# Patient Record
Sex: FEMALE | Race: ASIAN | NOT HISPANIC OR LATINO | Employment: UNEMPLOYED | URBAN - METROPOLITAN AREA
[De-identification: names, ages, dates, MRNs, and addresses within clinical notes are randomized per-mention and may not be internally consistent; named-entity substitution may affect disease eponyms.]

---

## 2017-01-05 ENCOUNTER — OFFICE VISIT - HEALTHEAST (OUTPATIENT)
Dept: FAMILY MEDICINE | Facility: CLINIC | Age: 67
End: 2017-01-05

## 2017-01-05 DIAGNOSIS — R93.89 ABNORMAL CHEST X-RAY: ICD-10-CM

## 2017-01-05 DIAGNOSIS — F32.A DEPRESSION (EMOTION): ICD-10-CM
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
## 2017-01-05 DIAGNOSIS — S22.089D CLOSED FRACTURE OF ELEVENTH THORACIC VERTEBRA WITH ROUTINE HEALING, UNSPECIFIED FRACTURE MORPHOLOGY, SUBSEQUENT ENCOUNTER: ICD-10-CM

## 2017-01-05 DIAGNOSIS — E78.5 HYPERLIPIDEMIA, UNSPECIFIED HYPERLIPIDEMIA TYPE: ICD-10-CM

## 2017-01-05 ASSESSMENT — MIFFLIN-ST. JEOR: SCORE: 967.58

## 2017-02-02 ENCOUNTER — OFFICE VISIT - HEALTHEAST (OUTPATIENT)
Dept: FAMILY MEDICINE | Facility: CLINIC | Age: 67
End: 2017-02-02

## 2017-02-02 DIAGNOSIS — E78.5 HYPERLIPIDEMIA, UNSPECIFIED HYPERLIPIDEMIA TYPE: ICD-10-CM

## 2017-02-02 DIAGNOSIS — S22.089D CLOSED FRACTURE OF ELEVENTH THORACIC VERTEBRA WITH ROUTINE HEALING, UNSPECIFIED FRACTURE MORPHOLOGY, SUBSEQUENT ENCOUNTER: ICD-10-CM

## 2017-02-02 DIAGNOSIS — F32.A DEPRESSION (EMOTION): ICD-10-CM

## 2017-02-02 DIAGNOSIS — K92.1 BLOOD IN STOOL: ICD-10-CM

## 2017-02-02 DIAGNOSIS — K64.9 HEMORRHOIDS, UNSPECIFIED HEMORRHOID TYPE: ICD-10-CM

## 2017-02-02 ASSESSMENT — MIFFLIN-ST. JEOR: SCORE: 939.51

## 2017-02-08 ENCOUNTER — AMBULATORY - HEALTHEAST (OUTPATIENT)
Dept: NURSING | Facility: CLINIC | Age: 67
End: 2017-02-08

## 2017-02-08 DIAGNOSIS — Z12.11 SCREENING FOR COLON CANCER: ICD-10-CM

## 2017-02-13 ENCOUNTER — RECORDS - HEALTHEAST (OUTPATIENT)
Dept: ADMINISTRATIVE | Facility: OTHER | Age: 67
End: 2017-02-13

## 2017-02-14 ENCOUNTER — RECORDS - HEALTHEAST (OUTPATIENT)
Dept: ADMINISTRATIVE | Facility: OTHER | Age: 67
End: 2017-02-14

## 2017-05-10 ENCOUNTER — OFFICE VISIT - HEALTHEAST (OUTPATIENT)
Dept: FAMILY MEDICINE | Facility: CLINIC | Age: 67
End: 2017-05-10

## 2017-05-10 DIAGNOSIS — Z23 IMMUNIZATION DUE: ICD-10-CM

## 2017-05-10 DIAGNOSIS — E78.5 HYPERLIPIDEMIA, UNSPECIFIED HYPERLIPIDEMIA TYPE: ICD-10-CM

## 2017-05-10 DIAGNOSIS — F32.A DEPRESSION (EMOTION): ICD-10-CM

## 2017-05-10 LAB
CHOLEST SERPL-MCNC: 230 MG/DL
FASTING STATUS PATIENT QL REPORTED: NO
HDLC SERPL-MCNC: 42 MG/DL
LDLC SERPL CALC-MCNC: 152 MG/DL
TRIGL SERPL-MCNC: 181 MG/DL

## 2017-05-10 ASSESSMENT — MIFFLIN-ST. JEOR: SCORE: 981.18

## 2017-05-31 ENCOUNTER — OFFICE VISIT - HEALTHEAST (OUTPATIENT)
Dept: FAMILY MEDICINE | Facility: CLINIC | Age: 67
End: 2017-05-31

## 2017-05-31 ENCOUNTER — RECORDS - HEALTHEAST (OUTPATIENT)
Dept: GENERAL RADIOLOGY | Facility: CLINIC | Age: 67
End: 2017-05-31

## 2017-05-31 DIAGNOSIS — R07.81 PLEURODYNIA: ICD-10-CM

## 2017-05-31 DIAGNOSIS — R07.81 RIB PAIN ON RIGHT SIDE: ICD-10-CM

## 2017-05-31 ASSESSMENT — MIFFLIN-ST. JEOR: SCORE: 974.72

## 2017-06-02 ENCOUNTER — OFFICE VISIT - HEALTHEAST (OUTPATIENT)
Dept: FAMILY MEDICINE | Facility: CLINIC | Age: 67
End: 2017-06-02

## 2017-06-02 DIAGNOSIS — R07.81 RIB PAIN ON RIGHT SIDE: ICD-10-CM

## 2017-06-02 ASSESSMENT — MIFFLIN-ST. JEOR: SCORE: 970.41

## 2017-07-26 ENCOUNTER — OFFICE VISIT - HEALTHEAST (OUTPATIENT)
Dept: FAMILY MEDICINE | Facility: CLINIC | Age: 67
End: 2017-07-26

## 2017-07-26 DIAGNOSIS — Z28.39 IMMUNIZATION DEFICIENCY: ICD-10-CM

## 2017-07-26 ASSESSMENT — MIFFLIN-ST. JEOR: SCORE: 978.35

## 2017-08-10 ENCOUNTER — OFFICE VISIT - HEALTHEAST (OUTPATIENT)
Dept: FAMILY MEDICINE | Facility: CLINIC | Age: 67
End: 2017-08-10

## 2017-08-10 DIAGNOSIS — R07.81 RIB PAIN ON RIGHT SIDE: ICD-10-CM

## 2017-08-10 DIAGNOSIS — F32.A DEPRESSION: ICD-10-CM

## 2017-08-10 DIAGNOSIS — L85.3 DRY SKIN DERMATITIS: ICD-10-CM

## 2017-08-10 DIAGNOSIS — E78.5 HYPERLIPIDEMIA, UNSPECIFIED HYPERLIPIDEMIA TYPE: ICD-10-CM

## 2017-08-10 LAB
CHOLEST SERPL-MCNC: 221 MG/DL
FASTING STATUS PATIENT QL REPORTED: NO
HDLC SERPL-MCNC: 35 MG/DL
LDLC SERPL CALC-MCNC: 117 MG/DL
TRIGL SERPL-MCNC: 345 MG/DL

## 2017-08-10 ASSESSMENT — MIFFLIN-ST. JEOR: SCORE: 980.9

## 2017-11-10 ENCOUNTER — OFFICE VISIT - HEALTHEAST (OUTPATIENT)
Dept: FAMILY MEDICINE | Facility: CLINIC | Age: 67
End: 2017-11-10

## 2017-11-10 DIAGNOSIS — L85.3 DRY SKIN DERMATITIS: ICD-10-CM

## 2017-11-10 DIAGNOSIS — E78.5 HYPERLIPIDEMIA, UNSPECIFIED HYPERLIPIDEMIA TYPE: ICD-10-CM

## 2017-11-10 DIAGNOSIS — M25.512 LEFT SHOULDER PAIN: ICD-10-CM

## 2017-11-10 DIAGNOSIS — Z23 NEED FOR INFLUENZA VACCINATION: ICD-10-CM

## 2017-11-10 LAB
ALT SERPL W P-5'-P-CCNC: 23 U/L (ref 0–45)
AST SERPL W P-5'-P-CCNC: 23 U/L (ref 0–40)
CHOLEST SERPL-MCNC: 229 MG/DL
FASTING STATUS PATIENT QL REPORTED: YES
HDLC SERPL-MCNC: 41 MG/DL
LDLC SERPL CALC-MCNC: 148 MG/DL
TRIGL SERPL-MCNC: 201 MG/DL

## 2017-11-10 ASSESSMENT — MIFFLIN-ST. JEOR: SCORE: 988.82

## 2017-12-22 ENCOUNTER — OFFICE VISIT - HEALTHEAST (OUTPATIENT)
Dept: FAMILY MEDICINE | Facility: CLINIC | Age: 67
End: 2017-12-22

## 2017-12-22 ENCOUNTER — AMBULATORY - HEALTHEAST (OUTPATIENT)
Dept: FAMILY MEDICINE | Facility: CLINIC | Age: 67
End: 2017-12-22

## 2017-12-22 ENCOUNTER — COMMUNICATION - HEALTHEAST (OUTPATIENT)
Dept: FAMILY MEDICINE | Facility: CLINIC | Age: 67
End: 2017-12-22

## 2017-12-22 DIAGNOSIS — M25.512 LEFT SHOULDER PAIN: ICD-10-CM

## 2017-12-22 DIAGNOSIS — E78.5 HYPERLIPIDEMIA, UNSPECIFIED HYPERLIPIDEMIA TYPE: ICD-10-CM

## 2017-12-22 ASSESSMENT — MIFFLIN-ST. JEOR: SCORE: 997.05

## 2018-02-02 ENCOUNTER — OFFICE VISIT - HEALTHEAST (OUTPATIENT)
Dept: FAMILY MEDICINE | Facility: CLINIC | Age: 68
End: 2018-02-02

## 2018-02-02 DIAGNOSIS — M25.512 LEFT SHOULDER PAIN: ICD-10-CM

## 2018-02-02 DIAGNOSIS — E78.5 HYPERLIPIDEMIA, UNSPECIFIED HYPERLIPIDEMIA TYPE: ICD-10-CM

## 2018-02-02 ASSESSMENT — MIFFLIN-ST. JEOR: SCORE: 1001.58

## 2018-02-06 ENCOUNTER — COMMUNICATION - HEALTHEAST (OUTPATIENT)
Dept: FAMILY MEDICINE | Facility: CLINIC | Age: 68
End: 2018-02-06

## 2018-02-06 DIAGNOSIS — E78.5 HYPERLIPIDEMIA, UNSPECIFIED HYPERLIPIDEMIA TYPE: ICD-10-CM

## 2018-02-28 ENCOUNTER — RECORDS - HEALTHEAST (OUTPATIENT)
Dept: MAMMOGRAPHY | Facility: CLINIC | Age: 68
End: 2018-02-28

## 2018-02-28 ENCOUNTER — OFFICE VISIT - HEALTHEAST (OUTPATIENT)
Dept: FAMILY MEDICINE | Facility: CLINIC | Age: 68
End: 2018-02-28

## 2018-02-28 DIAGNOSIS — R10.13 EPIGASTRIC PAIN: ICD-10-CM

## 2018-02-28 DIAGNOSIS — R03.0 ELEVATED BP WITHOUT DIAGNOSIS OF HYPERTENSION: ICD-10-CM

## 2018-02-28 DIAGNOSIS — Z12.31 ENCOUNTER FOR SCREENING MAMMOGRAM FOR MALIGNANT NEOPLASM OF BREAST: ICD-10-CM

## 2018-02-28 DIAGNOSIS — N64.4 BREAST PAIN IN FEMALE: ICD-10-CM

## 2018-02-28 ASSESSMENT — MIFFLIN-ST. JEOR: SCORE: 1015.19

## 2018-03-14 ENCOUNTER — OFFICE VISIT - HEALTHEAST (OUTPATIENT)
Dept: FAMILY MEDICINE | Facility: CLINIC | Age: 68
End: 2018-03-14

## 2018-03-14 DIAGNOSIS — N64.4 PAIN OF BOTH BREASTS: ICD-10-CM

## 2018-03-14 DIAGNOSIS — E78.5 HYPERLIPIDEMIA, UNSPECIFIED HYPERLIPIDEMIA TYPE: ICD-10-CM

## 2018-03-14 DIAGNOSIS — L85.3 DRY SKIN DERMATITIS: ICD-10-CM

## 2018-03-14 DIAGNOSIS — I10 ESSENTIAL HYPERTENSION: ICD-10-CM

## 2018-03-14 LAB
ALBUMIN SERPL-MCNC: 3.5 G/DL (ref 3.5–5)
ALP SERPL-CCNC: 70 U/L (ref 45–120)
ALT SERPL W P-5'-P-CCNC: 26 U/L (ref 0–45)
ANION GAP SERPL CALCULATED.3IONS-SCNC: 9 MMOL/L (ref 5–18)
AST SERPL W P-5'-P-CCNC: 23 U/L (ref 0–40)
BILIRUB SERPL-MCNC: 0.5 MG/DL (ref 0–1)
BUN SERPL-MCNC: 12 MG/DL (ref 8–22)
CALCIUM SERPL-MCNC: 9.3 MG/DL (ref 8.5–10.5)
CHLORIDE BLD-SCNC: 105 MMOL/L (ref 98–107)
CHOLEST SERPL-MCNC: 225 MG/DL
CO2 SERPL-SCNC: 28 MMOL/L (ref 22–31)
CREAT SERPL-MCNC: 0.83 MG/DL (ref 0.6–1.1)
FASTING STATUS PATIENT QL REPORTED: NO
GFR SERPL CREATININE-BSD FRML MDRD: >60 ML/MIN/1.73M2
GLUCOSE BLD-MCNC: 87 MG/DL (ref 70–125)
HDLC SERPL-MCNC: 41 MG/DL
LDLC SERPL CALC-MCNC: 154 MG/DL
POTASSIUM BLD-SCNC: 4.4 MMOL/L (ref 3.5–5)
PROT SERPL-MCNC: 6.8 G/DL (ref 6–8)
SODIUM SERPL-SCNC: 142 MMOL/L (ref 136–145)
TRIGL SERPL-MCNC: 151 MG/DL

## 2018-03-14 ASSESSMENT — MIFFLIN-ST. JEOR: SCORE: 1012.64

## 2018-05-16 ENCOUNTER — OFFICE VISIT - HEALTHEAST (OUTPATIENT)
Dept: FAMILY MEDICINE | Facility: CLINIC | Age: 68
End: 2018-05-16

## 2018-05-16 DIAGNOSIS — Z00.00 ROUTINE GENERAL MEDICAL EXAMINATION AT A HEALTH CARE FACILITY: ICD-10-CM

## 2018-05-16 DIAGNOSIS — Z23 IMMUNIZATION DUE: ICD-10-CM

## 2018-05-16 DIAGNOSIS — I10 ESSENTIAL HYPERTENSION: ICD-10-CM

## 2018-05-16 DIAGNOSIS — E78.5 HYPERLIPIDEMIA, UNSPECIFIED HYPERLIPIDEMIA TYPE: ICD-10-CM

## 2018-05-16 DIAGNOSIS — F32.A DEPRESSION: ICD-10-CM

## 2018-05-16 ASSESSMENT — MIFFLIN-ST. JEOR: SCORE: 1007.55

## 2018-06-13 ENCOUNTER — OFFICE VISIT - HEALTHEAST (OUTPATIENT)
Dept: FAMILY MEDICINE | Facility: CLINIC | Age: 68
End: 2018-06-13

## 2018-06-13 DIAGNOSIS — I10 ESSENTIAL HYPERTENSION: ICD-10-CM

## 2018-06-13 DIAGNOSIS — E78.5 HYPERLIPIDEMIA, UNSPECIFIED HYPERLIPIDEMIA TYPE: ICD-10-CM

## 2018-06-13 DIAGNOSIS — F32.1 MODERATE SINGLE CURRENT EPISODE OF MAJOR DEPRESSIVE DISORDER (H): ICD-10-CM

## 2018-06-13 DIAGNOSIS — R91.8 PULMONARY NODULES: ICD-10-CM

## 2018-06-13 DIAGNOSIS — R05.9 COUGH: ICD-10-CM

## 2018-06-13 ASSESSMENT — MIFFLIN-ST. JEOR: SCORE: 1003.58

## 2018-07-12 ENCOUNTER — COMMUNICATION - HEALTHEAST (OUTPATIENT)
Dept: FAMILY MEDICINE | Facility: CLINIC | Age: 68
End: 2018-07-12

## 2018-07-13 ENCOUNTER — RECORDS - HEALTHEAST (OUTPATIENT)
Dept: ADMINISTRATIVE | Facility: OTHER | Age: 68
End: 2018-07-13

## 2018-08-15 ENCOUNTER — OFFICE VISIT - HEALTHEAST (OUTPATIENT)
Dept: FAMILY MEDICINE | Facility: CLINIC | Age: 68
End: 2018-08-15

## 2018-08-15 DIAGNOSIS — I10 ESSENTIAL HYPERTENSION: ICD-10-CM

## 2018-08-15 DIAGNOSIS — E78.5 HYPERLIPIDEMIA, UNSPECIFIED HYPERLIPIDEMIA TYPE: ICD-10-CM

## 2018-08-15 DIAGNOSIS — F32.1 MODERATE SINGLE CURRENT EPISODE OF MAJOR DEPRESSIVE DISORDER (H): ICD-10-CM

## 2018-08-15 DIAGNOSIS — R20.8 BURNING SENSATION OF FEET: ICD-10-CM

## 2018-08-15 DIAGNOSIS — R05.9 COUGH: ICD-10-CM

## 2018-08-15 LAB
CHOLEST SERPL-MCNC: 203 MG/DL
FASTING STATUS PATIENT QL REPORTED: NO
HDLC SERPL-MCNC: 36 MG/DL
LDLC SERPL CALC-MCNC: 127 MG/DL
TRIGL SERPL-MCNC: 199 MG/DL

## 2018-08-15 ASSESSMENT — MIFFLIN-ST. JEOR: SCORE: 1011.52

## 2018-09-26 ENCOUNTER — OFFICE VISIT - HEALTHEAST (OUTPATIENT)
Dept: FAMILY MEDICINE | Facility: CLINIC | Age: 68
End: 2018-09-26

## 2018-09-26 DIAGNOSIS — I10 ESSENTIAL HYPERTENSION: ICD-10-CM

## 2018-09-26 DIAGNOSIS — E78.5 HYPERLIPIDEMIA, UNSPECIFIED HYPERLIPIDEMIA TYPE: ICD-10-CM

## 2018-09-26 DIAGNOSIS — D22.9 ATYPICAL NEVI: ICD-10-CM

## 2018-09-26 DIAGNOSIS — R05.9 COUGH: ICD-10-CM

## 2018-09-26 DIAGNOSIS — Z23 NEED FOR INFLUENZA VACCINATION: ICD-10-CM

## 2018-09-26 DIAGNOSIS — R20.8 BURNING SENSATION IN LOWER EXTREMITY: ICD-10-CM

## 2018-09-26 ASSESSMENT — MIFFLIN-ST. JEOR: SCORE: 1015.77

## 2018-10-29 ENCOUNTER — RECORDS - HEALTHEAST (OUTPATIENT)
Dept: ADMINISTRATIVE | Facility: OTHER | Age: 68
End: 2018-10-29

## 2018-11-05 ENCOUNTER — COMMUNICATION - HEALTHEAST (OUTPATIENT)
Dept: FAMILY MEDICINE | Facility: CLINIC | Age: 68
End: 2018-11-05

## 2018-11-05 DIAGNOSIS — F32.A DEPRESSION: ICD-10-CM

## 2018-11-07 ENCOUNTER — COMMUNICATION - HEALTHEAST (OUTPATIENT)
Dept: FAMILY MEDICINE | Facility: CLINIC | Age: 68
End: 2018-11-07

## 2018-11-07 ENCOUNTER — OFFICE VISIT - HEALTHEAST (OUTPATIENT)
Dept: FAMILY MEDICINE | Facility: CLINIC | Age: 68
End: 2018-11-07

## 2018-11-07 DIAGNOSIS — F32.1 MODERATE SINGLE CURRENT EPISODE OF MAJOR DEPRESSIVE DISORDER (H): ICD-10-CM

## 2018-11-07 DIAGNOSIS — L85.3 DRY SKIN: ICD-10-CM

## 2018-11-07 DIAGNOSIS — I10 ESSENTIAL HYPERTENSION: ICD-10-CM

## 2018-11-07 DIAGNOSIS — C44.300 SKIN CANCER OF FACE: ICD-10-CM

## 2018-11-07 DIAGNOSIS — E78.5 HYPERLIPIDEMIA, UNSPECIFIED HYPERLIPIDEMIA TYPE: ICD-10-CM

## 2018-11-07 LAB
CHOLEST SERPL-MCNC: 242 MG/DL
FASTING STATUS PATIENT QL REPORTED: YES
HDLC SERPL-MCNC: 37 MG/DL
LDLC SERPL CALC-MCNC: 168 MG/DL
TRIGL SERPL-MCNC: 185 MG/DL

## 2018-11-07 ASSESSMENT — MIFFLIN-ST. JEOR: SCORE: 1011.94

## 2018-11-15 ENCOUNTER — RECORDS - HEALTHEAST (OUTPATIENT)
Dept: ADMINISTRATIVE | Facility: OTHER | Age: 68
End: 2018-11-15

## 2019-01-03 ENCOUNTER — OFFICE VISIT - HEALTHEAST (OUTPATIENT)
Dept: FAMILY MEDICINE | Facility: CLINIC | Age: 69
End: 2019-01-03

## 2019-01-03 DIAGNOSIS — I10 ESSENTIAL HYPERTENSION: ICD-10-CM

## 2019-01-03 DIAGNOSIS — C44.300 SKIN CANCER OF FACE: ICD-10-CM

## 2019-01-03 DIAGNOSIS — F32.1 MODERATE SINGLE CURRENT EPISODE OF MAJOR DEPRESSIVE DISORDER (H): ICD-10-CM

## 2019-01-03 DIAGNOSIS — E78.5 HYPERLIPIDEMIA, UNSPECIFIED HYPERLIPIDEMIA TYPE: ICD-10-CM

## 2019-01-03 DIAGNOSIS — L30.9 DERMATITIS: ICD-10-CM

## 2019-01-03 ASSESSMENT — MIFFLIN-ST. JEOR: SCORE: 1026.29

## 2019-02-05 ENCOUNTER — COMMUNICATION - HEALTHEAST (OUTPATIENT)
Dept: FAMILY MEDICINE | Facility: CLINIC | Age: 69
End: 2019-02-05

## 2019-02-05 DIAGNOSIS — E78.5 HYPERLIPIDEMIA, UNSPECIFIED HYPERLIPIDEMIA TYPE: ICD-10-CM

## 2019-03-12 ENCOUNTER — COMMUNICATION - HEALTHEAST (OUTPATIENT)
Dept: FAMILY MEDICINE | Facility: CLINIC | Age: 69
End: 2019-03-12

## 2019-03-12 DIAGNOSIS — E78.5 HYPERLIPIDEMIA, UNSPECIFIED HYPERLIPIDEMIA TYPE: ICD-10-CM

## 2019-03-12 DIAGNOSIS — L30.9 DERMATITIS: ICD-10-CM

## 2019-05-14 ENCOUNTER — COMMUNICATION - HEALTHEAST (OUTPATIENT)
Dept: FAMILY MEDICINE | Facility: CLINIC | Age: 69
End: 2019-05-14

## 2019-05-15 ENCOUNTER — OFFICE VISIT - HEALTHEAST (OUTPATIENT)
Dept: FAMILY MEDICINE | Facility: CLINIC | Age: 69
End: 2019-05-15

## 2019-05-15 DIAGNOSIS — R42 DIZZINESS: ICD-10-CM

## 2019-05-15 DIAGNOSIS — I10 ESSENTIAL HYPERTENSION: ICD-10-CM

## 2019-05-15 DIAGNOSIS — E78.5 HYPERLIPIDEMIA, UNSPECIFIED HYPERLIPIDEMIA TYPE: ICD-10-CM

## 2019-05-15 DIAGNOSIS — Z91.148 NONCOMPLIANCE WITH MEDICATION REGIMEN: ICD-10-CM

## 2019-05-15 LAB
ANION GAP SERPL CALCULATED.3IONS-SCNC: 13 MMOL/L (ref 5–18)
BUN SERPL-MCNC: 12 MG/DL (ref 8–22)
CALCIUM SERPL-MCNC: 9.9 MG/DL (ref 8.5–10.5)
CHLORIDE BLD-SCNC: 101 MMOL/L (ref 98–107)
CO2 SERPL-SCNC: 25 MMOL/L (ref 22–31)
CREAT SERPL-MCNC: 1.17 MG/DL (ref 0.6–1.1)
GFR SERPL CREATININE-BSD FRML MDRD: 46 ML/MIN/1.73M2
GLUCOSE BLD-MCNC: 220 MG/DL (ref 70–125)
POTASSIUM BLD-SCNC: 4.3 MMOL/L (ref 3.5–5)
SODIUM SERPL-SCNC: 139 MMOL/L (ref 136–145)

## 2019-05-15 ASSESSMENT — MIFFLIN-ST. JEOR: SCORE: 1034.79

## 2019-05-24 ENCOUNTER — OFFICE VISIT - HEALTHEAST (OUTPATIENT)
Dept: FAMILY MEDICINE | Facility: CLINIC | Age: 69
End: 2019-05-24

## 2019-05-24 DIAGNOSIS — L20.84 INTRINSIC ECZEMA: ICD-10-CM

## 2019-05-24 DIAGNOSIS — M25.512 CHRONIC LEFT SHOULDER PAIN: ICD-10-CM

## 2019-05-24 DIAGNOSIS — G89.29 CHRONIC LEFT SHOULDER PAIN: ICD-10-CM

## 2019-05-24 DIAGNOSIS — L85.3 DRY SKIN DERMATITIS: ICD-10-CM

## 2019-05-24 DIAGNOSIS — E78.5 HYPERLIPIDEMIA, UNSPECIFIED HYPERLIPIDEMIA TYPE: ICD-10-CM

## 2019-05-24 DIAGNOSIS — F32.1 MODERATE SINGLE CURRENT EPISODE OF MAJOR DEPRESSIVE DISORDER (H): ICD-10-CM

## 2019-05-24 RX ORDER — PETROLATUM 0.61 G/G
CREAM TOPICAL
Qty: 120 G | Refills: 3 | Status: SHIPPED | OUTPATIENT
Start: 2019-05-24 | End: 2023-04-11

## 2019-05-24 ASSESSMENT — MIFFLIN-ST. JEOR: SCORE: 1035.93

## 2019-06-18 ENCOUNTER — COMMUNICATION - HEALTHEAST (OUTPATIENT)
Dept: FAMILY MEDICINE | Facility: CLINIC | Age: 69
End: 2019-06-18

## 2019-06-18 ENCOUNTER — OFFICE VISIT - HEALTHEAST (OUTPATIENT)
Dept: FAMILY MEDICINE | Facility: CLINIC | Age: 69
End: 2019-06-18

## 2019-06-18 DIAGNOSIS — F32.1 MODERATE SINGLE CURRENT EPISODE OF MAJOR DEPRESSIVE DISORDER (H): ICD-10-CM

## 2019-06-18 DIAGNOSIS — I10 ESSENTIAL HYPERTENSION: ICD-10-CM

## 2019-06-18 DIAGNOSIS — R73.9 ELEVATED BLOOD SUGAR: ICD-10-CM

## 2019-06-18 DIAGNOSIS — R07.89 TIGHTNESS IN CHEST: ICD-10-CM

## 2019-06-18 DIAGNOSIS — M54.2 NECK PAIN: ICD-10-CM

## 2019-06-18 DIAGNOSIS — E78.5 HYPERLIPIDEMIA, UNSPECIFIED HYPERLIPIDEMIA TYPE: ICD-10-CM

## 2019-06-18 LAB
ANION GAP SERPL CALCULATED.3IONS-SCNC: 12 MMOL/L (ref 5–18)
BUN SERPL-MCNC: 14 MG/DL (ref 8–22)
CALCIUM SERPL-MCNC: 9.1 MG/DL (ref 8.5–10.5)
CHLORIDE BLD-SCNC: 104 MMOL/L (ref 98–107)
CHOLEST SERPL-MCNC: 206 MG/DL
CO2 SERPL-SCNC: 26 MMOL/L (ref 22–31)
CREAT SERPL-MCNC: 1.04 MG/DL (ref 0.6–1.1)
FASTING STATUS PATIENT QL REPORTED: NO
GFR SERPL CREATININE-BSD FRML MDRD: 53 ML/MIN/1.73M2
GLUCOSE BLD-MCNC: 98 MG/DL (ref 70–125)
HBA1C MFR BLD: 6.3 % (ref 3.5–6)
HDLC SERPL-MCNC: 34 MG/DL
LDLC SERPL CALC-MCNC: 118 MG/DL
POTASSIUM BLD-SCNC: 4.5 MMOL/L (ref 3.5–5)
SODIUM SERPL-SCNC: 142 MMOL/L (ref 136–145)
TRIGL SERPL-MCNC: 269 MG/DL

## 2019-06-18 ASSESSMENT — MIFFLIN-ST. JEOR: SCORE: 1044.99

## 2019-06-24 ENCOUNTER — HOSPITAL ENCOUNTER (OUTPATIENT)
Dept: CARDIOLOGY | Facility: HOSPITAL | Age: 69
Discharge: HOME | End: 2019-06-24
Attending: FAMILY MEDICINE

## 2019-06-24 DIAGNOSIS — R07.89 TIGHTNESS IN CHEST: ICD-10-CM

## 2019-06-24 LAB
CV STRESS CURRENT BP HE: NORMAL
CV STRESS CURRENT HR HE: 104
CV STRESS CURRENT HR HE: 107
CV STRESS CURRENT HR HE: 108
CV STRESS CURRENT HR HE: 114
CV STRESS CURRENT HR HE: 116
CV STRESS CURRENT HR HE: 118
CV STRESS CURRENT HR HE: 127
CV STRESS CURRENT HR HE: 130
CV STRESS CURRENT HR HE: 132
CV STRESS CURRENT HR HE: 59
CV STRESS CURRENT HR HE: 62
CV STRESS CURRENT HR HE: 62
CV STRESS CURRENT HR HE: 63
CV STRESS CURRENT HR HE: 64
CV STRESS CURRENT HR HE: 66
CV STRESS CURRENT HR HE: 67
CV STRESS CURRENT HR HE: 70
CV STRESS CURRENT HR HE: 75
CV STRESS CURRENT HR HE: 87
CV STRESS CURRENT HR HE: 91
CV STRESS CURRENT HR HE: 92
CV STRESS DEVIATION TIME HE: NORMAL
CV STRESS ECHO PERCENT HR HE: NORMAL
CV STRESS EXERCISE STAGE HE: NORMAL
CV STRESS EXERCISE STAGE REACHED HE: NORMAL
CV STRESS FINAL RESTING BP HE: NORMAL
CV STRESS FINAL RESTING HR HE: 59
CV STRESS MAX HR HE: 132
CV STRESS MAX TREADMILL GRADE HE: 5
CV STRESS MAX TREADMILL SPEED HE: 1.7
CV STRESS PEAK DIA BP HE: NORMAL
CV STRESS PEAK SYS BP HE: NORMAL
CV STRESS PHASE HE: NORMAL
CV STRESS PROTOCOL HE: NORMAL
CV STRESS RESTING PT POSITION HE: NORMAL
CV STRESS RESTING PT POSITION HE: NORMAL
CV STRESS ST DEVIATION AMOUNT HE: NORMAL
CV STRESS ST DEVIATION ELEVATION HE: NORMAL
CV STRESS ST EVELATION AMOUNT HE: NORMAL
CV STRESS TEST TYPE HE: NORMAL
CV STRESS TOTAL STAGE TIME MIN 1 HE: NORMAL
STRESS ECHO BASELINE BP: NORMAL
STRESS ECHO BASELINE HR: 65
STRESS ECHO CALCULATED PERCENT HR: 86 %
STRESS ECHO LAST STRESS BP: NORMAL
STRESS ECHO LAST STRESS HR: 130
STRESS ECHO POST ESTIMATED WORKLOAD: 3.5
STRESS ECHO POST EXERCISE DUR MIN: 4
STRESS ECHO POST EXERCISE DUR SEC: 19
STRESS ECHO TARGET HR: 131.58

## 2019-08-13 ENCOUNTER — OFFICE VISIT - HEALTHEAST (OUTPATIENT)
Dept: FAMILY MEDICINE | Facility: CLINIC | Age: 69
End: 2019-08-13

## 2019-08-13 ENCOUNTER — AMBULATORY - HEALTHEAST (OUTPATIENT)
Dept: FAMILY MEDICINE | Facility: CLINIC | Age: 69
End: 2019-08-13

## 2019-08-13 DIAGNOSIS — Z00.00 WELLNESS EXAMINATION: ICD-10-CM

## 2019-08-13 DIAGNOSIS — N95.1 MENOPAUSAL STATE: ICD-10-CM

## 2019-08-13 DIAGNOSIS — F32.1 MODERATE SINGLE CURRENT EPISODE OF MAJOR DEPRESSIVE DISORDER (H): ICD-10-CM

## 2019-08-13 ASSESSMENT — MIFFLIN-ST. JEOR: SCORE: 1036.47

## 2019-08-21 ENCOUNTER — OFFICE VISIT - HEALTHEAST (OUTPATIENT)
Dept: BEHAVIORAL HEALTH | Facility: CLINIC | Age: 69
End: 2019-08-21

## 2019-08-21 ENCOUNTER — AMBULATORY - HEALTHEAST (OUTPATIENT)
Dept: FAMILY MEDICINE | Facility: CLINIC | Age: 69
End: 2019-08-21

## 2019-08-21 ENCOUNTER — AMBULATORY - HEALTHEAST (OUTPATIENT)
Dept: PHARMACY | Facility: CLINIC | Age: 69
End: 2019-08-21

## 2019-08-21 DIAGNOSIS — F32.1 MODERATE SINGLE CURRENT EPISODE OF MAJOR DEPRESSIVE DISORDER (H): ICD-10-CM

## 2019-08-26 ENCOUNTER — AMBULATORY - HEALTHEAST (OUTPATIENT)
Dept: FAMILY MEDICINE | Facility: CLINIC | Age: 69
End: 2019-08-26

## 2019-08-26 DIAGNOSIS — M81.0 AGE-RELATED OSTEOPOROSIS WITHOUT CURRENT PATHOLOGICAL FRACTURE: ICD-10-CM

## 2019-09-04 ENCOUNTER — COMMUNICATION - HEALTHEAST (OUTPATIENT)
Dept: FAMILY MEDICINE | Facility: CLINIC | Age: 69
End: 2019-09-04

## 2019-09-04 ENCOUNTER — OFFICE VISIT - HEALTHEAST (OUTPATIENT)
Dept: PHARMACY | Facility: CLINIC | Age: 69
End: 2019-09-04

## 2019-09-04 ENCOUNTER — OFFICE VISIT - HEALTHEAST (OUTPATIENT)
Dept: BEHAVIORAL HEALTH | Facility: CLINIC | Age: 69
End: 2019-09-04

## 2019-09-04 DIAGNOSIS — M25.512 CHRONIC LEFT SHOULDER PAIN: ICD-10-CM

## 2019-09-04 DIAGNOSIS — F32.1 MODERATE SINGLE CURRENT EPISODE OF MAJOR DEPRESSIVE DISORDER (H): ICD-10-CM

## 2019-09-04 DIAGNOSIS — M81.0 AGE-RELATED OSTEOPOROSIS WITHOUT CURRENT PATHOLOGICAL FRACTURE: ICD-10-CM

## 2019-09-04 DIAGNOSIS — I10 ESSENTIAL HYPERTENSION: ICD-10-CM

## 2019-09-04 DIAGNOSIS — R52 PAIN: ICD-10-CM

## 2019-09-04 DIAGNOSIS — E78.5 HYPERLIPIDEMIA, UNSPECIFIED HYPERLIPIDEMIA TYPE: ICD-10-CM

## 2019-09-04 DIAGNOSIS — G89.29 CHRONIC LEFT SHOULDER PAIN: ICD-10-CM

## 2019-10-04 ENCOUNTER — COMMUNICATION - HEALTHEAST (OUTPATIENT)
Dept: FAMILY MEDICINE | Facility: CLINIC | Age: 69
End: 2019-10-04

## 2019-10-04 DIAGNOSIS — I10 ESSENTIAL HYPERTENSION: ICD-10-CM

## 2019-10-08 ENCOUNTER — AMBULATORY - HEALTHEAST (OUTPATIENT)
Dept: OTHER | Facility: CLINIC | Age: 69
End: 2019-10-08

## 2019-10-08 ENCOUNTER — DOCUMENTATION ONLY (OUTPATIENT)
Dept: OTHER | Facility: CLINIC | Age: 69
End: 2019-10-08

## 2019-10-08 ENCOUNTER — OFFICE VISIT - HEALTHEAST (OUTPATIENT)
Dept: PHARMACY | Facility: CLINIC | Age: 69
End: 2019-10-08

## 2019-10-08 DIAGNOSIS — I10 ESSENTIAL HYPERTENSION: ICD-10-CM

## 2019-10-08 DIAGNOSIS — E78.5 HYPERLIPIDEMIA, UNSPECIFIED HYPERLIPIDEMIA TYPE: ICD-10-CM

## 2019-10-08 DIAGNOSIS — R52 PAIN: ICD-10-CM

## 2019-10-08 DIAGNOSIS — M81.0 AGE-RELATED OSTEOPOROSIS WITHOUT CURRENT PATHOLOGICAL FRACTURE: ICD-10-CM

## 2019-10-08 DIAGNOSIS — F32.1 MODERATE SINGLE CURRENT EPISODE OF MAJOR DEPRESSIVE DISORDER (H): ICD-10-CM

## 2019-10-15 ENCOUNTER — OFFICE VISIT - HEALTHEAST (OUTPATIENT)
Dept: BEHAVIORAL HEALTH | Facility: CLINIC | Age: 69
End: 2019-10-15

## 2019-10-15 ENCOUNTER — OFFICE VISIT - HEALTHEAST (OUTPATIENT)
Dept: FAMILY MEDICINE | Facility: CLINIC | Age: 69
End: 2019-10-15

## 2019-10-15 DIAGNOSIS — F32.1 MODERATE SINGLE CURRENT EPISODE OF MAJOR DEPRESSIVE DISORDER (H): ICD-10-CM

## 2019-10-15 DIAGNOSIS — M81.0 AGE-RELATED OSTEOPOROSIS WITHOUT CURRENT PATHOLOGICAL FRACTURE: ICD-10-CM

## 2019-10-15 DIAGNOSIS — E78.5 HYPERLIPIDEMIA, UNSPECIFIED HYPERLIPIDEMIA TYPE: ICD-10-CM

## 2019-10-15 DIAGNOSIS — R73.03 PREDIABETES: ICD-10-CM

## 2019-10-15 DIAGNOSIS — Z23 IMMUNIZATION DUE: ICD-10-CM

## 2019-10-15 DIAGNOSIS — I10 ESSENTIAL HYPERTENSION: ICD-10-CM

## 2019-10-15 DIAGNOSIS — E11.9 TYPE 2 DIABETES MELLITUS TREATED WITHOUT INSULIN (H): ICD-10-CM

## 2019-10-15 LAB — HBA1C MFR BLD: 6.7 % (ref 3.5–6)

## 2019-10-15 ASSESSMENT — MIFFLIN-ST. JEOR: SCORE: 1050.07

## 2019-11-01 ENCOUNTER — OFFICE VISIT - HEALTHEAST (OUTPATIENT)
Dept: FAMILY MEDICINE | Facility: CLINIC | Age: 69
End: 2019-11-01

## 2019-11-01 ENCOUNTER — OFFICE VISIT - HEALTHEAST (OUTPATIENT)
Dept: PHARMACY | Facility: CLINIC | Age: 69
End: 2019-11-01

## 2019-11-01 DIAGNOSIS — E11.9 TYPE 2 DIABETES MELLITUS TREATED WITHOUT INSULIN (H): ICD-10-CM

## 2019-11-01 DIAGNOSIS — R30.0 DYSURIA: ICD-10-CM

## 2019-11-01 DIAGNOSIS — I95.9 HYPOTENSION, UNSPECIFIED HYPOTENSION TYPE: ICD-10-CM

## 2019-11-01 LAB
ANION GAP SERPL CALCULATED.3IONS-SCNC: 9 MMOL/L (ref 5–18)
BASOPHILS # BLD AUTO: 0.1 THOU/UL (ref 0–0.2)
BASOPHILS NFR BLD AUTO: 1 % (ref 0–2)
BUN SERPL-MCNC: 16 MG/DL (ref 8–22)
CALCIUM SERPL-MCNC: 9.1 MG/DL (ref 8.5–10.5)
CHLORIDE BLD-SCNC: 100 MMOL/L (ref 98–107)
CHOLEST SERPL-MCNC: 271 MG/DL
CO2 SERPL-SCNC: 29 MMOL/L (ref 22–31)
CREAT SERPL-MCNC: 1.42 MG/DL (ref 0.6–1.1)
EOSINOPHIL # BLD AUTO: 0.3 THOU/UL (ref 0–0.4)
EOSINOPHIL NFR BLD AUTO: 3 % (ref 0–6)
ERYTHROCYTE [DISTWIDTH] IN BLOOD BY AUTOMATED COUNT: 10.8 % (ref 11–14.5)
FASTING STATUS PATIENT QL REPORTED: NO
FASTING STATUS PATIENT QL REPORTED: YES
GFR SERPL CREATININE-BSD FRML MDRD: 37 ML/MIN/1.73M2
GLUCOSE BLD-MCNC: 106 MG/DL (ref 74–125)
GLUCOSE BLD-MCNC: 112 MG/DL (ref 70–125)
HCT VFR BLD AUTO: 41.3 % (ref 35–47)
HDLC SERPL-MCNC: 35 MG/DL
HGB BLD-MCNC: 13.8 G/DL (ref 12–16)
LDLC SERPL CALC-MCNC: 202 MG/DL
LYMPHOCYTES # BLD AUTO: 3.1 THOU/UL (ref 0.8–4.4)
LYMPHOCYTES NFR BLD AUTO: 26 % (ref 20–40)
MCH RBC QN AUTO: 32.5 PG (ref 27–34)
MCHC RBC AUTO-ENTMCNC: 33.5 G/DL (ref 32–36)
MCV RBC AUTO: 97 FL (ref 80–100)
MONOCYTES # BLD AUTO: 1 THOU/UL (ref 0–0.9)
MONOCYTES NFR BLD AUTO: 8 % (ref 2–10)
NEUTROPHILS # BLD AUTO: 7.7 THOU/UL (ref 2–7.7)
NEUTROPHILS NFR BLD AUTO: 63 % (ref 50–70)
PLATELET # BLD AUTO: 319 THOU/UL (ref 140–440)
PMV BLD AUTO: 6.7 FL (ref 7–10)
POTASSIUM BLD-SCNC: 4.7 MMOL/L (ref 3.5–5)
RBC # BLD AUTO: 4.25 MILL/UL (ref 3.8–5.4)
SODIUM SERPL-SCNC: 138 MMOL/L (ref 136–145)
TRIGL SERPL-MCNC: 168 MG/DL
WBC: 12.3 THOU/UL (ref 4–11)

## 2019-11-01 ASSESSMENT — MIFFLIN-ST. JEOR: SCORE: 1042.99

## 2019-11-05 ENCOUNTER — COMMUNICATION - HEALTHEAST (OUTPATIENT)
Dept: FAMILY MEDICINE | Facility: CLINIC | Age: 69
End: 2019-11-05

## 2019-11-07 ENCOUNTER — COMMUNICATION - HEALTHEAST (OUTPATIENT)
Dept: FAMILY MEDICINE | Facility: CLINIC | Age: 69
End: 2019-11-07

## 2019-11-07 ENCOUNTER — AMBULATORY - HEALTHEAST (OUTPATIENT)
Dept: FAMILY MEDICINE | Facility: CLINIC | Age: 69
End: 2019-11-07

## 2019-11-07 DIAGNOSIS — E78.5 HYPERLIPIDEMIA, UNSPECIFIED HYPERLIPIDEMIA TYPE: ICD-10-CM

## 2019-11-07 DIAGNOSIS — L20.84 INTRINSIC ECZEMA: ICD-10-CM

## 2019-11-20 ENCOUNTER — COMMUNICATION - HEALTHEAST (OUTPATIENT)
Dept: FAMILY MEDICINE | Facility: CLINIC | Age: 69
End: 2019-11-20

## 2019-11-25 ENCOUNTER — OFFICE VISIT - HEALTHEAST (OUTPATIENT)
Dept: BEHAVIORAL HEALTH | Facility: CLINIC | Age: 69
End: 2019-11-25

## 2019-11-25 DIAGNOSIS — F32.1 MODERATE SINGLE CURRENT EPISODE OF MAJOR DEPRESSIVE DISORDER (H): ICD-10-CM

## 2019-12-03 ENCOUNTER — COMMUNICATION - HEALTHEAST (OUTPATIENT)
Dept: FAMILY MEDICINE | Facility: CLINIC | Age: 69
End: 2019-12-03

## 2019-12-03 ENCOUNTER — OFFICE VISIT - HEALTHEAST (OUTPATIENT)
Dept: FAMILY MEDICINE | Facility: CLINIC | Age: 69
End: 2019-12-03

## 2019-12-03 DIAGNOSIS — N12 PYELONEPHRITIS: ICD-10-CM

## 2019-12-03 DIAGNOSIS — E11.9 TYPE 2 DIABETES MELLITUS TREATED WITHOUT INSULIN (H): ICD-10-CM

## 2019-12-03 DIAGNOSIS — M81.0 OSTEOPOROSIS, UNSPECIFIED OSTEOPOROSIS TYPE, UNSPECIFIED PATHOLOGICAL FRACTURE PRESENCE: ICD-10-CM

## 2019-12-03 DIAGNOSIS — I10 ESSENTIAL HYPERTENSION: ICD-10-CM

## 2019-12-03 DIAGNOSIS — E78.5 HYPERLIPIDEMIA, UNSPECIFIED HYPERLIPIDEMIA TYPE: ICD-10-CM

## 2019-12-03 DIAGNOSIS — R32 URINARY INCONTINENCE, UNSPECIFIED TYPE: ICD-10-CM

## 2019-12-03 LAB
ANION GAP SERPL CALCULATED.3IONS-SCNC: 8 MMOL/L (ref 5–18)
BUN SERPL-MCNC: 13 MG/DL (ref 8–22)
CALCIUM SERPL-MCNC: 9.1 MG/DL (ref 8.5–10.5)
CHLORIDE BLD-SCNC: 103 MMOL/L (ref 98–107)
CO2 SERPL-SCNC: 30 MMOL/L (ref 22–31)
CREAT SERPL-MCNC: 1.19 MG/DL (ref 0.6–1.1)
GFR SERPL CREATININE-BSD FRML MDRD: 45 ML/MIN/1.73M2
GLUCOSE BLD-MCNC: 141 MG/DL (ref 70–125)
POTASSIUM BLD-SCNC: 4.4 MMOL/L (ref 3.5–5)
SODIUM SERPL-SCNC: 141 MMOL/L (ref 136–145)

## 2019-12-03 ASSESSMENT — MIFFLIN-ST. JEOR: SCORE: 1048.34

## 2019-12-04 ENCOUNTER — OFFICE VISIT - HEALTHEAST (OUTPATIENT)
Dept: PHARMACY | Facility: CLINIC | Age: 69
End: 2019-12-04

## 2019-12-04 DIAGNOSIS — E78.5 HYPERLIPIDEMIA, UNSPECIFIED HYPERLIPIDEMIA TYPE: ICD-10-CM

## 2019-12-04 DIAGNOSIS — F32.1 MODERATE SINGLE CURRENT EPISODE OF MAJOR DEPRESSIVE DISORDER (H): ICD-10-CM

## 2019-12-04 DIAGNOSIS — R52 PAIN: ICD-10-CM

## 2019-12-04 DIAGNOSIS — M81.0 AGE-RELATED OSTEOPOROSIS WITHOUT CURRENT PATHOLOGICAL FRACTURE: ICD-10-CM

## 2019-12-04 DIAGNOSIS — I10 ESSENTIAL HYPERTENSION: ICD-10-CM

## 2019-12-04 DIAGNOSIS — E11.9 TYPE 2 DIABETES MELLITUS TREATED WITHOUT INSULIN (H): ICD-10-CM

## 2019-12-04 LAB
ALBUMIN UR-MCNC: NEGATIVE MG/DL
APPEARANCE UR: CLEAR
BACTERIA #/AREA URNS HPF: ABNORMAL HPF
BILIRUB UR QL STRIP: NEGATIVE
COLOR UR AUTO: YELLOW
GLUCOSE UR STRIP-MCNC: NEGATIVE MG/DL
HGB UR QL STRIP: ABNORMAL
KETONES UR STRIP-MCNC: NEGATIVE MG/DL
LEUKOCYTE ESTERASE UR QL STRIP: NEGATIVE
NITRATE UR QL: NEGATIVE
PH UR STRIP: 5.5 [PH] (ref 5–8)
RBC #/AREA URNS AUTO: ABNORMAL HPF
SP GR UR STRIP: 1.01 (ref 1–1.03)
SQUAMOUS #/AREA URNS AUTO: ABNORMAL LPF
UROBILINOGEN UR STRIP-ACNC: ABNORMAL
WBC #/AREA URNS AUTO: ABNORMAL HPF

## 2019-12-05 ENCOUNTER — COMMUNICATION - HEALTHEAST (OUTPATIENT)
Dept: FAMILY MEDICINE | Facility: CLINIC | Age: 69
End: 2019-12-05

## 2019-12-07 ENCOUNTER — COMMUNICATION - HEALTHEAST (OUTPATIENT)
Dept: FAMILY MEDICINE | Facility: CLINIC | Age: 69
End: 2019-12-07

## 2019-12-07 DIAGNOSIS — L20.84 INTRINSIC ECZEMA: ICD-10-CM

## 2019-12-10 ENCOUNTER — RECORDS - HEALTHEAST (OUTPATIENT)
Dept: ADMINISTRATIVE | Facility: OTHER | Age: 69
End: 2019-12-10

## 2019-12-13 ENCOUNTER — AMBULATORY - HEALTHEAST (OUTPATIENT)
Dept: FAMILY MEDICINE | Facility: CLINIC | Age: 69
End: 2019-12-13

## 2019-12-13 DIAGNOSIS — R32 URINARY INCONTINENCE, UNSPECIFIED TYPE: ICD-10-CM

## 2020-01-06 ENCOUNTER — COMMUNICATION - HEALTHEAST (OUTPATIENT)
Dept: FAMILY MEDICINE | Facility: CLINIC | Age: 70
End: 2020-01-06

## 2020-01-06 DIAGNOSIS — F32.1 MODERATE SINGLE CURRENT EPISODE OF MAJOR DEPRESSIVE DISORDER (H): ICD-10-CM

## 2020-01-06 DIAGNOSIS — L20.84 INTRINSIC ECZEMA: ICD-10-CM

## 2020-01-16 ENCOUNTER — OFFICE VISIT - HEALTHEAST (OUTPATIENT)
Dept: BEHAVIORAL HEALTH | Facility: CLINIC | Age: 70
End: 2020-01-16

## 2020-01-16 ENCOUNTER — AMBULATORY - HEALTHEAST (OUTPATIENT)
Dept: BEHAVIORAL HEALTH | Facility: CLINIC | Age: 70
End: 2020-01-16

## 2020-01-16 DIAGNOSIS — F32.1 MODERATE SINGLE CURRENT EPISODE OF MAJOR DEPRESSIVE DISORDER (H): ICD-10-CM

## 2020-02-04 ENCOUNTER — OFFICE VISIT - HEALTHEAST (OUTPATIENT)
Dept: FAMILY MEDICINE | Facility: CLINIC | Age: 70
End: 2020-02-04

## 2020-02-04 DIAGNOSIS — E11.9 TYPE 2 DIABETES MELLITUS TREATED WITHOUT INSULIN (H): ICD-10-CM

## 2020-02-04 DIAGNOSIS — E78.5 HYPERLIPIDEMIA, UNSPECIFIED HYPERLIPIDEMIA TYPE: ICD-10-CM

## 2020-02-04 DIAGNOSIS — F32.1 MODERATE SINGLE CURRENT EPISODE OF MAJOR DEPRESSIVE DISORDER (H): ICD-10-CM

## 2020-02-04 DIAGNOSIS — I10 ESSENTIAL HYPERTENSION: ICD-10-CM

## 2020-02-04 DIAGNOSIS — R20.8 BURNING SENSATION OF FEET: ICD-10-CM

## 2020-02-04 DIAGNOSIS — M81.0 OSTEOPOROSIS, UNSPECIFIED OSTEOPOROSIS TYPE, UNSPECIFIED PATHOLOGICAL FRACTURE PRESENCE: ICD-10-CM

## 2020-02-04 DIAGNOSIS — M25.512 LEFT SHOULDER PAIN, UNSPECIFIED CHRONICITY: ICD-10-CM

## 2020-02-04 LAB
ANION GAP SERPL CALCULATED.3IONS-SCNC: 11 MMOL/L (ref 5–18)
BUN SERPL-MCNC: 14 MG/DL (ref 8–22)
CALCIUM SERPL-MCNC: 9.5 MG/DL (ref 8.5–10.5)
CHLORIDE BLD-SCNC: 101 MMOL/L (ref 98–107)
CHOLEST SERPL-MCNC: 231 MG/DL
CO2 SERPL-SCNC: 29 MMOL/L (ref 22–31)
CREAT SERPL-MCNC: 1.17 MG/DL (ref 0.6–1.1)
FASTING STATUS PATIENT QL REPORTED: NO
GFR SERPL CREATININE-BSD FRML MDRD: 46 ML/MIN/1.73M2
GLUCOSE BLD-MCNC: 112 MG/DL (ref 70–125)
HBA1C MFR BLD: 6.8 % (ref 3.5–6)
HDLC SERPL-MCNC: 39 MG/DL
LDLC SERPL CALC-MCNC: 146 MG/DL
POTASSIUM BLD-SCNC: 4.3 MMOL/L (ref 3.5–5)
SODIUM SERPL-SCNC: 141 MMOL/L (ref 136–145)
TRIGL SERPL-MCNC: 232 MG/DL

## 2020-02-04 ASSESSMENT — PATIENT HEALTH QUESTIONNAIRE - PHQ9: SUM OF ALL RESPONSES TO PHQ QUESTIONS 1-9: 14

## 2020-02-04 ASSESSMENT — MIFFLIN-ST. JEOR: SCORE: 1036.49

## 2020-02-09 ENCOUNTER — COMMUNICATION - HEALTHEAST (OUTPATIENT)
Dept: FAMILY MEDICINE | Facility: CLINIC | Age: 70
End: 2020-02-09

## 2020-02-09 DIAGNOSIS — G89.29 CHRONIC LEFT SHOULDER PAIN: ICD-10-CM

## 2020-02-09 DIAGNOSIS — M25.512 CHRONIC LEFT SHOULDER PAIN: ICD-10-CM

## 2020-03-04 ENCOUNTER — OFFICE VISIT - HEALTHEAST (OUTPATIENT)
Dept: PHARMACY | Facility: CLINIC | Age: 70
End: 2020-03-04

## 2020-03-04 DIAGNOSIS — M81.0 AGE-RELATED OSTEOPOROSIS WITHOUT CURRENT PATHOLOGICAL FRACTURE: ICD-10-CM

## 2020-03-04 DIAGNOSIS — E11.9 TYPE 2 DIABETES MELLITUS TREATED WITHOUT INSULIN (H): ICD-10-CM

## 2020-03-04 DIAGNOSIS — R52 PAIN: ICD-10-CM

## 2020-03-04 DIAGNOSIS — E78.5 HYPERLIPIDEMIA, UNSPECIFIED HYPERLIPIDEMIA TYPE: ICD-10-CM

## 2020-03-04 DIAGNOSIS — F32.1 MODERATE SINGLE CURRENT EPISODE OF MAJOR DEPRESSIVE DISORDER (H): ICD-10-CM

## 2020-03-04 DIAGNOSIS — I10 ESSENTIAL HYPERTENSION: ICD-10-CM

## 2020-03-09 ENCOUNTER — COMMUNICATION - HEALTHEAST (OUTPATIENT)
Dept: FAMILY MEDICINE | Facility: CLINIC | Age: 70
End: 2020-03-09

## 2020-03-09 DIAGNOSIS — G89.29 CHRONIC LEFT SHOULDER PAIN: ICD-10-CM

## 2020-03-09 DIAGNOSIS — M25.512 CHRONIC LEFT SHOULDER PAIN: ICD-10-CM

## 2020-03-10 ENCOUNTER — RECORDS - HEALTHEAST (OUTPATIENT)
Dept: ADMINISTRATIVE | Facility: OTHER | Age: 70
End: 2020-03-10

## 2020-03-25 ENCOUNTER — COMMUNICATION - HEALTHEAST (OUTPATIENT)
Dept: FAMILY MEDICINE | Facility: CLINIC | Age: 70
End: 2020-03-25

## 2020-03-25 DIAGNOSIS — F32.1 MODERATE SINGLE CURRENT EPISODE OF MAJOR DEPRESSIVE DISORDER (H): ICD-10-CM

## 2020-04-01 ENCOUNTER — COMMUNICATION - HEALTHEAST (OUTPATIENT)
Dept: FAMILY MEDICINE | Facility: CLINIC | Age: 70
End: 2020-04-01

## 2020-04-01 DIAGNOSIS — G89.29 CHRONIC LEFT SHOULDER PAIN: ICD-10-CM

## 2020-04-01 DIAGNOSIS — L20.84 INTRINSIC ECZEMA: ICD-10-CM

## 2020-04-01 DIAGNOSIS — E11.9 TYPE 2 DIABETES MELLITUS TREATED WITHOUT INSULIN (H): ICD-10-CM

## 2020-04-01 DIAGNOSIS — M25.512 CHRONIC LEFT SHOULDER PAIN: ICD-10-CM

## 2020-05-01 ENCOUNTER — COMMUNICATION - HEALTHEAST (OUTPATIENT)
Dept: FAMILY MEDICINE | Facility: CLINIC | Age: 70
End: 2020-05-01

## 2020-05-01 DIAGNOSIS — M25.512 CHRONIC LEFT SHOULDER PAIN: ICD-10-CM

## 2020-05-01 DIAGNOSIS — G89.29 CHRONIC LEFT SHOULDER PAIN: ICD-10-CM

## 2020-05-01 DIAGNOSIS — L20.84 INTRINSIC ECZEMA: ICD-10-CM

## 2020-05-03 ENCOUNTER — COMMUNICATION - HEALTHEAST (OUTPATIENT)
Dept: FAMILY MEDICINE | Facility: CLINIC | Age: 70
End: 2020-05-03

## 2020-05-03 DIAGNOSIS — I10 ESSENTIAL HYPERTENSION: ICD-10-CM

## 2020-05-08 ENCOUNTER — COMMUNICATION - HEALTHEAST (OUTPATIENT)
Dept: FAMILY MEDICINE | Facility: CLINIC | Age: 70
End: 2020-05-08

## 2020-05-08 ENCOUNTER — OFFICE VISIT - HEALTHEAST (OUTPATIENT)
Dept: FAMILY MEDICINE | Facility: CLINIC | Age: 70
End: 2020-05-08

## 2020-05-08 DIAGNOSIS — G89.29 CHRONIC LEFT SHOULDER PAIN: ICD-10-CM

## 2020-05-08 DIAGNOSIS — E11.9 TYPE 2 DIABETES MELLITUS TREATED WITHOUT INSULIN (H): ICD-10-CM

## 2020-05-08 DIAGNOSIS — M25.512 CHRONIC LEFT SHOULDER PAIN: ICD-10-CM

## 2020-05-08 DIAGNOSIS — M54.50 CHRONIC MIDLINE LOW BACK PAIN WITHOUT SCIATICA: ICD-10-CM

## 2020-05-08 DIAGNOSIS — G89.29 CHRONIC MIDLINE LOW BACK PAIN WITHOUT SCIATICA: ICD-10-CM

## 2020-06-02 ENCOUNTER — COMMUNICATION - HEALTHEAST (OUTPATIENT)
Dept: FAMILY MEDICINE | Facility: CLINIC | Age: 70
End: 2020-06-02

## 2020-06-02 DIAGNOSIS — G89.29 CHRONIC LEFT SHOULDER PAIN: ICD-10-CM

## 2020-06-02 DIAGNOSIS — M25.512 CHRONIC LEFT SHOULDER PAIN: ICD-10-CM

## 2020-06-03 ENCOUNTER — AMBULATORY - HEALTHEAST (OUTPATIENT)
Dept: PHARMACY | Facility: CLINIC | Age: 70
End: 2020-06-03

## 2020-06-03 DIAGNOSIS — G89.29 CHRONIC MIDLINE LOW BACK PAIN WITHOUT SCIATICA: ICD-10-CM

## 2020-06-03 DIAGNOSIS — M54.50 CHRONIC MIDLINE LOW BACK PAIN WITHOUT SCIATICA: ICD-10-CM

## 2020-06-03 DIAGNOSIS — E11.42 TYPE 2 DIABETES MELLITUS WITH DIABETIC POLYNEUROPATHY, WITHOUT LONG-TERM CURRENT USE OF INSULIN (H): ICD-10-CM

## 2020-06-03 PROCEDURE — 99607 MTMS BY PHARM ADDL 15 MIN: CPT | Performed by: PHARMACIST

## 2020-06-03 PROCEDURE — 99606 MTMS BY PHARM EST 15 MIN: CPT | Performed by: PHARMACIST

## 2020-06-30 ENCOUNTER — COMMUNICATION - HEALTHEAST (OUTPATIENT)
Dept: FAMILY MEDICINE | Facility: CLINIC | Age: 70
End: 2020-06-30

## 2020-06-30 DIAGNOSIS — M25.512 CHRONIC LEFT SHOULDER PAIN: ICD-10-CM

## 2020-06-30 DIAGNOSIS — G89.29 CHRONIC LEFT SHOULDER PAIN: ICD-10-CM

## 2020-07-10 ENCOUNTER — AMBULATORY - HEALTHEAST (OUTPATIENT)
Dept: FAMILY MEDICINE | Facility: CLINIC | Age: 70
End: 2020-07-10

## 2020-07-13 ENCOUNTER — COMMUNICATION - HEALTHEAST (OUTPATIENT)
Dept: FAMILY MEDICINE | Facility: CLINIC | Age: 70
End: 2020-07-13

## 2020-07-13 DIAGNOSIS — I10 ESSENTIAL HYPERTENSION: ICD-10-CM

## 2020-07-13 DIAGNOSIS — G89.29 CHRONIC LEFT SHOULDER PAIN: ICD-10-CM

## 2020-07-13 DIAGNOSIS — M25.512 CHRONIC LEFT SHOULDER PAIN: ICD-10-CM

## 2020-07-16 ENCOUNTER — COMMUNICATION - HEALTHEAST (OUTPATIENT)
Dept: FAMILY MEDICINE | Facility: CLINIC | Age: 70
End: 2020-07-16

## 2020-07-16 DIAGNOSIS — I10 ESSENTIAL HYPERTENSION: ICD-10-CM

## 2020-07-28 ENCOUNTER — OFFICE VISIT - HEALTHEAST (OUTPATIENT)
Dept: FAMILY MEDICINE | Facility: CLINIC | Age: 70
End: 2020-07-28

## 2020-07-28 ENCOUNTER — HOME CARE/HOSPICE - HEALTHEAST (OUTPATIENT)
Dept: HOME HEALTH SERVICES | Facility: HOME HEALTH | Age: 70
End: 2020-07-28

## 2020-07-28 ENCOUNTER — RECORDS - HEALTHEAST (OUTPATIENT)
Dept: MAMMOGRAPHY | Facility: CLINIC | Age: 70
End: 2020-07-28

## 2020-07-28 ENCOUNTER — RECORDS - HEALTHEAST (OUTPATIENT)
Dept: GENERAL RADIOLOGY | Facility: CLINIC | Age: 70
End: 2020-07-28

## 2020-07-28 DIAGNOSIS — M54.50 CHRONIC MIDLINE LOW BACK PAIN WITHOUT SCIATICA: ICD-10-CM

## 2020-07-28 DIAGNOSIS — M54.50 LOW BACK PAIN: ICD-10-CM

## 2020-07-28 DIAGNOSIS — Z79.899 POLYPHARMACY: ICD-10-CM

## 2020-07-28 DIAGNOSIS — Z12.31 ENCOUNTER FOR SCREENING MAMMOGRAM FOR MALIGNANT NEOPLASM OF BREAST: ICD-10-CM

## 2020-07-28 DIAGNOSIS — G89.29 OTHER CHRONIC PAIN: ICD-10-CM

## 2020-07-28 DIAGNOSIS — E11.9 TYPE 2 DIABETES MELLITUS TREATED WITHOUT INSULIN (H): ICD-10-CM

## 2020-07-28 DIAGNOSIS — M81.0 AGE-RELATED OSTEOPOROSIS WITHOUT CURRENT PATHOLOGICAL FRACTURE: ICD-10-CM

## 2020-07-28 DIAGNOSIS — Z12.31 VISIT FOR SCREENING MAMMOGRAM: ICD-10-CM

## 2020-07-28 DIAGNOSIS — G89.29 CHRONIC MIDLINE LOW BACK PAIN WITHOUT SCIATICA: ICD-10-CM

## 2020-07-28 DIAGNOSIS — I10 ESSENTIAL HYPERTENSION: ICD-10-CM

## 2020-07-28 DIAGNOSIS — F32.1 MODERATE SINGLE CURRENT EPISODE OF MAJOR DEPRESSIVE DISORDER (H): ICD-10-CM

## 2020-07-28 LAB
ALBUMIN SERPL-MCNC: 3.7 G/DL (ref 3.5–5)
ALP SERPL-CCNC: 63 U/L (ref 45–120)
ALT SERPL W P-5'-P-CCNC: 32 U/L (ref 0–45)
ANION GAP SERPL CALCULATED.3IONS-SCNC: 6 MMOL/L (ref 5–18)
AST SERPL W P-5'-P-CCNC: 28 U/L (ref 0–40)
BILIRUB SERPL-MCNC: 1.1 MG/DL (ref 0–1)
BUN SERPL-MCNC: 14 MG/DL (ref 8–22)
CALCIUM SERPL-MCNC: 9.6 MG/DL (ref 8.5–10.5)
CHLORIDE BLD-SCNC: 101 MMOL/L (ref 98–107)
CHOLEST SERPL-MCNC: 212 MG/DL
CO2 SERPL-SCNC: 32 MMOL/L (ref 22–31)
CREAT SERPL-MCNC: 1.07 MG/DL (ref 0.6–1.1)
CREAT UR-MCNC: 16.8 MG/DL
FASTING STATUS PATIENT QL REPORTED: NO
GFR SERPL CREATININE-BSD FRML MDRD: 51 ML/MIN/1.73M2
GLUCOSE BLD-MCNC: 126 MG/DL (ref 70–125)
HBA1C MFR BLD: 6.7 % (ref 3.5–6)
HDLC SERPL-MCNC: 35 MG/DL
LDLC SERPL CALC-MCNC: 132 MG/DL
MICROALBUMIN UR-MCNC: <0.5 MG/DL (ref 0–1.99)
MICROALBUMIN/CREAT UR: NORMAL MG/G{CREAT}
POTASSIUM BLD-SCNC: 4.6 MMOL/L (ref 3.5–5)
PROT SERPL-MCNC: 6.7 G/DL (ref 6–8)
SODIUM SERPL-SCNC: 139 MMOL/L (ref 136–145)
TRIGL SERPL-MCNC: 227 MG/DL

## 2020-07-28 ASSESSMENT — MIFFLIN-ST. JEOR: SCORE: 1018.06

## 2020-07-28 ASSESSMENT — PATIENT HEALTH QUESTIONNAIRE - PHQ9: SUM OF ALL RESPONSES TO PHQ QUESTIONS 1-9: 1

## 2020-07-29 ENCOUNTER — COMMUNICATION - HEALTHEAST (OUTPATIENT)
Dept: HOME HEALTH SERVICES | Facility: HOME HEALTH | Age: 70
End: 2020-07-29

## 2020-07-30 ENCOUNTER — COMMUNICATION - HEALTHEAST (OUTPATIENT)
Dept: FAMILY MEDICINE | Facility: CLINIC | Age: 70
End: 2020-07-30

## 2020-07-30 ENCOUNTER — AMBULATORY - HEALTHEAST (OUTPATIENT)
Dept: FAMILY MEDICINE | Facility: CLINIC | Age: 70
End: 2020-07-30

## 2020-07-30 DIAGNOSIS — G89.29 CHRONIC MIDLINE LOW BACK PAIN WITHOUT SCIATICA: ICD-10-CM

## 2020-07-30 DIAGNOSIS — M54.50 CHRONIC MIDLINE LOW BACK PAIN WITHOUT SCIATICA: ICD-10-CM

## 2020-07-30 DIAGNOSIS — L20.84 INTRINSIC ECZEMA: ICD-10-CM

## 2020-07-30 DIAGNOSIS — S22.080S COMPRESSION FRACTURE OF T11 VERTEBRA, SEQUELA: ICD-10-CM

## 2020-07-30 RX ORDER — TRIAMCINOLONE ACETONIDE 1 MG/G
OINTMENT TOPICAL
Qty: 454 G | Refills: 0 | Status: SHIPPED | OUTPATIENT
Start: 2020-07-30 | End: 2023-04-11

## 2020-07-31 ENCOUNTER — COMMUNICATION - HEALTHEAST (OUTPATIENT)
Dept: HOME HEALTH SERVICES | Facility: HOME HEALTH | Age: 70
End: 2020-07-31

## 2020-07-31 ENCOUNTER — HOME CARE/HOSPICE - HEALTHEAST (OUTPATIENT)
Dept: HOME HEALTH SERVICES | Facility: HOME HEALTH | Age: 70
End: 2020-07-31

## 2020-07-31 ENCOUNTER — AMBULATORY - HEALTHEAST (OUTPATIENT)
Dept: FAMILY MEDICINE | Facility: CLINIC | Age: 70
End: 2020-07-31

## 2020-07-31 DIAGNOSIS — E11.9 TYPE 2 DIABETES MELLITUS TREATED WITHOUT INSULIN (H): ICD-10-CM

## 2020-08-03 ENCOUNTER — HOME CARE/HOSPICE - HEALTHEAST (OUTPATIENT)
Dept: HOME HEALTH SERVICES | Facility: HOME HEALTH | Age: 70
End: 2020-08-03

## 2020-08-04 ENCOUNTER — HOME CARE/HOSPICE - HEALTHEAST (OUTPATIENT)
Dept: HOME HEALTH SERVICES | Facility: HOME HEALTH | Age: 70
End: 2020-08-04

## 2020-08-05 ENCOUNTER — HOME CARE/HOSPICE - HEALTHEAST (OUTPATIENT)
Dept: HOME HEALTH SERVICES | Facility: HOME HEALTH | Age: 70
End: 2020-08-05

## 2020-08-05 ENCOUNTER — COMMUNICATION - HEALTHEAST (OUTPATIENT)
Dept: HOME HEALTH SERVICES | Facility: HOME HEALTH | Age: 70
End: 2020-08-05

## 2020-08-07 ENCOUNTER — RECORDS - HEALTHEAST (OUTPATIENT)
Dept: ADMINISTRATIVE | Facility: OTHER | Age: 70
End: 2020-08-07

## 2020-08-07 LAB — RETINOPATHY: NEGATIVE

## 2020-08-11 ENCOUNTER — HOME CARE/HOSPICE - HEALTHEAST (OUTPATIENT)
Dept: HOME HEALTH SERVICES | Facility: HOME HEALTH | Age: 70
End: 2020-08-11

## 2020-08-11 ENCOUNTER — AMBULATORY - HEALTHEAST (OUTPATIENT)
Dept: PHARMACY | Facility: CLINIC | Age: 70
End: 2020-08-11

## 2020-08-11 ENCOUNTER — RECORDS - HEALTHEAST (OUTPATIENT)
Dept: ADMINISTRATIVE | Facility: OTHER | Age: 70
End: 2020-08-11

## 2020-08-11 ENCOUNTER — RECORDS - HEALTHEAST (OUTPATIENT)
Dept: HEALTH INFORMATION MANAGEMENT | Facility: CLINIC | Age: 70
End: 2020-08-11

## 2020-08-11 DIAGNOSIS — E11.42 TYPE 2 DIABETES MELLITUS WITH DIABETIC POLYNEUROPATHY, WITHOUT LONG-TERM CURRENT USE OF INSULIN (H): ICD-10-CM

## 2020-08-11 DIAGNOSIS — F32.1 MODERATE SINGLE CURRENT EPISODE OF MAJOR DEPRESSIVE DISORDER (H): ICD-10-CM

## 2020-08-11 DIAGNOSIS — G89.29 CHRONIC MIDLINE LOW BACK PAIN WITHOUT SCIATICA: ICD-10-CM

## 2020-08-11 DIAGNOSIS — M54.50 CHRONIC MIDLINE LOW BACK PAIN WITHOUT SCIATICA: ICD-10-CM

## 2020-08-11 PROCEDURE — 99606 MTMS BY PHARM EST 15 MIN: CPT | Performed by: PHARMACIST

## 2020-08-12 ENCOUNTER — HOME CARE/HOSPICE - HEALTHEAST (OUTPATIENT)
Dept: HOME HEALTH SERVICES | Facility: HOME HEALTH | Age: 70
End: 2020-08-12

## 2020-08-12 ENCOUNTER — COMMUNICATION - HEALTHEAST (OUTPATIENT)
Dept: FAMILY MEDICINE | Facility: CLINIC | Age: 70
End: 2020-08-12

## 2020-08-12 DIAGNOSIS — G89.29 CHRONIC MIDLINE LOW BACK PAIN WITHOUT SCIATICA: ICD-10-CM

## 2020-08-12 DIAGNOSIS — M81.0 AGE-RELATED OSTEOPOROSIS WITHOUT CURRENT PATHOLOGICAL FRACTURE: ICD-10-CM

## 2020-08-12 DIAGNOSIS — M54.50 CHRONIC MIDLINE LOW BACK PAIN WITHOUT SCIATICA: ICD-10-CM

## 2020-08-13 ENCOUNTER — HOME CARE/HOSPICE - HEALTHEAST (OUTPATIENT)
Dept: HOME HEALTH SERVICES | Facility: HOME HEALTH | Age: 70
End: 2020-08-13

## 2020-08-17 ENCOUNTER — HOME CARE/HOSPICE - HEALTHEAST (OUTPATIENT)
Dept: HOME HEALTH SERVICES | Facility: HOME HEALTH | Age: 70
End: 2020-08-17

## 2020-08-18 ENCOUNTER — AMBULATORY - HEALTHEAST (OUTPATIENT)
Dept: FAMILY MEDICINE | Facility: CLINIC | Age: 70
End: 2020-08-18

## 2020-08-18 ENCOUNTER — HOME CARE/HOSPICE - HEALTHEAST (OUTPATIENT)
Dept: HOME HEALTH SERVICES | Facility: HOME HEALTH | Age: 70
End: 2020-08-18

## 2020-08-18 DIAGNOSIS — G89.29 CHRONIC MIDLINE LOW BACK PAIN WITHOUT SCIATICA: ICD-10-CM

## 2020-08-18 DIAGNOSIS — M54.50 CHRONIC MIDLINE LOW BACK PAIN WITHOUT SCIATICA: ICD-10-CM

## 2020-08-20 ENCOUNTER — HOME CARE/HOSPICE - HEALTHEAST (OUTPATIENT)
Dept: HOME HEALTH SERVICES | Facility: HOME HEALTH | Age: 70
End: 2020-08-20

## 2020-08-24 ENCOUNTER — HOME CARE/HOSPICE - HEALTHEAST (OUTPATIENT)
Dept: HOME HEALTH SERVICES | Facility: HOME HEALTH | Age: 70
End: 2020-08-24

## 2020-08-24 ENCOUNTER — COMMUNICATION - HEALTHEAST (OUTPATIENT)
Dept: FAMILY MEDICINE | Facility: CLINIC | Age: 70
End: 2020-08-24

## 2020-08-24 DIAGNOSIS — M81.0 AGE-RELATED OSTEOPOROSIS WITHOUT CURRENT PATHOLOGICAL FRACTURE: ICD-10-CM

## 2020-08-25 ENCOUNTER — HOME CARE/HOSPICE - HEALTHEAST (OUTPATIENT)
Dept: HOME HEALTH SERVICES | Facility: HOME HEALTH | Age: 70
End: 2020-08-25

## 2020-08-28 ENCOUNTER — HOME CARE/HOSPICE - HEALTHEAST (OUTPATIENT)
Dept: HOME HEALTH SERVICES | Facility: HOME HEALTH | Age: 70
End: 2020-08-28

## 2020-09-01 ENCOUNTER — HOME CARE/HOSPICE - HEALTHEAST (OUTPATIENT)
Dept: HOME HEALTH SERVICES | Facility: HOME HEALTH | Age: 70
End: 2020-09-01

## 2020-09-15 ENCOUNTER — HOME CARE/HOSPICE - HEALTHEAST (OUTPATIENT)
Dept: HOME HEALTH SERVICES | Facility: HOME HEALTH | Age: 70
End: 2020-09-15

## 2020-09-15 ENCOUNTER — COMMUNICATION - HEALTHEAST (OUTPATIENT)
Dept: FAMILY MEDICINE | Facility: CLINIC | Age: 70
End: 2020-09-15

## 2020-09-15 DIAGNOSIS — G89.29 CHRONIC MIDLINE LOW BACK PAIN WITHOUT SCIATICA: ICD-10-CM

## 2020-09-15 DIAGNOSIS — M54.50 CHRONIC MIDLINE LOW BACK PAIN WITHOUT SCIATICA: ICD-10-CM

## 2020-09-17 ENCOUNTER — AMBULATORY - HEALTHEAST (OUTPATIENT)
Dept: FAMILY MEDICINE | Facility: CLINIC | Age: 70
End: 2020-09-17

## 2020-09-17 DIAGNOSIS — I10 ESSENTIAL HYPERTENSION: ICD-10-CM

## 2020-09-21 ENCOUNTER — HOME CARE/HOSPICE - HEALTHEAST (OUTPATIENT)
Dept: HOME HEALTH SERVICES | Facility: HOME HEALTH | Age: 70
End: 2020-09-21

## 2020-09-22 ENCOUNTER — AMBULATORY - HEALTHEAST (OUTPATIENT)
Dept: FAMILY MEDICINE | Facility: CLINIC | Age: 70
End: 2020-09-22

## 2020-09-22 ENCOUNTER — HOME CARE/HOSPICE - HEALTHEAST (OUTPATIENT)
Dept: HOME HEALTH SERVICES | Facility: HOME HEALTH | Age: 70
End: 2020-09-22

## 2020-09-22 DIAGNOSIS — I10 ESSENTIAL HYPERTENSION: ICD-10-CM

## 2020-09-28 ENCOUNTER — HOME CARE/HOSPICE - HEALTHEAST (OUTPATIENT)
Dept: HOME HEALTH SERVICES | Facility: HOME HEALTH | Age: 70
End: 2020-09-28

## 2020-09-28 ENCOUNTER — COMMUNICATION - HEALTHEAST (OUTPATIENT)
Dept: HOME HEALTH SERVICES | Facility: HOME HEALTH | Age: 70
End: 2020-09-28

## 2020-10-06 ENCOUNTER — HOME CARE/HOSPICE - HEALTHEAST (OUTPATIENT)
Dept: HOME HEALTH SERVICES | Facility: HOME HEALTH | Age: 70
End: 2020-10-06

## 2020-10-07 ENCOUNTER — AMBULATORY - HEALTHEAST (OUTPATIENT)
Dept: CARE COORDINATION | Facility: CLINIC | Age: 70
End: 2020-10-07

## 2020-10-07 DIAGNOSIS — E11.9 TYPE 2 DIABETES MELLITUS TREATED WITHOUT INSULIN (H): ICD-10-CM

## 2020-10-07 DIAGNOSIS — I10 ESSENTIAL HYPERTENSION: ICD-10-CM

## 2020-10-07 DIAGNOSIS — Z91.148 NONCOMPLIANCE WITH MEDICATION REGIMEN: ICD-10-CM

## 2020-10-20 ENCOUNTER — COMMUNICATION - HEALTHEAST (OUTPATIENT)
Dept: FAMILY MEDICINE | Facility: CLINIC | Age: 70
End: 2020-10-20

## 2020-10-20 ENCOUNTER — HOME CARE/HOSPICE - HEALTHEAST (OUTPATIENT)
Dept: HOME HEALTH SERVICES | Facility: HOME HEALTH | Age: 70
End: 2020-10-20

## 2020-10-20 DIAGNOSIS — E78.5 HYPERLIPIDEMIA, UNSPECIFIED HYPERLIPIDEMIA TYPE: ICD-10-CM

## 2020-10-20 DIAGNOSIS — E11.9 TYPE 2 DIABETES MELLITUS TREATED WITHOUT INSULIN (H): ICD-10-CM

## 2020-10-22 RX ORDER — ATORVASTATIN CALCIUM 20 MG/1
TABLET, FILM COATED ORAL
Qty: 90 TABLET | Refills: 3 | Status: SHIPPED | OUTPATIENT
Start: 2020-10-22 | End: 2021-10-17

## 2020-10-26 ENCOUNTER — COMMUNICATION - HEALTHEAST (OUTPATIENT)
Dept: FAMILY MEDICINE | Facility: CLINIC | Age: 70
End: 2020-10-26

## 2020-10-26 DIAGNOSIS — E78.5 HYPERLIPIDEMIA, UNSPECIFIED HYPERLIPIDEMIA TYPE: ICD-10-CM

## 2020-11-04 ENCOUNTER — HOME CARE/HOSPICE - HEALTHEAST (OUTPATIENT)
Dept: HOME HEALTH SERVICES | Facility: HOME HEALTH | Age: 70
End: 2020-11-04

## 2020-11-12 ENCOUNTER — OFFICE VISIT - HEALTHEAST (OUTPATIENT)
Dept: PHARMACY | Facility: CLINIC | Age: 70
End: 2020-11-12

## 2020-11-12 DIAGNOSIS — E11.42 TYPE 2 DIABETES MELLITUS WITH DIABETIC POLYNEUROPATHY, WITHOUT LONG-TERM CURRENT USE OF INSULIN (H): ICD-10-CM

## 2020-11-12 DIAGNOSIS — M54.50 CHRONIC MIDLINE LOW BACK PAIN WITHOUT SCIATICA: ICD-10-CM

## 2020-11-12 DIAGNOSIS — G89.29 CHRONIC MIDLINE LOW BACK PAIN WITHOUT SCIATICA: ICD-10-CM

## 2020-11-12 DIAGNOSIS — F32.1 MODERATE SINGLE CURRENT EPISODE OF MAJOR DEPRESSIVE DISORDER (H): ICD-10-CM

## 2020-11-12 PROCEDURE — 99606 MTMS BY PHARM EST 15 MIN: CPT | Performed by: PHARMACIST

## 2020-11-17 ENCOUNTER — HOME CARE/HOSPICE - HEALTHEAST (OUTPATIENT)
Dept: HOME HEALTH SERVICES | Facility: HOME HEALTH | Age: 70
End: 2020-11-17

## 2020-11-19 ENCOUNTER — OFFICE VISIT - HEALTHEAST (OUTPATIENT)
Dept: FAMILY MEDICINE | Facility: CLINIC | Age: 70
End: 2020-11-19

## 2020-11-19 DIAGNOSIS — M81.0 OSTEOPOROSIS, UNSPECIFIED OSTEOPOROSIS TYPE, UNSPECIFIED PATHOLOGICAL FRACTURE PRESENCE: ICD-10-CM

## 2020-11-19 DIAGNOSIS — H93.13 TINNITUS OF BOTH EARS: ICD-10-CM

## 2020-11-19 DIAGNOSIS — E11.9 TYPE 2 DIABETES MELLITUS TREATED WITHOUT INSULIN (H): ICD-10-CM

## 2020-11-19 DIAGNOSIS — R43.2 LOSS OF TASTE: ICD-10-CM

## 2020-11-19 DIAGNOSIS — I10 ESSENTIAL HYPERTENSION: ICD-10-CM

## 2020-11-19 DIAGNOSIS — F32.1 MODERATE SINGLE CURRENT EPISODE OF MAJOR DEPRESSIVE DISORDER (H): ICD-10-CM

## 2020-11-27 ENCOUNTER — HOME CARE/HOSPICE - HEALTHEAST (OUTPATIENT)
Dept: HOME HEALTH SERVICES | Facility: HOME HEALTH | Age: 70
End: 2020-11-27

## 2020-11-27 ENCOUNTER — COMMUNICATION - HEALTHEAST (OUTPATIENT)
Dept: HOME HEALTH SERVICES | Facility: HOME HEALTH | Age: 70
End: 2020-11-27

## 2020-11-27 DIAGNOSIS — M54.50 CHRONIC MIDLINE LOW BACK PAIN WITHOUT SCIATICA: ICD-10-CM

## 2020-11-27 DIAGNOSIS — G89.29 CHRONIC MIDLINE LOW BACK PAIN WITHOUT SCIATICA: ICD-10-CM

## 2020-12-03 ENCOUNTER — HOME CARE/HOSPICE - HEALTHEAST (OUTPATIENT)
Dept: HOME HEALTH SERVICES | Facility: HOME HEALTH | Age: 70
End: 2020-12-03

## 2020-12-11 ENCOUNTER — HOME CARE/HOSPICE - HEALTHEAST (OUTPATIENT)
Dept: HOME HEALTH SERVICES | Facility: HOME HEALTH | Age: 70
End: 2020-12-11

## 2020-12-21 ENCOUNTER — COMMUNICATION - HEALTHEAST (OUTPATIENT)
Dept: FAMILY MEDICINE | Facility: CLINIC | Age: 70
End: 2020-12-21

## 2020-12-21 DIAGNOSIS — F32.1 MODERATE SINGLE CURRENT EPISODE OF MAJOR DEPRESSIVE DISORDER (H): ICD-10-CM

## 2020-12-24 ENCOUNTER — HOME CARE/HOSPICE - HEALTHEAST (OUTPATIENT)
Dept: HOME HEALTH SERVICES | Facility: HOME HEALTH | Age: 70
End: 2020-12-24

## 2021-01-08 ENCOUNTER — HOME CARE/HOSPICE - HEALTHEAST (OUTPATIENT)
Dept: HOME HEALTH SERVICES | Facility: HOME HEALTH | Age: 71
End: 2021-01-08

## 2021-01-22 ENCOUNTER — COMMUNICATION - HEALTHEAST (OUTPATIENT)
Dept: FAMILY MEDICINE | Facility: CLINIC | Age: 71
End: 2021-01-22

## 2021-01-22 ENCOUNTER — COMMUNICATION - HEALTHEAST (OUTPATIENT)
Dept: HOME HEALTH SERVICES | Facility: HOME HEALTH | Age: 71
End: 2021-01-22

## 2021-01-22 ENCOUNTER — HOME CARE/HOSPICE - HEALTHEAST (OUTPATIENT)
Dept: HOME HEALTH SERVICES | Facility: HOME HEALTH | Age: 71
End: 2021-01-22

## 2021-01-22 DIAGNOSIS — E11.9 TYPE 2 DIABETES MELLITUS TREATED WITHOUT INSULIN (H): ICD-10-CM

## 2021-01-26 ENCOUNTER — AMBULATORY - HEALTHEAST (OUTPATIENT)
Dept: FAMILY MEDICINE | Facility: CLINIC | Age: 71
End: 2021-01-26

## 2021-01-26 DIAGNOSIS — E11.9 TYPE 2 DIABETES MELLITUS TREATED WITHOUT INSULIN (H): ICD-10-CM

## 2021-01-26 RX ORDER — GLUCOSAMINE HCL/CHONDROITIN SU 500-400 MG
1 CAPSULE ORAL PRN
Qty: 200 STRIP | Refills: 11 | Status: SHIPPED | OUTPATIENT
Start: 2021-01-26

## 2021-02-05 ENCOUNTER — HOME CARE/HOSPICE - HEALTHEAST (OUTPATIENT)
Dept: HOME HEALTH SERVICES | Facility: HOME HEALTH | Age: 71
End: 2021-02-05

## 2021-02-05 ENCOUNTER — AMBULATORY - HEALTHEAST (OUTPATIENT)
Dept: FAMILY MEDICINE | Facility: CLINIC | Age: 71
End: 2021-02-05

## 2021-02-05 DIAGNOSIS — I10 ESSENTIAL HYPERTENSION: ICD-10-CM

## 2021-02-09 ENCOUNTER — HOME CARE/HOSPICE - HEALTHEAST (OUTPATIENT)
Dept: HOME HEALTH SERVICES | Facility: HOME HEALTH | Age: 71
End: 2021-02-09

## 2021-02-09 ENCOUNTER — OFFICE VISIT - HEALTHEAST (OUTPATIENT)
Dept: FAMILY MEDICINE | Facility: CLINIC | Age: 71
End: 2021-02-09

## 2021-02-09 DIAGNOSIS — E11.9 TYPE 2 DIABETES MELLITUS TREATED WITHOUT INSULIN (H): ICD-10-CM

## 2021-02-09 DIAGNOSIS — M79.2 RADICULAR PAIN IN LEFT ARM: ICD-10-CM

## 2021-02-09 DIAGNOSIS — M25.512 LEFT SHOULDER PAIN, UNSPECIFIED CHRONICITY: ICD-10-CM

## 2021-02-09 DIAGNOSIS — F32.1 MODERATE SINGLE CURRENT EPISODE OF MAJOR DEPRESSIVE DISORDER (H): ICD-10-CM

## 2021-02-09 DIAGNOSIS — R07.89 LEFT-SIDED CHEST WALL PAIN: ICD-10-CM

## 2021-02-09 DIAGNOSIS — I10 ESSENTIAL HYPERTENSION: ICD-10-CM

## 2021-02-09 LAB
ALBUMIN SERPL-MCNC: 3.7 G/DL (ref 3.5–5)
ALP SERPL-CCNC: 66 U/L (ref 45–120)
ALT SERPL W P-5'-P-CCNC: 23 U/L (ref 0–45)
ANION GAP SERPL CALCULATED.3IONS-SCNC: 11 MMOL/L (ref 5–18)
AST SERPL W P-5'-P-CCNC: 18 U/L (ref 0–40)
BILIRUB SERPL-MCNC: 0.6 MG/DL (ref 0–1)
BUN SERPL-MCNC: 7 MG/DL (ref 8–22)
CALCIUM SERPL-MCNC: 9.4 MG/DL (ref 8.5–10.5)
CHLORIDE BLD-SCNC: 101 MMOL/L (ref 98–107)
CO2 SERPL-SCNC: 28 MMOL/L (ref 22–31)
CREAT SERPL-MCNC: 1.02 MG/DL (ref 0.6–1.1)
GFR SERPL CREATININE-BSD FRML MDRD: 54 ML/MIN/1.73M2
GLUCOSE BLD-MCNC: 165 MG/DL (ref 70–125)
HBA1C MFR BLD: 7.2 %
POTASSIUM BLD-SCNC: 4.7 MMOL/L (ref 3.5–5)
PROT SERPL-MCNC: 6.8 G/DL (ref 6–8)
SODIUM SERPL-SCNC: 140 MMOL/L (ref 136–145)

## 2021-02-09 ASSESSMENT — PATIENT HEALTH QUESTIONNAIRE - PHQ9: SUM OF ALL RESPONSES TO PHQ QUESTIONS 1-9: 10

## 2021-02-09 ASSESSMENT — MIFFLIN-ST. JEOR: SCORE: 1048.97

## 2021-02-10 ENCOUNTER — HOME CARE/HOSPICE - HEALTHEAST (OUTPATIENT)
Dept: HOME HEALTH SERVICES | Facility: HOME HEALTH | Age: 71
End: 2021-02-10

## 2021-02-18 ENCOUNTER — COMMUNICATION - HEALTHEAST (OUTPATIENT)
Dept: FAMILY MEDICINE | Facility: CLINIC | Age: 71
End: 2021-02-18

## 2021-02-18 DIAGNOSIS — G89.29 CHRONIC MIDLINE LOW BACK PAIN WITHOUT SCIATICA: ICD-10-CM

## 2021-02-18 DIAGNOSIS — M54.50 CHRONIC MIDLINE LOW BACK PAIN WITHOUT SCIATICA: ICD-10-CM

## 2021-02-19 ENCOUNTER — OFFICE VISIT - HEALTHEAST (OUTPATIENT)
Dept: FAMILY MEDICINE | Facility: CLINIC | Age: 71
End: 2021-02-19

## 2021-02-19 ENCOUNTER — HOME CARE/HOSPICE - HEALTHEAST (OUTPATIENT)
Dept: HOME HEALTH SERVICES | Facility: HOME HEALTH | Age: 71
End: 2021-02-19

## 2021-02-19 DIAGNOSIS — R42 DIZZINESS: ICD-10-CM

## 2021-02-19 DIAGNOSIS — E11.9 TYPE 2 DIABETES MELLITUS TREATED WITHOUT INSULIN (H): ICD-10-CM

## 2021-02-19 DIAGNOSIS — I10 ESSENTIAL HYPERTENSION: ICD-10-CM

## 2021-02-19 DIAGNOSIS — F32.1 MODERATE SINGLE CURRENT EPISODE OF MAJOR DEPRESSIVE DISORDER (H): ICD-10-CM

## 2021-02-19 DIAGNOSIS — E78.5 HYPERLIPIDEMIA, UNSPECIFIED HYPERLIPIDEMIA TYPE: ICD-10-CM

## 2021-02-19 ASSESSMENT — MIFFLIN-ST. JEOR: SCORE: 1043.58

## 2021-02-22 ENCOUNTER — AMBULATORY - HEALTHEAST (OUTPATIENT)
Dept: FAMILY MEDICINE | Facility: CLINIC | Age: 71
End: 2021-02-22

## 2021-02-22 DIAGNOSIS — R42 DIZZINESS: ICD-10-CM

## 2021-02-23 ENCOUNTER — COMMUNICATION - HEALTHEAST (OUTPATIENT)
Dept: FAMILY MEDICINE | Facility: CLINIC | Age: 71
End: 2021-02-23

## 2021-02-23 ENCOUNTER — OFFICE VISIT - HEALTHEAST (OUTPATIENT)
Dept: FAMILY MEDICINE | Facility: CLINIC | Age: 71
End: 2021-02-23

## 2021-02-23 DIAGNOSIS — E11.9 TYPE 2 DIABETES MELLITUS TREATED WITHOUT INSULIN (H): ICD-10-CM

## 2021-02-23 DIAGNOSIS — R42 DIZZINESS: ICD-10-CM

## 2021-02-23 DIAGNOSIS — F32.1 MODERATE SINGLE CURRENT EPISODE OF MAJOR DEPRESSIVE DISORDER (H): ICD-10-CM

## 2021-02-23 DIAGNOSIS — Z79.899 POLYPHARMACY: ICD-10-CM

## 2021-02-23 DIAGNOSIS — I10 ESSENTIAL HYPERTENSION: ICD-10-CM

## 2021-02-23 DIAGNOSIS — E78.5 HYPERLIPIDEMIA, UNSPECIFIED HYPERLIPIDEMIA TYPE: ICD-10-CM

## 2021-02-23 ASSESSMENT — MIFFLIN-ST. JEOR: SCORE: 1039.33

## 2021-03-05 ENCOUNTER — COMMUNICATION - HEALTHEAST (OUTPATIENT)
Dept: FAMILY MEDICINE | Facility: CLINIC | Age: 71
End: 2021-03-05

## 2021-03-05 ENCOUNTER — HOME CARE/HOSPICE - HEALTHEAST (OUTPATIENT)
Dept: HOME HEALTH SERVICES | Facility: HOME HEALTH | Age: 71
End: 2021-03-05

## 2021-03-05 DIAGNOSIS — F32.1 MODERATE SINGLE CURRENT EPISODE OF MAJOR DEPRESSIVE DISORDER (H): ICD-10-CM

## 2021-03-08 ENCOUNTER — HOME CARE/HOSPICE - HEALTHEAST (OUTPATIENT)
Dept: HOME HEALTH SERVICES | Facility: HOME HEALTH | Age: 71
End: 2021-03-08

## 2021-03-16 ENCOUNTER — OFFICE VISIT - HEALTHEAST (OUTPATIENT)
Dept: INTERPRETER SERVICES | Facility: CLINIC | Age: 71
End: 2021-03-16

## 2021-03-16 ENCOUNTER — OFFICE VISIT - HEALTHEAST (OUTPATIENT)
Dept: FAMILY MEDICINE | Facility: CLINIC | Age: 71
End: 2021-03-16

## 2021-03-16 DIAGNOSIS — I10 ESSENTIAL HYPERTENSION: ICD-10-CM

## 2021-03-16 DIAGNOSIS — E11.9 TYPE 2 DIABETES MELLITUS TREATED WITHOUT INSULIN (H): ICD-10-CM

## 2021-03-16 DIAGNOSIS — M54.9 BACK PAIN: ICD-10-CM

## 2021-03-16 DIAGNOSIS — E78.5 HYPERLIPIDEMIA, UNSPECIFIED HYPERLIPIDEMIA TYPE: ICD-10-CM

## 2021-03-16 DIAGNOSIS — R42 DIZZINESS: ICD-10-CM

## 2021-03-16 DIAGNOSIS — S22.080S COMPRESSION FRACTURE OF T11 VERTEBRA, SEQUELA: ICD-10-CM

## 2021-03-16 LAB
ALBUMIN UR-MCNC: NEGATIVE G/DL
APPEARANCE UR: CLEAR
BACTERIA #/AREA URNS HPF: ABNORMAL /[HPF]
BILIRUB UR QL STRIP: NEGATIVE
COLOR UR AUTO: YELLOW
GLUCOSE UR STRIP-MCNC: NEGATIVE MG/DL
HGB UR QL STRIP: NEGATIVE
HYALINE CASTS #/AREA URNS LPF: ABNORMAL LPF
KETONES UR STRIP-MCNC: NEGATIVE MG/DL
LEUKOCYTE ESTERASE UR QL STRIP: ABNORMAL
MUCOUS THREADS #/AREA URNS LPF: ABNORMAL LPF
NITRATE UR QL: NEGATIVE
PH UR STRIP: 5 [PH] (ref 5–8)
RBC #/AREA URNS AUTO: ABNORMAL HPF
SP GR UR STRIP: 1.01 (ref 1–1.03)
SQUAMOUS #/AREA URNS AUTO: ABNORMAL LPF
UROBILINOGEN UR STRIP-ACNC: ABNORMAL
WBC #/AREA URNS AUTO: ABNORMAL HPF
WBC CLUMPS #/AREA URNS HPF: PRESENT /[HPF]

## 2021-03-16 ASSESSMENT — MIFFLIN-ST. JEOR: SCORE: 1043.86

## 2021-03-17 LAB — BACTERIA SPEC CULT: NO GROWTH

## 2021-03-18 ENCOUNTER — HOME CARE/HOSPICE - HEALTHEAST (OUTPATIENT)
Dept: HOME HEALTH SERVICES | Facility: HOME HEALTH | Age: 71
End: 2021-03-18

## 2021-03-18 ENCOUNTER — COMMUNICATION - HEALTHEAST (OUTPATIENT)
Dept: FAMILY MEDICINE | Facility: CLINIC | Age: 71
End: 2021-03-18

## 2021-03-18 DIAGNOSIS — M54.50 CHRONIC MIDLINE LOW BACK PAIN WITHOUT SCIATICA: ICD-10-CM

## 2021-03-18 DIAGNOSIS — G89.29 CHRONIC MIDLINE LOW BACK PAIN WITHOUT SCIATICA: ICD-10-CM

## 2021-03-22 ENCOUNTER — HOSPITAL ENCOUNTER (OUTPATIENT)
Dept: PHYSICAL MEDICINE AND REHAB | Facility: CLINIC | Age: 71
Discharge: HOME | End: 2021-03-22
Attending: FAMILY MEDICINE

## 2021-03-22 DIAGNOSIS — M54.50 CHRONIC BILATERAL LOW BACK PAIN WITHOUT SCIATICA: ICD-10-CM

## 2021-03-22 DIAGNOSIS — G89.29 CHRONIC BILATERAL LOW BACK PAIN WITHOUT SCIATICA: ICD-10-CM

## 2021-03-22 DIAGNOSIS — S22.080S CLOSED WEDGE COMPRESSION FRACTURE OF T11 VERTEBRA, SEQUELA: ICD-10-CM

## 2021-03-22 ASSESSMENT — MIFFLIN-ST. JEOR: SCORE: 1043.3

## 2021-03-23 ENCOUNTER — OFFICE VISIT - HEALTHEAST (OUTPATIENT)
Dept: FAMILY MEDICINE | Facility: CLINIC | Age: 71
End: 2021-03-23

## 2021-03-23 DIAGNOSIS — R42 DIZZINESS: ICD-10-CM

## 2021-03-23 DIAGNOSIS — E11.9 TYPE 2 DIABETES MELLITUS TREATED WITHOUT INSULIN (H): ICD-10-CM

## 2021-03-23 DIAGNOSIS — I10 ESSENTIAL HYPERTENSION: ICD-10-CM

## 2021-03-23 DIAGNOSIS — S22.080S COMPRESSION FRACTURE OF T11 VERTEBRA, SEQUELA: ICD-10-CM

## 2021-03-23 RX ORDER — MELOXICAM 15 MG/1
TABLET ORAL
Qty: 30 TABLET | Refills: 5 | Status: SHIPPED | OUTPATIENT
Start: 2021-03-23 | End: 2021-09-07

## 2021-03-25 ENCOUNTER — COMMUNICATION - HEALTHEAST (OUTPATIENT)
Dept: FAMILY MEDICINE | Facility: CLINIC | Age: 71
End: 2021-03-25

## 2021-03-25 ENCOUNTER — HOME CARE/HOSPICE - HEALTHEAST (OUTPATIENT)
Dept: HOME HEALTH SERVICES | Facility: HOME HEALTH | Age: 71
End: 2021-03-25

## 2021-03-25 ENCOUNTER — COMMUNICATION - HEALTHEAST (OUTPATIENT)
Dept: HOME HEALTH SERVICES | Facility: HOME HEALTH | Age: 71
End: 2021-03-25

## 2021-03-25 DIAGNOSIS — E11.9 TYPE 2 DIABETES MELLITUS TREATED WITHOUT INSULIN (H): ICD-10-CM

## 2021-04-01 ENCOUNTER — HOSPITAL ENCOUNTER (OUTPATIENT)
Dept: MRI IMAGING | Facility: HOSPITAL | Age: 71
Discharge: HOME | End: 2021-04-01
Attending: PHYSICIAN ASSISTANT

## 2021-04-01 ENCOUNTER — COMMUNICATION - HEALTHEAST (OUTPATIENT)
Dept: PHYSICAL MEDICINE AND REHAB | Facility: CLINIC | Age: 71
End: 2021-04-01

## 2021-04-01 DIAGNOSIS — S22.080S CLOSED WEDGE COMPRESSION FRACTURE OF T11 VERTEBRA, SEQUELA: ICD-10-CM

## 2021-04-02 ENCOUNTER — COMMUNICATION - HEALTHEAST (OUTPATIENT)
Dept: PHYSICAL MEDICINE AND REHAB | Facility: CLINIC | Age: 71
End: 2021-04-02

## 2021-04-02 DIAGNOSIS — M54.50 CHRONIC BILATERAL LOW BACK PAIN WITHOUT SCIATICA: ICD-10-CM

## 2021-04-02 DIAGNOSIS — G89.29 CHRONIC BILATERAL LOW BACK PAIN WITHOUT SCIATICA: ICD-10-CM

## 2021-04-06 ENCOUNTER — OFFICE VISIT - HEALTHEAST (OUTPATIENT)
Dept: FAMILY MEDICINE | Facility: CLINIC | Age: 71
End: 2021-04-06

## 2021-04-06 ENCOUNTER — COMMUNICATION - HEALTHEAST (OUTPATIENT)
Dept: FAMILY MEDICINE | Facility: CLINIC | Age: 71
End: 2021-04-06

## 2021-04-06 DIAGNOSIS — N18.31 STAGE 3A CHRONIC KIDNEY DISEASE (H): ICD-10-CM

## 2021-04-06 DIAGNOSIS — G89.29 CHRONIC MIDLINE LOW BACK PAIN WITHOUT SCIATICA: ICD-10-CM

## 2021-04-06 DIAGNOSIS — M54.50 CHRONIC MIDLINE LOW BACK PAIN WITHOUT SCIATICA: ICD-10-CM

## 2021-04-06 RX ORDER — GABAPENTIN 300 MG/1
CAPSULE ORAL
Qty: 150 CAPSULE | Refills: 11 | Status: SHIPPED | OUTPATIENT
Start: 2021-04-06 | End: 2022-03-01

## 2021-04-10 ENCOUNTER — AMBULATORY - HEALTHEAST (OUTPATIENT)
Dept: NURSING | Facility: CLINIC | Age: 71
End: 2021-04-10

## 2021-04-15 ENCOUNTER — RECORDS - HEALTHEAST (OUTPATIENT)
Dept: ADMINISTRATIVE | Facility: OTHER | Age: 71
End: 2021-04-15

## 2021-04-16 ENCOUNTER — RECORDS - HEALTHEAST (OUTPATIENT)
Dept: ADMINISTRATIVE | Facility: OTHER | Age: 71
End: 2021-04-16

## 2021-04-20 ENCOUNTER — OFFICE VISIT - HEALTHEAST (OUTPATIENT)
Dept: PHYSICAL THERAPY | Facility: REHABILITATION | Age: 71
End: 2021-04-20

## 2021-04-20 DIAGNOSIS — M54.50 CHRONIC RIGHT-SIDED LOW BACK PAIN WITHOUT SCIATICA: ICD-10-CM

## 2021-04-20 DIAGNOSIS — M62.81 GENERALIZED MUSCLE WEAKNESS: ICD-10-CM

## 2021-04-20 DIAGNOSIS — G89.29 CHRONIC RIGHT-SIDED LOW BACK PAIN WITHOUT SCIATICA: ICD-10-CM

## 2021-04-26 ENCOUNTER — COMMUNICATION - HEALTHEAST (OUTPATIENT)
Dept: FAMILY MEDICINE | Facility: CLINIC | Age: 71
End: 2021-04-26

## 2021-04-26 DIAGNOSIS — E11.9 TYPE 2 DIABETES MELLITUS TREATED WITHOUT INSULIN (H): ICD-10-CM

## 2021-04-27 RX ORDER — METFORMIN HCL 500 MG
TABLET, EXTENDED RELEASE 24 HR ORAL
Qty: 30 TABLET | Refills: 1 | Status: SHIPPED | OUTPATIENT
Start: 2021-04-27 | End: 2021-07-09

## 2021-05-03 ENCOUNTER — COMMUNICATION - HEALTHEAST (OUTPATIENT)
Dept: FAMILY MEDICINE | Facility: CLINIC | Age: 71
End: 2021-05-03

## 2021-05-03 DIAGNOSIS — F32.1 MODERATE SINGLE CURRENT EPISODE OF MAJOR DEPRESSIVE DISORDER (H): ICD-10-CM

## 2021-05-03 DIAGNOSIS — M79.2 RADICULAR PAIN IN LEFT ARM: ICD-10-CM

## 2021-05-06 ENCOUNTER — OFFICE VISIT - HEALTHEAST (OUTPATIENT)
Dept: PHYSICAL THERAPY | Facility: REHABILITATION | Age: 71
End: 2021-05-06

## 2021-05-06 DIAGNOSIS — M54.50 CHRONIC RIGHT-SIDED LOW BACK PAIN WITHOUT SCIATICA: ICD-10-CM

## 2021-05-06 DIAGNOSIS — M62.81 GENERALIZED MUSCLE WEAKNESS: ICD-10-CM

## 2021-05-06 DIAGNOSIS — G89.29 CHRONIC RIGHT-SIDED LOW BACK PAIN WITHOUT SCIATICA: ICD-10-CM

## 2021-05-07 ENCOUNTER — OFFICE VISIT - HEALTHEAST (OUTPATIENT)
Dept: FAMILY MEDICINE | Facility: CLINIC | Age: 71
End: 2021-05-07

## 2021-05-07 DIAGNOSIS — M81.0 AGE-RELATED OSTEOPOROSIS WITHOUT CURRENT PATHOLOGICAL FRACTURE: ICD-10-CM

## 2021-05-07 DIAGNOSIS — S22.080S COMPRESSION FRACTURE OF T11 VERTEBRA, SEQUELA: ICD-10-CM

## 2021-05-07 DIAGNOSIS — F32.1 MODERATE SINGLE CURRENT EPISODE OF MAJOR DEPRESSIVE DISORDER (H): ICD-10-CM

## 2021-05-07 DIAGNOSIS — E78.5 HYPERLIPIDEMIA, UNSPECIFIED HYPERLIPIDEMIA TYPE: ICD-10-CM

## 2021-05-07 DIAGNOSIS — I10 ESSENTIAL HYPERTENSION: ICD-10-CM

## 2021-05-07 DIAGNOSIS — R20.8 BURNING SENSATION OF FEET: ICD-10-CM

## 2021-05-07 DIAGNOSIS — E11.9 TYPE 2 DIABETES MELLITUS TREATED WITHOUT INSULIN (H): ICD-10-CM

## 2021-05-07 DIAGNOSIS — R42 DIZZINESS: ICD-10-CM

## 2021-05-07 DIAGNOSIS — Z91.148 NONCOMPLIANCE WITH MEDICATION REGIMEN: ICD-10-CM

## 2021-05-07 RX ORDER — DULOXETIN HYDROCHLORIDE 60 MG/1
60 CAPSULE, DELAYED RELEASE ORAL DAILY
Qty: 30 CAPSULE | Refills: 5 | Status: SHIPPED | OUTPATIENT
Start: 2021-05-07 | End: 2021-11-01

## 2021-05-07 ASSESSMENT — MIFFLIN-ST. JEOR: SCORE: 1049.25

## 2021-05-14 ENCOUNTER — OFFICE VISIT - HEALTHEAST (OUTPATIENT)
Dept: PHYSICAL THERAPY | Facility: REHABILITATION | Age: 71
End: 2021-05-14

## 2021-05-14 DIAGNOSIS — M62.81 GENERALIZED MUSCLE WEAKNESS: ICD-10-CM

## 2021-05-14 DIAGNOSIS — E78.5 HYPERLIPIDEMIA, UNSPECIFIED HYPERLIPIDEMIA TYPE: ICD-10-CM

## 2021-05-14 DIAGNOSIS — M81.0 OSTEOPOROSIS, UNSPECIFIED OSTEOPOROSIS TYPE, UNSPECIFIED PATHOLOGICAL FRACTURE PRESENCE: ICD-10-CM

## 2021-05-14 DIAGNOSIS — E11.9 TYPE 2 DIABETES MELLITUS TREATED WITHOUT INSULIN (H): ICD-10-CM

## 2021-05-14 DIAGNOSIS — F32.1 MODERATE SINGLE CURRENT EPISODE OF MAJOR DEPRESSIVE DISORDER (H): ICD-10-CM

## 2021-05-14 DIAGNOSIS — M54.50 CHRONIC RIGHT-SIDED LOW BACK PAIN WITHOUT SCIATICA: ICD-10-CM

## 2021-05-14 DIAGNOSIS — G89.29 CHRONIC RIGHT-SIDED LOW BACK PAIN WITHOUT SCIATICA: ICD-10-CM

## 2021-05-14 DIAGNOSIS — I10 ESSENTIAL HYPERTENSION: ICD-10-CM

## 2021-05-20 ENCOUNTER — COMMUNICATION - HEALTHEAST (OUTPATIENT)
Dept: FAMILY MEDICINE | Facility: CLINIC | Age: 71
End: 2021-05-20

## 2021-05-20 ENCOUNTER — RECORDS - HEALTHEAST (OUTPATIENT)
Dept: ADMINISTRATIVE | Facility: OTHER | Age: 71
End: 2021-05-20

## 2021-05-20 DIAGNOSIS — M81.0 AGE-RELATED OSTEOPOROSIS WITHOUT CURRENT PATHOLOGICAL FRACTURE: ICD-10-CM

## 2021-05-20 DIAGNOSIS — R42 DIZZINESS: ICD-10-CM

## 2021-05-21 ENCOUNTER — OFFICE VISIT - HEALTHEAST (OUTPATIENT)
Dept: PHYSICAL THERAPY | Facility: REHABILITATION | Age: 71
End: 2021-05-21

## 2021-05-21 DIAGNOSIS — E11.9 TYPE 2 DIABETES MELLITUS TREATED WITHOUT INSULIN (H): ICD-10-CM

## 2021-05-21 DIAGNOSIS — I10 ESSENTIAL HYPERTENSION: ICD-10-CM

## 2021-05-21 DIAGNOSIS — M81.0 AGE-RELATED OSTEOPOROSIS WITHOUT CURRENT PATHOLOGICAL FRACTURE: ICD-10-CM

## 2021-05-21 DIAGNOSIS — M81.0 OSTEOPOROSIS, UNSPECIFIED OSTEOPOROSIS TYPE, UNSPECIFIED PATHOLOGICAL FRACTURE PRESENCE: ICD-10-CM

## 2021-05-21 DIAGNOSIS — M54.50 CHRONIC RIGHT-SIDED LOW BACK PAIN WITHOUT SCIATICA: ICD-10-CM

## 2021-05-21 DIAGNOSIS — G89.29 CHRONIC RIGHT-SIDED LOW BACK PAIN WITHOUT SCIATICA: ICD-10-CM

## 2021-05-21 DIAGNOSIS — M62.81 GENERALIZED MUSCLE WEAKNESS: ICD-10-CM

## 2021-05-21 DIAGNOSIS — E78.5 HYPERLIPIDEMIA, UNSPECIFIED HYPERLIPIDEMIA TYPE: ICD-10-CM

## 2021-05-21 RX ORDER — MECLIZINE HYDROCHLORIDE 25 MG/1
25 TABLET ORAL 3 TIMES DAILY PRN
Qty: 30 TABLET | Refills: 1 | Status: SHIPPED | OUTPATIENT
Start: 2021-05-21 | End: 2021-10-12

## 2021-05-21 RX ORDER — ALENDRONATE SODIUM 70 MG/1
TABLET ORAL
Qty: 12 TABLET | Refills: 3 | Status: SHIPPED | OUTPATIENT
Start: 2021-05-21 | End: 2022-05-23

## 2021-05-26 ENCOUNTER — RECORDS - HEALTHEAST (OUTPATIENT)
Dept: ADMINISTRATIVE | Facility: OTHER | Age: 71
End: 2021-05-26

## 2021-05-26 VITALS
OXYGEN SATURATION: 98 % | SYSTOLIC BLOOD PRESSURE: 121 MMHG | TEMPERATURE: 97.8 F | RESPIRATION RATE: 12 BRPM | HEART RATE: 70 BPM | DIASTOLIC BLOOD PRESSURE: 62 MMHG

## 2021-05-26 VITALS
OXYGEN SATURATION: 98 % | DIASTOLIC BLOOD PRESSURE: 75 MMHG | HEART RATE: 68 BPM | DIASTOLIC BLOOD PRESSURE: 69 MMHG | RESPIRATION RATE: 12 BRPM | SYSTOLIC BLOOD PRESSURE: 100 MMHG | TEMPERATURE: 98.5 F | SYSTOLIC BLOOD PRESSURE: 114 MMHG | OXYGEN SATURATION: 97 % | TEMPERATURE: 98.7 F | HEART RATE: 79 BPM

## 2021-05-26 VITALS
TEMPERATURE: 97.7 F | DIASTOLIC BLOOD PRESSURE: 86 MMHG | SYSTOLIC BLOOD PRESSURE: 125 MMHG | HEART RATE: 60 BPM | OXYGEN SATURATION: 96 %

## 2021-05-26 VITALS — OXYGEN SATURATION: 96 % | TEMPERATURE: 97.7 F | HEART RATE: 76 BPM

## 2021-05-26 VITALS
SYSTOLIC BLOOD PRESSURE: 121 MMHG | HEART RATE: 66 BPM | OXYGEN SATURATION: 98 % | RESPIRATION RATE: 12 BRPM | DIASTOLIC BLOOD PRESSURE: 77 MMHG | TEMPERATURE: 96 F

## 2021-05-26 VITALS
TEMPERATURE: 96.5 F | SYSTOLIC BLOOD PRESSURE: 126 MMHG | OXYGEN SATURATION: 98 % | RESPIRATION RATE: 12 BRPM | DIASTOLIC BLOOD PRESSURE: 81 MMHG | HEART RATE: 68 BPM

## 2021-05-27 ENCOUNTER — RECORDS - HEALTHEAST (OUTPATIENT)
Dept: HOME HEALTH SERVICES | Facility: HOME HEALTH | Age: 71
End: 2021-05-27

## 2021-05-27 VITALS
DIASTOLIC BLOOD PRESSURE: 72 MMHG | OXYGEN SATURATION: 96 % | RESPIRATION RATE: 12 BRPM | TEMPERATURE: 96.6 F | HEART RATE: 76 BPM | SYSTOLIC BLOOD PRESSURE: 83 MMHG

## 2021-05-27 VITALS
SYSTOLIC BLOOD PRESSURE: 93 MMHG | DIASTOLIC BLOOD PRESSURE: 67 MMHG | RESPIRATION RATE: 12 BRPM | HEART RATE: 72 BPM | OXYGEN SATURATION: 96 % | TEMPERATURE: 98.8 F

## 2021-05-27 VITALS
TEMPERATURE: 97.3 F | OXYGEN SATURATION: 96 % | HEART RATE: 64 BPM | DIASTOLIC BLOOD PRESSURE: 74 MMHG | SYSTOLIC BLOOD PRESSURE: 109 MMHG | RESPIRATION RATE: 16 BRPM

## 2021-05-27 VITALS
OXYGEN SATURATION: 98 % | DIASTOLIC BLOOD PRESSURE: 93 MMHG | SYSTOLIC BLOOD PRESSURE: 165 MMHG | TEMPERATURE: 98 F | HEART RATE: 70 BPM

## 2021-05-27 VITALS
HEART RATE: 71 BPM | SYSTOLIC BLOOD PRESSURE: 112 MMHG | DIASTOLIC BLOOD PRESSURE: 70 MMHG | OXYGEN SATURATION: 94 % | TEMPERATURE: 99.8 F

## 2021-05-27 VITALS
SYSTOLIC BLOOD PRESSURE: 102 MMHG | RESPIRATION RATE: 12 BRPM | DIASTOLIC BLOOD PRESSURE: 70 MMHG | HEART RATE: 66 BPM | OXYGEN SATURATION: 98 % | TEMPERATURE: 95.8 F

## 2021-05-27 VITALS
DIASTOLIC BLOOD PRESSURE: 65 MMHG | HEART RATE: 72 BPM | OXYGEN SATURATION: 98 % | SYSTOLIC BLOOD PRESSURE: 93 MMHG | RESPIRATION RATE: 12 BRPM | TEMPERATURE: 97.8 F

## 2021-05-27 VITALS
TEMPERATURE: 97.7 F | DIASTOLIC BLOOD PRESSURE: 60 MMHG | OXYGEN SATURATION: 97 % | HEART RATE: 78 BPM | RESPIRATION RATE: 17 BRPM | SYSTOLIC BLOOD PRESSURE: 98 MMHG

## 2021-05-27 VITALS
HEART RATE: 72 BPM | DIASTOLIC BLOOD PRESSURE: 70 MMHG | TEMPERATURE: 98.4 F | BODY MASS INDEX: 27.48 KG/M2 | HEIGHT: 59 IN | RESPIRATION RATE: 16 BRPM | WEIGHT: 136.31 LBS | SYSTOLIC BLOOD PRESSURE: 112 MMHG

## 2021-05-27 VITALS
OXYGEN SATURATION: 96 % | DIASTOLIC BLOOD PRESSURE: 78 MMHG | RESPIRATION RATE: 16 BRPM | SYSTOLIC BLOOD PRESSURE: 109 MMHG | TEMPERATURE: 99.1 F | HEART RATE: 109 BPM

## 2021-05-27 VITALS — RESPIRATION RATE: 16 BRPM | SYSTOLIC BLOOD PRESSURE: 109 MMHG | HEART RATE: 82 BPM | DIASTOLIC BLOOD PRESSURE: 67 MMHG

## 2021-05-27 VITALS
OXYGEN SATURATION: 98 % | SYSTOLIC BLOOD PRESSURE: 120 MMHG | RESPIRATION RATE: 12 BRPM | DIASTOLIC BLOOD PRESSURE: 80 MMHG | HEART RATE: 82 BPM | TEMPERATURE: 96 F

## 2021-05-27 VITALS
SYSTOLIC BLOOD PRESSURE: 133 MMHG | TEMPERATURE: 98.7 F | HEART RATE: 76 BPM | DIASTOLIC BLOOD PRESSURE: 87 MMHG | RESPIRATION RATE: 12 BRPM | OXYGEN SATURATION: 98 %

## 2021-05-27 VITALS
TEMPERATURE: 98.5 F | SYSTOLIC BLOOD PRESSURE: 143 MMHG | OXYGEN SATURATION: 98 % | HEART RATE: 78 BPM | RESPIRATION RATE: 12 BRPM | DIASTOLIC BLOOD PRESSURE: 82 MMHG

## 2021-05-27 VITALS — HEART RATE: 80 BPM | SYSTOLIC BLOOD PRESSURE: 108 MMHG | DIASTOLIC BLOOD PRESSURE: 72 MMHG | TEMPERATURE: 98.2 F

## 2021-05-27 VITALS
SYSTOLIC BLOOD PRESSURE: 119 MMHG | TEMPERATURE: 97.7 F | OXYGEN SATURATION: 97 % | DIASTOLIC BLOOD PRESSURE: 83 MMHG | RESPIRATION RATE: 15 BRPM | HEART RATE: 77 BPM

## 2021-05-27 VITALS
DIASTOLIC BLOOD PRESSURE: 70 MMHG | RESPIRATION RATE: 12 BRPM | SYSTOLIC BLOOD PRESSURE: 103 MMHG | HEART RATE: 62 BPM | TEMPERATURE: 96.2 F | OXYGEN SATURATION: 98 %

## 2021-05-27 VITALS — OXYGEN SATURATION: 95 % | SYSTOLIC BLOOD PRESSURE: 112 MMHG | DIASTOLIC BLOOD PRESSURE: 69 MMHG | HEART RATE: 66 BPM

## 2021-05-27 VITALS
TEMPERATURE: 96.1 F | OXYGEN SATURATION: 96 % | HEART RATE: 69 BPM | DIASTOLIC BLOOD PRESSURE: 85 MMHG | SYSTOLIC BLOOD PRESSURE: 147 MMHG | RESPIRATION RATE: 12 BRPM

## 2021-05-27 VITALS
HEART RATE: 83 BPM | RESPIRATION RATE: 12 BRPM | SYSTOLIC BLOOD PRESSURE: 105 MMHG | TEMPERATURE: 98.7 F | OXYGEN SATURATION: 97 % | DIASTOLIC BLOOD PRESSURE: 70 MMHG

## 2021-05-27 VITALS — SYSTOLIC BLOOD PRESSURE: 110 MMHG | OXYGEN SATURATION: 96 % | DIASTOLIC BLOOD PRESSURE: 70 MMHG | HEART RATE: 77 BPM

## 2021-05-27 VITALS
RESPIRATION RATE: 12 BRPM | OXYGEN SATURATION: 96 % | HEART RATE: 77 BPM | DIASTOLIC BLOOD PRESSURE: 80 MMHG | SYSTOLIC BLOOD PRESSURE: 143 MMHG | TEMPERATURE: 98.7 F

## 2021-05-27 VITALS
SYSTOLIC BLOOD PRESSURE: 103 MMHG | OXYGEN SATURATION: 94 % | TEMPERATURE: 96.6 F | HEART RATE: 66 BPM | RESPIRATION RATE: 12 BRPM | DIASTOLIC BLOOD PRESSURE: 63 MMHG

## 2021-05-27 VITALS
TEMPERATURE: 96 F | RESPIRATION RATE: 12 BRPM | SYSTOLIC BLOOD PRESSURE: 137 MMHG | DIASTOLIC BLOOD PRESSURE: 76 MMHG | HEART RATE: 75 BPM

## 2021-05-27 VITALS
DIASTOLIC BLOOD PRESSURE: 70 MMHG | TEMPERATURE: 98.6 F | OXYGEN SATURATION: 95 % | SYSTOLIC BLOOD PRESSURE: 128 MMHG | HEART RATE: 76 BPM

## 2021-05-27 VITALS
TEMPERATURE: 96 F | SYSTOLIC BLOOD PRESSURE: 111 MMHG | RESPIRATION RATE: 12 BRPM | OXYGEN SATURATION: 98 % | DIASTOLIC BLOOD PRESSURE: 67 MMHG | HEART RATE: 74 BPM

## 2021-05-27 VITALS
HEART RATE: 68 BPM | TEMPERATURE: 97.5 F | DIASTOLIC BLOOD PRESSURE: 72 MMHG | SYSTOLIC BLOOD PRESSURE: 119 MMHG | OXYGEN SATURATION: 96 %

## 2021-05-27 ASSESSMENT — PATIENT HEALTH QUESTIONNAIRE - PHQ9
SUM OF ALL RESPONSES TO PHQ QUESTIONS 1-9: 10
SUM OF ALL RESPONSES TO PHQ QUESTIONS 1-9: 1
SUM OF ALL RESPONSES TO PHQ QUESTIONS 1-9: 14

## 2021-05-28 ENCOUNTER — OFFICE VISIT - HEALTHEAST (OUTPATIENT)
Dept: PHYSICAL THERAPY | Facility: REHABILITATION | Age: 71
End: 2021-05-28

## 2021-05-28 DIAGNOSIS — E78.5 HYPERLIPIDEMIA, UNSPECIFIED HYPERLIPIDEMIA TYPE: ICD-10-CM

## 2021-05-28 DIAGNOSIS — I10 ESSENTIAL HYPERTENSION: ICD-10-CM

## 2021-05-28 DIAGNOSIS — F32.1 MODERATE SINGLE CURRENT EPISODE OF MAJOR DEPRESSIVE DISORDER (H): ICD-10-CM

## 2021-05-28 DIAGNOSIS — G89.29 CHRONIC RIGHT-SIDED LOW BACK PAIN WITHOUT SCIATICA: ICD-10-CM

## 2021-05-28 DIAGNOSIS — E11.9 TYPE 2 DIABETES MELLITUS TREATED WITHOUT INSULIN (H): ICD-10-CM

## 2021-05-28 DIAGNOSIS — M62.81 GENERALIZED MUSCLE WEAKNESS: ICD-10-CM

## 2021-05-28 DIAGNOSIS — M81.0 AGE-RELATED OSTEOPOROSIS WITHOUT CURRENT PATHOLOGICAL FRACTURE: ICD-10-CM

## 2021-05-28 DIAGNOSIS — M54.50 CHRONIC RIGHT-SIDED LOW BACK PAIN WITHOUT SCIATICA: ICD-10-CM

## 2021-05-28 NOTE — PROGRESS NOTES
ASSESSMENT   And plan  1. Essential hypertension  She has not been taking losartan her systolic blood pressure is below 100 I have given her prescription for the medication she will pick it up but not started until she sees her primary doctor.  Checking a BMP today.  - Basic Metabolic Panel    2. Noncompliance with medication regimen    Patient is confused about her medications.  She has not been taking her fluoxetine correctly sometimes she will take 2 doses a day sometimes she will take it because she forgets.  I did do a pill count today she is been taking her gabapentin correctly except for the last week.  She has an extra week of fluoxetine and her bottle today.  She may require a care management referral if she is inaccurate with her medications.    3. Dizziness    Complains of dizziness no falls no postural symptoms today checking a BMP.  - Basic Metabolic Panel    4. Hyperlipidemia, unspecified hyperlipidemia type    Has a history of hyperlipidemia should be taking a statin but does not have this medication I refilled that she will not started again until she sees   - Basic Metabolic Panel            There are no Patient Instructions on file for this visit.    Orders Placed This Encounter   Procedures     Basic Metabolic Panel     There are no discontinued medications.    No follow-ups on file.    CHIEF COMPLAINT:  Chief Complaint   Patient presents with     Dizziness     Chest Pain       HISTORY OF PRESENT ILLNESS:  Pa is a 69 y.o. female   Here because she is been dizzy.  She has a medical history significant for depression, hypertension, pain secondary to neuralgia.  She sees Dr. Llanos regularly.  She reports that for the last 2 weeks she has been feeling very bad with chest pain shortness of breath and feeling sad and crying all the.  She says that she has a total of 4 medications but she cannot find the other 2 medications.  She thinks that her heart pain is due to her low blood pressure.  She was  "unaware that she had hypertension.    REVIEW OF SYSTEMS:     General positive for fatigue and dizziness  Pulmonary positive for shortness of breath which is intermittent  CVS positive for chest pain  Psych positive for increased crying denies suicidal ideation  Neuro positive for dizziness and headache  All other systems are negative.    PFSH:    Not working at this time    TOBACCO USE:  Social History     Tobacco Use   Smoking Status Never Smoker   Smokeless Tobacco Current User     Types: Chew   Tobacco Comment    chews betal nut        VITALS:  Vitals:    05/15/19 0832   BP: 96/64   Pulse: 78   Resp: 18   Temp: 97.5  F (36.4  C)   TempSrc: Oral   SpO2: 95%   Weight: 133 lb 2 oz (60.4 kg)   Height: 4' 11.02\" (1.499 m)     Wt Readings from Last 3 Encounters:   05/15/19 133 lb 2 oz (60.4 kg)   01/03/19 131 lb 4 oz (59.5 kg)   11/07/18 128 lb 1.6 oz (58.1 kg)       PHYSICAL EXAM:  Interactive elderly appearing female sitting comfortably in exam room no acute distress  HEENT neck supple mucous members moist, oral cavity shows no exudate no erythema there is no lymph enlargement noted in the neck  Respiratory system clear to auscultation equal breath sounds no wheezes no crackles  CVS regular rate and rhythm no murmurs rubs gallops no pedal edema noted  CNS cranial nerves II to XII intact gait is normal reflexes are brisk motor tone is normal in her upper extremities  Psych oriented x3 not agitated well-groomed she is crying.  Abdomen soft there is no focal tenderness bowel sounds are present    DATA REVIEWED:  Additional History from Old Records Summarized (2): 2  Reviewed clinic notes from Dr. Paige as last visits including clinic note from September 2018 when her blood pressure was elevated.  Decision to Obtain Records (1): 0  Radiology Tests Summarized or Ordered (1): 0  Labs Reviewed or Ordered (1): bmp  Medicine Test Summarized or Ordered (1): 0  Independent Review of EKG or X-RAY(2 each): 0    The visit lasted a " total of 22 minutes face to face with the patient. Over 50% of the time was spent counseling and educating the patient about   Dizziness hypertension depression hyperlipidemia.    MEDICATIONS:  Current Outpatient Medications   Medication Sig Dispense Refill     clotrimazole-betamethasone (LOTRISONE) cream APPLY THIN LAYER EXTERNALLY TO THE AFFECTED AREA TWICE DAILY 30 g 0     FLUoxetine (PROZAC) 20 MG capsule Take 1 capsule (20 mg total) by mouth daily. 90 capsule 3     gabapentin (NEURONTIN) 300 MG capsule Take 1 capsule (300 mg total) by mouth 2 (two) times a day. 60 capsule 5     ibuprofen (ADVIL,MOTRIN) 600 MG tablet Take 1 tablet (600 mg total) by mouth 3 times a day after meals. 60 tablet 1     losartan (COZAAR) 50 MG tablet Take 1 tablet (50 mg total) by mouth daily. 30 tablet 3     omeprazole (PRILOSEC) 20 MG capsule Take 1 capsule (20 mg total) by mouth daily. 90 capsule 3     pravastatin (PRAVACHOL) 20 MG tablet Take 1 tablet (20 mg total) by mouth at bedtime. 30 tablet 11     triamcinolone (KENALOG) 0.1 % ointment Apply topically 2 (two) times a day. 453 g 1     white petrolatum-mineral oil (EUCERIN) Crea Apply to affected area twice a day 120 g 3     No current facility-administered medications for this visit.      Please note that this clinical encounter uses voice recognition software, there may be typographical errors present      Data points3

## 2021-05-28 NOTE — TELEPHONE ENCOUNTER
Called and spoke to daughter letting her know we would not be able to provide transporation, she understood.

## 2021-05-29 NOTE — PROGRESS NOTES
ASSESSMENT AND PLAN:  1. Chronic left shoulder pain  Patient continues to complain about neck pain pain is actually radiating from her left shoulder into her neck.  When she rotates in neck to the left side pain is accentuated starting her on Mobic have discontinued the ibuprofen.  - meloxicam (MOBIC) 15 MG tablet; Take 1 tablet (15 mg total) by mouth daily.  Dispense: 30 tablet; Refill: 3    2. Moderate single current episode of major depressive disorder (H)    Can continue fluoxetine she is tolerated the medication    3. Hyperlipidemia, unspecified hyperlipidemia type    She does have hyperlipidemia she should restart her atorvastatin.    4. Dry skin dermatitis    Both feet are very itchy I will have her see her primary doctor again restarted the Kenalog ointment eczema is noted bilaterally  - white petrolatum-mineral oil Crea; Apply to affected area twice a day  Dispense: 120 g; Refill: 3    5. Intrinsic eczema      - triamcinolone (KENALOG) 0.1 % ointment; Apply topically 2 (two) times a day.  Dispense: 453 g; Refill: 1              No orders of the defined types were placed in this encounter.    Medications Discontinued During This Encounter   Medication Reason     ibuprofen (ADVIL,MOTRIN) 600 MG tablet Therapy completed     triamcinolone (KENALOG) 0.1 % ointment Reorder     white petrolatum-mineral oil (EUCERIN) Crea Reorder     meloxicam (MOBIC) 15 MG tablet Reorder       No follow-ups on file.    CHIEF COMPLAINT:  Chief Complaint   Patient presents with     Dizziness     Stiff neck       HISTORY OF PRESENT ILLNESS:  Pa is a 69 y.o. female with hypertension, hyperlipidemia, pulmonary nodules, major depression, history of skin cancer on face, and noncompliance with medication regimen, who presents to the clinic today for dizziness and stiff neck. Pa is present with a Sejal . I saw her 9 days ago at which time losartan and pravastatin were to be restarted once she had seen her primary care provider,  "Dr. Llanos, for follow up . The patient instead has come to see me today, and has not restarted losartan or pravastatin yet. Her blood pressure is well-controlled today. BMP done at her last visit was significant for mildly elevated creatinine. Her glucose was elevated at 220 though she was non-fasting and had bread with sticky rice prior to her visit.    Pa is still having persistent dizziness. She also notes stiff neck with shooting pain up into her head, causing headaches. This started last week when the patient became panicked after accidentally setting off the fire alarm while cooking.     She continues to do well on gabapentin with Advil for management of her pain secondary to neuralgia.    Pa indicates dry skin on her bilateral feet. This started once she moved to the [place]. The patient tries not to scratch, but it is very itchy.     Her PHQ-9 score today is 5, stating things are somewhat difficult.     REVIEW OF SYSTEMS:   Musculoskeletal: Stiff neck with shooting pain up to head.   Neuro: Dizziness and headaches.  Skin: Dry pruritic skin on bilateral feet.   All other systems are negative.    PFSH:  She is , not employed. Reviewed as below.     TOBACCO USE:  Social History     Tobacco Use   Smoking Status Never Smoker   Smokeless Tobacco Current User     Types: Chew   Tobacco Comment    chews betal nut        VITALS:  Vitals:    05/24/19 0811   BP: 116/76   Pulse: 75   Resp: 18   Temp: 97.6  F (36.4  C)   TempSrc: Oral   SpO2: 91%   Weight: 133 lb 6 oz (60.5 kg)   Height: 4' 11.02\" (1.499 m)     Wt Readings from Last 3 Encounters:   05/24/19 133 lb 6 oz (60.5 kg)   05/15/19 133 lb 2 oz (60.4 kg)   01/03/19 131 lb 4 oz (59.5 kg)     Body mass index is 26.92 kg/m .      PHYSICAL EXAM:  General: Alert, cooperative, no distress, appears stated age  HEENT: Normocephalic, without obvious abnormality, atraumatic, moist mucous membranes  Eyes: PERRL, conjunctiva/cornea clear, EOM's intact  Neck: Stiffness noted at " C7, normal extension and flexion, rotation to the right causes pain, supple, no cervical lymph node enlargement   Back: Symmetric, no curvature, stiffness noted at L3, nontender to palpation  Lungs: Clear to auscultation bilaterally, respirations unlabored  Heart: Regular rate and rhythm, S1 and S2 normal, no murmur, rub, or gallop  Neurologic:  A & O x 3.  No tremor, no focal findings.  Normal gait.   Skin: Scaling and fissuring noted on the lateral and medial aspects as well as the soles of bilateral feet       DATA REVIEWED:  Additional History from Old Records Summarized (2): Reviewed Dr. Llanos's 01/03/2019 note regarding evaluation for hypertension, hyperlipidemia, major depression, and dermatitis.  Decision to Obtain Records (1): none  Radiology Tests Summarized or Ordered (1): none  Labs Reviewed or Ordered (1): 05/15/2019 BMP reviewed.  Medicine Test Summarized or Ordered (1): none  Independent Review of EKG or X-RAY(2 each): none      I, Christina Dixon, am scribing for and in the presence of, Dr. Jerome.    I, Dr. Jerome, personally performed the services described in this documentation, as scribed by Christina Dixon in my presence, and it is both accurate and complete.      MEDICATIONS:  Current Outpatient Medications   Medication Sig Dispense Refill     FLUoxetine (PROZAC) 20 MG capsule Take 1 capsule (20 mg total) by mouth daily. 90 capsule 3     gabapentin (NEURONTIN) 300 MG capsule Take 1 capsule (300 mg total) by mouth 2 (two) times a day. 60 capsule 5     losartan (COZAAR) 50 MG tablet Take 1 tablet (50 mg total) by mouth daily. 30 tablet 3     pravastatin (PRAVACHOL) 20 MG tablet Take 1 tablet (20 mg total) by mouth at bedtime. 30 tablet 11     clotrimazole-betamethasone (LOTRISONE) cream APPLY THIN LAYER EXTERNALLY TO THE AFFECTED AREA TWICE DAILY 30 g 0     meloxicam (MOBIC) 15 MG tablet Take 1 tablet (15 mg total) by mouth daily. 30 tablet 3     omeprazole (PRILOSEC) 20 MG capsule Take 1 capsule (20 mg  total) by mouth daily. 90 capsule 3     triamcinolone (KENALOG) 0.1 % ointment Apply topically 2 (two) times a day. 453 g 1     white petrolatum-mineral oil Crea Apply to affected area twice a day 120 g 3     No current facility-administered medications for this visit.        Total Data Points: 3    Please note that this clinical encounter uses voice recognition software, there may be typographical errors present

## 2021-05-29 NOTE — PROGRESS NOTES
"ASSESMENT AND PLAN:  Diagnoses and all orders for this visit:    Tightness in chest  -     Stress Test Graded; Future; Expected date: 06/18/2019    Hyperlipidemia, unspecified hyperlipidemia type  Recheck today.    Elevated blood sugar  -     Glycosylated Hemoglobin A1c  -     Basic Metabolic Panel  A1c 6.3   Will monitor.   Recheck glucose at next visit.    Essential hypertension  BP normal without med   Will not restart Losartan     Moderate single current episode of major depressive disorder (H)  Increased prozac to 40 mg from 20 mg.     Neck pain  Continue mobic. Declined PT.       SUBJECTIVE: Ricky Lea is here to follow up on neck pain.  Was seen by Dr. Jerome on 5/24/19, was given mobic. Pt unclear if it's helping but forget to take daily as Rxed. Still having neck pain and shoulder pain.   Main concern today is \" tightness in the chest\" after walking/climbing stairs. No chest pain or palpitations.   On prozac, forgets to take as Rxed. Denied suicidal or homicidal ideations.     No past medical history on file.  Patient Active Problem List   Diagnosis     Hyperlipidemia, unspecified hyperlipidemia type     Pulmonary nodules     Left shoulder pain     Essential hypertension     Moderate single current episode of major depressive disorder (H)     Skin cancer of face     Dry skin     Noncompliance with medication regimen     Intrinsic eczema       Allergies:  No Known Allergies    Social History     Tobacco Use   Smoking Status Never Smoker   Smokeless Tobacco Current User     Types: Chew   Tobacco Comment    chews betal nut        Review of systems otherwise negative except as listed in HPI.   Social History     Tobacco Use   Smoking Status Never Smoker   Smokeless Tobacco Current User     Types: Chew   Tobacco Comment    chews betal nut        OBJECTICE: /82 (Patient Site: Left Arm, Patient Position: Sitting, Cuff Size: Adult Regular)   Pulse (!) 56   Temp 97.5  F (36.4  C)   Resp 12   Ht 4' 11.06\" (1.5 " m)   Wt 135 lb 4 oz (61.3 kg)   BMI 27.27 kg/m             GEN-alert,  in no apparent distress.  HEENT- neck is supple.  CV-regular rate and rhythm with no murmur.   RESP-lungs clear to auscultation .  ABDOMEN- Soft , not tender.  EXTREM- No joint swelling or tenderness.   SKIN-normal        [facility]   6/18/2019

## 2021-05-30 VITALS — HEIGHT: 59 IN | BODY MASS INDEX: 24.47 KG/M2 | WEIGHT: 121.38 LBS

## 2021-05-30 VITALS — WEIGHT: 112.19 LBS | BODY MASS INDEX: 22.62 KG/M2 | HEIGHT: 59 IN

## 2021-05-30 VITALS — HEIGHT: 59 IN | WEIGHT: 118.38 LBS | BODY MASS INDEX: 23.87 KG/M2

## 2021-05-31 VITALS — WEIGHT: 121.7 LBS | HEIGHT: 59 IN | BODY MASS INDEX: 24.53 KG/M2

## 2021-05-31 VITALS — BODY MASS INDEX: 25.41 KG/M2 | WEIGHT: 126.06 LBS | HEIGHT: 59 IN

## 2021-05-31 VITALS — WEIGHT: 124.25 LBS | BODY MASS INDEX: 25.05 KG/M2 | HEIGHT: 59 IN

## 2021-05-31 VITALS — WEIGHT: 119 LBS | BODY MASS INDEX: 23.99 KG/M2 | HEIGHT: 59 IN

## 2021-05-31 VITALS — BODY MASS INDEX: 24.7 KG/M2 | WEIGHT: 122.5 LBS | HEIGHT: 59 IN

## 2021-05-31 VITALS — BODY MASS INDEX: 24.7 KG/M2 | HEIGHT: 59 IN | WEIGHT: 122.5 LBS

## 2021-05-31 VITALS — WEIGHT: 127.06 LBS | HEIGHT: 59 IN | BODY MASS INDEX: 25.61 KG/M2

## 2021-05-31 NOTE — PROGRESS NOTES
Was asked by Sly Freeman to help with a patient today regarding medications during her in person visit. She would benefit from a visit with MTM.  Patient agreeable for me to enter a MTM referral today.    Hattie Irby, PharmD  Medication Therapy Management Pharmacist  [facility]

## 2021-05-31 NOTE — PROGRESS NOTES
Mental Health Visit Note    8/21/19   Start time: 0800    Stop Time: 0850 Session # 1 of intake    Session Type: Patient is presenting for an Individual session.    Patient is a 69.o. Female of [place] descent being seen today for psychotherapy.  Patient was referred by Dr. Bassam Llanos in order to assist with mood stabilization.  Patient came unaccompanied to this appointment today.    Individuals present Patient, Therapist and Sejal     New symptoms or complaints: Feeling nervous, not being able to control worry, little interest and pleasure in doing things, frequent crying episodes, feeling down, feeling tired, trouble falling asleep, poor appetite and social isolation.    Functional Impairment:   Personal: 4  Family: 4  Work: 0  Social:3    Clinical assessment of mental status:   Grooming: Well groomed  Attire: Appropriate  Age: Appears Stated  Behavior Towards Examiner: Cooperative  Motor Activity: Within normal   Eye Contact: Appropriate  Mood: Sad  Affect: Congruent w/content of speech  Speech/Language: Within normal  Attention: Within normal  Concentration: Within normal  Thought Process: Within normal  Thought Content: Hallucinations: Within noraml  Delusions: Within normal  Orientation: X 3  Memory: No Evidence of Impairment  Judgement: No Evidence of Impairment  Estimated Intelligence: Average  Demonstrated Insight: Somewhat diminished  Fund of Knowledge: adequate    CAGE  0/4  PHQ-9 scoring 11 indicating moderate depression  ARNIE-7 scoring 2 indicating minimal anxiety  WHODAS 2.0 12-item version: 34.28%     Scores presented in qualifiers to represent level of disability.  MODERATE Problem (medium, fair, ...) 25-49%    H1= 30  H2= 30  H3= 30    Suicidal/Homicidal Ideation present: None Reported This Session    Patient's impression of their current status:  The patient described reasons for engaging in therapy services during today's session. Patient reports that she is experiencing frequent crying  "episodes for no reason. The patient believes that the symptoms began within the last couple of weeks but is uncertain.. The patient has never engaged in psychotherapy services before. Patient described the following expectations for treatment: \"I just want to stop crying all the time.\"    Therapist impression of patients current state:  The patient presented for an initial intake session with this provider. Patient is a year old 69-year-old female of [place] descent. Patient was referred by Dr. Bassam Llanos to engage in individual psychotherapy to address symptoms of depressed mood.  Patient stated that she and her  reside with their daughter and grandchildren.  Patient sets up her own medications although verbalized that she is beginning to have memory issues.  Writer asked that pharmacist come in to review medications being that it appeared patient was on more medication than the patient indicated.  Patient stated, that she was unaware that her  Dr. increase the dose of her Prozac and indicated that she has not been taking that medication for a while. It was recommended that patient's daughter attend next session with therapist as well as, the patient and her daughter then meeting with the pharmacist.       The patient appeared cooperative and open-minded throughout the intake and easily engaged in dialogue. Therapist and patient reviewed consent and privacy policy. Patient reported understanding and signed document- sent to be scanned into EMR. The patient has not engaged in outpatient psychotherapy services in the community so there is no diagnostic assessment on file or available. The patient completed the following questionnaires for session today: WHODAS, CAGE, PHQ 9 and ARNIE -7.  Therapist and patient completed C-SSRS together in session. No concerns about patient's safety or the safety of others per assessment today. These questionnaires will be used to inform diagnoses and will be uploaded to patient chart " after standard DA is completed. Therapist and patient will further review patient's history during the next follow-up encounter in order to complete a standard diagnostic assessment. Goals for treatment will be established after the 2nd or 3rd follow-up session with this provider. The patient plans to follow-up with psychotropic medication management with her PCP. Patient did not request psychiatry services at intake.        Type of psychotherapeutic technique provided: Insight oriented and Solution-focused    Progress toward short term goals: Goals not yet established being this is the first intake appointment    Review of long term goals: Goals not yet established being that this is the first intake appointment    Diagnosis:   Moderate single current episode of major depressive disorder (H)    Plan and Follow up:   1.  Patient's daughter will be attending next appointment.  2.  A referral for pharmacy medication review will be initiated  3.  Patient scheduled for September 4th at 9 AM  4.  Complete the adult intake questionnaire.  5.  Referral for [facility]      Discharge Criteria/Planning: Patient will continue with follow-up until therapy can be discontinued without return of signs and symptoms.    Jane Freeman LICSW  9/21/10

## 2021-05-31 NOTE — PROGRESS NOTES
Assessment and Plan:   1. Wellness examination    2. Moderate single current episode of major depressive disorder (H)  Continue Prozac 40 mg daily.  No suicidal ideations.  - Ambulatory referral to Psychology    3. Menopausal state  - DXA Bone Density Scan; Future        The patient's current medical problems were reviewed.    I have had an Advance Directives discussion with the patient.  The following health maintenance schedule was reviewed with the patient and provided in printed form in the after visit summary:   Health Maintenance   Topic Date Due     ZOSTER VACCINES (1 of 2) 03/25/2002     DXA SCAN  03/25/2017     MEDICARE ANNUAL WELLNESS VISIT  05/16/2019     INFLUENZA VACCINE RULE BASED (1) 08/01/2019     DEPRESSION FOLLOW UP  11/24/2019     MAMMOGRAM  02/28/2020     FALL RISK ASSESSMENT  05/15/2020     ADVANCE CARE PLANNING  05/16/2023     COLONOSCOPY  02/13/2027     TD 18+ HE  07/26/2027     HEPATITIS C SCREENING  Completed     PNEUMOCOCCAL POLYSACCHARIDE VACCINE AGE 65 AND OVER  Completed     PNEUMOCOCCAL CONJUGATE VACCINE FOR ADULTS (PCV13 OR PREVNAR)  Completed        Subjective:   Chief Complaint: Ricky Lea is an 69 y.o. female here for an Annual Wellness visit.   HPI: 69-year-old female here for annual she has history of depression, taking Prozac 40 mg daily.  She is not sure if the medication is helping.  She denied worsening depression symptoms but find herself crying easily for no reason.  She lives with her , daughter and her 3 children.  She is complaining of recurrent headaches, Tylenol usually works.  She has mild headaches today, tolerable.  No acute weaknesses.  No acute vision changes.    No acute chest pain or shortness of breath.  No.    Review of Systems:   Please see above.  The rest of the review of systems are negative for all systems.    Patient Care Team:  Bassam Llanos MD as PCP - General (Family Medicine)  Mary Kay Mejia as Lead Care Coordinator (Licensed Clinical )      Patient Active Problem List   Diagnosis     Hyperlipidemia, unspecified hyperlipidemia type     Pulmonary nodules     Left shoulder pain     Essential hypertension     Moderate single current episode of major depressive disorder (H)     Skin cancer of face     Dry skin     Noncompliance with medication regimen     Intrinsic eczema     No past medical history on file.   No past surgical history on file.   Family History   Problem Relation Age of Onset     Breast cancer Neg Hx       Social History     Socioeconomic History     Marital status:      Spouse name: Not on file     Number of children: Not on file     Years of education: Not on file     Highest education level: Not on file   Occupational History     Employer: NOT EMPLOYED   Social Needs     Financial resource strain: Not on file     Food insecurity:     Worry: Not on file     Inability: Not on file     Transportation needs:     Medical: Not on file     Non-medical: Not on file   Tobacco Use     Smoking status: Never Smoker     Smokeless tobacco: Current User     Types: Chew     Tobacco comment: chews betal nut    Substance and Sexual Activity     Alcohol use: Not on file     Drug use: Not on file     Sexual activity: Not on file   Lifestyle     Physical activity:     Days per week: Not on file     Minutes per session: Not on file     Stress: Not on file   Relationships     Social connections:     Talks on phone: Not on file     Gets together: Not on file     Attends Jewish service: Not on file     Active member of club or organization: Not on file     Attends meetings of clubs or organizations: Not on file     Relationship status: Not on file     Intimate partner violence:     Fear of current or ex partner: Not on file     Emotionally abused: Not on file     Physically abused: Not on file     Forced sexual activity: Not on file   Other Topics Concern     Not on file   Social History Narrative     Not on file      Current Outpatient Medications  "  Medication Sig Dispense Refill     FLUoxetine (PROZAC) 40 MG capsule Take 1 capsule (40 mg total) by mouth daily. 90 capsule 1     gabapentin (NEURONTIN) 300 MG capsule Take 1 capsule (300 mg total) by mouth 2 (two) times a day. 60 capsule 5     meloxicam (MOBIC) 15 MG tablet Take 1 tablet (15 mg total) by mouth daily. 30 tablet 3     pravastatin (PRAVACHOL) 20 MG tablet Take 1 tablet (20 mg total) by mouth at bedtime. 30 tablet 11     clotrimazole-betamethasone (LOTRISONE) cream APPLY THIN LAYER EXTERNALLY TO THE AFFECTED AREA TWICE DAILY 30 g 0     losartan (COZAAR) 50 MG tablet Take 1 tablet (50 mg total) by mouth daily. 30 tablet 3     omeprazole (PRILOSEC) 20 MG capsule Take 1 capsule (20 mg total) by mouth daily. 90 capsule 3     triamcinolone (KENALOG) 0.1 % ointment Apply topically 2 (two) times a day. 453 g 1     white petrolatum-mineral oil Crea Apply to affected area twice a day 120 g 3     No current facility-administered medications for this visit.       Objective:   Vital Signs:   Visit Vitals  /86 (Patient Site: Left Arm, Patient Position: Sitting, Cuff Size: Adult Regular)   Pulse (!) 56   Temp 98.2  F (36.8  C) (Oral)   Resp 20   Ht 4' 11\" (1.499 m)   Wt 133 lb 9 oz (60.6 kg)   BMI 26.98 kg/m         VisionScreening:  No exam data present     PHYSICAL EXAM  Gen - alert, orientated, NAD  Eyes - fundascopic exam limited by the undialated pupil but looks symmetric  ENT - oropharynx clear, TMs clear  Neck - supple, no palpable mass or lymphadenopathy  CV - RRR, no murmur  Breast - no dominant mass on either side, no axillary mass.  Resp - lungs CTA  Ab - soft, nontender, no palpable mass or organomegaly   - Declined, deferred.  Extrem - warm, no edema  Neuro - CN II-XII intact, strength, sensation, reflexes intact and symmetric  Skin - no rash.  No atypical appearing lesions seen.       Assessment Results 8/13/2019   Activities of Daily Living No help needed   Instrumental Activities of Daily " Living 5-6 - Severe functional impairment   Get Up and Go Score -   Mini Cog Total Score 2   Some recent data might be hidden     A Mini-Cog score of 0-2 suggests the possibility of dementia, score of 3-5 suggests no dementia    Identified Health Risks:     The patient was provided with suggestions to help her develop a healthy physical lifestyle.   She is at risk for lack of exercise and has been provided with information to increase physical activity for the benefit of her well-being.   Information on urinary incontinence and treatment options given to patient.  The patient was provided with suggestions to help her develop a healthy emotional lifestyle.   She is at risk for falling and has been provided with information to reduce the risk of falling at home.  Information regarding advance directives (living brooks), including where she can download the appropriate form, was provided to the patient via the AVS.       The patient was provided with appropriate referrals to address her memory problem.

## 2021-06-01 ENCOUNTER — HOSPITAL ENCOUNTER (OUTPATIENT)
Dept: CT IMAGING | Facility: HOSPITAL | Age: 71
Discharge: HOME | End: 2021-06-01
Attending: FAMILY MEDICINE
Payer: COMMERCIAL

## 2021-06-01 VITALS — HEIGHT: 59 IN | BODY MASS INDEX: 25.82 KG/M2 | WEIGHT: 128.06 LBS

## 2021-06-01 VITALS — BODY MASS INDEX: 26.11 KG/M2 | WEIGHT: 129.5 LBS | HEIGHT: 59 IN

## 2021-06-01 VITALS — HEIGHT: 59 IN | WEIGHT: 127.19 LBS | BODY MASS INDEX: 25.64 KG/M2

## 2021-06-01 VITALS — HEIGHT: 59 IN | BODY MASS INDEX: 26.22 KG/M2 | WEIGHT: 130.06 LBS

## 2021-06-01 VITALS — BODY MASS INDEX: 25.46 KG/M2 | WEIGHT: 126.31 LBS | HEIGHT: 59 IN

## 2021-06-01 NOTE — TELEPHONE ENCOUNTER
Refill Approved    Rx renewed per Medication Renewal Policy. Medication was last renewed on 1/3/19.  5/15/19  Joleen Story, [facility] Triage/Med Refill 9/4/2019     Requested Prescriptions   Pending Prescriptions Disp Refills     gabapentin (NEURONTIN) 300 MG capsule [Pharmacy Med Name: GABAPENTIN 300MG CAPSULES] 60 capsule 0     Sig: TAKE 1 CAPSULE BY MOUTH TWICE DAILY       Gabapentin/Levetiracetam/Tiagabine Refill Protocol  Passed - 9/4/2019  3:46 PM        Passed - PCP or prescribing provider visit in past 12 months or next 3 months     Last office visit with prescriber/PCP: 6/18/2019 Bassam Llanos MD OR same dept: 6/18/2019 Bassam Llanos MD OR same specialty: 6/18/2019 Bassam Llanos MD  Last physical: 8/13/2019 Last MTM visit: Visit date not found   Next visit within 3 mo: Visit date not found  Next physical within 3 mo: Visit date not found  Prescriber OR PCP: Bassam Llanos MD  Last diagnosis associated with med order: There are no diagnoses linked to this encounter.  If protocol passes may refill for 12 months if within 3 months of last provider visit (or a total of 15 months).             gabapentin (NEURONTIN) 300 MG capsule [Pharmacy Med Name: GABAPENTIN 300MG CAPSULES] 30 capsule 0     Sig: TAKE 1 CAPSULE(300 MG) BY MOUTH AT BEDTIME       Gabapentin/Levetiracetam/Tiagabine Refill Protocol  Passed - 9/4/2019  3:46 PM        Passed - PCP or prescribing provider visit in past 12 months or next 3 months     Last office visit with prescriber/PCP: 6/18/2019 Bassam Llanos MD OR [facility] dept: 6/18/2019 Bassam Llanos MD OR same specialty: 6/18/2019 Bassam Llanos MD  Last physical: 8/13/2019 Last MTM visit: Visit date not found   Next visit within 3 mo: Visit date not found  Next physical within 3 mo: Visit date not found  Prescriber OR PCP: Bassam Llanos MD  Last diagnosis associated with med order: There are no diagnoses linked to this encounter.  If protocol passes may refill for 12 months if within 3 months of last provider visit  (or a total of 15 months).             losartan (COZAAR) 50 MG tablet [Pharmacy Med Name: LOSARTAN 50MG TABLETS] 30 tablet 0     Sig: TAKE 1 TABLET(50 MG) BY MOUTH DAILY       Angiotensin Receptor Blocker Protocol Passed - 9/4/2019  3:46 PM        Passed - PCP or prescribing provider visit in past 12 months       Last office visit with prescriber/PCP: 6/18/2019 Bassam Llanos MD OR same dept: 6/18/2019 Bassam Llanos MD OR same specialty: 6/18/2019 Bassam Llanos MD  Last physical: 8/13/2019 Last MTM visit: Visit date not found   Next visit within 3 mo: Visit date not found  Next physical within 3 mo: Visit date not found  Prescriber OR PCP: Bassam Llanos MD  Last diagnosis associated with med order: There are no diagnoses linked to this encounter.  If protocol passes may refill for 12 months if within 3 months of last provider visit (or a total of 15 months).             Passed - Serum potassium within the past 12 months     Lab Results   Component Value Date    Potassium 4.5 06/18/2019             Passed - Blood pressure filed in past 12 months     BP Readings from Last 1 Encounters:   09/04/19 130/80             Passed - Serum creatinine within the past 12 months     Creatinine   Date Value Ref Range Status   06/18/2019 1.04 0.60 - 1.10 mg/dL Final

## 2021-06-01 NOTE — PROGRESS NOTES
MTM Initial Encounter  Assessment & Plan                                                     1. Moderate single current episode of major depressive disorder (H)  Needs improvement.  Has restarted fluoxetine 20 mg for the last ~2 weeks.  Patient's mood does not seem improved from previous, will likely benefit from increasing to previous dose of fluoxetine 40 mg daily.  Will send prescription today and check in in 1 month.   - FLUoxetine (PROZAC) 40 MG capsule; Take 1 capsule (40 mg total) by mouth daily.  Dispense: 30 capsule; Refill: 3    2. Hyperlipidemia  Needs improvement.  Patient not currently taking pravastatin 20 mg daily as prescribed.  Reiterated the importance of taking this medication every day and recommended restarting tonight.  -Patient to take pravastatin 20 mg daily as prescribed    3. Essential hypertension  Needs improvement.  Patient has not been taking her losartan as prescribed, last filled in May.  Patient has refills available at the pharmacy, states that they are aware of how to get refills from the pharmacy.  Recommend patient restart this medication today.  -Patient to get losartan refill from pharmacy and start taking once daily as prescribed    4. Age-related osteoporosis without current pathological fracture  Stable.  Patient currently taking alendronate once weekly as prescribed, reviewed appropriate administration today.  Recommend continuation of current therapy.    5. Pain/Atypical chest pain  Needs further assessment.  Patient expressing chest pain upon exertion today, recently had stress test performed in June, has not received results from this yet.  Patient unable to express duration of this pain.  Recommend ER if pain worsens significantly or sooner visit with Dr. Llanos than in 1 month if needed.  Otherwise, recommended complete discussion with PCP at next visit. Pain does not appear related to acid reflux, removing PPI from medication list today (no refill history, she has not  been taking this).   - Patient to discuss with PCP at next visit  - PharmD routed note to Dr. Llanos today    Follow Up  Return in about 5 weeks (around 10/8/2019) for Medication Review.    Subjective & Objective                                                     Ricky Lea is a 69 y.o. female coming in for an initial visit for Medication Therapy Management. She was referred to me from Bassam Llanos MD. Patient presents with professional Sejal  and daughter.    Chief Complaint: Initial MTM visit - referral from psychotherapist at [facility]    Medication Adherence/Access: Daughter helps her with medications, takes them from the vials every day - does not have a pillbox. Patient brings in medication vials today.     Mental Health: Fluoxetine 20 mg daily. Patient also brings with fluoxetine 40 mg vial and states that she has stopped taking this medication and recently started the 20 mg as instructed. Patient becomes emotional today and unable to express how she is feeling when asked about her mood recently. Patient sees therapist at [facility], has appt with her today.  At last visit with psychotherapist (Sly Freeman), it was determined at that patient had not been taking her fluoxetine 40 mg likely for some time, at this time a prescription was written to restart fluoxetine 20 mg daily.    Hyperlipidemia: Brings with, but is NOT taking Pravastatin 20 mg daily at bedtime. Has not been taking this medication, was under the impression that she was supposed to stop taking this medication recently. Last filled 7/26/19.   Lab Results   Component Value Date    CHOL 206 (H) 06/18/2019    CHOL 242 (H) 11/07/2018    CHOL 203 (H) 08/15/2018     Lab Results   Component Value Date    HDL 34 (L) 06/18/2019    HDL 37 (L) 11/07/2018    HDL 36 (L) 08/15/2018     Lab Results   Component Value Date    LDLCALC 118 06/18/2019    LDLCALC 168 (H) 11/07/2018    LDLCALC 127 08/15/2018     Lab Results   Component Value Date    TRIG 269 (H) 06/18/2019  "   TRIG 185 (H) 11/07/2018    TRIG 199 (H) 08/15/2018     Hypertension: Not taking Losartan 50 mg daily (does not bring in today states she is not taking). Last filled 5/15/19 #30.   BP Readings from Last 3 Encounters:   09/04/19 130/80   08/13/19 128/86   06/18/19 122/82     Osteoporosis: Alendronate 70 mg every 7 days on Monday. New medication, taking right away in the morning before eating anything. Aware that she cannot lay down for 30 minutes afterwards.   Last DXA scan on 8/20/19 - Further evaluation and therapeutic intervention is advised with a recheck in 1 year.   1. The spine bone density reveals osteoporosis at L1-L4 with T-score of -3.1..   2. Femoral bone densities show left femoral neck T- score of -1.5, and right femoral neck T-score of -1.6.   3.  She does not have a previous scan available for comparison.    GERD: Not taking Omeprazole 20 mg daily (does not bring in today, states she is not taking). No fill history listed in Epic, last rx sent the beginning of 2018 - will remove from medication list today.     Pain: Gabapentin 300 mg three times daily (prescribed two times a day), meloxicam 15 mg twice daily (prescribed once daily). Brings in both medication vials - both vials empty. States that she has been out of these medications for the past couple days, but that they are aware of how to get refills from the pharmacy.     Chest pain: States that her breathing has been a little tight, having pain in her chest that is tight with pressure. Only when she is walking, not in relation to eating. Not sure if she has talked with Dr. Llanos about this pain or not. It has been like this \"for a while\", unable to express how long a while is referring to. Per chart review, stress test performed in June, pt has not received results from this.   The 10-year ASCVD risk score ([place] JENNIFER Jr., et al., 2013) is: 11.2%    Values used to calculate the score:      Age: 69 years      Sex: Female      Is Non-  " American: No      Diabetic: No      Tobacco smoker: No      Systolic Blood Pressure: 130 mmHg      Is BP treated: Yes      HDL Cholesterol: 34 mg/dL      Total Cholesterol: 206 mg/dL      PMH: reviewed in EPIC   Allergies/ADRs: reviewed in EPIC   Alcohol: reviewed in EPIC   Tobacco:   Social History     Tobacco Use   Smoking Status Never Smoker   Smokeless Tobacco Current User     Types: Chew   Tobacco Comment    chews betal nut      Today's Vitals:   Vitals:    09/04/19 0825   BP: 130/80   Pulse: 79   SpO2: 95%   Weight: 133 lb 11.2 oz (60.6 kg)     ----------------  The patient was given a summary of these recommendations as an after visit summary    I spent 30 minutes with this patient today.   All changes were made via collaborative practice agreement with Bassam Llanos MD. A copy of the visit note was provided to the patient's provider.     Hattie Irby, PharmD  Medication Therapy Management Pharmacist  [facility]       Current Outpatient Medications   Medication Sig Dispense Refill     alendronate (FOSAMAX) 70 MG tablet Take 1 tablet (70 mg total) by mouth every 7 days. 12 tablet 3     FLUoxetine (PROZAC) 40 MG capsule Take 1 capsule (40 mg total) by mouth daily. 30 capsule 3     gabapentin (NEURONTIN) 300 MG capsule Take 1 capsule (300 mg total) by mouth 2 (two) times a day. 60 capsule 5     losartan (COZAAR) 50 MG tablet Take 1 tablet (50 mg total) by mouth daily. 30 tablet 3     meloxicam (MOBIC) 15 MG tablet Take 1 tablet (15 mg total) by mouth daily. 30 tablet 3     pravastatin (PRAVACHOL) 20 MG tablet Take 1 tablet (20 mg total) by mouth at bedtime. 30 tablet 11     triamcinolone (KENALOG) 0.1 % ointment Apply topically 2 (two) times a day. 453 g 1     white petrolatum-mineral oil Crea Apply to affected area twice a day 120 g 3     No current facility-administered medications for this visit.

## 2021-06-01 NOTE — PROGRESS NOTES
9/4/2019    Start time: 0900    Stop Time: 0950   Session # 2 of Intake    Individuals Present: Patient, Patient's daughter, Therapist and Sejal .    Session Type: Patient is presenting for an Individual session.    Ricky Lea is a 69 y.o. female is being seen today for second visit of intake to evaluate and treat for mood instability.  Chief Complaint   Patient presents with     Visit #2 of Initial       Follow up:   Patient met with pharmacist this morning to review medications.(Please refer to Pharmacist progress note for recommendations.)  Patient's daughter stated that patient has been taking her medications as prescribed. Patient reports that she continues to have some periods of tearfulness although indicates many times this is because of seen sad things on TV or people on the streets asking for money.    New symptoms or complaints: Patient indicated when walking episodes of shortness of breath although, reports that this is been long-standing.  Patient encouraged to talk to her PCP regarding those concerns.    Functional Impairment:   Personal: 3  Family: 3  Social: 3  Work: 0    Clinical assessment of mental status:   Ricky Lea presented on time.   She was oriented x3, open and cooperative, and dressed appropriately for this session and weather. Her memory was Normal cognitive functioning .  Her speech was  Within normal.  Language was clear.  Concentration and focus is Within normal. Psychosis is not noted or reported. She reports her mood is Congruent w/content of speech.  Affect is congruent with speech and is Congruent w/content of speech.  Fund of knowledge is adequate. Insight is adequate for therapy.    Suicidal/Homicidal Ideation present: Denies any suicidal thoughts today.     Patient's impression of their current status: Patient stated that she is feeling a bit better and only tearful when she sees sad TV shows or people on the streets with signs.  Overall, patient stated that she is less  tearful and in better spirits.    Therapist impression of patients current state: This 69 y.o.  female presents with moderate single current episode of major depressive disorder (H).  Daughter attended today's session.  Patient reports that she usually gets 1 to 2 hours asleep at night and sleeps on a mat on the floor and during the day will take short naps.  Patient stated that she spends most of her time being with her grandchildren and helping around the home. Patient appears to be in brighter spirits today and was slightly tearful when talking about sad things on TV.  She is willing to consider adult day programming for increased socialization.    Assessment tools used today included: Clinical global impressions on flowsheet.    Type of psychotherapeutic technique provided: Insight oriented and Solution-focused    Review of medication: Adherence as MD prescribed; patient denies any side effect    Review of patient health/health concerns: Patient verbalized episodes of shortness of breath.  She will be talking to her PCP regarding those concerns.    Progress toward short term goals: Goals not yet developed    Review of long term goals goals not yet developed goals not yet developed    Diagnosis: Moderate single current episode of major depressive disorder (H)    Plan and Follow-up:   1 .Patient plans on following up with pharmacist.    2. Daughter will purchase a pillbox and set up patient's daily medications.   3. Patients daughter will be exploring the possibility of patient attending a senior day program.    4. Patient plans to continue taking the medication as prescribed.  5. Patient plans to follow up with therapy on October 15 at 9 AM.  6. Patient encouraged to talk to her PCP regarding any medical concerns.  7. Obtain additional information to complete DA      Discharge Criteria/Planning: Patient will continue with follow-up until therapy can be discontinued without return of signs and  symptoms.    Performed and documented by 9/4/2019   Jane LENTZ

## 2021-06-02 VITALS — BODY MASS INDEX: 26.46 KG/M2 | WEIGHT: 131.25 LBS | HEIGHT: 59 IN

## 2021-06-02 VITALS — WEIGHT: 128.1 LBS | BODY MASS INDEX: 25.83 KG/M2 | HEIGHT: 59 IN

## 2021-06-02 VITALS — HEIGHT: 59 IN | WEIGHT: 129 LBS | BODY MASS INDEX: 26 KG/M2

## 2021-06-02 NOTE — PROGRESS NOTES
"S:  Ricky Lea is a 69 y.o. female who comes to the clinic today for  1.  Dysuria:  Ongoing x 2 weeks.  Worse at the end of urination.  Also accompanied by itching and burning.  She has some right low back pain and flank pain.  No fevers.  No n/v.  She feels a tightness in her chest that has been ongoing for 1 year.  She has been eating and drinking normally.  No dizziness or lightheadness this am.  She did not eat or drink anything this am.  She is hungry.  No blood in urine.    She does have losartan, 50mg in her medications.  She says she did not take it this am.  She may have taken it last night in her med box.      I reviewed the pertinent family, social, surgical, medical history.    Prior bp was 110/82 on 5/6/19, and 130/80 on 9/4/19.      O:  BP (!) 76/50   Pulse 70   Temp 97.5  F (36.4  C) (Oral)   Resp 22   Ht 4' 11\" (1.499 m)   Wt 135 lb (61.2 kg)   SpO2 95%   BMI 27.27 kg/m    Gen: no acute distress, non toxic appearing.    Neck:  Supple, no lad, no carotid bruits  Heart:  Regular rate and rhythm.  No m/r/g  Lungs: cta bilaterally, no wheezes or rhonchi.  Good air inspiration  Abdomen:  No masses or organomegaly, soft.  Non tender.  Non distended.    No cva tenderness . Some mild right flank tenderness.  No suprapubic tenderness.    Extremities:  No edema.       After she drinks 4 cups of water her blood pressure increased but then she vomited.  She said that her stomach felt over full.  After that she continued to appear nontoxic.  At that time I encouraged her to sip some water until she was able to urinate.  We talked about the fact that she had a slightly abnormal white blood cell count.  If she is unable to keep water down, then I will send her to the hospital for further  Evaluation.  She wanted more time here to drink water.      She did sip some more water.  She then had another large emesis that was composed of pure water.  At that time her blood pressure had improved into the 130 systolic.  " Her pulse was in the 60s.  She denied any chest pain, shortness of breath, significant abdominal pain.  She was feeling just a little bit shaky.  Her blood sugar was 106 at that time.  She was still unable to leave a urine.  At that time the decision was made to call transportation to take her to the hospital for further evaluation.        Patient Active Problem List   Diagnosis     Hyperlipidemia, unspecified hyperlipidemia type     Pulmonary nodules     Left shoulder pain     Essential hypertension     Moderate single current episode of major depressive disorder (H)     Skin cancer of face     Dry skin     Noncompliance with medication regimen     Intrinsic eczema     Age-related osteoporosis without current pathological fracture     Type 2 diabetes mellitus treated without insulin (H)     Current Outpatient Medications on File Prior to Visit   Medication Sig Dispense Refill     alendronate (FOSAMAX) 70 MG tablet Take 1 tablet (70 mg total) by mouth every 7 days. 12 tablet 3     blood glucose test strips Use 1 each As Directed as needed. Dispense brand per patient's insurance at pharmacy discretion. 200 strip 11     blood-glucose meter Misc Test blood sugar twice a day 1 each 0     FLUoxetine (PROZAC) 40 MG capsule Take 1 capsule (40 mg total) by mouth daily. 30 capsule 3     gabapentin (NEURONTIN) 300 MG capsule TAKE 1 CAPSULE BY MOUTH TWICE DAILY 180 capsule 2     generic lancets (FINGERSTIX LANCETS) Dispense brand per patient's insurance at pharmacy discretion. 200 each 11     meloxicam (MOBIC) 15 MG tablet Take 1 tablet (15 mg total) by mouth daily. 30 tablet 3     metFORMIN (GLUCOPHAGE XR) 500 MG 24 hr tablet Take 1 tablet (500 mg total) by mouth daily with breakfast. 30 tablet 5     pravastatin (PRAVACHOL) 20 MG tablet Take 1 tablet (20 mg total) by mouth at bedtime. 30 tablet 11     triamcinolone (KENALOG) 0.1 % ointment Apply topically 2 (two) times a day. 453 g 1     white petrolatum-mineral oil Crea  Apply to affected area twice a day 120 g 3     No current facility-administered medications on file prior to visit.           No results found for this or any previous visit (from the past 48 hour(s)).     No images are attached to the encounter or orders placed in the encounter.       Assessment/Plan:  1. Dysuria  Urinalysis-UC if Indicated    Basic Metabolic Panel    HM1(CBC and Differential)    HM1 (CBC with Diff)   2. Hypotension, unspecified hypotension type  Basic Metabolic Panel    HM1(CBC and Differential)    HM1 (CBC with Diff)   3. Type 2 diabetes mellitus treated without insulin (H)  Basic Metabolic Panel    HM1(CBC and Differential)    HM1 (CBC with Diff)    Glucose     With concern for early pyelonephritis in this patient with diabetes.  I did ask her to hold her losartan until she is able to come back in 1 week to meet with her primary care doctor.  We discussed this decision in light of her hypotension on arrival today.  We will send her to the emergency room for further evaluation given her vomiting, inability to urinate, and concern for early pyelonephritis.    Greater than 40 minutes was spent with this patient, with over half in education and reevaluation .   Transportation was arranged for her to go to the ER.        Ninoska Rodriguez   11/1/2019 11:08 AM

## 2021-06-02 NOTE — PROGRESS NOTES
ASSESMENT AND PLAN:  Diagnoses and all orders for this visit:    Moderate single current episode of major depressive disorder (H)  Will consider adding another med at next visit. ( Starting metformin today first ).    Essential hypertension  Stable , no med. Continue to monitor.     Hyperlipidemia, unspecified hyperlipidemia type  -     Lipid Cascade  Recheck lipid today.    Type 2 diabetes mellitus treated without insulin (H)  -     blood-glucose meter Misc; Test blood sugar twice a day  Dispense: 1 each; Refill: 0  -     blood glucose test strips; Use 1 each As Directed as needed. Dispense brand per patient's insurance at pharmacy discretion.  Dispense: 200 strip; Refill: 11  -     generic lancets (FINGERSTIX LANCETS); Dispense brand per patient's insurance at pharmacy discretion.  Dispense: 200 each; Refill: 11  -     metFORMIN (GLUCOPHAGE XR) 500 MG 24 hr tablet; Take 1 tablet (500 mg total) by mouth daily with breakfast.  Dispense: 30 tablet; Refill: 5  Newly diagnosed DM.  Start metformin.  She will  meter and supply, teaching will be done with our MTM Pharmacist.     Age-related osteoporosis without current pathological fracture  Continue Fosamax.    Prediabetes  -     Glycosylated Hemoglobin A1c  Last A1c 6.3. 6.7 today. Started med    Immunization due  -     Influenza High Dose, Seasonal 65+ yrs        SUBJECTIVE: Pa Venu is here for follow up.  Daughter iis helping her with meds, brought in pill box and pill . Taking meds as Rxed.    Depression- On prozac 40 mg daily. No SE reported. No worsening depression.  She is not sure if med is helping or not not no SE reported. Less crying than before. Still having nightmares but less frequent.  Having trouble sleeping at night. Goes to bed at ` 9:00, 9:30 but awake until 1:00 am some days. Sad movies make her cry easily. Denied SI/HI.     Heavy chest- Stress test was negative. No acute chest pain today.     Pain- Knee pain and shoulder pain on and  "off, tolerable. No severe pain. Med is helping. On neurontin and mobic.     Lipid- Tolerating med well. Eats rice and boiled vegetables most days.    Prediabetes- A1c 6.3 in 7/19. No hypo/hyperglycemic symptoms.     Osteoporosis- Takes fosamax as Rxed. Tolerating, No SE reported.         No past medical history on file.  Patient Active Problem List   Diagnosis     Hyperlipidemia, unspecified hyperlipidemia type     Pulmonary nodules     Left shoulder pain     Essential hypertension     Moderate single current episode of major depressive disorder (H)     Skin cancer of face     Dry skin     Noncompliance with medication regimen     Intrinsic eczema     Age-related osteoporosis without current pathological fracture     Type 2 diabetes mellitus treated without insulin (H)       Allergies:  No Known Allergies    Social History     Tobacco Use   Smoking Status Never Smoker   Smokeless Tobacco Current User     Types: Chew   Tobacco Comment    chews betal nut        Review of systems otherwise negative except as listed in HPI.   Social History     Tobacco Use   Smoking Status Never Smoker   Smokeless Tobacco Current User     Types: Chew   Tobacco Comment    chews betal nut        OBJECTICE: /82 (Patient Site: Left Arm, Patient Position: Sitting, Cuff Size: Adult Regular)   Pulse 80   Temp 97.7  F (36.5  C) (Oral)   Resp 20   Ht 4' 11\" (1.499 m)   Wt 136 lb 9 oz (61.9 kg)   BMI 27.58 kg/m      DATA REVIEWED:    Labs Reviewed or Ordered (1):      GEN-alert,  in no apparent distress.  HEENT-mucous membranes are moist, neck is supple.  CV-regular rate and rhythm with no murmur.   RESP-lungs clear to auscultation .  ABDOMEN- Soft , not tender.  EXTREM- No open lesions and ulcers. Sensation intact.  SKIN- peeling and mild erythema right sole.         Bassam Llanos   10/15/2019   "

## 2021-06-02 NOTE — PROGRESS NOTES
"10/15/2019    Start time: 0900    Stop Time: 0930   Session # 3 of (Intake)    Session Type: Patient is presenting for an Individual session.    Ricky Lea is a 69 y.o. female is being seen today for evaluation and treatment of mood stability.    Individuals present Patient, Sejal  and Therapist    Follow up Patient stated, \"I am doing okay.\"  It was recommended last session for patient's daughter to accompany patient today in order to fully complete the adult intake questionnaire being that patient indicated she had a hard time remembering information.  Patient stated daughter had to work today and was unable to accompany her.     New symptoms or complaints: \"Sometimes I have some heartburn but I just talked to the doctor about it and I am okay.\"    Functional Impairment:   Personal: 2  Family: 2  Social: 2  Work: 0    Clinical assessment of mental status:   Ricky Lea presented on time.   She was oriented x3, open and cooperative, and dressed appropriately for this session and weather. Her memory was Normal cognitive functioning and Mild .  Her speech was  Within normal.  Language was clear.  Concentration and focus is Within normal. Psychosis is not noted or reported. She reports her mood is Congruent w/content of speech.  Affect is congruent with speech and is Congruent w/content of speech.  Fund of knowledge is adequate. Insight is adequate for therapy.  Difficulty remembering past dates and events.  During today's session patient experienced some periods of brief dozing off.    Suicidal/Homicidal Ideation present: Denies any suicidal/homicidal ideations.     Patient's impression of their current status: Patient states that she is busy with her 3 grandchildren.  She is also participating more in [facility] events and outings.     Therapist impression of patients current state: This 69 y.o.  female presents with a history of depression.  During today's session patient was smiling and engaged in conversation. "  Attempted to complete the adult intake questionnaire although patient stated there are many things she has difficulty remembering and would like her daughter to help her answer those questions.  During today's session we discussed the importance of continuing socialization and self-care.     Assessment tools used today include: Global assessment tool (can be found documented in Doc Flowsheets).     Type of psychotherapeutic technique provided: Insight oriented and Solution-focused      Review of medication: Adherence as MD prescribed; Side effects noted; Efficacy      Review of patient health/health concerns: new concerns; treatment sought/referral needed    Progress toward short term goals: Not yet determined.    Review of long term goals not yet determined.    Diagnosis Moderate single current episode of major depressive disorder (H)    Plan and Follow-up:   1.  Patient's daughter will be able to accompany patient to next appointment. (Complete         the Adult Intake questionnaire.)  2.  Patient plans to continue taking the medication as prescribed.   3.  Patient plans to follow up with therapy in 4 weeks.     Discharge Criteria/Planning: Patient will continue with follow-up until therapy can be discontinued without return of signs and symptoms.    Performed and documented by   Jane LENTZ  10/15/19

## 2021-06-02 NOTE — PROGRESS NOTES
MTM Follow Up Encounter  Assessment & Plan                                                     Medication Adherence: This may continue to be an issues, especially unknown without patients daughter here today. Pharmacist reviewed appropriate administration for all medications today.  Patient states that she is now using a pillbox at home which her daughter sets up for her.  Recommended patient bring in pillbox and all medications to next visit.    1. Moderate single current episode of major depressive disorder (H)  Patient brings in vial of fluoxetine today but states that she is taking twice daily instead of once daily as prescribed. Recommended dosing once daily as prescribed, patient verbalizes understanding.  -Patient to take fluoxetine 40 mg once daily    2. Hyperlipidemia  Unable to fully verify if patient is taking pravastatin 20 mg daily as prescribed, reiterated importance of taking this medication every day.  -Patient to take pravastatin 20 mg daily as prescribed    3. Essential hypertension  Needs further assessment.  Patient presents with losartan medication vial today, though not sure if she is taking it.  Blood pressure goal of less than 140/90.  -Patient to take losartan 50 mg daily as prescribed    4. Age-related osteoporosis without current pathological fracture  Needs improvement.  Patient not currently taking alendronate 70 mg once weekly because the medication ran out last week.  Patient unaware that this medication had to be refilled and taken continuously.  Reviewed the importance of continued use of this medication and its indication for use.  -Patient to refill alendronate 70 mg-and take once weekly as prescribed    5. Pain/Chest Tightness  Needs further assessment.  Patient continues to express chest pain upon exertion.  Recommend complete discussion about chest pain with PCP at next visit. Additionally, there is a discrepancy with gabapentin dosing - recommend verifying with PCP at next  visit. Patient continues to take meloxicam daily, originally prescribed for neck pain. Would recommend reconsidering the use of chronic NSAID, especially in combination with ARB therapy - if warranted I would recommend close monitoring of kidney function.   - Next visit with PCP -  follow up on continued chest pain  - Next visit with PCP - consider eliminating chronic NSAID use with ARB therapy due to risk of decreased therapeutic effect of ARB and increase risk of kidney injury    Follow Up  Return in about 10 weeks (around 12/17/2019) for Medication Review.    Subjective & Objective                                                     Ricky Lea is a 69 y.o. female coming in for a follow up visit for Medication Therapy Management. She was referred to me from Bassam Llanos MD. Patient presents with professional Sejal  today.    Chief Complaint: Follow Up from MT visit on 9/4/19    Medication Adherence/Access: Daughter helps her with medications, uses a daily pillbox.  Patient brings in medication vials today.   Patient is not sure which medications she is taking - states that she is taking the medications from the pillbox set up by her daughter.     Mental Health: Fluoxetine 40 mg twice daily (prescribed once daily). Sometimes states that she does have a bad mood, but today states that it is ok. States that her sleep is ok.     Hyperlipidemia: Pravastatin 20 mg daily at bedtime. Denies any muscle pain.   Lab Results   Component Value Date    LDLCALC 118 06/18/2019     Hypertension: Brings in Losartan 50 mg daily (though she cannot verify if she is taking this or not).    BP Readings from Last 3 Encounters:   10/08/19 116/72   09/04/19 130/80   08/13/19 128/86     Osteoporosis: Alendronate 70 mg every 7 days on Monday. Patient states that she did not take this yesterday on Monday because it was empty - unaware that this had to be refilled.   New medication, taking right away in the morning before eating  anything. Aware that she cannot lay down for 30 minutes afterwards.   Last DXA scan on 8/20/19 - Further evaluation and therapeutic intervention is advised with a recheck in 1 year.   1. The spine bone density reveals osteoporosis at L1-L4 with T-score of -3.1..   2. Femoral bone densities show left femoral neck T- score of -1.5, and right femoral neck T-score of -1.6.   3.  She does not have a previous scan available for comparison.    Pain: Gabapentin 300 mg three times daily (prescribed two times a day), meloxicam 15 mg daily. States that pain is still present in her joints. Patient states that Dr. Llanos instructed she take gabapentin three times a day, although rx bottle states two times a day. Originally prescribed meloxicam for neck pain - does not mention this at all today.     Chest pain: Continues to endorse chest pain with daily activities like lifting heavy things. Describing her chest as tight. Per chart review, stress test performed in June.     PMH: reviewed in EPIC   Allergies/ADRs: reviewed in EPIC   Alcohol: reviewed in EPIC   Tobacco:   Social History     Tobacco Use   Smoking Status Never Smoker   Smokeless Tobacco Current User     Types: Chew   Tobacco Comment    chews betal nut      Today's Vitals:   Vitals:    10/08/19 0757   BP: 116/72   Pulse: 64   SpO2: 93%   Weight: 138 lb 9.6 oz (62.9 kg)     ----------------  The patient was given a summary of these recommendations as an after visit summary to give to her daughter.    I spent 30 minutes with this patient today.   All changes were made via collaborative practice agreement with Bassam Llanos MD. A copy of the visit note was provided to the patient's provider.     Hattie Irby, PharmD  Medication Therapy Management Pharmacist  [facility]       Current Outpatient Medications   Medication Sig Dispense Refill     alendronate (FOSAMAX) 70 MG tablet Take 1 tablet (70 mg total) by mouth every 7 days. 12 tablet 3     FLUoxetine  (PROZAC) 40 MG capsule Take 1 capsule (40 mg total) by mouth daily. 30 capsule 3     gabapentin (NEURONTIN) 300 MG capsule TAKE 1 CAPSULE BY MOUTH TWICE DAILY 180 capsule 2     meloxicam (MOBIC) 15 MG tablet Take 1 tablet (15 mg total) by mouth daily. 30 tablet 3     pravastatin (PRAVACHOL) 20 MG tablet Take 1 tablet (20 mg total) by mouth at bedtime. 30 tablet 11     triamcinolone (KENALOG) 0.1 % ointment Apply topically 2 (two) times a day. 453 g 1     white petrolatum-mineral oil Crea Apply to affected area twice a day 120 g 3     No current facility-administered medications for this visit.

## 2021-06-02 NOTE — TELEPHONE ENCOUNTER
Refill Approved    Rx renewed per Medication Renewal Policy. Medication was last renewed on 9/4/19.  Last OV: 8/13/19.    Zuleyma [facility], [facility] Triage/Med Refill 10/6/2019     Requested Prescriptions   Pending Prescriptions Disp Refills     losartan (COZAAR) 50 MG tablet [Pharmacy Med Name: LOSARTAN 50MG TABLETS] 30 tablet 0     Sig: TAKE 1 TABLET(50 MG) BY MOUTH DAILY       Angiotensin Receptor Blocker Protocol Passed - 10/4/2019  4:24 PM        Passed - PCP or prescribing provider visit in past 12 months       Last office visit with prescriber/PCP: 6/18/2019 Bassam Llanos MD OR same dept: 6/18/2019 Bassam Llanos MD OR same specialty: 6/18/2019 Bassam Llanos MD  Last physical: 8/13/2019 Last MTM visit: Visit date not found   Next visit within 3 mo: Visit date not found  Next physical within 3 mo: Visit date not found  Prescriber OR PCP: Bassam Llanos MD  Last diagnosis associated with med order: 1. Essential hypertension  - losartan (COZAAR) 50 MG tablet [Pharmacy Med Name: LOSARTAN 50MG TABLETS]; TAKE 1 TABLET(50 MG) BY MOUTH DAILY  Dispense: 30 tablet; Refill: 0    If protocol passes may refill for 12 months if within 3 months of last provider visit (or a total of 15 months).             Passed - Serum potassium within the past 12 months     Lab Results   Component Value Date    Potassium 4.5 06/18/2019             Passed - Blood pressure filed in past 12 months     BP Readings from Last 1 Encounters:   09/04/19 130/80             Passed - Serum creatinine within the past 12 months     Creatinine   Date Value Ref Range Status   06/18/2019 1.04 0.60 - 1.10 mg/dL Final

## 2021-06-02 NOTE — PROGRESS NOTES
MTM Consult Encounter    Pa Venu is a 69 y.o. female referred for a clinical pharmacist consult from Dr. Llanos for glucometer teaching. Patient presents with professional Sejal  and daughter.    Discussion: Patient presents with a new meter. Patient would like to learn how to use her glucometer today. Phatiast reviewed appropriate monitoring of BG and recommended checking BG 1-2 times daily. Also informed patient of desired BG and sent her home with goals. BG today was 146.     Additionally, patient states that she has had pain with urination and increased frequency. Admits to chills.     Plan:  1. Pharmacist reviewed use of glucometer, BG goals, and frequency of testing  2. Patient to see provider for evaluation of urinary symptoms    Follow up:   Return in about 1 month (around 12/4/2019) for Medication Review.    Hattie Irby, PharmD  Medication Therapy Management Pharmacist  [facility]    Current Outpatient Medications   Medication Sig Dispense Refill     alendronate (FOSAMAX) 70 MG tablet Take 1 tablet (70 mg total) by mouth every 7 days. 12 tablet 3     blood glucose test strips Use 1 each As Directed as needed. Dispense brand per patient's insurance at pharmacy discretion. 200 strip 11     blood-glucose meter Misc Test blood sugar twice a day 1 each 0     FLUoxetine (PROZAC) 40 MG capsule Take 1 capsule (40 mg total) by mouth daily. 30 capsule 3     gabapentin (NEURONTIN) 300 MG capsule TAKE 1 CAPSULE BY MOUTH TWICE DAILY 180 capsule 2     generic lancets (FINGERSTIX LANCETS) Dispense brand per patient's insurance at pharmacy discretion. 200 each 11     meloxicam (MOBIC) 15 MG tablet Take 1 tablet (15 mg total) by mouth daily. 30 tablet 3     metFORMIN (GLUCOPHAGE XR) 500 MG 24 hr tablet Take 1 tablet (500 mg total) by mouth daily with breakfast. 30 tablet 5     pravastatin (PRAVACHOL) 20 MG tablet Take 1 tablet (20 mg total) by mouth at bedtime. 30 tablet 11     triamcinolone  (KENALOG) 0.1 % ointment Apply topically 2 (two) times a day. 453 g 1     white petrolatum-mineral oil Crea Apply to affected area twice a day 120 g 3     No current facility-administered medications for this visit.

## 2021-06-03 VITALS
BODY MASS INDEX: 27.21 KG/M2 | RESPIRATION RATE: 22 BRPM | DIASTOLIC BLOOD PRESSURE: 78 MMHG | TEMPERATURE: 97.5 F | SYSTOLIC BLOOD PRESSURE: 136 MMHG | HEART RATE: 60 BPM | HEIGHT: 59 IN | OXYGEN SATURATION: 98 % | WEIGHT: 135 LBS

## 2021-06-03 VITALS
SYSTOLIC BLOOD PRESSURE: 116 MMHG | HEART RATE: 64 BPM | WEIGHT: 138.6 LBS | BODY MASS INDEX: 27.99 KG/M2 | DIASTOLIC BLOOD PRESSURE: 72 MMHG | OXYGEN SATURATION: 93 %

## 2021-06-03 VITALS
HEART RATE: 80 BPM | SYSTOLIC BLOOD PRESSURE: 110 MMHG | BODY MASS INDEX: 27.53 KG/M2 | RESPIRATION RATE: 20 BRPM | TEMPERATURE: 97.7 F | HEIGHT: 59 IN | DIASTOLIC BLOOD PRESSURE: 82 MMHG | WEIGHT: 136.56 LBS

## 2021-06-03 VITALS
WEIGHT: 133.7 LBS | BODY MASS INDEX: 27 KG/M2 | HEART RATE: 79 BPM | DIASTOLIC BLOOD PRESSURE: 80 MMHG | OXYGEN SATURATION: 95 % | SYSTOLIC BLOOD PRESSURE: 130 MMHG

## 2021-06-03 VITALS — HEIGHT: 59 IN | WEIGHT: 133.56 LBS | BODY MASS INDEX: 26.92 KG/M2

## 2021-06-03 VITALS — HEIGHT: 59 IN | BODY MASS INDEX: 27.27 KG/M2 | WEIGHT: 135.25 LBS

## 2021-06-03 VITALS — WEIGHT: 133.38 LBS | BODY MASS INDEX: 26.89 KG/M2 | HEIGHT: 59 IN

## 2021-06-03 VITALS — BODY MASS INDEX: 26.84 KG/M2 | WEIGHT: 133.13 LBS | HEIGHT: 59 IN

## 2021-06-03 NOTE — TELEPHONE ENCOUNTER
DANTE reviewed the chart. No diagnosis of urinary incontinence or coverable diagnosis per CMT review. CMT called [employer] to update her regarding this.     Alix states that the patient informed her that she is having urge incontinence, or incontinence that comes on swiftly without warning. The patient is hesitant to venture out because of these episodes.     Can MD sign order does MD wish to see the patient first? If MD needs to see patient first, Alix would like a call to help with scheduling. Thanks.

## 2021-06-03 NOTE — PROGRESS NOTES
MTM Follow Up Encounter  Assessment & Plan                                                     1. Type 2 diabetes mellitus treated without insulin (H)  Stable.  A1c at goal of less than 7%.  SMBG has overall been well controlled, though blood sugars in the middle of the day are typically higher than morning sugars.  Patient currently taking metformin 500 mg once daily as prescribed without medication side effects.  When checked yesterday, patient's kidney function had declined.  Recommend recheck of kidney function at next visit and increase of metformin as able due to tolerability and kidney function.  Appropriately on ACE inhibitor and statin, recommend reassessing use of daily aspirin.    Future assessment: BMP; if kidney function improved, consider metformin dose increase    2. Moderate single current episode of major depressive disorder (H)  Stable.  The patient currently well tolerating fluoxetine 40 mg daily, recommend continuation.    3. Hyperlipidemia  Stable.  Patient currently taking and tolerating moderate intensity, atorvastatin 20 mg daily.  Lipids last assessed about 1 month ago, recommend continuation.    4. Essential hypertension  Stable.  Blood pressure at goal of less than 140/90.  Taking losartan 50 mg daily for blood pressure and kidney protection.  Recommend reassessment of BMP and next visit.    5. Age-related osteoporosis without current pathological fracture  Stable.  Reiterated the importance of taking this medication every week and continuing to get refills, patient verbalizes understanding.    6. Pain  Stable.  Reassess at follow-up visit. Neuropathy current stable with gabapentin use.     Follow Up  Return in about 3 months (around 3/4/2020) for Medication Review.    Subjective & Objective                                                     Ricky Lea is a 69 y.o. female coming in for a follow up visit for Medication Therapy Management. She was referred to me from Bassam Llanos MD. Patient  presents with professional [place]  today.    Chief Complaint: Follow Up from MTM visit on 11/1/19    Medication Adherence/Access: Daughter helps her with medications, uses a daily pillbox.  Patient brings in medication vials today but not the pillbox.     Diabetes:  New diagnosis in last couple months.  Pt currently taking Metformin  mg once daily with food. patient is not currently experiencing side effects.   SMBG: twice daily    Ranges (based on glucometer readings) :   Date FBG/2 hours PP Lunch/2 hours PP Dinner/2 hours PP    12/4 212      12/3 113 216     11/26   170    11/21 140      11/19 139 275     11/16  282     11/11 109      Patient is not experiencing hypoglycemia   Recent symptoms of high blood sugar? None  ACEi/ARB:  Losartan 50 mg once daily  Statin: Atorvastatin 20 mg daily  Aspirin: Not taking  Diet/Exercise: Eating twice daily meals. Patient does not exercise, babysits her grand children which keeps her busy. States that she eats rice, vegetables, and protein with each meal. States that she eats small amounts of rice with each meal.   Lab Results   Component Value Date    HGBA1C 6.7 (H) 10/15/2019    HGBA1C 6.3 (H) 06/18/2019     Lab Results   Component Value Date    LDLCALC 202 (H) 11/01/2019    CREATININE 1.19 (H) 12/03/2019     Mental Health: Fluoxetine 40 mg once daily. Mood has been ok, stable. Sleep and appetite have been good.   PHQ-9 Total Score: 11 (8/21/2019  2:00 PM)    Hyperlipidemia: Atorvastatin 20 mg daily at bedtime. Denies any muscle pain.   Lab Results   Component Value Date    LDLCALC 202 (H) 11/01/2019     Hypertension: Brings in Losartan 50 mg daily. States that she sometimes experiences dizziness but she has been tolerating well overall lately.   BP Readings from Last 3 Encounters:   12/04/19 122/84   12/03/19 108/64   11/01/19 144/90     Osteoporosis: Alendronate 70 mg every 7 days on Monday. Taking right away in the morning before eating anything. Aware that  she cannot lay down for 30 minutes afterwards.   Last DXA scan on 8/20/19 - Further evaluation and therapeutic intervention is advised with a recheck in 1 year.  1. The spine bone density reveals osteoporosis at L1-L4 with T-score of -3.1..  2. Femoral bone densities show left femoral neck T- score of -1.5, and right femoral neck T-score of -1.6.  3.  She does not have a previous scan available for comparison.    Pain: Gabapentin 300 mg twice daily, meloxicam 15 mg daily - unsure if she is using this one. Admits to continued knee pain. Denies symptoms of neuropathy.     PMH: reviewed in EPIC   Allergies/ADRs: reviewed in EPIC   Alcohol: reviewed in EPIC   Tobacco:   Social History     Tobacco Use   Smoking Status Never Smoker   Smokeless Tobacco Current User     Types: Chew   Tobacco Comment    chews betal nut      Today's Vitals:   Vitals:    12/04/19 1022   BP: 122/84   Pulse: 66   SpO2: 94%   Weight: 134 lb 3.2 oz (60.9 kg)     ----------------  The patient declined an after visit summary to give to her daughter.    I spent 60 minutes with this patient today.   All changes were made via collaborative practice agreement with Bassam Llanos MD. A copy of the visit note was provided to the patient's provider.     Hattie Irby, PharmD  Medication Therapy Management Pharmacist  [facility]       Current Outpatient Medications   Medication Sig Dispense Refill     alendronate (FOSAMAX) 70 MG tablet Take 1 tablet (70 mg total) by mouth every 7 days. 12 tablet 3     atorvastatin (LIPITOR) 20 MG tablet Take 1 tablet (20 mg total) by mouth daily. 30 tablet 11     blood glucose test strips Use 1 each As Directed as needed. Dispense brand per patient's insurance at pharmacy discretion. 200 strip 11     blood-glucose meter Misc Test blood sugar twice a day 1 each 0     diaper,brief,adult,disposable Misc Use 2-3 a day as needed 120 each 3     FLUoxetine (PROZAC) 40 MG capsule Take 1 capsule (40 mg total)  by mouth daily. 30 capsule 3     gabapentin (NEURONTIN) 300 MG capsule TAKE 1 CAPSULE BY MOUTH TWICE DAILY 180 capsule 2     generic lancets (FINGERSTIX LANCETS) Dispense brand per patient's insurance at pharmacy discretion. 200 each 11     losartan (COZAAR) 50 MG tablet Take 1 tablet by mouth daily.  2     meloxicam (MOBIC) 15 MG tablet Take 1 tablet (15 mg total) by mouth daily. 30 tablet 3     metFORMIN (GLUCOPHAGE XR) 500 MG 24 hr tablet Take 1 tablet (500 mg total) by mouth daily with breakfast. 30 tablet 5     triamcinolone (KENALOG) 0.1 % ointment APPLY TWICE DAILY 454 g 0     white petrolatum-mineral oil Crea Apply to affected area twice a day 120 g 3     No current facility-administered medications for this visit.
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
## 2021-06-03 NOTE — TELEPHONE ENCOUNTER
Orders being requested: Medium size pull ups. Using 3 a day  Reason service is needed/diagnosis: incontinence per  after talking to patient.  When are orders needed by: soon  Where to send Orders: Fax: [facility] fax # [phone]  Okay to leave detailed message?  Yes

## 2021-06-03 NOTE — TELEPHONE ENCOUNTER
Pt has future apt on 12/3/19. Will discuss during office visit and sign form if indicated.     Dr. Bassam Llanos  11/21/2019 7:32 AM

## 2021-06-03 NOTE — TELEPHONE ENCOUNTER
----- Message from Ninoska Rodriguez MD sent at 11/4/2019  5:31 PM CST -----  Please call pt with an  and let them know that their lab results show some mild kidney problems.  This could be from her infection.  Please have her follow up with dr clayton within 1 week to recheck her kidney function.

## 2021-06-03 NOTE — PROGRESS NOTES
ASSESMENT AND PLAN:  Diagnoses and all orders for this visit:    Pyelonephritis  -     Urinalysis-UC if Indicated  -     Basic Metabolic Panel  Patient unable to provide urine sample in the clinic.  States she will bring it back.  Sterile container provided.  Instructed to keep it in the fridge before bringing it to the clinic.    Type 2 diabetes mellitus treated without insulin (H)  A1c at next visit.  Continue metformin  mg daily.    Essential hypertension  Controlled.  Continue Cozaar 50 mg daily.    Hyperlipidemia, unspecified hyperlipidemia type  Check lipid at next visit.    Urinary incontinence, unspecified type  -     diaper,brief,adult,disposable Misc; Use 2-3 a day as needed  Dispense: 120 each; Refill: 3    Osteoporosis, unspecified osteoporosis type, unspecified pathological fracture presence  Continue Fosamax.      This transcription uses voice recognition software, which may contain typographical errors.        SUBJECTIVE: Ricky Lea is a 69-year-old female with diabetes, hypertension, hyperlipidemia and recent hospital visit due to pyelonephritis here for follow-up.  She was in the ED on 11/1/2019 due to vomiting and dysuria.  She was sent home from the ED with Keflex for pyelonephritis in a stable condition.  She states she is doing well after the ED visit.  No fever that time.  Dysuria and frequency had improved.      She has history of urinary incontinence, started a few years ago.  She never mentioned this to PCP in the past.  She states she has been using adult diapers 2-3 a day.  Her daughter but diapers from this store.    He takes metformin  mg daily for diabetes, last A1c was 6.7 in 10/19.  Her blood sugar ranges from 90s to 150s with occasional 200s in the past 2 weeks.  She denied low blood sugar with hypoglycemic symptoms.    She takes Prozac 40 mg daily for depression.  The symptoms are stable.  She states she has been crying less.  Appetite is slowly improving.        No past  "medical history on file.  Patient Active Problem List   Diagnosis     Hyperlipidemia, unspecified hyperlipidemia type     Pulmonary nodules     Left shoulder pain     Essential hypertension     Moderate single current episode of major depressive disorder (H)     Skin cancer of face     Dry skin     Noncompliance with medication regimen     Intrinsic eczema     Age-related osteoporosis without current pathological fracture     Type 2 diabetes mellitus treated without insulin (H)       Allergies:  No Known Allergies    Social History     Tobacco Use   Smoking Status Never Smoker   Smokeless Tobacco Current User     Types: Chew   Tobacco Comment    chews betal nut        Review of systems otherwise negative except as listed in HPI.   Social History     Tobacco Use   Smoking Status Never Smoker   Smokeless Tobacco Current User     Types: Chew   Tobacco Comment    chews betal nut        OBJECTICE: /64 (Patient Site: Left Arm, Patient Position: Sitting, Cuff Size: Adult Regular)   Pulse 67   Temp 97.4  F (36.3  C) (Oral)   Ht 4' 11.02\" (1.499 m)   Wt 136 lb 2 oz (61.7 kg)   SpO2 97%   BMI 27.48 kg/m      DATA REVIEWED:  Additional History from Old Records Summarized (2):   Labs Reviewed or Ordered (1):       GEN-alert,  in no apparent distress.  HEENT-mucous membranes are moist, neck is supple.  CV-regular rate and rhythm with no murmur.   RESP-lungs clear to auscultation .  ABDOMEN- Soft , not tender. No CVA tenderness.   EXTREM- No edema.  SKIN-no open lesions or ulcers. Sensation intact.         Bassam Llanos   12/3/2019   "

## 2021-06-03 NOTE — TELEPHONE ENCOUNTER
CMT left message x 1. Please review message thread below and advise the patient as indicated. Please schedule if necessary or indicated in message thread.    DANTE scheduled patient 12/3/19 for office visit with MD for recheck. Please notify patient.

## 2021-06-04 ENCOUNTER — OFFICE VISIT - HEALTHEAST (OUTPATIENT)
Dept: PHYSICAL THERAPY | Facility: REHABILITATION | Age: 71
End: 2021-06-04

## 2021-06-04 VITALS
BODY MASS INDEX: 27.11 KG/M2 | OXYGEN SATURATION: 100 % | SYSTOLIC BLOOD PRESSURE: 106 MMHG | HEART RATE: 73 BPM | DIASTOLIC BLOOD PRESSURE: 68 MMHG | WEIGHT: 134.3 LBS

## 2021-06-04 VITALS
DIASTOLIC BLOOD PRESSURE: 64 MMHG | RESPIRATION RATE: 16 BRPM | TEMPERATURE: 98.2 F | HEART RATE: 68 BPM | WEIGHT: 129.44 LBS | BODY MASS INDEX: 26.09 KG/M2 | HEIGHT: 59 IN | OXYGEN SATURATION: 94 % | SYSTOLIC BLOOD PRESSURE: 112 MMHG

## 2021-06-04 VITALS
BODY MASS INDEX: 27.09 KG/M2 | HEART RATE: 66 BPM | SYSTOLIC BLOOD PRESSURE: 122 MMHG | DIASTOLIC BLOOD PRESSURE: 84 MMHG | WEIGHT: 134.2 LBS | OXYGEN SATURATION: 94 %

## 2021-06-04 VITALS
HEART RATE: 67 BPM | OXYGEN SATURATION: 97 % | TEMPERATURE: 97.4 F | HEIGHT: 59 IN | DIASTOLIC BLOOD PRESSURE: 64 MMHG | WEIGHT: 136.13 LBS | BODY MASS INDEX: 27.44 KG/M2 | SYSTOLIC BLOOD PRESSURE: 108 MMHG

## 2021-06-04 VITALS
HEART RATE: 65 BPM | SYSTOLIC BLOOD PRESSURE: 96 MMHG | BODY MASS INDEX: 26.91 KG/M2 | RESPIRATION RATE: 16 BRPM | HEIGHT: 59 IN | TEMPERATURE: 97.8 F | OXYGEN SATURATION: 98 % | WEIGHT: 133.5 LBS | DIASTOLIC BLOOD PRESSURE: 56 MMHG

## 2021-06-04 DIAGNOSIS — M54.50 CHRONIC RIGHT-SIDED LOW BACK PAIN WITHOUT SCIATICA: ICD-10-CM

## 2021-06-04 DIAGNOSIS — M62.81 GENERALIZED MUSCLE WEAKNESS: ICD-10-CM

## 2021-06-04 DIAGNOSIS — G89.29 CHRONIC RIGHT-SIDED LOW BACK PAIN WITHOUT SCIATICA: ICD-10-CM

## 2021-06-04 DIAGNOSIS — F32.1 MODERATE SINGLE CURRENT EPISODE OF MAJOR DEPRESSIVE DISORDER (H): ICD-10-CM

## 2021-06-04 DIAGNOSIS — M81.0 OSTEOPOROSIS, UNSPECIFIED OSTEOPOROSIS TYPE, UNSPECIFIED PATHOLOGICAL FRACTURE PRESENCE: ICD-10-CM

## 2021-06-04 DIAGNOSIS — S22.080S COMPRESSION FRACTURE OF T11 VERTEBRA, SEQUELA: ICD-10-CM

## 2021-06-04 DIAGNOSIS — E78.5 HYPERLIPIDEMIA, UNSPECIFIED HYPERLIPIDEMIA TYPE: ICD-10-CM

## 2021-06-04 DIAGNOSIS — E11.9 TYPE 2 DIABETES MELLITUS TREATED WITHOUT INSULIN (H): ICD-10-CM

## 2021-06-04 DIAGNOSIS — M81.0 AGE-RELATED OSTEOPOROSIS WITHOUT CURRENT PATHOLOGICAL FRACTURE: ICD-10-CM

## 2021-06-04 DIAGNOSIS — I10 ESSENTIAL HYPERTENSION: ICD-10-CM

## 2021-06-04 NOTE — TELEPHONE ENCOUNTER
Who is calling:  Alix  Reason for Call:  States [facility] did not received the orders for:    Urinary incontinence, unspecified type  -     diaper,brief,adult,disposable Misc; Use 2-3 a day as needed  Dispense: 120 each; Refill: 3    Please fax order to [facility] at [phone].  Date of last appointment with primary care: 12/3/2019  Okay to leave a detailed message: Yes, please call Alix when this is faxed.

## 2021-06-04 NOTE — TELEPHONE ENCOUNTER
CMT cannot print requisition as this was printed and given to patient. Can we re-order so that CMT can send to [facility]? Thank you.

## 2021-06-04 NOTE — TELEPHONE ENCOUNTER
Chart reviewed. See notes below. Order was printed and given to patient at 12/3/19 visit.     Urinary incontinence, unspecified type  -     diaper,brief,adult,disposable Misc; Use 2-3 a day as needed  Dispense: 120 each; Refill: 3    diaper,brief,adult,disposable Misc 120 each 3 12/3/2019  --   Sig: Use 2-3 a day as needed   Class: Print   diaper,brief,adult,disposable Misc [[id number]]     Electronically signed by: Bassam Llanos MD on 12/03/19 1423 Status: Active   Ordering user: Bassam Llanos MD 12/03/19 1423 Authorized by: Bassam Llanos MD   Frequency:  12/03/19 - Until Discontinued   Diagnoses  Urinary incontinence, unspecified type [R32]

## 2021-06-04 NOTE — TELEPHONE ENCOUNTER
Who is calling:  Alix [facility] Care Coordinator  Reason for Call:  Calling to check on below request. Reports [facility] has not received any prescriptions yet. Relayed below information. Again asking for the prescription to be sent directly to [facility] @ Fax # [phone]. Any questions please call.  Date of last appointment with primary care: 12/3/19  Okay to leave a detailed message: Yes

## 2021-06-05 VITALS
BODY MASS INDEX: 27.24 KG/M2 | TEMPERATURE: 98.3 F | SYSTOLIC BLOOD PRESSURE: 108 MMHG | WEIGHT: 135.13 LBS | OXYGEN SATURATION: 95 % | HEART RATE: 84 BPM | HEIGHT: 59 IN | DIASTOLIC BLOOD PRESSURE: 78 MMHG | RESPIRATION RATE: 16 BRPM

## 2021-06-05 VITALS
RESPIRATION RATE: 16 BRPM | OXYGEN SATURATION: 94 % | SYSTOLIC BLOOD PRESSURE: 90 MMHG | TEMPERATURE: 98.5 F | HEART RATE: 74 BPM | BODY MASS INDEX: 27.23 KG/M2 | DIASTOLIC BLOOD PRESSURE: 66 MMHG | HEIGHT: 59 IN | WEIGHT: 135.06 LBS

## 2021-06-05 VITALS
WEIGHT: 136.25 LBS | SYSTOLIC BLOOD PRESSURE: 132 MMHG | BODY MASS INDEX: 27.47 KG/M2 | TEMPERATURE: 98.5 F | RESPIRATION RATE: 18 BRPM | HEIGHT: 59 IN | DIASTOLIC BLOOD PRESSURE: 78 MMHG | HEART RATE: 76 BPM

## 2021-06-05 VITALS
HEART RATE: 78 BPM | RESPIRATION RATE: 16 BRPM | TEMPERATURE: 98 F | SYSTOLIC BLOOD PRESSURE: 118 MMHG | DIASTOLIC BLOOD PRESSURE: 78 MMHG | BODY MASS INDEX: 27.04 KG/M2 | HEIGHT: 59 IN | WEIGHT: 134.13 LBS | OXYGEN SATURATION: 98 %

## 2021-06-05 VITALS — HEIGHT: 59 IN | BODY MASS INDEX: 27.21 KG/M2 | WEIGHT: 135 LBS

## 2021-06-05 VITALS
DIASTOLIC BLOOD PRESSURE: 80 MMHG | BODY MASS INDEX: 26.44 KG/M2 | WEIGHT: 131 LBS | SYSTOLIC BLOOD PRESSURE: 110 MMHG | HEART RATE: 68 BPM

## 2021-06-05 NOTE — PROGRESS NOTES
1/16/2020        Mental Health Visit Note    Start time: 0900    Stop Time: 0930   Session # 5    Session Type: Patient is presenting for an Individual session.    Ricky Lea is a 69 y.o. female is being seen today for Mental health follow-up visit.  Chief Complaint   Patient presents with     MH Follow Up   Individuals present Patient, Patient's daughter, [place]  and Therapist.    Follow up Patient indicated that she is feeling better and not as sad.    New symptoms or complaints: Uncertainty regarding how to use her glucometer.    Functional Impairment:   Personal: 2  Family: 2  Social: 2  Work: 0    Clinical assessment of mental status:   Ricky Lea presented on time.   She was oriented x3, open and cooperative, and dressed appropriately for this session and weather. Her memory was Normal cognitive functioning .  Her speech was  Within normal.  Language was clear.  Concentration and focus is Within normal. Psychosis is not noted or reported. She reports her mood is Congruent w/content of speech.  Affect is congruent with speech and is Congruent w/content of speech.  Fund of knowledge is adequate. Insight is adequate for therapy.    Suicidal/Homicidal Ideation present: Patient denies any suicidal thoughts.     Patient's impression of their current status: Patient reports that she is spending time taking care of her grandchildren and enjoys that on a daily basis.  She reports that she continues to stay up late watching TV although does enjoy the quiet when the grandchildren are sleeping.  Daughter did not indicate any concerns in regard to her mother although was asking if they could have education regarding the glucometer.    Therapist impression of patients current state: This 69 y.o.  female presents in a good mood smiling and engaged in conversation.  We began discussing treatment plan as well as the transition of writer to the other clinic and options for patient if she were to choose to continue  Psychotherapy.  Patient and daughter are aware that there is a another therapist at the clinic that she can transition to otherwise wait until replacement therapist is hired.  At this time patient appears stable.    Assessment tools used today include:      Type of psychotherapeutic technique provided: Insight oriented and Solution-focused      Review of medication: Patient reports that she is taking her medications although she and her daughter on are uncertain as to how to use a glucometer.    Review of patient health/health concerns: Patient is not indicating any current health issues.    Progress toward short term goals: Established today.    Review of long term goals: Established today    Diagnosis  Moderate single current episode of major depressive disorder (H)       Plan and Follow-up:  1.  Patient, daughter and  were brought down to 's station to meet with her nurse        in order for patient and daughter to obtain education regarding glucometer use.    2.  Patient plans to continue taking the medication as prescribed.  3.  Patient plans to follow up with other Therapist if needs arise.    Discharge Criteria/Planning: Client has chronic symptoms and ongoing therapy for maintenance stability recommended.    Performed and documented by   Jane LENTZ  1/16/20

## 2021-06-05 NOTE — PROGRESS NOTES
ASSESMENT AND PLAN:  Diagnoses and all orders for this visit:    Type 2 diabetes mellitus treated without insulin (H)  -     Glycosylated Hemoglobin A1c  -     Basic Metabolic Panel  -     Microalbumin, Random Urine  A1c 6.8 today.  We will continue metformin  mg daily.    Hyperlipidemia, unspecified hyperlipidemia type  -     Lipid Cascade  Continue Lipitor.    Moderate single current episode of major depressive disorder (H)  No suicidal homicidal ideations.  Continue Prozac.    Essential hypertension  Controlled.  Continue Cozaar.    Osteoporosis, unspecified osteoporosis type, unspecified pathological fracture presence  Continue Fosamax.    Left shoulder pain, unspecified chronicity  Improving.  Will hold Mobic for now.    Burning sensation of feet  Option to increase Neurontin dose discussed.  Patient elected to continue current dose to 300 mg twice a day.        This transcription uses voice recognition software, which may contain typographical errors.        SUBJECTIVE: Ricky Lea is a 69-year-old female with history of diabetes, hypertension, hyperlipidemia, depression, osteoporosis and burning sensation on the feet here for follow-up.  She is here with her daughter who is helping her with her medications.  Daughter states that she takes all her medications as prescribed.  She is on metformin  mg daily for diabetes, her last A1c was 6.7 in 10/19.  She is not checking her blood sugar daily but checks 2- 3 times a week.  Her fasting blood sugar ranges from 120s, 130s to 170s most day.  One reading above 200 in the past month.  No reported hypo-or hyperglycemic symptoms.  Other medications are Cozaar, Neurontin, Prozac, Lipitor and Fosamax.  She was on Mobic for shoulder pain, shoulder pain is improving.  She does not have any new concerns today.    No past medical history on file.  Patient Active Problem List   Diagnosis     Hyperlipidemia, unspecified hyperlipidemia type     Pulmonary nodules     Left  "shoulder pain     Essential hypertension     Moderate single current episode of major depressive disorder (H)     Skin cancer of face     Dry skin     Noncompliance with medication regimen     Intrinsic eczema     Age-related osteoporosis without current pathological fracture     Type 2 diabetes mellitus treated without insulin (H)     Osteoporosis, unspecified osteoporosis type, unspecified pathological fracture presence     Urinary incontinence, unspecified type       Allergies:  No Known Allergies    Social History     Tobacco Use   Smoking Status Never Smoker   Smokeless Tobacco Current User     Types: Chew   Tobacco Comment    chews betal nut        Review of systems otherwise negative except as listed in HPI.   Social History     Tobacco Use   Smoking Status Never Smoker   Smokeless Tobacco Current User     Types: Chew   Tobacco Comment    chews betal nut        OBJECTICE: BP 96/56 (Patient Site: Left Arm, Patient Position: Sitting, Cuff Size: Adult Regular)   Pulse 65   Temp 97.8  F (36.6  C) (Oral)   Resp 16   Ht 4' 11.02\" (1.499 m)   Wt 133 lb 8 oz (60.6 kg)   SpO2 98%   BMI 26.95 kg/m      DATA REVIEWED:    Labs Reviewed or Ordered (1):       GEN-alert,  in no apparent distress.  HEENT- neck is supple.  CV-regular rate and rhythm with no murmur.   RESP-lungs clear to auscultation .  ABDOMEN- Soft , not tender.  EXTREM- rash/peeling on sole of feet improving. No edema.         Bassam Llanos   2/4/2020   "

## 2021-06-05 NOTE — TELEPHONE ENCOUNTER
RN cannot approve Refill Request    RN can NOT refill this medication med is not covered by policy/route to provider. Last office visit: 12/3/2019 Bassam Llanos MD Last Physical: 8/13/2019 Last MTM visit: Visit date not found Last visit same specialty: 12/3/2019 Bassam Llanos MD.  Next visit within 3 mo: Visit date not found  Next physical within 3 mo: Visit date not found      [facility], [facility] Triage/Med Refill 1/6/2020    Requested Prescriptions   Pending Prescriptions Disp Refills     triamcinolone (KENALOG) 0.1 % ointment [Pharmacy Med Name: TRIAMCINOLONE 0.1% OINTMENT 454GM] 454 g 0     Sig: APPLY EXTERNALLY TO THE AFFECTED AREA TWICE DAILY       There is no refill protocol information for this order        FLUoxetine (PROZAC) 40 MG capsule [Pharmacy Med Name: FLUOXETINE 40MG CAPSULES] 30 capsule 3     Sig: TAKE 1 CAPSULE BY MOUTH EVERY DAY       [facility] Refill Protocol  Passed - 1/6/2020  5:02 AM        Passed - PCP or prescribing provider visit in last year     Last office visit with prescriber/PCP: 12/3/2019 Bassam Llanos MD OR same dept: 12/3/2019 Bassam Llanos MD OR same specialty: 12/3/2019 Bassam Llanos MD  Last physical: 8/13/2019 Last MTM visit: Visit date not found   Next visit within 3 mo: Visit date not found  Next physical within 3 mo: Visit date not found  Prescriber OR PCP: Bassam Llanos MD  Last diagnosis associated with med order: 1. Intrinsic eczema  - triamcinolone (KENALOG) 0.1 % ointment [Pharmacy Med Name: TRIAMCINOLONE 0.1% OINTMENT 454GM]; APPLY EXTERNALLY TO THE AFFECTED AREA TWICE DAILY  Dispense: 454 g; Refill: 0    2. Moderate single current episode of major depressive disorder (H)  - FLUoxetine (PROZAC) 40 MG capsule [Pharmacy Med Name: FLUOXETINE 40MG CAPSULES]; TAKE 1 CAPSULE BY MOUTH EVERY DAY  Dispense: 30 capsule; Refill: 3    If protocol passes may refill for 12 months if within 3 months of last provider visit (or a total of 15 months).

## 2021-06-05 NOTE — TELEPHONE ENCOUNTER
Refill Approved    Rx renewed per Medication Renewal Policy. Medication was last renewed on 9/4/19.    [facility], [facility] Triage/Med Refill 1/6/2020     Requested Prescriptions   Pending Prescriptions Disp Refills     triamcinolone (KENALOG) 0.1 % ointment [Pharmacy Med Name: TRIAMCINOLONE 0.1% OINTMENT 454GM] 454 g 0     Sig: APPLY EXTERNALLY TO THE AFFECTED AREA TWICE DAILY       There is no refill protocol information for this order        FLUoxetine (PROZAC) 40 MG capsule [Pharmacy Med Name: FLUOXETINE 40MG CAPSULES] 30 capsule 3     Sig: TAKE 1 CAPSULE BY MOUTH EVERY DAY       [facility] Refill Protocol  Passed - 1/6/2020  5:02 AM        Passed - PCP or prescribing provider visit in last year     Last office visit with prescriber/PCP: 12/3/2019 Basasm Llanos MD OR same dept: 12/3/2019 Bassam Llanos MD OR same specialty: 12/3/2019 Bassam Llanos MD  Last physical: 8/13/2019 Last MTM visit: Visit date not found   Next visit within 3 mo: Visit date not found  Next physical within 3 mo: Visit date not found  Prescriber OR PCP: Bassam Llanos MD  Last diagnosis associated with med order: 1. Intrinsic eczema  - triamcinolone (KENALOG) 0.1 % ointment [Pharmacy Med Name: TRIAMCINOLONE 0.1% OINTMENT 454GM]; APPLY EXTERNALLY TO THE AFFECTED AREA TWICE DAILY  Dispense: 454 g; Refill: 0    2. Moderate single current episode of major depressive disorder (H)  - FLUoxetine (PROZAC) 40 MG capsule [Pharmacy Med Name: FLUOXETINE 40MG CAPSULES]; TAKE 1 CAPSULE BY MOUTH EVERY DAY  Dispense: 30 capsule; Refill: 3    If protocol passes may refill for 12 months if within 3 months of last provider visit (or a total of 15 months).

## 2021-06-05 NOTE — PROGRESS NOTES
Outpatient Mental Health Treatment Plan    Name:  Ricky Lea  :  [birth year]  MRN:  [id number]  Treatment Plan:  Initial Treatment Plan  Intake/initial treatment plan date:  20  Benefit and risks and alternatives have been discussed: Yes  Is this treatment appropriate with minimal intrusion/restrictions: Yes  Estimated duration of treatment:  5-10 sessions  Anticipated frequency of services:  Every 4 weeks  Necessity for frequency: This frequency is needed to establish therapeutic goals and for continuity of care in order to monitor progress.  Necessity for treatment: To address cognitive, behavioral, and/or emotional barriers in order to work toward goals and to improve quality of life.    Session Type: Patient is presenting for an Individual session.    Plan:      ? Depression    Goal:  Decrease average depression level from 4 to.1   Strategies:    ?[x] Decrease social isolation     [x] Increase involvement in meaningful activities     ?[x] Discuss sleep hygiene     ?[x] Explore thoughts and expectations about self and others     ?[x] Process grief (loss of significant person, independence, role,                     etc.)     ?[x] Assess for suicide risk     ?[x] Implement physical activity routine (with physician approval)     [x] Increase socialization and Continue compliance with medical treatment                                           plan (or explore  barriers)  ?    Degree to which this is a problem: 4  Degree to which goal is met: 2  Date of Review: 4       Functional Impairment:    Personal: 2  Family: 2  Social: 2  Work: 0      Diagnosis:  Moderate single current episode of major depressive disorder (H)  C-SSRS Not indicating any suicidal thoughts or actions.  CAGE  0/4  PHQ-9 scoring 11 indicating moderate depression  ARNIE-7 scoring 2 indicating minimal anxiety  WHODAS 2.0 12-item version: 34.28%      Scores presented in qualifiers to represent level of disability.  MODERATE Problem (medium, fair, ...)  25-49%     H1= 30  H2= 30  H3= 30  CAGE-AID 0/4      Clinical assessments and measures completed:. ARNIE-7, PHQ-9 and CAGE-AID, C-SSRS and WHOADAS     Strengths:  kind hearted  Limitations:  Language barrier  Cultural Considerations: Residing in multigenerational housing.    Persons responsible for this plan: Patient            Psychotherapist Signature           Patient Signature:              Guardian Signature             Provider: [facility]  Date: 1/16/20

## 2021-06-05 NOTE — TELEPHONE ENCOUNTER
RN cannot approve Refill Request    RN can NOT refill this medication med is not covered by policy/route to provider. Last office visit: 5/24/2019 Ozzy Jerome MD Last Physical: Visit date not found Last MTM visit: Visit date not found Last visit same specialty: 2/4/2020 Bassam Llanos MD.  Next visit within 3 mo: Visit date not found  Next physical within 3 mo: Visit date not found      Michelle Ferro, [facility] Triage/Med Refill 2/9/2020    Requested Prescriptions   Pending Prescriptions Disp Refills     meloxicam (MOBIC) 15 MG tablet [Pharmacy Med Name: MELOXICAM 15MG TABLETS] 30 tablet 3     Sig: TAKE 1 TABLET(15 MG) BY MOUTH DAILY       There is no refill protocol information for this order

## 2021-06-06 NOTE — PROGRESS NOTES
MTM Follow Up Encounter  Assessment & Plan                                                     1. Type 2 diabetes mellitus treated without insulin (H)  Stable.  A1c at goal of less than 7%, though reported some elevated blood sugars at times.  Patient continues to take metformin  mg once daily without medication side effects, recommend continuation.  If kidney function improves in the future, would recommend dose increase of metformin.  Additionally, if A1c worsens in the future would recommend initiating DPP 4 such as linagliptin 5 mg once daily.    2. Moderate single current episode of major depressive disorder (H)  Stable.  The patient currently well tolerating fluoxetine 40 mg daily, recommend continuation.    3. Hyperlipidemia  Stable.  Patient currently taking and tolerating moderate intensity, atorvastatin 20 mg daily.  Lipids assessed within the last year, recommend continuation.    4. Essential hypertension  Stable.  Blood pressure at goal of less than 140/90.  Taking losartan 50 mg daily for blood pressure and kidney protection.      5. Age-related osteoporosis without current pathological fracture  Stable.  Reiterated the importance of taking this medication every week and continuing to get refills, patient verbalizes understanding.    6. Pain  Patient continues to endorse pain in her knees and potentially symptoms of neuropathy in her feet.  Recommend further discussion with PCP at next visit regarding burning and pain in her feet and knees.  If neuropathy is not well managed, could consider dose increase of gabapentin while being cautious with renal function.  Also for this reason, could consider Voltaren gel for patient's knees instead of systemic NSAIDs.  -Options for physician assessment at next visit: Voltaren gel versus NSAIDs for patient's knee pain and potential dose increase of gabapentin for neuropathy    Follow Up  Return in about 13 weeks (around 6/3/2020) for Medication  Review.    Subjective & Objective                                                     Ricky Lea is a 69 y.o. female coming in for a follow up visit for Medication Therapy Management. She was referred to me from Bassam Llanos MD. Patient presents with professional Sjeal  today.    Chief Complaint: Follow Up from MTM visit on 12/4/19    Medication Adherence/Access: Daughter helps her with medications and refills, uses a daily pillbox.  Patient forgets her medications today.     Diabetes:   Pt currently taking Metformin  mg once daily with food. patient is not currently experiencing side effects. Patient forgets to bring in her medications or her glucometer today. States that she is out of test strips and has not been able to check her BG, not sure how long ago she was able to check her sugars. Possibly within the last week she ran out.   SMBG: twice daily    Ranges (patient reported) : 120-210, states that it usually isn't consistent. States that it depends on what she eats.   Patient is not experiencing hypoglycemia   Recent symptoms of high blood sugar? None  ACEi/ARB:  Losartan 50 mg once daily  Statin: Atorvastatin 20 mg daily  Aspirin: Not taking  Diet/Exercise: Eating twice daily meals. Patient does not exercise, babysits her grand children which keeps her busy. States that she eats rice, vegetables, and protein with each meal. States that she eats small amounts of rice with each meal. Also drinking orange juice about every day.   Lab Results   Component Value Date    HGBA1C 6.8 (H) 02/04/2020    HGBA1C 6.7 (H) 10/15/2019    HGBA1C 6.3 (H) 06/18/2019     Lab Results   Component Value Date    LDLCALC 146 (H) 02/04/2020    CREATININE 1.17 (H) 02/04/2020     Mental Health: Fluoxetine 40 mg once daily. Mood has been ok, stable. Sleep and appetite have been good.   PHQ-9 Total Score: 14 (2/4/2020  2:34 PM)    Hyperlipidemia: Atorvastatin 20 mg daily at bedtime. Denies any muscle pain.   Lab Results    Component Value Date    LDLCALC 146 (H) 02/04/2020     Hypertension: Brings in Losartan 50 mg daily. States that she sometimes experiences dizziness but she has been tolerating well overall lately.   BP Readings from Last 3 Encounters:   03/04/20 106/68   02/04/20 96/56   12/04/19 122/84     Osteoporosis: Alendronate 70 mg every 7 days on Monday. Taking right away in the morning before eating anything. Aware that she cannot lay down for 30 minutes afterwards.   Last DXA scan on 8/20/19 - Further evaluation and therapeutic intervention is advised with a recheck in 1 year.  1. The spine bone density reveals osteoporosis at L1-L4 with T-score of -3.1..  2. Femoral bone densities show left femoral neck T- score of -1.5, and right femoral neck T-score of -1.6.  3.  She does not have a previous scan available for comparison.    Pain: Gabapentin 300 mg twice daily, meloxicam 15 mg daily - unsure if she is using this one. Admits to continued knee pain. Admits to feet burning - on her skin and also burning on the inside of her feet, been like this for a while.  Patient shows me her foot today which appears to have a red rash around the edge, for which she has been getting the triamcinolone cream which she states has not been helping.  Lab Results   Component Value Date    CREATININE 1.17 (H) 02/04/2020     PMH: reviewed in EPIC   Allergies/ADRs: reviewed in EPIC   Alcohol: None per patient report  Tobacco:   Social History     Tobacco Use   Smoking Status Never Smoker   Smokeless Tobacco Current User     Types: Chew   Tobacco Comment    chews betal nut      Today's Vitals:   Vitals:    03/04/20 1014   BP: 106/68   Pulse: 73   SpO2: 100%   Weight: 134 lb 4.8 oz (60.9 kg)     ----------------  The patient was given a summary of these recommendations as an after visit summary.    I spent 60 minutes with this patient today.   All changes were made via collaborative practice agreement with Bassam Llanos MD. A copy of the visit  note was provided to the patient's provider.     Hattie Hummel (Mahamed), PharmD  Medication Therapy Management Pharmacist  [facility]       Current Outpatient Medications   Medication Sig Dispense Refill     alendronate (FOSAMAX) 70 MG tablet Take 1 tablet (70 mg total) by mouth every 7 days. 12 tablet 3     atorvastatin (LIPITOR) 20 MG tablet Take 1 tablet (20 mg total) by mouth daily. 30 tablet 11     blood glucose test strips Use 1 each As Directed as needed. Dispense brand per patient's insurance at pharmacy discretion. 200 strip 11     blood-glucose meter Misc Test blood sugar twice a day 1 each 0     diaper,brief,adult,disposable Misc Use 2-3 a day as needed 120 each 3     FLUoxetine (PROZAC) 40 MG capsule Take 1 capsule (40 mg total) by mouth daily. 90 capsule 3     gabapentin (NEURONTIN) 300 MG capsule TAKE 1 CAPSULE BY MOUTH TWICE DAILY 180 capsule 2     generic lancets (FINGERSTIX LANCETS) Dispense brand per patient's insurance at pharmacy discretion. 200 each 11     losartan (COZAAR) 50 MG tablet Take 1 tablet by mouth daily.  2     meloxicam (MOBIC) 15 MG tablet TAKE 1 TABLET(15 MG) BY MOUTH DAILY 30 tablet 0     metFORMIN (GLUCOPHAGE XR) 500 MG 24 hr tablet Take 1 tablet (500 mg total) by mouth daily with breakfast. 30 tablet 5     triamcinolone (KENALOG) 0.1 % ointment APPLY EXTERNALLY TO THE AFFECTED AREA TWICE DAILY 454 g 0     white petrolatum-mineral oil Crea Apply to affected area twice a day 120 g 3     No current facility-administered medications for this visit.

## 2021-06-06 NOTE — TELEPHONE ENCOUNTER
RN cannot approve Refill Request    RN can NOT refill this medication med is not covered by policy/route to provider     . Last office visit: 2/4/2020 Bassam Llanos MD Last Physical: 8/13/2019 Last MTM visit: Visit date not found Last visit same specialty: 2/4/2020 Bassam Llanos MD.  Next visit within 3 mo: Visit date not found  Next physical within 3 mo: Visit date not found      Joleen Story, [facility] Triage/Med Refill 3/12/2020    Requested Prescriptions   Pending Prescriptions Disp Refills     meloxicam (MOBIC) 15 MG tablet [Pharmacy Med Name: MELOXICAM 15MG TABLETS] 30 tablet 0     Sig: TAKE 1 TABLET(15 MG) BY MOUTH DAILY       There is no refill protocol information for this order

## 2021-06-07 NOTE — TELEPHONE ENCOUNTER
RN cannot approve Refill Request    RN can NOT refill this medication med is not covered by policy/route to provider     . Last office visit: 2/4/2020 Bassam Llanos MD Last Physical: 8/13/2019 Last MTM visit: Visit date not found Last visit same specialty: 2/4/2020 Bassam Llanos MD.  Next visit within 3 mo: Visit date not found  Next physical within 3 mo: Visit date not found      Joleen Story, [facility] Triage/Med Refill 5/1/2020    Requested Prescriptions   Pending Prescriptions Disp Refills     triamcinolone (KENALOG) 0.1 % ointment [Pharmacy Med Name: TRIAMCINOLONE 0.1% OINTMENT 454GM] 454 g 0     Sig: APPLY EXTERNALLY TO THE AFFECTED AREA TWICE DAILY       There is no refill protocol information for this order        meloxicam (MOBIC) 15 MG tablet [Pharmacy Med Name: MELOXICAM 15MG TABLETS] 30 tablet 0     Sig: TAKE 1 TABLET(15 MG) BY MOUTH DAILY       There is no refill protocol information for this order

## 2021-06-07 NOTE — TELEPHONE ENCOUNTER
RN cannot approve Refill Request    RN can NOT refill this medication med is not covered by policy/route to provider and Protocol failed and NO refill given.       Joleen Story, [facility] Triage/Med Refill 4/2/2020    Requested Prescriptions   Pending Prescriptions Disp Refills     meloxicam (MOBIC) 15 MG tablet [Pharmacy Med Name: MELOXICAM 15MG TABLETS] 30 tablet 0     Sig: TAKE 1 TABLET(15 MG) BY MOUTH DAILY       There is no refill protocol information for this order        metFORMIN (GLUCOPHAGE-XR) 500 MG 24 hr tablet [Pharmacy Med Name: METFORMIN ER 500MG 24HR TABS] 30 tablet 5     Sig: TAKE 1 TABLET(500 MG) BY MOUTH DAILY WITH BREAKFAST       Metformin Refill Protocol Failed - 4/1/2020  5:47 PM        Failed - LFT or AST or ALT in last 12 months     Albumin   Date Value Ref Range Status   03/14/2018 3.5 3.5 - 5.0 g/dL Final     Bilirubin, Total   Date Value Ref Range Status   03/14/2018 0.5 0.0 - 1.0 mg/dL Final     Bilirubin, Direct   Date Value Ref Range Status   06/09/2016 0.1 <=0.5 mg/dL Final     Alkaline Phosphatase   Date Value Ref Range Status   03/14/2018 70 45 - 120 U/L Final     AST   Date Value Ref Range Status   03/14/2018 23 0 - 40 U/L Final     ALT   Date Value Ref Range Status   03/14/2018 26 0 - 45 U/L Final     Protein, Total   Date Value Ref Range Status   03/14/2018 6.8 6.0 - 8.0 g/dL Final                Failed - Microalbumin in last year      No results found for: MICROALBUR               Passed - Blood pressure in last 12 months     BP Readings from Last 1 Encounters:   03/04/20 106/68             Passed - GFR or Serum Creatinine in last 6 months     GFR MDRD Non Af Amer   Date Value Ref Range Status   02/04/2020 46 (L) >60 mL/min/1.73m2 Final     GFR MDRD Af Amer   Date Value Ref Range Status   02/04/2020 56 (L) >60 mL/min/1.73m2 Final             Passed - Visit with PCP or prescribing provider visit in last 6 months or next 3 months     Last office visit with prescriber/PCP:  2/4/2020 OR same dept: 2/4/2020 Bassam Llanos MD OR same specialty: 2/4/2020 Bassam Llanos MD Last physical: Visit date not found Last MTM visit: Visit date not found         Next appt within 3 mo: Visit date not found  Next physical within 3 mo: Visit date not found  Prescriber OR PCP: Bassam Llanos MD  Last diagnosis associated with med order: 1. Chronic left shoulder pain  - meloxicam (MOBIC) 15 MG tablet [Pharmacy Med Name: MELOXICAM 15MG TABLETS]; TAKE 1 TABLET(15 MG) BY MOUTH DAILY  Dispense: 30 tablet; Refill: 0    2. Type 2 diabetes mellitus treated without insulin (H)  - metFORMIN (GLUCOPHAGE-XR) 500 MG 24 hr tablet [Pharmacy Med Name: METFORMIN ER 500MG 24HR TABS]; TAKE 1 TABLET(500 MG) BY MOUTH DAILY WITH BREAKFAST  Dispense: 30 tablet; Refill: 5    3. Intrinsic eczema  - triamcinolone (KENALOG) 0.1 % ointment [Pharmacy Med Name: TRIAMCINOLONE 0.1% OINTMENT 454GM]; APPLY EXTERNALLY TO THE AFFECTED AREA TWICE DAILY  Dispense: 454 g; Refill: 0     If protocol passes may refill for 12 months if within 3 months of last provider visit (or a total of 15 months).           Passed - A1C in last 6 months     Hemoglobin A1c   Date Value Ref Range Status   02/04/2020 6.8 (H) 3.5 - 6.0 % Final                  triamcinolone (KENALOG) 0.1 % ointment [Pharmacy Med Name: TRIAMCINOLONE 0.1% OINTMENT 454GM] 454 g 0     Sig: APPLY EXTERNALLY TO THE AFFECTED AREA TWICE DAILY       There is no refill protocol information for this order

## 2021-06-07 NOTE — TELEPHONE ENCOUNTER
RN cannot approve Refill Request    RN can NOT refill this medication historical medication requested.       Joleen Story, [facility] Triage/Med Refill 5/5/2020    Requested Prescriptions   Pending Prescriptions Disp Refills     losartan (COZAAR) 50 MG tablet [Pharmacy Med Name: LOSARTAN 50MG TABLETS] 90 tablet 0     Sig: TAKE 1 TABLET(50 MG) BY MOUTH DAILY       Angiotensin Receptor Blocker Protocol Passed - 5/3/2020 10:54 AM        Passed - PCP or prescribing provider visit in past 12 months       Last office visit with prescriber/PCP: 2/4/2020 Bassam Llanos MD OR same dept: 2/4/2020 Bassam Llanos MD OR same specialty: 2/4/2020 Bassam Llanos MD  Last physical: 8/13/2019 Last MTM visit: Visit date not found   Next visit within 3 mo: Visit date not found  Next physical within 3 mo: Visit date not found  Prescriber OR PCP: Bassam Llanos MD  Last diagnosis associated with med order: There are no diagnoses linked to this encounter.  If protocol passes may refill for 12 months if within 3 months of last provider visit (or a total of 15 months).             Passed - Serum potassium within the past 12 months     Lab Results   Component Value Date    Potassium 4.3 02/04/2020             Passed - Blood pressure filed in past 12 months     BP Readings from Last 1 Encounters:   03/04/20 106/68             Passed - Serum creatinine within the past 12 months     Creatinine   Date Value Ref Range Status   02/04/2020 1.17 (H) 0.60 - 1.10 mg/dL Final

## 2021-06-08 NOTE — PROGRESS NOTES
ASSESMENT AND PLAN:  Diagnoses and all orders for this visit:    Depression (emotion)  PHQ 9 score 2.  Doing well . Not taking med daily. Will D/C prozac.  She will call if worsen.    Hyperlipidemia, unspecified hyperlipidemia type  -     pravastatin (PRAVACHOL) 20 MG tablet; Take 1 tablet (20 mg total) by mouth bedtime.  Dispense: 30 tablet; Refill: 5  Continue current med, no SE reported.   Recheck in 3 months.    Closed fracture of eleventh thoracic vertebra with routine healing, unspecified fracture morphology, subsequent encounter  Pain significantly improved. Does not need norco anymore.    Hemorrhoids, unspecified hemorrhoid type  External hemorrhoid. Not actively bleeding.  Offered cream , pt declined.    Blood in stool  Likely from hemorrhoid.  Pt has appt for colonoscopy on 2/13/17, advised to keep that appt.        SUBJECTIVE: Ricky Lea is here to f/u on depression.  Started on prozac 3 weeks ago, she takes when she remember to take it, not daily. Feeling well. Helping daughter who is getting divorce. Denied suicidal or homicidal ideations. No night morris. Has trouble sleeping occasionally, not every nght.    Pain from fall is improving significantly. Has not been using pain med for a few days. Hurts only with heavy lifting and bending.     Noticed fresh blood in stool twice in 2 weeks. Fresh blood in toilet and when she wipes. Denied constipation. No pain with BM. No h/o blood in stool in the past.       No past medical history on file.  Patient Active Problem List   Diagnosis     Hyperlipidemia, unspecified hyperlipidemia type     Pulmonary nodules       Allergies:  No Known Allergies    History   Smoking Status     Never Smoker   Smokeless Tobacco     Current User     Types: Chew     Comment: beta nut with tobacco       Review of systems otherwise negative except as listed in HPI.   History   Smoking Status     Never Smoker   Smokeless Tobacco     Current User     Types: Chew     Comment: beta nut with  "tobacco       OBJECTICE:   Visit Vitals     /84     Pulse 68     Temp 98.4  F (36.9  C) (Oral)     Resp 20     Ht 4' 11\" (1.499 m)     Wt 112 lb 3 oz (50.9 kg)     SpO2 97%     BMI 22.66 kg/m2       GEN-alert, in no apparent distress. Tearful during interview.  HEENT-mucous membranes are moist, neck is supple.  CV-regular rate and rhythm with no murmur.   RESP-lungs clear to auscultation .  ABDOMEN- Soft , not tender.  EXTREM- No edema.No tenderness.  BACK- Minimal lumbar tenderness, minimal tenderness on right lower rib.No bruising or swelling.  RECTAL- Small external hemorrhoid at 6 o'clock, no active bleeding. Not tender. No ulcerations. Non thrombosed.    Bassam Llanos   2/2/2017     "

## 2021-06-08 NOTE — PROGRESS NOTES
Patient came in today for a colonoscopy prep teaching. All questions were answered. Patient took the kit home with her.

## 2021-06-08 NOTE — PROGRESS NOTES
ASSESMENT AND PLAN:  Diagnoses and all orders for this visit:    Abnormal chest x-ray  CT chest- 1. No concerning pulmonary nodules. There are two left upper lobe calcified granulomas.  2. No lymphadenopathy.  3. Enlarged main pulmonary artery, which can be seen with pulmonary hypertension.  Results reviewed with the pt.    Depression (emotion)  -     FLUoxetine (PROZAC) 20 MG tablet; Take 1 tablet (20 mg total) by mouth daily.  Dispense: 30 tablet; Refill: 2  Started on med, SE discussed including worsening symptoms.  F/U in 4 weeks or sooner if new problems arise.    Hyperlipidemia, unspecified hyperlipidemia type   in 12/16.  Recheck in 3 months.  Continue current med.    Fall, subsequent encounter  Pain improving. No pain med refill requested.  X ray reviewed- Negative pelvis,No lumbar fracture, Mild T11 compression fracture.    Closed fracture of eleventh thoracic vertebra with routine healing, unspecified fracture morphology, subsequent encounter  As above.  F/U if pain worsen.      SUBJECTIVE: Ricky Lea is here to follow-up on CT result.  Chest x-ray done in June 2016 showed nodule at the left base.  Recommended CT follow-up.  CT done on 12/21/16 showed no concerning pulmonary nodules but a large main pulmonary artery possible pulmonary hypertension.  Patient does not have any symptoms.  She denied dyspnea at rest or  dyspnea on exertion.  No chest pain, chronic cough, weight loss or night sweats.    She was in the ER on 12/27/16 with a fall.  Was found to have a T11 compression fracture.  She is still complaining of pain around the sacral area and right lower back, especially when she coughs but improving since last ER visit.  She takes norco as needed.    She has been feeling down and stressed lately.  She was crying during interview.  She recently found out her daughter is getting , she has been helping taking care of her 3 grandchildren.  She has 5 children.  One of her daughter [place] resulted in right-sided vision loss, she later passed away due to complications from cancer.  She has been having trouble sleeping at night sometimes but not every night.  She denied suicidal or homicidal ideations.  She states\" I have to be strong to help my children\".        No past medical history on file.  Patient Active Problem List   Diagnosis     Hyperlipidemia, unspecified hyperlipidemia type     Pulmonary nodules       Allergies:  No Known Allergies    History   Smoking Status     Never Smoker   Smokeless Tobacco     Not on file       Review of systems otherwise negative except as listed in HPI.   History   Smoking Status     Never Smoker   Smokeless Tobacco     Not on file       OBJECTICE:   Visit Vitals     /78     Pulse 72     Temp 97.9  F (36.6  C) (Oral)     Resp 16     Ht 4' 11\" (1.499 m)     Wt 118 lb 6 oz (53.7 kg)     SpO2 98%     BMI 23.91 kg/m2           GEN-alert,  in no apparent distress.  Tearful during interview.  HEENT-mucous membranes are moist, neck is supple.  CV-regular rate and rhythm with no murmur.   RESP-lungs clear to auscultation .  ABDOMEN- Soft , not tender.  EXTREM- No edema.No tenderness.  BACK- Mild lumbar tenderness, Mild tenderness on right lower rib.No bruising or swelling.  SKIN-normal        Bassam Llanos   1/5/2017     "

## 2021-06-08 NOTE — PROGRESS NOTES
"Ricky Lea is a 70 y.o. female who is being evaluated via a billable video visit.      The patient has been notified of following:     \"This video visit will be conducted via a call between you and your physician/provider. We have found that certain health care needs can be provided without the need for an in-person physical exam.  This service lets us provide the care you need with a video conversation.  If a prescription is necessary we can send it directly to your pharmacy.  If lab work is needed we can place an order for that and you can then stop by our lab to have the test done at a later time.    Video visits are billed at different rates depending on your insurance coverage. Please reach out to your insurance provider with any questions.    If during the course of the call the physician/provider feels a video visit is not appropriate, you will not be charged for this service.\"    Patient has given verbal consent to a Video visit? Yes    Patient would like to receive their AVS by None    Patient would like the video invitation sent by: Text to cell phone: [phone]    Will anyone else be joining your video visit? Yes: No, Daughter will be present Eh Naw . How would they like to receive their invitation? Text to cell phone: [phone]        Video Start Time: 8:21 Am    Additional provider notes: Patient and daughter present during this video visit.  Patient has history of diabetes, hyperlipidemia, hypertension, depression, chronic knee pain, back pain and osteoporosis.  She states she is doing well, at her baseline.  But later states she is been having more back pain and burning sensation in the lower extremities.  No radiation to the back pain.  No urinary or bowel symptoms.  Knee pain is worse when she climbs up and down stairs and walk for long distance.  No acute swelling in the knees.  She is currently taking gabapentin 300 mg twice a day.  I was able to review all her medications with daughter.  She " is taking all her medications as prescribed.  Daughter states her depression symptoms are stable.  She denies suicidal or homicidal ideations.  She is not crying as often as before.  No known sick contact.  No known family member with acute fever, cough, sore throat or shortness of breath.    Objective: Patient appears to be in a good mood today.  Smiles and laughs during video call.  Does not appear to be in acute distress.  Able to complete sentences without difficulties.        1. Type 2 diabetes mellitus treated without insulin (H)  Last A1c 6.8  - metFORMIN (GLUCOPHAGE-XR) 750 MG 24 hr tablet; TAKE 1 TABLET(500 MG) BY MOUTH DAILY WITH BREAKFAST  Dispense: 30 tablet; Refill: 5  Follow-up 4 weeks.  Advised to check fasting blood sugar once a day.    2. Chronic left shoulder pain  Increased Neurontin to 300 mg 2 tabs two times a day.  Follow-up in 4 weeks.  - gabapentin (NEURONTIN) 300 MG capsule; Take 2 capsules (600 mg total) by mouth 2 (two) times a day.  Dispense: 120 capsule; Refill: 5    3. Chronic midline low back pain without sciatica  Bedside commode order printed for [facility] to fax.        Video-Visit Details    Type of service:  Video Visit    Video End Time (time video stopped): 8:44 AM  Originating Location (pt. Location): Home    Distant Location (provider location):  [facility] MEDICINE/OB      Platform used for Video Visit: Haley Llanos MD

## 2021-06-08 NOTE — PROGRESS NOTES
MTM Follow Up Encounter  Assessment & Plan                                                     1. Type 2 diabetes mellitus with diabetic polyneuropathy, without long-term current use of insulin (H)  Stable.  Last A1c at goal of less than 7%.  Recommend continuation of current therapy and follow-up A1c at next clinic visit, postponed for COVID-19 restrictions.    2. Chronic midline low back pain without sciatica  Needs improvement.  Patient's pain and neuropathy continue.  Patient only taking gabapentin half dose, as full dose caused side effects.  Recommend patient slightly increased dose in the evening to help with pain and reduce risk for side effects, patient agreeable.  Additionally, in the future would recommend consideration of duloxetine as alternative to current fluoxetine for patient's depression and neuropathy.  Plan  - Patient to take gabapentin 300 mg AM and 600 mg PM    Future consideration: Fluoxetine versus duloxetine for depression and pain    Follow Up  Return in about 2 months (around 8/6/2020) for Medication Review.    Subjective & Objective                                                     Ricky Lea is a 70 y.o. female called for a follow up visit for Medication Therapy Management. She was referred to me from Bassam Llanos MD. Professional telephonic Sejal  utilized today.    Patient consented to a telehealth visit: Yes  Chief Complaint: Follow Up from MTM visit on 3/4/19  Medication Adherence/Access: Daughter helps her with medications and refills, uses a daily pillbox.      Diabetes:   Pt currently taking Metformin  mg once daily with food. patient is not currently experiencing side effects.    Neuropathy: Gabapentin 300 mg two times a day (prescribed 600 two times a day). States that the medication doesn't help at all. States that when she tried the increased dose, she was feeling very dizzy.   SMBG: twice daily    Ranges (patient reported) : 131 today, states that it usually  isn't consistent. States that it depends on what she eats.   Patient is not experiencing hypoglycemia   Recent symptoms of high blood sugar? None  ACEi/ARB:  Losartan 50 mg once daily  Statin: Atorvastatin 20 mg daily  Aspirin: Not taking  Diet/Exercise: Unable to discuss today  Lab Results   Component Value Date    HGBA1C 6.8 (H) 02/04/2020    HGBA1C 6.7 (H) 10/15/2019    HGBA1C 6.3 (H) 06/18/2019     Lab Results   Component Value Date    LDLCALC 146 (H) 02/04/2020    CREATININE 1.17 (H) 02/04/2020     Pain: Gabapentin 300 mg twice daily, meloxicam 15 mg daily - unsure if she is using this one.   States that she is having trouble with back pain.    Lab Results   Component Value Date    CREATININE 1.17 (H) 02/04/2020     PMH: reviewed in EPIC   Allergies/ADRs: reviewed in EPIC   Alcohol: None per patient report  Tobacco:   Social History     Tobacco Use   Smoking Status Never Smoker   Smokeless Tobacco Current User     Types: Chew   Tobacco Comment    chews betal nut      ----------------  The patient declined an after visit summary.    I spent 15 minutes with this patient today.   All changes were made via collaborative practice agreement with Bassam Llanos MD. A copy of the visit note was provided to the patient's provider.     Hattie Pak), PharmD  Medication Therapy Management Pharmacist  [facility]    Telemedicine Visit Details    Type of service:  Telephone     Start Time: 8:45 AM  End Time (time video/phone call stopped): 9:00 AM    Originating Location (pt. Location): Home    Distant Location (provider location):  [facility] MEDICATION THERAPY MANAGEMENT [facility]     Mode of Communication:   Telephone        Current Outpatient Medications   Medication Sig Dispense Refill     alendronate (FOSAMAX) 70 MG tablet Take 1 tablet (70 mg total) by mouth every 7 days. 12 tablet 3     atorvastatin (LIPITOR) 20 MG tablet Take 1 tablet (20 mg total) by mouth daily. 30 tablet  11     blood glucose test strips Use 1 each As Directed as needed. Dispense brand per patient's insurance at pharmacy discretion. 200 strip 11     blood-glucose meter Misc Test blood sugar twice a day 1 each 0     diaper,brief,adult,disposable Misc Use 2-3 a day as needed 120 each 3     FLUoxetine (PROZAC) 40 MG capsule Take 1 capsule (40 mg total) by mouth daily. 90 capsule 3     gabapentin (NEURONTIN) 300 MG capsule Take 2 capsules (600 mg total) by mouth 2 (two) times a day. 120 capsule 5     generic lancets (FINGERSTIX LANCETS) Dispense brand per patient's insurance at pharmacy discretion. 200 each 11     losartan (COZAAR) 50 MG tablet TAKE 1 TABLET(50 MG) BY MOUTH DAILY 90 tablet 3     meloxicam (MOBIC) 15 MG tablet TAKE 1 TABLET(15 MG) BY MOUTH DAILY 30 tablet 0     metFORMIN (GLUCOPHAGE-XR) 750 MG 24 hr tablet TAKE 1 TABLET(500 MG) BY MOUTH DAILY WITH BREAKFAST 30 tablet 5     triamcinolone (KENALOG) 0.1 % ointment APPLY EXTERNALLY TO THE AFFECTED AREA TWICE DAILY 454 g 0     white petrolatum-mineral oil Crea Apply to affected area twice a day 120 g 3     No current facility-administered medications for this visit.
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
## 2021-06-09 NOTE — TELEPHONE ENCOUNTER
RN cannot approve Refill Request    RN can NOT refill this medication med is not covered by policy/route to provider.     Last office visit: 2/4/2020 Bassam Llanos MD Last Physical: 8/13/2019 Last MTM visit: Visit date not found Last visit same specialty: 2/4/2020 Bassam Llanos MD.  Next visit within 3 mo: Visit date not found  Next physical within 3 mo: Visit date not found      Joleen Story, [facility] Triage/Med Refill 7/1/2020    Requested Prescriptions   Pending Prescriptions Disp Refills     meloxicam (MOBIC) 15 MG tablet [Pharmacy Med Name: MELOXICAM 15MG TABLETS] 30 tablet 0     Sig: TAKE 1 TABLET(15 MG) BY MOUTH DAILY       There is no refill protocol information for this order

## 2021-06-10 NOTE — TELEPHONE ENCOUNTER
Dr. Kauffman gives verbal orders for:    New verbal order needed for Skilled Nursing start of care on 7/31/20 per patient's request. Thanks.

## 2021-06-10 NOTE — TELEPHONE ENCOUNTER
RN cannot approve Refill Request    RN can NOT refill this medication med is not covered by policy/route to provider. Last office visit: 7/28/2020 Bassam Llanos MD Last Physical: 8/13/2019 Last MTM visit: Visit date not found Last visit same specialty: 7/28/2020 Bassam Llanos MD.  Next visit within 3 mo: Visit date not found  Next physical within 3 mo: Visit date not found      Joleen Story, [facility] Triage/Med Refill 8/12/2020    Requested Prescriptions   Pending Prescriptions Disp Refills     meloxicam (MOBIC) 15 MG tablet [Pharmacy Med Name: MELOXICAM 15 MG TABLET 15 TAB] 30 tablet 0     Sig: TAKE 1 PILL (15 MG) BY MOUTH EVERYDAY FOR PAIN       There is no refill protocol information for this order      Signed Prescriptions Disp Refills    alendronate (FOSAMAX) 70 MG tablet 12 tablet 3     Sig: TAKE 1 PILL BY MOUTH EVERY WEEK FOR BONES       Biphosphonates Refill Protocol Passed - 8/12/2020  9:48 AM        Passed - PCP or prescribing provider visit in last 12 months     Last office visit with prescriber/PCP: 7/28/2020 Bassam Llanos MD OR same dept: 7/28/2020 Bassam Llanos MD OR same specialty: 7/28/2020 Bassam Llanos MD  Last physical: 8/13/2019 Last MTM visit: Visit date not found   Next visit within 3 mo: Visit date not found  Next physical within 3 mo: Visit date not found  Prescriber OR PCP: Bassam Llanos MD  Last diagnosis associated with med order: 1. Age-related osteoporosis without current pathological fracture  - alendronate (FOSAMAX) 70 MG tablet; TAKE 1 PILL BY MOUTH EVERY WEEK FOR BONES  Dispense: 12 tablet; Refill: 3    2. Chronic midline low back pain without sciatica  - meloxicam (MOBIC) 15 MG tablet [Pharmacy Med Name: MELOXICAM 15 MG TABLET 15 TAB]; TAKE 1 PILL (15 MG) BY MOUTH EVERYDAY FOR PAIN  Dispense: 30 tablet; Refill: 0    If protocol passes may refill for 12 months if within 3 months of last provider visit (or a total of 15 months).             Passed - Serum creatinine in last 12 months      Creatinine   Date Value Ref Range Status   07/28/2020 1.07 0.60 - 1.10 mg/dL Final

## 2021-06-10 NOTE — TELEPHONE ENCOUNTER
New verbal order needed for Skilled Nursing start of care on 7/31/20 per patient's request.     This is outside of the original 48 hour referral order from PCP.    Jenny, RN  [facility], located within [facility]  [phone]

## 2021-06-10 NOTE — TELEPHONE ENCOUNTER
RN cannot approve Refill Request    RN can NOT refill this medication med is not covered by policy/route to provider     . Last office visit: 7/26/2017 Jorden Galvan MD Last Physical: Visit date not found Last MTM visit: Visit date not found Last visit same specialty: 7/28/2020 Bassam Llanos MD.  Next visit within 3 mo: Visit date not found  Next physical within 3 mo: Visit date not found      Joleen Story, [facility] Triage/Med Refill 7/30/2020    Requested Prescriptions   Pending Prescriptions Disp Refills     meloxicam (MOBIC) 15 MG tablet [Pharmacy Med Name: MELOXICAM 15MG TABLETS] 30 tablet 0     Sig: TAKE 1 TABLET(15 MG) BY MOUTH DAILY       There is no refill protocol information for this order

## 2021-06-10 NOTE — PROGRESS NOTES
Up date    Patient reported she was still dizziness with gabapentin 300mg 1 cap TID.    Can she has tylenol to take as needed for pain?    Please advise

## 2021-06-10 NOTE — TELEPHONE ENCOUNTER
Does PCP approve HC orders below?  The Metformin was ordered as 750 but the sig states 500 mg. Please clarify.  The same with the Gapapentin  - it was sent to [facility].  Thanks.

## 2021-06-10 NOTE — TELEPHONE ENCOUNTER
Physical Therapy [facility] is requesting verbal ok for 2W3 to work on strengthening, pain management, gait/transfer training, and balance training. You can respond to this inbasket with the ok or call and leave a message at [phone]. Thank you

## 2021-06-10 NOTE — PROGRESS NOTES
ASSESMENT AND PLAN:  1. Type 2 diabetes mellitus treated without insulin (H)  A1c 6.7 today.  Continue current medication.  - Glycosylated Hemoglobin A1c  - Microalbumin, Random Urine  - [facility] RED  - generic lancets (FINGERSTIX LANCETS); Dispense brand per patient's insurance at pharmacy discretion.  Dispense: 200 each; Refill: 11  - Ambulatory referral to Ophthalmology  - Comprehensive Metabolic Panel  - Lipid Cascade RANDOM    2. Chronic midline low back pain without sciatica  We will consider physical therapy.  Advised to take Neurontin 1 tablet 3 times a day instead of taking 2 twice a day since it is causing significant drowsiness.  - XR Lumbar Spine 2 or 3 VWS; Future  - gabapentin (NEURONTIN) 300 MG capsule; Take 1 capsule (300 mg total) by mouth 3 (three) times a day.  Dispense: 90 capsule; Refill: 5  - meloxicam (MOBIC) 15 MG tablet; Take one tab daily  Dispense: 30 tablet; Refill: 0    3. Essential hypertension  BP at target.    4. Age-related osteoporosis without current pathological fracture  Continue Fosamax.    5. Polypharmacy  - Ambulatory referral to Home Health    6. Moderate single current episode of major depressive disorder (H)  Stable.  Continue current meds.    7. Visit for screening mammogram  - Mammo Screening Bilateral; Future  She will get mammogram today.    SUBJECTIVE: Ricky Lea is a 70-year-old female with history of diabetes, hypertension, hyperlipidemia, depression and back pain here for follow-up.  She forgot to bring her pill bottles.  She forgot to bring her glucometer.  States her blood sugar's are in the 110s to 130s.  Daughter is helping her with her medications but she is not sure if she is taking it correctly.  She was prescribed Neurontin 300 mg to take 2 tablets twice a day.  It is making her too drowsy so she is taking it only 1 tablet twice a day.  Back pain continue to be problem for her.  States it was worse about a week ago, she was having trouble even going to the restroom.   "It is improving slightly.  She was also on Mobic, unclear if she still has it.  No pain radiation to the lower extremities.  No known injury to the back.    No known exposure to COVID-19.  No acute fever, URI symptoms or sore throat.  No acute chest pain or shortness of breath.  Depression symptoms seems to be stable.  Denied suicidal homicidal ideations.    No past medical history on file.  Patient Active Problem List   Diagnosis     Hyperlipidemia, unspecified hyperlipidemia type     Pulmonary nodules     Left shoulder pain     Essential hypertension     Moderate single current episode of major depressive disorder (H)     Skin cancer of face     Dry skin     Noncompliance with medication regimen     Intrinsic eczema     Age-related osteoporosis without current pathological fracture     Type 2 diabetes mellitus treated without insulin (H)     Osteoporosis, unspecified osteoporosis type, unspecified pathological fracture presence     Urinary incontinence, unspecified type     Burning sensation of feet     Chronic midline low back pain without sciatica       Allergies:  No Known Allergies    Social History     Tobacco Use   Smoking Status Never Smoker   Smokeless Tobacco Current User     Types: Chew   Tobacco Comment    chews betal nut        Review of systems otherwise negative except as listed in HPI.   Social History     Tobacco Use   Smoking Status Never Smoker   Smokeless Tobacco Current User     Types: Chew   Tobacco Comment    chews betal nut        OBJECTICE: /64 (Patient Site: Right Arm, Patient Position: Sitting, Cuff Size: Adult Regular)   Pulse 68   Temp 98.2  F (36.8  C) (Oral)   Resp 16   Ht 4' 11.02\" (1.499 m)   Wt 129 lb 7 oz (58.7 kg)   SpO2 94%   BMI 26.13 kg/m      DATA REVIEWED:    Radiology Tests Summarized or Ordered (1):   Labs Reviewed or Ordered (1):       GEN-alert,  in no apparent distress.  HEENT-mucous membranes are moist, neck is supple.  CV-regular rate and rhythm with no " murmur.   RESP-lungs clear to auscultation .  ABDOMEN- Soft , not tender.  BACK- No tenderness on palpation. Limited ROM due to pain.   SKIN-normal    This transcription uses voice recognition software, which may contain typographical errors.        Bassam Llanos   7/28/2020

## 2021-06-10 NOTE — TELEPHONE ENCOUNTER
Home care assessment done today  Requesting for on going home care orders for the following services:    1/ skilled nurse visit 1 x per week x 2 weeks then 1 visit every 2 weeks x 7 weeks and 3 PRN visit to assess cardiopulmonary status and medication management.    2/ PT evaluation for gait training due  gait is unsteady with back and feet pain.    3/ OT evaluation for equipment needs: for safety      Needs to clarify on these medication:    1/ Metformin  Is it 750mg or 500mg daily?  Epic med list stated 750mg tab to take 500mg daily. I talked to [facility] and they said Metformin is 750mg daily. Also patient stated she had been taking 750mg 1 tab daily.    2/ Gabapentin    [facility] did not get Rx for Gabapentin transferred from [facility].     In home, she got Gabapentin med bottle stated 300mg 2 caps =600mg two times a day. Epic medication list stated 300mg cap three times a day.   Please verify Gabapentin doses and frequency.   Please send new Rx to [facility].    Thank you

## 2021-06-10 NOTE — PROGRESS NOTES
Patient goal: to have MSU, better walking and transfer, and getting cane/WW/grab bars in bath tub. She used her  shower chair.  Reason for this episode: Medication management  Pertinent medical history: DM, HTN, chronic pain in back and neurophathy pain in feet, depression, osteoprosis  Precautions/safety: (ex. contact precautions, cognition, activity restrictions, falls, surgical precautions, etc.): Universal precaution; alert a  nd oriented, unsetady gait related to back pain/neurophathy pain difficulty with transfer related to pain, no cane or WW in home at SOC, has caregiver in home.  Prior Level of Function: needs assistance, daughter is main caregiver  Current Level of Function/(ex. gait, transfer, ADL assist, etc): unsteady, abnormal gait, difficulty with transfers but like to sleep on floor  Persons present for visit: daughter  Home Environment and Assist  ance Available: clean home, no enviroment concern  Multidisciplinary Plan/Follow up Needs (ex. other disciplines ordered and why): RN: for MSU 1w2, 2w7 3 PRN; PT clem, OT clem    FYI: anticipate she will be on going with MSU  Any question to let me know.

## 2021-06-10 NOTE — TELEPHONE ENCOUNTER
Refill Approved    Rx renewed per Medication Renewal Policy. Medication was last renewed on 8/26/9.    Joleen Story, [facility] Triage/Med Refill 8/12/2020     Requested Prescriptions   Pending Prescriptions Disp Refills     alendronate (FOSAMAX) 70 MG tablet [Pharmacy Med Name: ALENDRONATE NA 70 MG TAB 70 TAB] 4 tablet 0     Sig: TAKE 1 PILL BY MOUTH EVERY WEEK FOR BONES       Biphosphonates Refill Protocol Passed - 8/12/2020  9:48 AM        Passed - PCP or prescribing provider visit in last 12 months     Last office visit with prescriber/PCP: 7/28/2020 Bassam Llanos MD OR same dept: 7/28/2020 Bassam Llanos MD OR same specialty: 7/28/2020 Bassam Llanos MD  Last physical: 8/13/2019 Last MTM visit: Visit date not found   Next visit within 3 mo: Visit date not found  Next physical within 3 mo: Visit date not found  Prescriber OR PCP: Bassam Llanos MD  Last diagnosis associated with med order: 1. Age-related osteoporosis without current pathological fracture  - alendronate (FOSAMAX) 70 MG tablet [Pharmacy Med Name: ALENDRONATE NA 70 MG TAB 70 TAB]; TAKE 1 PILL BY MOUTH EVERY WEEK FOR BONES  Dispense: 4 tablet; Refill: 0    2. Chronic midline low back pain without sciatica  - meloxicam (MOBIC) 15 MG tablet [Pharmacy Med Name: MELOXICAM 15 MG TABLET 15 TAB]; TAKE 1 PILL (15 MG) BY MOUTH EVERYDAY FOR PAIN  Dispense: 30 tablet; Refill: 0    If protocol passes may refill for 12 months if within 3 months of last provider visit (or a total of 15 months).             Passed - Serum creatinine in last 12 months     Creatinine   Date Value Ref Range Status   07/28/2020 1.07 0.60 - 1.10 mg/dL Final                meloxicam (MOBIC) 15 MG tablet [Pharmacy Med Name: MELOXICAM 15 MG TABLET 15 TAB] 30 tablet 0     Sig: TAKE 1 PILL (15 MG) BY MOUTH EVERYDAY FOR PAIN       There is no refill protocol information for this order

## 2021-06-10 NOTE — PROGRESS NOTES
I did SOC on 7/31 and I put in visit for myself today to f/u on her medication refills and set up.  She was in process of transfering her meds from [facility] to [facility]. [facility] did not get all her Rxs completed on profile yet.    Today she still not getting her Atorvastatin and Meloxicam for MSU. I talked to [facility] to f/u agained on her meds and get refills.    I will put in PRN visit on Wed for you to f/u. Let me know if you want me to s  ee her or keep her for on going medication management.

## 2021-06-10 NOTE — TELEPHONE ENCOUNTER
Requested orders approved.    Metformin  mg daily.    Gabapentin 300 mg three times a day. ( 600 gm two times a day made her too dizzy ).      Dr. Bassam Llanos  7/31/2020 4:36 PM

## 2021-06-10 NOTE — PROGRESS NOTES
MTM Follow Up Encounter  Assessment & Plan                                                     1. Type 2 diabetes mellitus with diabetic polyneuropathy, without long-term current use of insulin (H)  Well-controlled.  Last A1c at goal of less than 7%.  Recommend continuation of current therapy, daily metformin.  Additionally, patient appropriately taking moderate intensity statin and ARB therapy.  BMP shows recent improvement in kidney function, recommend follow-up in 6 to 12 months.    2. Chronic midline low back pain without sciatica/MDD  Improving, though needs further assessment.  Patient reports slight improvement in back pain and dizziness/drowsiness lately.  Of note, patient has upcoming appointment with spine care center next week.  Patient currently taking fluoxetine for depression, in the future could consider switch to duloxetine up to maximum dose 120 mg daily to help with pain as well as depression. Considering patients controlled PHQ-9 score, will have patient evaluated by spine center for recommendations prior to change of current fluoxetine.     Future consideration: Could consider Duloxetine vs. Fluoxetine for depression & pain    Follow Up  Return in about 3 months (around 11/11/2020) for Medication Review.  PCP 9/29, spine care 8/17  Subjective & Objective                                                     Ricky Lea is a 70 y.o. female called for a follow up visit for Medication Therapy Management. She was referred to me from Bassam Llanos MD. Professional telephonic Se[facility]  utilized today.    Patient consented to a telehealth visit: Yes  Chief Complaint: Follow Up from MTM visit on 6/3/20  Medication Adherence/Access: HHN sets up pillbox, takes medications three times a day. Patient states that she never misses medication doses. Patient does not know the names of the medications that she is taking, unable to review medications over the phone today.     Diabetes:   Pt currently taking  Metformin  mg once daily with food. Patient denies any medication side effects, including stomach upset nausea or diarrhea.  Neuropathy: Gabapentin 300 mg three times a day. Patient doesn't think that the neuropathy has improved at all, pain is the same.   SMBG: twice daily    Ranges (patient reported) : 123 today,   Patient is not experiencing hypoglycemia   Recent symptoms of high blood sugar? None  ACEi/ARB:  Losartan 50 mg once daily  Statin: Atorvastatin 20 mg daily  Lab Results   Component Value Date    HGBA1C 6.7 (H) 07/28/2020    HGBA1C 6.8 (H) 02/04/2020    HGBA1C 6.7 (H) 10/15/2019     Lab Results   Component Value Date    MICROALBUR <0.50 07/28/2020    LDLCALC 132 (H) 07/28/2020    CREATININE 1.07 07/28/2020     Pain: Her dizziness has improved recently, potentially due to  change in gabapentin dose, now 300 mg three times a day. Also prescribed meloxicam 15 mg daily.   States that she is having trouble with back pain but that it is a little bit better now.   Has scheduled appt with Spine care on 8/17/20  Lab Results   Component Value Date    CREATININE 1.07 07/28/2020     MDD: fluoxetine 40 mg daily. Patient stats that her mood is well controlled lately. States that her sleep varies, is sometimes good and sometimes not as great, same with her appetite. No complaints today or medication side effects reported.   PHQ-9 Total Score: 1 (7/28/2020 10:21 AM)    PMH: reviewed in EPIC   Allergies/ADRs: reviewed in EPIC   Alcohol: None per patient report  Tobacco:   Social History     Tobacco Use   Smoking Status Never Smoker   Smokeless Tobacco Current User     Types: Chew   Tobacco Comment    chews betal nut      ----------------  The patient declined an after visit summary.    I spent 27 minutes with this patient today.   All changes were made via collaborative practice agreement with Bassam Llanos MD. A copy of the visit note was provided to the patient's provider.     Hattie Hummel ([facility]),  PharmD  Medication Therapy Management Pharmacist  [facility]    Telemedicine Visit Details    Type of service:  Telephone     Start Time: 9:32 AM  End Time (time video/phone call stopped): 9:59 AM    Originating Location (pt. Location): Home     Distant Location (provider location):  [facility] MEDICATION THERAPY MANAGEMENT [facility]     Mode of Communication:   Telephone          Current Outpatient Medications   Medication Sig Dispense Refill     alendronate (FOSAMAX) 70 MG tablet Take 1 tablet (70 mg total) by mouth every 7 days. 12 tablet 3     atorvastatin (LIPITOR) 20 MG tablet Take 1 tablet (20 mg total) by mouth daily. 30 tablet 11     blood glucose test strips Use 1 each As Directed as needed. Dispense brand per patient's insurance at pharmacy discretion. 200 strip 11     blood-glucose meter Misc Test blood sugar twice a day 1 each 0     diaper,brief,adult,disposable Misc Use 2-3 a day as needed 120 each 3     FLUoxetine (PROZAC) 40 MG capsule Take 1 capsule (40 mg total) by mouth daily. 90 capsule 3     gabapentin (NEURONTIN) 300 MG capsule Take 1 capsule (300 mg total) by mouth 3 (three) times a day. 90 capsule 5     generic lancets (FINGERSTIX LANCETS) Dispense brand per patient's insurance at pharmacy discretion. 200 each 11     losartan (COZAAR) 50 MG tablet Take 1 tablet (50 mg total) by mouth daily. 90 tablet 3     meloxicam (MOBIC) 15 MG tablet TAKE 1 TABLET(15 MG) BY MOUTH DAILY 30 tablet 0     metFORMIN (GLUCOPHAGE XR) 750 MG 24 hr tablet Take 1 tablet (750 mg total) by mouth daily with supper. 30 tablet 11     triamcinolone (KENALOG) 0.1 % ointment APPLY EXTERNALLY TO THE AFFECTED AREA TWICE DAILY 454 g 0     white petrolatum-mineral oil Crea Apply to affected area twice a day 120 g 3     No current facility-administered medications for this visit.

## 2021-06-10 NOTE — TELEPHONE ENCOUNTER
RN cannot approve Refill Request    RN can NOT refill this medication med is not covered by policy/route to provider     . Last office visit: 7/28/2020 Bassam Llanos MD Last Physical: 8/13/2019 Last MTM visit: Visit date not found Last visit same specialty: 7/28/2020 Bassam Llanos MD.  Next visit within 3 mo: Visit date not found  Next physical within 3 mo: Visit date not found      Joleen Story, [facility] Triage/Med Refill 7/30/2020    Requested Prescriptions   Pending Prescriptions Disp Refills     triamcinolone (KENALOG) 0.1 % ointment [Pharmacy Med Name: TRIAMCINOLONE 0.1% OINTMENT 454GM] 454 g 0     Sig: APPLY EXTERNALLY TO THE AFFECTED AREA TWICE DAILY       There is no refill protocol information for this order

## 2021-06-10 NOTE — TELEPHONE ENCOUNTER
Verbal orders given as requested below for skilled nursing start of care on 7/31/2020 for patient's request.    Parag Kauffman MD

## 2021-06-10 NOTE — PROGRESS NOTES
"ASSESMENT AND PLAN:  Diagnoses and all orders for this visit:    Hyperlipidemia, unspecified hyperlipidemia type  -     Lipid Cascade  -     pravastatin (PRAVACHOL) 20 MG tablet; Take 1 tablet (20 mg total) by mouth at bedtime.  Dispense: 30 tablet; Refill: 5  Advised to take med daily as Rxed.  Recheck lipid today.  F/U in 3 months.    Immunization due  -     Pneumococcal conjugate vaccine 13-valent 6wks-17yrs; >50yrs    Depression (emotion)  Doing well without med.        SUBJECTIVE: Ricky Lea is here to follow-up on hyperlipidemia.  She was started on pravastatin 5 months ago.  She took it for 1 month, does not know how to get refill. No side effects reported while on it. No muscle pain or ache.    Not on med for depression for a while, doing well. No new symptoms reported. No suicidal or homicidal ideations.           No past medical history on file.  Patient Active Problem List   Diagnosis     Hyperlipidemia, unspecified hyperlipidemia type     Pulmonary nodules       Allergies:  No Known Allergies    History   Smoking Status     Never Smoker   Smokeless Tobacco     Current User     Types: Chew     Comment: beta nut with tobacco       Review of systems otherwise negative except as listed in HPI.   History   Smoking Status     Never Smoker   Smokeless Tobacco     Current User     Types: Chew     Comment: beta nut with tobacco       OBJECTICE: /76  Pulse 76  Temp 97.8  F (36.6  C) (Oral)   Resp 16  Ht 4' 11\" (1.499 m)  Wt 121 lb 6 oz (55.1 kg)  Breastfeeding? No  BMI 24.51 kg/m2    GEN-alert,  in no apparent distress.  HEENT-mucous membranes are moist, neck is supple.  CV-regular rate and rhythm with no murmur.   RESP-lungs clear to auscultation .  ABDOMEN- Soft , not tender.  BACK- No tenderness.   SKIN-normal        Castle Romi   5/10/2017     "
no abdominal pain, no bloating, no constipation, no diarrhea, no nausea and no vomiting.

## 2021-06-11 ENCOUNTER — OFFICE VISIT - HEALTHEAST (OUTPATIENT)
Dept: PHYSICAL THERAPY | Facility: REHABILITATION | Age: 71
End: 2021-06-11

## 2021-06-11 DIAGNOSIS — G89.29 CHRONIC RIGHT-SIDED LOW BACK PAIN WITHOUT SCIATICA: ICD-10-CM

## 2021-06-11 DIAGNOSIS — I10 ESSENTIAL HYPERTENSION: ICD-10-CM

## 2021-06-11 DIAGNOSIS — S22.080S COMPRESSION FRACTURE OF T11 VERTEBRA, SEQUELA: ICD-10-CM

## 2021-06-11 DIAGNOSIS — M81.0 AGE-RELATED OSTEOPOROSIS WITHOUT CURRENT PATHOLOGICAL FRACTURE: ICD-10-CM

## 2021-06-11 DIAGNOSIS — F32.1 MODERATE SINGLE CURRENT EPISODE OF MAJOR DEPRESSIVE DISORDER (H): ICD-10-CM

## 2021-06-11 DIAGNOSIS — M62.81 GENERALIZED MUSCLE WEAKNESS: ICD-10-CM

## 2021-06-11 DIAGNOSIS — E11.9 TYPE 2 DIABETES MELLITUS TREATED WITHOUT INSULIN (H): ICD-10-CM

## 2021-06-11 DIAGNOSIS — E78.5 HYPERLIPIDEMIA, UNSPECIFIED HYPERLIPIDEMIA TYPE: ICD-10-CM

## 2021-06-11 DIAGNOSIS — M54.50 CHRONIC RIGHT-SIDED LOW BACK PAIN WITHOUT SCIATICA: ICD-10-CM

## 2021-06-11 DIAGNOSIS — M81.0 OSTEOPOROSIS, UNSPECIFIED OSTEOPOROSIS TYPE, UNSPECIFIED PATHOLOGICAL FRACTURE PRESENCE: ICD-10-CM

## 2021-06-11 NOTE — TELEPHONE ENCOUNTER
Requesting on going home care order for new certification date begins 9/29/20 x 60 days for skilled nurse visit every 2 weeks and 3 PRN visits for medication management.    Thank you.

## 2021-06-11 NOTE — PROGRESS NOTES
"ASSESMENT AND PLAN:  Diagnoses and all orders for this visit:    Rib pain on right side  -     ibuprofen (ADVIL,MOTRIN) 600 MG tablet; Take 1 tablet (600 mg total) by mouth 3 times a day after meals.  Dispense: 60 tablet; Refill: 1  -     predniSONE (DELTASONE) 10 mg tablet; Take 1 tablet (10 mg total) by mouth 3 (three) times a day for 5 days.  Dispense: 15 tablet; Refill: 0    She will call and make appointment  if no improvement in the next few days as expected.     SUBJECTIVE: Ricky Lea is here to follow-up on right sided chest wall pain.  She was seen 3 days ago.  X-ray was negative for fracture.  The pain started about 5 days ago after trying to change positions from sitting to lying down.  Thinks she heard a pop.  No direct injury to the chest.  She was given tramadol 3 days ago.  She has been taking 50 mg 3 times a day.  States she cannot tell if it is helping but complaining of dizziness after taking it.     No past medical history on file.  Patient Active Problem List   Diagnosis     Hyperlipidemia, unspecified hyperlipidemia type     Pulmonary nodules       Allergies:  No Known Allergies    History   Smoking Status     Never Smoker   Smokeless Tobacco     Current User     Types: Chew     Comment: beta nut with tobacco       Review of systems otherwise negative except as listed in HPI.   History   Smoking Status     Never Smoker   Smokeless Tobacco     Current User     Types: Chew     Comment: beta nut with tobacco       OBJECTICE: /84 (Patient Site: Right Arm, Patient Position: Sitting, Cuff Size: Adult Regular)  Pulse 72  Temp 98.2  F (36.8  C) (Oral)   Resp 12  Ht 4' 11\" (1.499 m)  Wt 119 lb (54 kg)  BMI 24.04 kg/m2    DATA REVIEWED:    Radiology Tests Summarized or Ordered (1): negative Rt rib X ray           GEN-alert,  in no apparent distress.she appears more comfortable during the interview and exam today compared to 3 days ago.).     HEENT-mucous membranes are moist, neck is " supple.  CV-regular rate and rhythm with no murmur.   RESP-lungs clear to auscultation .  CHEST WALL-producible tenderness on palpation on right sided mid chest.  No chest wall deformities, erythema or bruising.          Bassam Llanos   6/2/2017

## 2021-06-11 NOTE — PROGRESS NOTES
Thank you for the update.  I decreased her losartan to 25 mg daily and sent new prescription to her pharmacy.  Thank you for your help with this patient.    Dr. Bassam Llanos  9/17/2020 7:43 AM

## 2021-06-11 NOTE — PROGRESS NOTES
"ASSESMENT AND PLAN:  Diagnoses and all orders for this visit:    Rib pain on right side  -     XR Ribs Right; Future; Expected date: 5/31/17  No rib fractures on x-ray.  -     traMADol (ULTRAM) 50 mg tablet; Take 1 tablet (50 mg total) by mouth every 6 (six) hours as needed for pain.  Dispense: 30 tablet; Refill: 0  Tramadol to control the severe pain.  She will follow-up in 3 days.      SUBJECTIVE: Ricky Lea is here with complaint of right-sided chest pain.  The pain started 2 days ago after trying to lay down from a sitting position.  She heard a pop and felt pain right after she tried to turn her body.  No direct injury to the chest.  She is having pain with deep respiration and when changing positions.  She has not been taking any medications at home.  She rates the pain 8/10 when it is worse.  No fever cough or other URI symptoms.  No left-sided chest pain.    No past medical history on file.  Patient Active Problem List   Diagnosis     Hyperlipidemia, unspecified hyperlipidemia type     Pulmonary nodules       Allergies:  No Known Allergies    History   Smoking Status     Never Smoker   Smokeless Tobacco     Current User     Types: Chew     Comment: beta nut with tobacco       Review of systems otherwise negative except as listed in HPI.   History   Smoking Status     Never Smoker   Smokeless Tobacco     Current User     Types: Chew     Comment: beta nut with tobacco       OBJECTICE: /84  Pulse 78  Temp 98.3  F (36.8  C)  Resp 16  Ht 4' 10.5\" (1.486 m)  Wt 121 lb 11.2 oz (55.2 kg)  SpO2 96%  BMI 25 kg/m2          GEN-alert,  in no apparent distress.  HEENT-mucous membranes are moist, neck is supple.  CV-regular rate and rhythm with no murmur.   RESP-lungs clear to auscultation .  CHEST WALL-producible tenderness on palpation on right sided mid chest.  No chest wall deformities, erythema or bruising.        Bassam Llanos   5/31/2017     "

## 2021-06-11 NOTE — TELEPHONE ENCOUNTER
RN cannot approve Refill Request    RN can NOT refill this medication med is not covered by policy/route to provider. Last office visit: 7/28/2020 Bassam Llanos MD Last Physical: 8/13/2019 Last MTM visit: Visit date not found Last visit same specialty: 7/28/2020 Bassam Llanos MD.  Next visit within 3 mo: Visit date not found  Next physical within 3 mo: Visit date not found      Joleen Story, [facility] Triage/Med Refill 9/17/2020    Requested Prescriptions   Pending Prescriptions Disp Refills     meloxicam (MOBIC) 15 MG tablet [Pharmacy Med Name: MELOXICAM 15 MG TABLET 15 TAB] 30 tablet 0     Sig: TAKE 1 PILL (15 MG) BY MOUTH EVERYDAY FOR PAIN       There is no refill protocol information for this order

## 2021-06-11 NOTE — PROGRESS NOTES
up date:  Pain improved with Mexalicam daily and tylenol as needed    BP readings in August and September  93/65 am  109/74 am  114/59 am  108/72 am  and today 83/72, she asked to drink 1 cup of water and recheck 15 min later 96/62.  I saw her around 2 pm    she stated with Gabapentin 1 cap BID she was still having dizziness varies during the day  she is also taking Losartan 50mg 1 tab every morning  I am wondering her dizziness symptom   also from low BP reading in the afternoon time  she stated she drinks 6 cups or more of water per day    Please advise on dizziness symptom and Losartan.

## 2021-06-12 NOTE — TELEPHONE ENCOUNTER
Refill Approved    Rx renewed per Medication Renewal Policy. Medication was last renewed on 11/7/19.7/31/20    Joleen Story, [facility] Triage/Med Refill 10/22/2020     Requested Prescriptions   Pending Prescriptions Disp Refills     atorvastatin (LIPITOR) 20 MG tablet [Pharmacy Med Name: ATORVASTATIN CALCIUM 20 MG 20 Tablet] 30 tablet 3     Sig: TAKE 1 PILL BY MOUTH EVERYDAY FOR CHOLESTEROL       Statins Refill Protocol (Hmg CoA Reductase Inhibitors) Passed - 10/20/2020  9:29 AM        Passed - PCP or prescribing provider visit in past 12 months      Last office visit with prescriber/PCP: 7/28/2020 Bassam Llanos MD OR same dept: 7/28/2020 Bassam Llanos MD OR same specialty: 7/28/2020 Bassam Llanos MD  Last physical: 8/13/2019 Last MTM visit: Visit date not found   Next visit within 3 mo: Visit date not found  Next physical within 3 mo: Visit date not found  Prescriber OR PCP: Bassam Llanos MD  Last diagnosis associated with med order: 1. Hyperlipidemia, unspecified hyperlipidemia type  - atorvastatin (LIPITOR) 20 MG tablet [Pharmacy Med Name: ATORVASTATIN CALCIUM 20 MG 20 Tablet]; TAKE 1 PILL BY MOUTH EVERYDAY FOR CHOLESTEROL  Dispense: 30 tablet; Refill: 3    2. Type 2 diabetes mellitus treated without insulin (H)  - metFORMIN (GLUCOPHAGE-XR) 750 MG 24 hr tablet [Pharmacy Med Name: METFORMIN HCL  MG TB2 750 Tablet]; TAKE 1 PILL BY MOUTH EVERYDAY WITH BREAKFAST FOR DIABETES  Dispense: 30 tablet; Refill: 3    If protocol passes may refill for 12 months if within 3 months of last provider visit (or a total of 15 months).                metFORMIN (GLUCOPHAGE-XR) 750 MG 24 hr tablet [Pharmacy Med Name: METFORMIN HCL  MG TB2 750 Tablet] 30 tablet 3     Sig: TAKE 1 PILL BY MOUTH EVERYDAY WITH BREAKFAST FOR DIABETES       Metformin Refill Protocol Passed - 10/20/2020  9:29 AM        Passed - Blood pressure in last 12 months     BP Readings from Last 1 Encounters:   10/20/20 102/70             Passed - LFT or AST or  ALT in last 12 months     Albumin   Date Value Ref Range Status   07/28/2020 3.7 3.5 - 5.0 g/dL Final     Bilirubin, Total   Date Value Ref Range Status   07/28/2020 1.1 (H) 0.0 - 1.0 mg/dL Final     Bilirubin, Direct   Date Value Ref Range Status   06/09/2016 0.1 <=0.5 mg/dL Final     Alkaline Phosphatase   Date Value Ref Range Status   07/28/2020 63 45 - 120 U/L Final     AST   Date Value Ref Range Status   07/28/2020 28 0 - 40 U/L Final     ALT   Date Value Ref Range Status   07/28/2020 32 0 - 45 U/L Final     Protein, Total   Date Value Ref Range Status   07/28/2020 6.7 6.0 - 8.0 g/dL Final                Passed - GFR or Serum Creatinine in last 6 months     GFR MDRD Non Af Amer   Date Value Ref Range Status   07/28/2020 51 (L) >60 mL/min/1.73m2 Final     GFR MDRD Af Amer   Date Value Ref Range Status   07/28/2020 >60 >60 mL/min/1.73m2 Final             Passed - Visit with PCP or prescribing provider visit in last 6 months or next 3 months     Last office visit with prescriber/PCP: 7/28/2020 OR same dept: 7/28/2020 Bassam Llanos MD OR same specialty: 7/28/2020 Bassam Llanos MD Last physical: Visit date not found Last MTM visit: Visit date not found         Next appt within 3 mo: Visit date not found  Next physical within 3 mo: Visit date not found  Prescriber OR PCP: Bassam Llanos MD  Last diagnosis associated with med order: 1. Hyperlipidemia, unspecified hyperlipidemia type  - atorvastatin (LIPITOR) 20 MG tablet [Pharmacy Med Name: ATORVASTATIN CALCIUM 20 MG 20 Tablet]; TAKE 1 PILL BY MOUTH EVERYDAY FOR CHOLESTEROL  Dispense: 30 tablet; Refill: 3    2. Type 2 diabetes mellitus treated without insulin (H)  - metFORMIN (GLUCOPHAGE-XR) 750 MG 24 hr tablet [Pharmacy Med Name: METFORMIN HCL  MG TB2 750 Tablet]; TAKE 1 PILL BY MOUTH EVERYDAY WITH BREAKFAST FOR DIABETES  Dispense: 30 tablet; Refill: 3     If protocol passes may refill for 12 months if within 3 months of last provider visit (or a total of 15 months).            Passed - A1C in last 6 months     Hemoglobin A1c   Date Value Ref Range Status   07/28/2020 6.7 (H) 3.5 - 6.0 % Final               Passed - Microalbumin in last year      Microalbumin, Random Urine   Date Value Ref Range Status   07/28/2020 <0.50 0.00 - 1.99 mg/dL Final

## 2021-06-12 NOTE — PROGRESS NOTES
"ASSESMENT AND PLAN:  Diagnoses and all orders for this visit:    Hyperlipidemia, unspecified hyperlipidemia type  -     Lipid Cascade  -     pravastatin (PRAVACHOL) 20 MG tablet; Take 1 tablet (20 mg total) by mouth at bedtime.  Dispense: 30 tablet; Refill: 5  Refilled pravastatin.  Recheck lipids today.  F/u in 6 months.    Depression  Doing well without medication.  No suicidal or homicidal ideations.    Rib pain on right side  Significantly improved.    Dry skin dermatitis  -     white petrolatum-mineral oil (EUCERIN) Crea; Apply to affected area twice a day  Dispense: 120 g; Refill: 3          SUBJECTIVE: Ricky Lea is a 67-year-old female with history of hyperlipidemia and depression here for follow-up.  She was on pravastatin 20 mg daily, ran out of medication a month ago.  She does not exercise regularly.  Has been trying to watch her diet.  No medication side effects reported.  Last lipid panel was in May 2017, LDL was 152 ( was 216 in 12/16).    She is complaining of dry peeling skin on the bottom of her feet.  She uses body lotion once well.  No rashes or skin lesions on the other parts of her body.      No past medical history on file.  Patient Active Problem List   Diagnosis     Hyperlipidemia, unspecified hyperlipidemia type     Pulmonary nodules       Allergies:  No Known Allergies    History   Smoking Status     Never Smoker   Smokeless Tobacco     Current User     Types: Chew     Comment: beta nut with tobacco       Review of systems otherwise negative except as listed in HPI.   History   Smoking Status     Never Smoker   Smokeless Tobacco     Current User     Types: Chew     Comment: beta nut with tobacco       OBJECTICE: /88 (Patient Site: Left Arm, Patient Position: Sitting, Cuff Size: Adult Regular)  Pulse 80  Temp 97.9  F (36.6  C) (Oral)   Resp 16  Ht 4' 10.66\" (1.49 m)  Wt 122 lb 8 oz (55.6 kg)  BMI 25.03 kg/m2    DATA REVIEWED:    Labs Reviewed or Ordered (1):       GEN-alert,  in no " apparent distress.  HEENT-mucous membranes are moist, neck is supple.  CV-regular rate and rhythm with no murmur.   RESP-lungs clear to auscultation .  ABDOMEN- Soft , not tender.  EXTREM- dry skin with some excoriation on feet.           Bassam Llanos   8/10/2017

## 2021-06-12 NOTE — PROGRESS NOTES
"S:  Patient seen in clinic today for green card exam and update of immunizations.  There is no past history of immunization reactions.  No recent fevers or shortness of breath.     Patient Active Problem List   Diagnosis     Hyperlipidemia, unspecified hyperlipidemia type     Pulmonary nodules       O:  /84 (Patient Site: Left Arm, Patient Position: Sitting, Cuff Size: Adult Regular)  Pulse 72  Temp 98.6  F (37  C) (Oral)   Resp 20  Ht 4' 10.5\" (1.486 m)  Wt 122 lb 8 oz (55.6 kg)  Breastfeeding? No  BMI 25.17 kg/m2  General - alert, NAD  CV - RRR  Resp - lunds clear to auscultation    A/P:  Green Card/update imm.  Counseling done on immunization history and requirements with the patient.  Reviewed available immunization history and relevant lab records with patient and family.  Immunizations given as documented in the EHR and on form.  Acetaminophen as needed for post-immunization fever or myalgias.  [employer] form [id number] completed today with the patient.  Given to patient in a sealed labeled envelope and a copy is being scanned into the EHR.  Please see that form for further details.  Patient directed to follow-up in clinic for routine and preventative care.     "

## 2021-06-12 NOTE — PROGRESS NOTES
I place a new home care order for the .      To CMT,  Can you please call 's current home care team to discontinue service? ( [facility]  is 1944). See note from [facility].    Thank you,    Dr. Bassam Llanos  10/6/2020 3:13 PM

## 2021-06-12 NOTE — PROGRESS NOTES
I am seeing this patient.   She asked if her  can have home care visit with [facility] and same nurse.   Her  is Demian Wyatt  is 1944.   He got referred to [facility] services but we did not have availability to see him at that time so he got to different home care services. I talked to our office and told we need new refer for Demian Wyatt for home care services and must sure note that his wife is also with home care and name. [facility] will be able to see him next week.   Can you help?   Thanks

## 2021-06-13 NOTE — PROGRESS NOTES
"Ricky Lea is a 70 y.o. female who is being evaluated via a billable telephone visit.      The patient has been notified of following:     \"This telephone visit will be conducted via a call between you and your physician/provider. We have found that certain health care needs can be provided without the need for a physical exam.  This service lets us provide the care you need with a short phone conversation.  If a prescription is necessary we can send it directly to your pharmacy.  If lab work is needed we can place an order for that and you can then stop by our lab to have the test done at a later time.    Telephone visits are billed at different rates depending on your insurance coverage. During this emergency period, for some insurers they may be billed the same as an in-person visit.  Please reach out to your insurance provider with any questions.    If during the course of the call the physician/provider feels a telephone visit is not appropriate, you will not be charged for this service.\"    Patient has given verbal consent to a Telephone visit? Yes    What phone number would you like to be contacted at? [phone]      Additional provider notes:   C/O \" not clear in the head\"  But no headache for a week, now improving. No fever,sore throat or cough. No las of smell but c/o lost of taste for a few days now improved. No other sick family members. No known exposure to COVID 19.    Reported -103 to 120-130. No low blood sugar with hypoglycemic symptoms.   Takes meds as Rxed. Home Care RN sets up pill box for her.     No worsening depression symptoms. Denied SI/HI    Able to speak no no difficulties, no audible cough or shortness of breath.    Assessment/Plan:    1. Type 2 diabetes mellitus treated without insulin (H)  A1c at next visit.     2. Essential hypertension  In person office visit in 3 months.    3. Moderate single current episode of major depressive disorder (H)  Stable.     4. Osteoporosis, " unspecified osteoporosis type, unspecified pathological fracture presence  Continue current med    5. Tinnitus of both ears  Offered ENT referral , pt declined.    6. Loss of taste  Oferred COVID test. Pt declined. Advised to call if develops fever, URI symptoms, shortness of breath or wheezing.     Phone call duration:  18  minutes   8:50 - 9: 08 am    Dr. Bassam Llanos  11/19/2020 10:35 AM

## 2021-06-13 NOTE — TELEPHONE ENCOUNTER
Requesting on going home care order for new certification date begins on 11/28/20 x 60 days for skilled nurse visit every 2 weeks and 3 PRN visits for medication management.  Thank you.

## 2021-06-13 NOTE — PROGRESS NOTES
MTM Follow Up Encounter  Assessment & Plan                                                     Potential COVID-19 symptoms-routing note to PCP today to determine if further assessment or testing is warranted.    1. Type 2 diabetes mellitus with diabetic polyneuropathy, without long-term current use of insulin (H)  Stable.  Last A1c at goal of less than 7%, not due for follow-up A1c until early 2021.  Recommend continuation of current therapy, daily metformin.  Additionally, patient appropriately taking moderate intensity statin and ARB therapy.  BMP stable.    2. Chronic midline low back pain without sciatica/MDD  Needs further assessment, patient continues to endorse pain.  Additionally, patient was supposed to be seen by spine care, this appointment never happened, would recommend rescheduling spine care appointment.  Additionally, could consider reassessing PHQ 9 score and switching from fluoxetine to duloxetine to help with both depression and pain, up to maximum dose 120 mg daily.    Future consideration: Could consider Duloxetine vs. Fluoxetine for depression & pain    Follow Up  PRN MTM    Subjective & Objective                                                     Ricky Lea is a 70 y.o. female coming in for a follow up visit for Medication Therapy Management. She was referred to me from Bassam Llanos MD. Professional telephonic Se[facility]  utilized today.    Patient consented to a telehealth visit: Yes  Chief Complaint: Follow Up from MTM visit on 8/11/20  Medication Adherence/Access: HHN sets up pillbox, takes medications three times a day. Patient gets medications delivered from [facility]. Patient states that she never misses medication doses. Brings with her mediations and pillbox today - appears to be no issues with forgetting doses, set up appropriately. If she forgets her morning dose, she will take it a couple hours later when she remembers.     Diabetes:   Pt currently taking Metformin  mg  once daily with food. Patient denies any medication side effects, including stomach upset nausea or diarrhea.  SMBG: twice daily    Ranges (based on glucometer readings) :   Date FBG   11/12 117   11/11 119   11/9 121   11/4 101   11/3 117   11/2 118   10/30 111   Patient is not experiencing hypoglycemia   Recent symptoms of high blood sugar? None  ACEi/ARB:  Losartan 25 mg once daily, endorsing dizziness, no HA  Statin: Atorvastatin 20 mg daily, denies myalgias  Lab Results   Component Value Date    HGBA1C 6.7 (H) 07/28/2020    HGBA1C 6.8 (H) 02/04/2020    HGBA1C 6.7 (H) 10/15/2019     Lab Results   Component Value Date    MICROALBUR <0.50 07/28/2020    LDLCALC 132 (H) 07/28/2020    CREATININE 1.07 07/28/2020     Pain: Taking gabapentin 300 mg two times a day, meloxicam 15 mg daily in the morning. States that she is experiencing pain sometimes around her waist. She is having a hard time describing this pain but states that if it is really bad she cannot move. Also has knee pain. Denies any blood in stool or dark tarry stools.   Had scheduled appt with Spine care on 8/17/20 - looks like she was a no show for this visit.   Lab Results   Component Value Date    CREATININE 1.07 07/28/2020     MDD: fluoxetine 40 mg daily. States that her head feels heavy, she cannot eat much, and cannot smell much for the last 1-2 weeks. Denies fever. Sometimes has trouble falling asleep. Denies any cough or trouble breathing. Described fatigue at times, no more than usual.   PHQ-9 Total Score: 1 (7/28/2020 10:21 AM)    Potential COVID-19 symptoms: States that her head feels heavy, she cannot eat much, and cannot smell much for the last 1-2 weeks. Denies fever. Sometimes has trouble falling asleep. Denies any cough or trouble breathing. Described fatigue at times, no more than usual.     PMH: reviewed in EPIC   Allergies/ADRs: reviewed in EPIC   Alcohol: None per patient report  Tobacco:   Social History     Tobacco Use   Smoking Status  Never Smoker   Smokeless Tobacco Current User     Types: Chew   Tobacco Comment    chews betal nut      ----------------  The patient declined an after visit summary.    I spent 30 minutes with this patient today.   All changes were made via collaborative practice agreement with Bassam Llanos MD. A copy of the visit note was provided to the patient's provider.     Hattie VogtOdharley), PharmD  Medication Therapy Management Pharmacist  [facility]    Current Outpatient Medications   Medication Sig Dispense Refill     alendronate (FOSAMAX) 70 MG tablet TAKE 1 PILL BY MOUTH EVERY WEEK FOR BONES 12 tablet 3     atorvastatin (LIPITOR) 20 MG tablet TAKE 1 PILL BY MOUTH EVERYDAY FOR CHOLESTEROL 90 tablet 3     blood glucose test strips Use 1 each As Directed as needed. Dispense brand per patient's insurance at pharmacy discretion. 200 strip 11     blood-glucose meter Misc Test blood sugar twice a day 1 each 0     diaper,brief,adult,disposable Misc Use 2-3 a day as needed 120 each 3     FLUoxetine (PROZAC) 40 MG capsule Take 1 capsule (40 mg total) by mouth daily. 90 capsule 3     gabapentin (NEURONTIN) 300 MG capsule Take 1 capsule (300 mg total) by mouth 2 (two) times a day. Late entry Okay per PCP to decrease to 1 cap BID per in basket messge order 8/6/20. 90 capsule 5     generic lancets (FINGERSTIX LANCETS) Dispense brand per patient's insurance at pharmacy discretion. 200 each 11     losartan (COZAAR) 25 MG tablet Take 1 tablet (25 mg total) by mouth daily. decrease to 25mg daily, c/o still feeling dizziness used 1/2 tab of 50mg daily 30 tablet 5     meloxicam (MOBIC) 15 MG tablet TAKE 1 PILL (15 MG) BY MOUTH EVERYDAY FOR PAIN 30 tablet 5     metFORMIN (GLUCOPHAGE-XR) 750 MG 24 hr tablet TAKE 1 PILL BY MOUTH EVERYDAY WITH BREAKFAST FOR DIABETES 90 tablet 2     triamcinolone (KENALOG) 0.1 % ointment APPLY EXTERNALLY TO THE AFFECTED AREA TWICE DAILY 454 g 0     white petrolatum-mineral oil Crea  Apply to affected area twice a day 120 g 3     No current facility-administered medications for this visit.

## 2021-06-14 NOTE — TELEPHONE ENCOUNTER
Refill Approved    Rx renewed per Medication Renewal Policy. Medication was last renewed on 1/6/20.    Joleen Story, [facility] Triage/Med Refill 12/23/2020     Requested Prescriptions   Pending Prescriptions Disp Refills     FLUoxetine (PROZAC) 40 MG capsule [Pharmacy Med Name: FLUOXETINE HCL 40 MG CAPS 40 Capsule] 30 capsule 0     Sig: TAKE 1 PILL (40 MG) BY MOUTH DAILY FOR DEPRESSION       SSRI Refill Protocol  Passed - 12/21/2020  8:35 AM        Passed - PCP or prescribing provider visit in last year     Last office visit with prescriber/PCP: 7/28/2020 Bassam Llanos MD OR same dept: 7/28/2020 Bassam Llanos MD OR same specialty: 7/28/2020 Bassam Llanos MD  Last physical: 8/13/2019 Last MTM visit: Visit date not found   Next visit within 3 mo: Visit date not found  Next physical within 3 mo: Visit date not found  Prescriber OR PCP: Bassam Llanos MD  Last diagnosis associated with med order: 1. Moderate single current episode of major depressive disorder (H)  - FLUoxetine (PROZAC) 40 MG capsule [Pharmacy Med Name: FLUOXETINE HCL 40 MG CAPS 40 Capsule]; TAKE 1 PILL (40 MG) BY MOUTH DAILY FOR DEPRESSION  Dispense: 30 capsule; Refill: 0    If protocol passes may refill for 12 months if within 3 months of last provider visit (or a total of 15 months).

## 2021-06-14 NOTE — PROGRESS NOTES
"ASSESMENT AND PLAN:  Diagnoses and all orders for this visit:    Hyperlipidemia, unspecified hyperlipidemia type  -     Lipid Cascade   in August.  Recheck today.  Check AST, ALT today.    Left shoulder pain  Frozen shoulder .  Discussed physical therapy, patient elected to defer at this time.  Try Neurontin.  F/U in 6 weeks.     Dry skin dermatitis  Improving with Eucerin cream.  Advised to use 2-3 times a day.    Nueropathy  Try Neurontin    Need for influenza vaccination  -     Influenza High Dose, Seasonal 65+ yrs        SUBJECTIVE: Ricky Lea is a 67-year-old female with history of hyperlipidemia here for follow-up.  She takes pravastatin 20 mg daily.  No side effects reported.  Last LDL was 117 in August with triglyceride 345.  AST 41 and ALT 55 last year.    New complaint-left shoulder pain for 2 months.  She cannot lift her left hand above her head.  Also complaining of tingling and numbness sensation on the left arm.  No injury to the shoulder.  No injury to the neck.  She is complaining of occasional neck pain.    No past medical history on file.  Patient Active Problem List   Diagnosis     Hyperlipidemia, unspecified hyperlipidemia type     Pulmonary nodules       Allergies:  No Known Allergies    History   Smoking Status     Never Smoker   Smokeless Tobacco     Current User     Types: Chew     Comment: beta nut with tobacco       Review of systems otherwise negative except as listed in HPI.   History   Smoking Status     Never Smoker   Smokeless Tobacco     Current User     Types: Chew     Comment: beta nut with tobacco       OBJECTICE: /76 (Patient Site: Left Arm, Patient Position: Sitting, Cuff Size: Adult Regular)  Pulse 72  Temp 98.2  F (36.8  C) (Oral)   Resp 16  Ht 4' 10.66\" (1.49 m)  Wt 124 lb 4 oz (56.4 kg)  BMI 25.39 kg/m2    DATA REVIEWED:    Labs Reviewed or Ordered (1):       GEN-alert,  in no apparent distress.  HEENT-mucous membranes are moist, neck is supple.  CV-regular " rate and rhythm with no murmur.   RESP-lungs clear to auscultation .  ABDOMEN- Soft , not tender.  EXTREM- Left shoulder - No tenderness on palpation but significant limited range of motion.  Significant deformities.  Right shoulder-normal range of motion.  SKIN-dry skin dermatitis and peeling on sole, bilaterally (some improvement since last visit ).        Bassam Llanos   11/10/2017

## 2021-06-14 NOTE — PROGRESS NOTES
Dr Llanos,      Patient stated she missed visit with you for follow up. She got a call from clinic to re-schedule.   I called  and asked to re-schedule visit but told she did not see missed visit or no note for follow visit.      Please let me know if you want to see her then I will help with appointment at my next visit in 2 weeks.      Thanks

## 2021-06-14 NOTE — TELEPHONE ENCOUNTER
Refill Approved    Rx renewed per Medication Renewal Policy. Medication was last renewed on 10/15/19, last OV 11/19/20.    Mary Ann Lema, [facility] Triage/Med Refill 1/23/2021     Requested Prescriptions   Pending Prescriptions Disp Refills     INJECT EASE LANCETS 30 gauge Misc [Pharmacy Med Name: LANCETS 30G INJECT EASE Miscellaneous] 100 each 0     Sig: AS DIRECTED *100 DAYS*       Diabetic Supplies Refill Protocol Passed - 1/22/2021  9:43 AM        Passed - Visit with PCP or prescribing provider visit in last 6 months     Last office visit with prescriber/PCP: 7/28/2020 Bassam Llanos MD OR sadiq dept: 7/28/2020 Bassam Llanos MD OR same specialty: 7/28/2020 Bassam Llanos MD  Last physical: 8/13/2019 Last MTM visit: Visit date not found   Next visit within 3 mo: Visit date not found  Next physical within 3 mo: Visit date not found  Prescriber OR PCP: Bassam Llanos MD  Last diagnosis associated with med order: 1. Type 2 diabetes mellitus treated without insulin (H)  - CONTOUR NEXT TEST STRIPS strips [Pharmacy Med Name: CONTOUR NEXT STRIPS 50 Strip]; USE 1 EACH AS DIRECTED AS NEEDED *50 DAYS*  Dispense: 50 strip; Refill: 0    If protocol passes may refill for 12 months if within 3 months of last provider visit (or a total of 15 months).             Passed - A1C in last 6 months     Hemoglobin A1c   Date Value Ref Range Status   07/28/2020 6.7 (H) 3.5 - 6.0 % Final                  CONTOUR NEXT TEST STRIPS strips [Pharmacy Med Name: CONTOUR NEXT STRIPS 50 Strip] 50 strip 0     Sig: USE 1 EACH AS DIRECTED AS NEEDED *50 DAYS*       Diabetic Supplies Refill Protocol Passed - 1/22/2021  9:43 AM        Passed - Visit with PCP or prescribing provider visit in last 6 months     Last office visit with prescriber/PCP: 7/28/2020 Bassam Llanos MD OR sadiq dept: 7/28/2020 Bassam Llanos MD OR same specialty: 7/28/2020 Bassam Llanos MD  Last physical: 8/13/2019 Last MTM visit: Visit date not found   Next visit within 3 mo: Visit date not found   Next physical within 3 mo: Visit date not found  Prescriber OR PCP: Bassam Llanos MD  Last diagnosis associated with med order: 1. Type 2 diabetes mellitus treated without insulin (H)  - CONTOUR NEXT TEST STRIPS strips [Pharmacy Med Name: CONTOUR NEXT STRIPS 50 Strip]; USE 1 EACH AS DIRECTED AS NEEDED *50 DAYS*  Dispense: 50 strip; Refill: 0    If protocol passes may refill for 12 months if within 3 months of last provider visit (or a total of 15 months).             Passed - A1C in last 6 months     Hemoglobin A1c   Date Value Ref Range Status   07/28/2020 6.7 (H) 3.5 - 6.0 % Final

## 2021-06-14 NOTE — PROGRESS NOTES
"ASSESMENT AND PLAN:  Diagnoses and all orders for this visit:    Hyperlipidemia, unspecified hyperlipidemia type  Lipid in 11/17.  .  Continue current med.    Left shoulder pain  -     predniSONE (DELTASONE) 20 MG tablet; Take 1 tablet (20 mg total) by mouth daily.  Dispense: 10 tablet; Refill: 0  -     cyclobenzaprine (FLEXERIL) 5 MG tablet; Take 1 tablet (5 mg total) by mouth 2 (two) times a day as needed for muscle spasms.  Dispense: 60 tablet; Refill: 0  Declined PT.  F/U in 6 weeks.      SUBJECTIVE: Ricky Lea is a 67-year-old female with hyperlipidemia and left shoulder pain here for follow-up.  She takes atorvastatin 20 mg daily, no medication side effects reported..  She was given Neurontin, but took only 3 pills due to dizziness.  She has shoulder pain for about 3 months now.  The pain is not worsening but not improving.  She is not able to lift her left hand above her head.  No injury to the shoulder.  It is not constant pain.  No tingling numbness sensation on the left arm and fingers.  Acute fever or URI symptoms.  No acute chest pain, shortness of breath or palpitations.  No new complaints today.    No past medical history on file.  Patient Active Problem List   Diagnosis     Hyperlipidemia, unspecified hyperlipidemia type     Pulmonary nodules     Left shoulder pain       Allergies:  No Known Allergies    History   Smoking Status     Never Smoker   Smokeless Tobacco     Current User     Types: Chew     Comment: beta nut with tobacco       Review of systems otherwise negative except as listed in HPI.   History   Smoking Status     Never Smoker   Smokeless Tobacco     Current User     Types: Chew     Comment: beta nut with tobacco       OBJECTICE: /88 (Patient Site: Right Arm, Patient Position: Sitting, Cuff Size: Adult Regular)  Pulse 84  Temp 97.6  F (36.4  C) (Oral)   Resp 16  Ht 4' 10.66\" (1.49 m)  Wt 126 lb 1 oz (57.2 kg)  BMI 25.76 kg/m2    DATA REVIEWED:    Labs Reviewed or Ordered " (1): lipid      GEN-alert,  in no apparent distress.  HEENT-mucous membranes are moist, neck is supple.  CV-regular rate and rhythm with no murmur.   RESP-lungs clear to auscultation .  ABDOMEN- Soft , not tender.  RIGHT SHOULDER- Normal.  LEFT SHOULDER-tenderness on posterior shoulder, no deformities.  Moderate limited range of motions due to pain.  Upper extremities normal strength.  SKIN-normal        [facility]   12/22/2017   This transcription uses voice recognition software, which may contain typographical errors.

## 2021-06-14 NOTE — TELEPHONE ENCOUNTER
Requesting on going home care order for new certification date begins on 1/27/21 x 60 days for skilled nurse visit every 2 weeks and 3 PRN visits for medication management.  Thank you.

## 2021-06-15 NOTE — PROGRESS NOTES
these are BP readings daughter took  and forgot to give them to you at today visits   2/11 168/105   2/12 155/94   2/13 172/102   2/15 115/74      Up date on BP readings after your visit today      127/68 lying, 74   110/71 sitting, 76   105/70,  standing 83      I got your note Losartan is on HOLD until 2/23/21. I did not set up Losartan at all at this visit.       She asked med to call [facility] for dizziness pill. I talked to [facility] and no Meclizine Rx send in yet.   I instructed her to drink more water, transfer slowly, and precaution for fall.      Please advsie for any new order.

## 2021-06-15 NOTE — PROGRESS NOTES
ASSESSMENT and plan   1. Type 2 diabetes mellitus treated without insulin (H)  Patient feels tired and weak she brought all her medications and she apparently has been taking them regularly however she was unable to tell me which medications were for her blood sugar.  She did not bring her glucometer in today I am rechecking an A1c.  MTM notes reviewed  - Glycosylated Hemoglobin A1c    2. Moderate single current episode of major depressive disorder (H)  She has been complaining of being more tired more sad and crying regularly over the last 2 weeks she denies being suicidal.  She says she is not sure why she feels this way.  Duloxetine trial follow-up with primary within 7 to 10 days  - DULoxetine (CYMBALTA) 30 MG capsule; Take 1 capsule (30 mg total) by mouth daily.  She should continue with her fluoxetine dispense: 30 capsule; Refill: 2    3. Essential hypertension  Blood pressures not controlled increasing losartan from 50 to 75 mg/day.  Previously it was increased from 25 to 50 mg a day.  Home care nurse advised  - Comprehensive Metabolic Panel  - losartan (COZAAR) 50 MG tablet; Take 1.5 tablets (75 mg total) by mouth daily.  Dispense: 45 tablet; Refill: 5    4. Left shoulder pain, unspecified chronicity  She has left shoulder pain which is associated with her pain in her left arm and radiating pain.  Elevating her left shoulder causes her difficulty no history of trauma present    5. Radicular pain in left arm  Reproducible pain noted in the left arm which radiates down to her fingers from her shoulder and into the left chest wall and side of her breast not exertional duloxetine trial started.  Other medications reviewed she has also taking gabapentin.  - DULoxetine (CYMBALTA) 30 MG capsule; Take 1 capsule (30 mg total) by mouth daily.  Dispense: 30 capsule; Refill: 2    6. Left-sided chest wall pain  No shortness of breath no exertional chest pain no dyspepsia.  Walking does not make the pain worse it is only  occurring when she lifts her left arm or presses on the left chest wall.      There are no Patient Instructions on file for this visit.    Orders Placed This Encounter   Procedures     Glycosylated Hemoglobin A1c     Comprehensive Metabolic Panel     Medications Discontinued During This Encounter   Medication Reason     losartan (COZAAR) 50 MG tablet        No follow-ups on file.    CHIEF COMPLAINT:  Chief Complaint   Patient presents with     Hypertension     Breast Pain     Left     Arm Pain     left       HISTORY OF PRESENT ILLNESS:  Pa is a 70 y.o. female who is here with a family member today to follow-up for her blood pressure and feelings of sadness and weakness.  Her home care nurses sent me a note stating that the patient has not been feeling well for approximately 2 weeks.  She has been apparently taking all her medications faithfully she did not bring her glucometer in today blood sugars have been in the 120s to 180s.  Her blood pressure at home has been monitored and found to be high.  Her losartan was recently increased from 25 to 50 mg.  She states that she has been feeling sad and feeling weepy and crying for no apparent reason she denies any new stressors in her life says she has a supportive family.  Denies any headache but says she is weak and occasionally dizzy.  Also notes that she has pain in her left arm which shoots up from her fingers into the shoulder and into the left chest wall and breast he says is a sharp burning pain if not related to walking is not related to her breathing can occur when she moves her arm she denies any injuries to the site.    REVIEW OF SYSTEMS:   Musculoskeletal positive for left arm pain and left shoulder pain  Neuro positive for a burning sensation in her left arm as mentioned in the HPI  Psych positive for depressed mood increased crying spells difficulty sleeping  General positive for weakness  10 point review of  All other systems are negative.    PFSH:  Social  "history reviewed    TOBACCO USE:  Social History     Tobacco Use   Smoking Status Never Smoker   Smokeless Tobacco Current User     Types: Chew   Tobacco Comment    chews betal nut        VITALS:  Vitals:    02/09/21 1149 02/09/21 1222   BP: 154/88 132/78   Pulse: 76    Resp: 18    Temp: 98.5  F (36.9  C)    TempSrc: Oral    Weight: 136 lb 4 oz (61.8 kg)    Height: 4' 11.02\" (1.499 m)      Wt Readings from Last 3 Encounters:   02/09/21 136 lb 4 oz (61.8 kg)   11/12/20 131 lb (59.4 kg)   07/28/20 129 lb 7 oz (58.7 kg)       PHYSICAL EXAM:  Interactive elderly appearing female sitting in exam room no acute distress  HEENT neck supple mucous members moist, oral cavity shows no exudate no erythema there is no lymph enlargement noted in neck  Respiratory system clear to auscultation equal breath sounds no wheeze no crackles  CVS regular rate and rhythm no murmurs rubs gallops appreciated  Abdomen soft there is no focal tenderness no hepatosplenomegaly  Musculoskeletal system tenderness elicited when I palpate the anterior portion of her left deltoid muscle she has tenderness over the left biceps.  Tenderness over the left wrist.  There is no tenderness over the left breast and pectoralis major muscle however she has tenderness in the left axilla when it is palpated  Neuro cranial nerves II to XII intact power of her biceps is 5 out of 5, gross sensation of the dorsum of the left hand is normal.  Range of movement of the left shoulder is limited in abduction to 40 degrees  Skin there are no rashes noted over the left upper extremity no erythema noted.  Psych she is well groomed oriented x3 affect is normal she maintains eye contact no psychomotor retardation noted    DATA REVIEWED:  Additional History from Old Records Summarized (2): 2 reviewed last note with primary reviewed home care notes.    Decision to Obtain Records (1): 0  Radiology Tests Summarized or Ordered (1): 0  Labs Reviewed or Ordered (1): 1  Medicine Test " Summarized or Ordered (1): 0  Independent Review of EKG or X-RAY(2 each): 0    The visit lasted a total of 42 minutes     MEDICATIONS:  Current Outpatient Medications   Medication Sig Dispense Refill     alendronate (FOSAMAX) 70 MG tablet TAKE 1 PILL BY MOUTH EVERY WEEK FOR BONES 12 tablet 3     atorvastatin (LIPITOR) 20 MG tablet TAKE 1 PILL BY MOUTH EVERYDAY FOR CHOLESTEROL 90 tablet 3     blood glucose test strips Use 1 each As Directed as needed. Dispense brand per patient's insurance at pharmacy discretion. 200 strip 11     blood-glucose meter Misc Test blood sugar twice a day 1 each 0     diaper,brief,adult,disposable Misc Use 2-3 a day as needed 120 each 3     FLUoxetine (PROZAC) 40 MG capsule TAKE 1 PILL (40 MG) BY MOUTH DAILY FOR DEPRESSION 90 capsule 3     gabapentin (NEURONTIN) 300 MG capsule Take 1 capsule (300 mg total) by mouth 2 (two) times a day. Late entry Okay per PCP to decrease to 1 cap BID per in basket messge order 8/6/20. 90 capsule 5     generic lancets (FINGERSTIX LANCETS) Dispense brand per patient's insurance at pharmacy discretion. 200 each 11     losartan (COZAAR) 50 MG tablet Take 1.5 tablets (75 mg total) by mouth daily. 45 tablet 5     meloxicam (MOBIC) 15 MG tablet Take one tablet my mouth daily for pain 30 tablet 5     metFORMIN (GLUCOPHAGE-XR) 750 MG 24 hr tablet TAKE 1 PILL BY MOUTH EVERYDAY WITH BREAKFAST FOR DIABETES 90 tablet 2     white petrolatum-mineral oil Crea Apply to affected area twice a day 120 g 3     DULoxetine (CYMBALTA) 30 MG capsule Take 1 capsule (30 mg total) by mouth daily. 30 capsule 2     triamcinolone (KENALOG) 0.1 % ointment APPLY EXTERNALLY TO THE AFFECTED AREA TWICE DAILY 454 g 0     No current facility-administered medications for this visit.      Karol CRAWFORD

## 2021-06-15 NOTE — PROGRESS NOTES
Up date on HTN status      Last 2 home care visits BP readings: 147/85, 143/80   she reported no headache and dizziness status was much better   current dose of Losartan 25mg daily ( 1/2 tab of 50mg)   her daughter stated they checked during home visit and noted her BP readings varies from 140-170/70-80 range.      Please advise

## 2021-06-15 NOTE — PROGRESS NOTES
ASSESMENT AND PLAN:  Diagnoses and all orders for this visit:    Essential hypertension  Found to have hypotension today.  She is currently taking losartan 75 mg daily, this was increased from 50 mg to 10 days ago.  Hold blood pressure med until next appointment in 4 days.  Appointment scheduled on 2/23/2021.    Dizziness  -     meclizine (ANTIVERT) 25 mg tablet; Take 1 tablet (25 mg total) by mouth 3 (three) times a day as needed for dizziness or nausea.  Dispense: 30 tablet; Refill: 1    Type 2 diabetes mellitus treated without insulin (H)  Last A1c 7.2 a week ago.    Hyperlipidemia, unspecified hyperlipidemia type  Continue current medication.  Recheck lipids in 3 months.    Moderate single current episode of major depressive disorder (H)  Currently on Prozac and Cymbalta.  Will consider taper and discontinue 1 of these medications to avoid unnecessary polypharmacy.  Will discuss again next week.    This transcription uses voice recognition software, which may contain typographical errors.      SUBJECTIVE: Ricky Lea is a 70-year-old female with history of depression, diabetes, hypertension and hyperlipidemia here for follow-up but her main concern today is dizziness for 2 weeks.  No nausea or vomiting.  Dizziness at rest, worse with movement.  He reports spinning room sensation at times.  No ringing in the ears.  No other neurological symptoms.    He was seen by Dr. Jerome on 2/9/2021.  Losartan was increased to 75 mg from 50 mg daily due to uncontrolled hypertension.    Her blood sugar readings are in the 120s to 140s most days.  No low blood sugar noted in the past 2 weeks.  She takes all her medications with the help of home care RN.  Depression symptoms are stable, no suicidal or homicidal ideations.  No known exposure to COVID-19, no symptoms.    No past medical history on file.  Patient Active Problem List   Diagnosis     Hyperlipidemia, unspecified hyperlipidemia type     Pulmonary nodules     Left shoulder  "pain     Essential hypertension     Moderate single current episode of major depressive disorder (H)     Skin cancer of face     Dry skin     Noncompliance with medication regimen     Intrinsic eczema     Age-related osteoporosis without current pathological fracture     Type 2 diabetes mellitus treated without insulin (H)     Osteoporosis, unspecified osteoporosis type, unspecified pathological fracture presence     Urinary incontinence, unspecified type     Burning sensation of feet     Chronic midline low back pain without sciatica       Allergies:  No Known Allergies    Social History     Tobacco Use   Smoking Status Never Smoker   Smokeless Tobacco Current User     Types: Chew   Tobacco Comment    chews betal nut        Review of systems otherwise negative except as listed in HPI.   Social History     Tobacco Use   Smoking Status Never Smoker   Smokeless Tobacco Current User     Types: Chew   Tobacco Comment    chews betal nut        OBJECTICE: BP 90/66   Pulse 74   Temp 98.5  F (36.9  C) (Oral)   Resp 16   Ht 4' 11.02\" (1.499 m)   Wt 135 lb 1 oz (61.3 kg)   SpO2 94%   BMI 27.26 kg/m        GEN-alert,  in no apparent distress.  HEENT-neck is supple.  CV-regular rate and rhythm with no murmur.   RESP-lungs clear to auscultation .  ABDOMEN- Soft , not tender.  NEURO- Normal.  SKIN-normal        [place] Romi   2/19/2021     "

## 2021-06-15 NOTE — TELEPHONE ENCOUNTER
Reason for Call:  Home Health Care    Zuleyma Winchester, RN with [facility] called regarding (reason for call): Medication clarification     Orders are needed for this patient. See below.     RN calling to clarify medications. RN states that the patient med list still shows Fluoxetine and Losartan as active but the 02/23/2021 telephone notes that Dr. Llanos wanted these discontinued.     CMT verified this with chart review and updated medication list (Losartan and Fluoxetine removed).     Pt Provider: Dr. Llanos     Phone Number Homecare Nurse can be reached at: [phone]    Can we leave a detailed message on this number? Yes       Best Time: ASAP     Call taken on 3/5/2021 at 10:06 AM by Sukh Hazel

## 2021-06-15 NOTE — PROGRESS NOTES
"ASSESMENT AND PLAN:  Diagnoses and all orders for this visit:    Left shoulder pain  Significant improvement since last visit.   Continue flexeril as needed.    Hyperlipidemia, unspecified hyperlipidemia type   , HDL 41, Trig 201 and  ( 11/10/17 )  Continue med, recheck at next visit.  F/U in 6 months.    SUBJECTIVE: Ricky Lea is a 67-year-old female with hyperlipidemia and left shoulder pain here for follow-up.      She takes atorvastatin 20 mg daily, no medication side effects reported.    Shoulder pain- Left shoulder pain since 9/17. Now improving significantly, ROM has improved but still minimally limited. Took oral steroid for 10 day sin 12/17, now on flexeril as needed. No SE reported.She was given Neurontin, but discontinued  due to dizziness.  No acute fever or URI symptoms.  No acute chest pain, shortness of breath or palpitations.  No new complaints today.    No past medical history on file.  Patient Active Problem List   Diagnosis     Hyperlipidemia, unspecified hyperlipidemia type     Pulmonary nodules     Left shoulder pain       Allergies:  No Known Allergies    History   Smoking Status     Never Smoker   Smokeless Tobacco     Current User     Types: Chew     Comment: beta nut with tobacco       Review of systems otherwise negative except as listed in HPI.   History   Smoking Status     Never Smoker   Smokeless Tobacco     Current User     Types: Chew     Comment: beta nut with tobacco       OBJECTICE: /82 (Patient Site: Left Arm, Patient Position: Sitting, Cuff Size: Adult Regular)  Pulse 76  Temp 97.7  F (36.5  C) (Oral)   Resp 16  Ht 4' 10.66\" (1.49 m)  Wt 127 lb 1 oz (57.6 kg)  BMI 25.96 kg/m2        GEN-alert,  in no apparent distress.  HEENT-mucous membranes are moist, neck is supple.  CV-regular rate and rhythm with no murmur.   RESP-lungs clear to auscultation .  ABDOMEN- Soft , not tender.  EXTREM- No edema.  SKIN-normal        [place] Romi   2/2/2018     "

## 2021-06-15 NOTE — PROGRESS NOTES
Thank you for the update. Meclizine was sent on 2/19/21, sent it again today.     Dr. Bassam Llanos  2/22/2021 1:31 PM

## 2021-06-15 NOTE — TELEPHONE ENCOUNTER
Refill Approved    Rx renewed per Medication Renewal Policy. Medication was last renewed on 8/18/20.    Les Javed, [facility] Triage/Med Refill 2/18/2021     Requested Prescriptions   Pending Prescriptions Disp Refills     gabapentin (NEURONTIN) 300 MG capsule [Pharmacy Med Name: GABAPENTIN 300 MG CAPSULE 300 Capsule] 90 capsule 5     Sig: TAKE 1 PILL BY MOUTH 3 TIMES DAILY FOR NERVE PAIN       Gabapentin/Levetiracetam/Tiagabine Refill Protocol  Passed - 2/18/2021  8:24 AM        Passed - PCP or prescribing provider visit in past 12 months or next 3 months     Last office visit with prescriber/PCP: 7/28/2020 Bassam Llanos MD OR same dept: 2/9/2021 Ozzy Jerome MD OR same specialty: 2/9/2021 Ozzy Jerome MD  Last physical: 8/13/2019 Last MTM visit: Visit date not found   Next visit within 3 mo: Visit date not found  Next physical within 3 mo: Visit date not found  Prescriber OR PCP: Bassam Llanos MD  Last diagnosis associated with med order: 1. Chronic midline low back pain without sciatica  - gabapentin (NEURONTIN) 300 MG capsule [Pharmacy Med Name: GABAPENTIN 300 MG CAPSULE 300 Capsule]; TAKE 1 PILL BY MOUTH 3 TIMES DAILY FOR NERVE PAIN  Dispense: 90 capsule; Refill: 5    If protocol passes may refill for 12 months if within 3 months of last provider visit (or a total of 15 months).

## 2021-06-15 NOTE — PROGRESS NOTES
ASSESMENT AND PLAN: Called pharmacy to deliver meclizine.    Diagnoses and all orders for this visit:    Dizziness  Called pharmacy to deliver meclizine.    Essential hypertension  Blood pressure still low.  Continue to hold losartan.  Follow-up in 2 weeks.    Type 2 diabetes mellitus treated without insulin (H)  No med changes today.    Hyperlipidemia, unspecified hyperlipidemia type  No med changes today.    Moderate single current episode of major depressive disorder (H)  Taper and discontinue Prozac.  We will continue Cymbalta also for pain.    Polypharmacy  Decrease gabapentin to 300 mg twice a day from 3 times a day.  Taper discontinue Prozac.    This transcription uses voice recognition software, which may contain typographical errors.      SUBJECTIVE: Ricky Lea is 70-year-old female here to follow-up on hypertension.  She was seen a week ago for dizziness and was found to have hypotension.  Losartan 75 mg daily was held.  Meclizine was prescribed but patient never received the prescription.  Dizziness is the same not worsening.  She has mild nausea but no vomiting.  She takes Prozac and Cymbalta for depression.  No worsening depression reported.  No suicidal or homicidal ideations.  She takes gabapentin 300 mg 3 times a day for back pain.  Back pain is about the same.  She also reports muscle aches and pain which are not new.  No known exposure to COVID-19.  No COVID-19 symptoms.      No past medical history on file.  Patient Active Problem List   Diagnosis     Hyperlipidemia, unspecified hyperlipidemia type     Pulmonary nodules     Left shoulder pain     Essential hypertension     Moderate single current episode of major depressive disorder (H)     Skin cancer of face     Dry skin     Noncompliance with medication regimen     Intrinsic eczema     Age-related osteoporosis without current pathological fracture     Type 2 diabetes mellitus treated without insulin (H)     Osteoporosis, unspecified osteoporosis  "type, unspecified pathological fracture presence     Urinary incontinence, unspecified type     Burning sensation of feet     Chronic midline low back pain without sciatica       Allergies:  No Known Allergies    Social History     Tobacco Use   Smoking Status Never Smoker   Smokeless Tobacco Current User     Types: Chew   Tobacco Comment    chews betal nut        Review of systems otherwise negative except as listed in HPI.   Social History     Tobacco Use   Smoking Status Never Smoker   Smokeless Tobacco Current User     Types: Chew   Tobacco Comment    chews betal nut        OBJECTICE: /76 (Patient Site: Left Arm, Patient Position: Sitting, Cuff Size: Adult Regular)   Pulse 78   Temp 98  F (36.7  C) (Oral)   Resp 16   Ht 4' 11.02\" (1.499 m)   Wt 134 lb 2 oz (60.8 kg)   SpO2 98%   BMI 27.07 kg/m            GEN-alert,  in no apparent distress.  HEENT- neck is supple.  CV-regular rate and rhythm with no murmur.   RESP-lungs clear to auscultation .  ABDOMEN- Soft , not tender.  NEURO- Grossly normal.   SKIN-normal        [facility]   2/23/2021   "

## 2021-06-15 NOTE — PROGRESS NOTES
Please increase Losartan to 50 mg daily and continue to monitor BP.  Will send new Rx to pharmacy.  Can you please help make follow up appointment with me in 2 weeks also?  Thank you so much for all your help.    Dr. Bassam Llanos  2/5/2021 1:56 PM

## 2021-06-16 PROBLEM — S22.080S COMPRESSION FRACTURE OF T11 VERTEBRA, SEQUELA: Status: ACTIVE | Noted: 2021-05-07

## 2021-06-16 PROBLEM — M81.0 OSTEOPOROSIS, UNSPECIFIED OSTEOPOROSIS TYPE, UNSPECIFIED PATHOLOGICAL FRACTURE PRESENCE: Status: ACTIVE | Noted: 2019-12-03

## 2021-06-16 PROBLEM — N18.30 CHRONIC KIDNEY DISEASE, STAGE 3 (H): Status: ACTIVE | Noted: 2021-04-06

## 2021-06-16 PROBLEM — Z91.148 NONCOMPLIANCE WITH MEDICATION REGIMEN: Status: ACTIVE | Noted: 2019-05-15

## 2021-06-16 PROBLEM — R32 URINARY INCONTINENCE, UNSPECIFIED TYPE: Status: ACTIVE | Noted: 2019-12-03

## 2021-06-16 PROBLEM — R20.8 BURNING SENSATION OF FEET: Status: ACTIVE | Noted: 2020-02-04

## 2021-06-16 PROBLEM — M54.50 CHRONIC MIDLINE LOW BACK PAIN WITHOUT SCIATICA: Status: ACTIVE | Noted: 2020-05-08

## 2021-06-16 PROBLEM — G89.29 CHRONIC MIDLINE LOW BACK PAIN WITHOUT SCIATICA: Status: ACTIVE | Noted: 2020-05-08

## 2021-06-16 PROBLEM — L85.3 DRY SKIN: Status: ACTIVE | Noted: 2018-11-07

## 2021-06-16 PROBLEM — L20.84 INTRINSIC ECZEMA: Status: ACTIVE | Noted: 2019-05-24

## 2021-06-16 PROBLEM — C44.300 SKIN CANCER OF FACE: Status: ACTIVE | Noted: 2018-11-07

## 2021-06-16 PROBLEM — M81.0 AGE-RELATED OSTEOPOROSIS WITHOUT CURRENT PATHOLOGICAL FRACTURE: Status: ACTIVE | Noted: 2019-08-26

## 2021-06-16 PROBLEM — E11.9 TYPE 2 DIABETES MELLITUS TREATED WITHOUT INSULIN (H): Status: ACTIVE | Noted: 2019-10-15

## 2021-06-16 PROBLEM — M25.512 LEFT SHOULDER PAIN: Status: ACTIVE | Noted: 2017-12-22

## 2021-06-16 PROBLEM — I10 ESSENTIAL HYPERTENSION: Status: ACTIVE | Noted: 2018-03-14

## 2021-06-16 PROBLEM — F32.1 MODERATE SINGLE CURRENT EPISODE OF MAJOR DEPRESSIVE DISORDER (H): Status: ACTIVE | Noted: 2018-06-13

## 2021-06-16 NOTE — TELEPHONE ENCOUNTER
Phone call to patient to review results and provider's recommendations with assistance of Sejal lopezer (May) from [facility]. Results given and explained. Discussed recommendation for physical therapy and then return for follow up in 4 weeks with Maureen SY. Also offered patient a return to clinic to discuss/review findings and treatment plan.     Patient will try the PT at this time; daughter states has had home PT recently. Explained PT ordered through [facility] is typically done at a clinic and not done in home. They will receive a call from [facility] to schedule the PT. They will call back to schedule the follow up with [facility] once PT has been set up. Explained if pain worsens or changes she should return sooner than the 4 weeks. Stated understanding.     PT order prepped.

## 2021-06-16 NOTE — PROGRESS NOTES
ASSESSMENT: Ricky Lea is a 70 y.o. female with past medical history significant for hyperlipidemia, hypertension, depression, osteoporosis (on Fosamax), type 2 diabetes mellitus, urinary incontinence who presents today for new patient evaluation of chronic bilateral low back pain, worse over the past 1 month.  My review of an x-ray lumbar spine from July 2020 showed a moderate T11 compression fracture.  It had progressed compared with an x-ray lumbar spine from 2016 when she had back pain after a slip and fall.  Patient has not had any additional falls or trauma to the back.  Patient has not had any advanced imaging of the lumbar spine.  I would like to evaluate for new or worsening compression fracture.  She is neurologically intact on exam.    LAKIA:62  Who 5:4    PLAN:  A shared decision making model was used.  The patient's values and choices were respected.  The following represents what was discussed and decided upon by the physician assistant and the patient.  A telephone  was used for the visit.    1.  DIAGNOSTIC TESTS: I reviewed the x-ray lumbar spine from July 2020.  I ordered an MRI lumbar spine for further evaluation.  T11 compression fracture seen in July 2020 should be healed, unless there has been progression of the fracture in the interim.  Would like to evaluate for new compression fracture as well.    2.  PHYSICAL THERAPY: No physical therapy was ordered.  We will await the results of the MRI.  As long as there is no acute fracture, I think patient would benefit from physical therapy.  Patient did have some home care physical therapy about a year ago but she does not do any home exercises.    3.  MEDICATIONS: No changes are made to the patient's medications.  She takes meloxicam 15 mg daily.  She uses duloxetine 30 mg daily.  She take gabapentin 300 mg 3 times daily.  She also uses Tylenol as needed.  -If there is an acute fracture, would add calcitonin nasal spray.    4.  INTERVENTIONS: No  interventions were ordered.    5.  PATIENT EDUCATION:   -If there is an acute fracture I would recommend a brace to be worn when the patient is up and about for pain management.  I would also recommend referral to the osteoporosis clinic since this would represent an atraumatic fracture.  She is currently on Fosamax.  She would need serial x-rays monthly x3 months to monitor the fracture.  -I recommended that the patient avoid heavy lifting.  She should take care to prevent fall.  -Patient is in agreement the above plan.  All questions were answered.    6.  FOLLOW-UP:   A nurse will call the patient with the results of the MRI lumbar spine.  If there is an acute fracture I would recommend a referral to orthotics for the brace, a referral to the osteoporosis clinic.  I would also recommend calcitonin nasal spray and I would order follow-up x-rays.  If there is no acute fracture I would recommend physical therapy.  If she has questions or concerns in the meantime, she should not hesitate to call.      SUBJECTIVE:  Ricky Lea  Is a 70 y.o. female who presents today in consultation request of Dr. Llanos for new patient evaluation of low back pain.  Patient reports that she has had back pain since 2016 when she had a slip and fall.  She was found to have a T11 compression fracture at that time.  No bracing or other specific treatment was recommended at that time.  Repeat x-rays in July 2020 showed worsening of the T11 compression fracture.  Patient reports that her pain has gotten worse over the past 1 month.  She denies any additional fall or injury to cause the worsening pain.    Patient complains of bilateral low back pain.  Both sides are equally affected.  She describes the pain as a burning pain.  She denies any pain radiating in the buttocks or down the legs.  She does complain of a burning sensation in the feet.  She denies any numbness, tingling, or weakness.  Back pain is aggravated with walking.  Applying topical pain  relief medications help to alleviate the pain.  She denies any loss of bowel or bladder control.  Denies any recent fevers, chills, or sweats.    Patient had home care physical therapy about a year ago.  She is not doing any home exercises now.  She has not had any spine surgeries or spine injections.  She uses meloxicam 15 mg daily.  She uses duloxetine 30 mg daily.  She takes gabapentin 300 mg 3 times daily.  She also uses Tylenol.  Medications are somewhat helpful.    Current Outpatient Medications on File Prior to Encounter   Medication Sig Dispense Refill     alendronate (FOSAMAX) 70 MG tablet TAKE 1 PILL BY MOUTH EVERY WEEK FOR BONES 12 tablet 3     atorvastatin (LIPITOR) 20 MG tablet TAKE 1 PILL BY MOUTH EVERYDAY FOR CHOLESTEROL 90 tablet 3     blood glucose test strips Use 1 each As Directed as needed. Dispense brand per patient's insurance at pharmacy discretion. 200 strip 11     blood-glucose meter Misc Test blood sugar twice a day 1 each 0     diaper,brief,adult,disposable Misc Use 2-3 a day as needed 120 each 3     DULoxetine (CYMBALTA) 30 MG capsule Take 1 capsule (30 mg total) by mouth daily. 30 capsule 2     gabapentin (NEURONTIN) 300 MG capsule TAKE 1 PILL BY MOUTH 3 TIMES DAILY FOR NERVE PAIN 270 capsule 3     generic lancets (FINGERSTIX LANCETS) Dispense brand per patient's insurance at pharmacy discretion. 200 each 11     meclizine (ANTIVERT) 25 mg tablet Take 1 tablet (25 mg total) by mouth 3 (three) times a day as needed for dizziness or nausea. 30 tablet 1     meloxicam (MOBIC) 15 MG tablet Take one tablet my mouth daily for pain 30 tablet 5     metFORMIN (GLUCOPHAGE-XR) 750 MG 24 hr tablet TAKE 1 PILL BY MOUTH EVERYDAY WITH BREAKFAST FOR DIABETES 90 tablet 2     triamcinolone (KENALOG) 0.1 % ointment APPLY EXTERNALLY TO THE AFFECTED AREA TWICE DAILY 454 g 0     white petrolatum-mineral oil Crea Apply to affected area twice a day 120 g 3       No Known Allergies    Patient Active Problem List    Diagnosis     Hyperlipidemia, unspecified hyperlipidemia type     Pulmonary nodules     Left shoulder pain     Essential hypertension     Moderate single current episode of major depressive disorder (H)     Skin cancer of face     Dry skin     Noncompliance with medication regimen     Intrinsic eczema     Age-related osteoporosis without current pathological fracture     Type 2 diabetes mellitus treated without insulin (H)     Osteoporosis, unspecified osteoporosis type, unspecified pathological fracture presence     Urinary incontinence, unspecified type     Burning sensation of feet     Chronic midline low back pain without sciatica         Surgical history: None    Family History   Problem Relation Age of Onset     Breast cancer Neg Hx        Social history: Patient is .  She does not work outside the home.  She denies any tobacco, alcohol, or illicit drug use.      ROS: Positive for muscle pain, dizziness, depression.  Specifically negative for bowel/bladder dysfunction, fevers,chills, appetite changes, unexplained weight loss.   Otherwise 13 systems reviewed are negative.  Please see the patient's intake questionnaire from today for details.      OBJECTIVE:  PHYSICAL EXAMINATION:    CONSTITUTIONAL:  Vital signs as above.  No acute distress.  The patient is well nourished and well groomed.  PSYCHIATRIC:  The patient is awake, alert, oriented to person, place, time and answering questions appropriately with clear speech.    HEENT: Normocephalic, atraumatic.  Sclera clear.  Neck is supple.  SKIN:  Skin over the face, bilateral lower extremities, and posterior torso is clean, dry, intact without rashes.    GAIT:  Gait is guarded.  She uses cane for assistance.  The patient is able to rise onto toes and heels bilaterally with support.  STANDING EXAMINATION: Tender to palpation bilateral lower lumbar paraspinous muscles.  No significant tenderness palpation midline thoracic or lumbar spinous  processes.  MUSCLE STRENGTH:  The patient has 5/5 strength for the bilateral hip flexors, knee flexors/extensors, ankle dorsiflexors/plantar flexors, great toe extensors, ankle evertors/invertors.  NEUROLOGICAL: 1-2+ patellar, and achilles reflexes bilaterally.  Negative Babinski's bilaterally.  No ankle clonus bilaterally. Sensation to light touch is intact in the bilateral L4, L5, and S1 dermatomes.  VASCULAR:  2/4 dorsalis pedis pulses bilaterally.  Bilateral lower extremities are warm.  There is no pitting edema of the bilateral lower extremities.  ABDOMINAL:  Soft, non-distended, non-tender throughout all quadrants.  No pulsatile mass palpated in the left lower quadrant.  LYMPH NODES:  No palpable or tender inguinal lymph nodes.  MUSCULOSKELETAL: Straight leg raise negative bilaterally.     RESULTS: I reviewed an x-ray lumbar spine from the [facility] dated July 28, 2020.  This shows a moderate compression fracture of T11 which had progressed compared with a lumbar spine radiograph from December 2016.  There is also mild facet arthropathy L5-S1.  Please see report for further details.

## 2021-06-16 NOTE — TELEPHONE ENCOUNTER
PSP:  Maureen Palafox PA-C  Last clinic visit: 3/22/21 new consult  Reason for call: Home care nurse left message on nurse line at 0945 AM regarding referral to orthotics for brace.  Clinical information: Chart reviewed. Returned her call.  Advice given: Explained patient needs to have the MRI (scheduled this afternoon). Once results reviewed, she will be called with the results and next steps in care based on the results. Stated understanding. She will explain this to the patient.

## 2021-06-16 NOTE — PROGRESS NOTES
Ricky Lea is a 70 y.o. female who is being evaluated via a billable telephone visit.      What phone number would you like to be contacted at? [phone]  How would you like to obtain your AVS? AVS Preference: Mail a copy.      Subjective   Ricky Lea is 70 y.o. and presents today for the following health issues   This appointment is to follow-up on dizziness.  She was last seen here a week ago.  She was taking losartan as needed for dizziness at that time.  Instructed to take meclizine 3 times a day instead.  Today she states the dizziness is improving but only minimally.  No nausea or vomiting.  She was referred to spine center for compression fracture, was seen yesterday.  MRI was ordered, no appointment scheduled yet.  No new concerns since last visit a week ago.      Review of Systems  No acute vision changes.  No acute headache.  No fever or other COVID-19 symptoms.      Objective       Vitals:  No vitals were obtained today due to virtual visit.    Physical Exam  Unable to perform physical exam.  Patient did not appear to be in distress during telephone visit.  She was able to speak in full sentences with normal tone of voice.      1. Dizziness  Discussed brain imaging but patient elected to defer.  She wants to focus on her back pain work-up for now.    2. Compression fracture of T11 vertebra, sequela  Appreciate help from spine center.    3. Essential hypertension  Held blood pressure medication due to low blood pressure.  Will recheck at next visit.    4. Type 2 diabetes mellitus treated without insulin (H)  Last A1c 7.2.  No med changes.        Phone call duration: 9 minutes    Dr. Bassam Llanos  3/23/2021 3:19 PM

## 2021-06-16 NOTE — TELEPHONE ENCOUNTER
Please call Ricky Lea or her family.  #1 - set her up for a follow up appointment in about 4 weeks with PCP to check on her chronic pain  #2 - see if she can come for the Covid vaccine this Saturday - let her know we strongly recommend it.

## 2021-06-16 NOTE — TELEPHONE ENCOUNTER
Requesting on going home care order for new certification date begins on 3/27/21 x 60 days for skilled nurse visit every 2 weeks and 3 PRN visits for medication management.  Thank you.

## 2021-06-16 NOTE — PATIENT INSTRUCTIONS - HE
[facility] Scheduling    Please call [phone] to schedule your image(s) (select option #1). There are 3 different locations, see below. You can do walk-in visits for xray only images if you want.     [facility]  [address] [place] [id number]    [address] [address]    [place] [id number] [facility] [address]

## 2021-06-16 NOTE — PROGRESS NOTES
ASSESMENT AND PLAN:  Diagnoses and all orders for this visit:    Pain of both breasts  Improving with Tylenol.  Normal mammogram.    Essential hypertension  -     Comprehensive Metabolic Panel  -     lisinopril (PRINIVIL,ZESTRIL) 10 MG tablet; Take 1 tablet (10 mg total) by mouth daily.  Dispense: 30 tablet; Refill: 5  Recheck /94.  Started on lisinopril 10 mg daily.  Potential side effects discussed including allergic reaction and dry cough.  She will follow-up in 4 weeks.    Hyperlipidemia, unspecified hyperlipidemia type  -     Lipid Cascade  Advised to take pravastatin 20 mg once a day as prescribed, not twice a day.   in November 2017.    Dry skin dermatitis  -     hydrocortisone 0.5 % ointment; Apply thin layer to affected area once daily  Dispense: 56 g; Refill: 0  Improvement with Eucerin cream.  We will try topical steroid.  To follow-up in 4 weeks.      SUBJECTIVE: Ricky Lea is a 67-year-old female here to follow-up on breast pain and blood pressure check.  She was seen 2 weeks ago for breast pain, mammogram was normal.  She is taking Tylenol twice a day and states breast pain is improving.      Her blood pressure was elevated during last visit.  It was normal 2 months ago.  No known history of hypertension.  Acute headaches.  No acute vision changes.  No chest pain or palpitations.    She was prescribed pravastatin 20 mg to take daily but she is taking 1 tablet twice a day.  No side effects reported including muscle aches and pain.    The is also prescribed omeprazole 20 mg to take daily for burn symptoms.  She states she is taking it 20 mg twice a day.    She was given Eucerin cream to use for dry skin on her feet.  She has been using it multiple times a day but no significant improvement.  She denied pain or pruritus but complaining of occasional burning on the sole of her feet.        No past medical history on file.  Patient Active Problem List   Diagnosis     Hyperlipidemia, unspecified  "hyperlipidemia type     Pulmonary nodules     Left shoulder pain     Essential hypertension       Allergies:  No Known Allergies    History   Smoking Status     Never Smoker   Smokeless Tobacco     Current User     Types: Chew     Comment: beta nut with tobacco       Review of systems otherwise negative except as listed in HPI.   History   Smoking Status     Never Smoker   Smokeless Tobacco     Current User     Types: Chew     Comment: beta nut with tobacco       OBJECTICE: /88  Pulse 76  Temp 98  F (36.7  C) (Oral)   Resp 16  Ht 4' 10.66\" (1.49 m)  Wt 129 lb 8 oz (58.7 kg)  BMI 26.46 kg/m2    DATA REVIEWED:    Radiology Tests Summarized or Ordered (1): Normal mammogram  Labs Reviewed or Ordered (1):       GEN-alert,  in no apparent distress.  HEENT-mucous membranes are moist, neck is supple.  BREASTS- Mild generalized tenderness.  No nipple discharge.  No palpable masses.  CV-regular rate and rhythm with no murmur.   RESP-lungs clear to auscultation .  ABDOMEN- Soft , not tender.  EXTREM- No edema.  Dry skin dermatitis with some peeling skin on both soles. No signs of infection.    This transcription uses voice recognition software, which may contain typographical errors.          Bassam Llanos   3/14/2018     "

## 2021-06-16 NOTE — TELEPHONE ENCOUNTER
INJECT EASE LANCETS 30 gauge Misc [[id number]]    Electronically signed by: Mary Ann Lema RN on 01/23/21 0659 Status: Discontinued   Ordering user: Mary Ann Lema RN 01/23/21 0659 Ordering provider: Bassam Llanos MD   Authorized by: Bassam Llanos MD   Frequency:  01/23/21 - 01/26/21  Released by: Mary Ann Lema RN 01/23/21 0659   Discontinued by: Bassam Llanos MD 01/26/21 0648   Diagnoses   Type 2 diabetes mellitus treated without insulin (H) [E11.9]

## 2021-06-16 NOTE — PROGRESS NOTES
ASSESMENT AND PLAN:  Diagnoses and all orders for this visit:    Dizziness  Advised to take meclizine 3 times a day not once a day.  Instructed to stop taking losartan, informed losartan is not for dizziness.    Essential hypertension  BP still low.  We will continue to hold losartan.    Back pain  -     Urinalysis-UC if Indicated    Compression fracture of T11 vertebra, sequela  -     Ambulatory referral to Spine Care    Type 2 diabetes mellitus treated without insulin (H)  Continue current medication.  A1c 7.2 last month.    Hyperlipidemia, unspecified hyperlipidemia type  Recheck lipid at next visit.    This transcription uses voice recognition software, which may contain typographical errors.      SUBJECTIVE: Pa Venu is here to follow-up on dizziness.  She was prescribed meclizine to take 3 times a day.  She brought in her pillbox and pill bottles today.  She is taking meclizine only once a day.  Losartan was held due to hypotension, but patient is taking losartan as needed for dizziness.  Dizziness is not improving but not worsening.  No nausea or vomiting with the dizziness.  She has home care RN set up her pillbox every 2 weeks.  She is also complaining of worsening lower back pain.  She was referred to spine care last year, appointment was never made per patient.  X-ray done in 7/2020 showed moderate compression fracture of T11, mild facet arthropathy at L5-S1.  She uses cane for ambulation.    No past medical history on file.  Patient Active Problem List   Diagnosis     Hyperlipidemia, unspecified hyperlipidemia type     Pulmonary nodules     Left shoulder pain     Essential hypertension     Moderate single current episode of major depressive disorder (H)     Skin cancer of face     Dry skin     Noncompliance with medication regimen     Intrinsic eczema     Age-related osteoporosis without current pathological fracture     Type 2 diabetes mellitus treated without insulin (H)     Osteoporosis, unspecified  "osteoporosis type, unspecified pathological fracture presence     Urinary incontinence, unspecified type     Burning sensation of feet     Chronic midline low back pain without sciatica       Allergies:  No Known Allergies    Social History     Tobacco Use   Smoking Status Never Smoker   Smokeless Tobacco Current User     Types: Chew   Tobacco Comment    chews betal nut        Review of systems otherwise negative except as listed in HPI.   Social History     Tobacco Use   Smoking Status Never Smoker   Smokeless Tobacco Current User     Types: Chew   Tobacco Comment    chews betal nut        OBJECTICE: /78 (Patient Site: Right Arm, Patient Position: Sitting, Cuff Size: Adult Regular)   Pulse 84   Temp 98.3  F (36.8  C) (Oral)   Resp 16   Ht 4' 11.02\" (1.499 m)   Wt 135 lb 2 oz (61.3 kg)   SpO2 95%   BMI 27.27 kg/m          GEN-alert,  in no apparent distress.  Uses cane for ambulation.  HEENT- neck is supple.  CV-regular rate and rhythm with no murmur.   RESP-lungs clear to auscultation .  ABDOMEN- Soft , not tender.  BACK-tenderness lower lower mid back.  Negative straight leg raising test.  SKIN-normal        [facility]   3/16/2021   "

## 2021-06-16 NOTE — PROGRESS NOTES
"Ricky Lea is a 71 y.o. female who is being evaluated via a billable telephone visit.      What phone number would you like to be contacted at?  [phone]  How would you like to obtain your AVS? AVS Preference: Mail a copy.    Assessment & Plan     Pa was seen today for back pain.    Diagnoses and all orders for this visit:    Chronic midline low back pain without sciatica  -     gabapentin (NEURONTIN) 300 MG capsule; Take 1 tab PO in AM and at noon, then at bedtime take 3 tabs PO.    Stage 3a chronic kidney disease    increase gabapentin and follow up to see if it helps at already scheduled follow up appointment.    Increase gabapentin at bedtime dose  Increase prn tylenol from 500mg to 1000mg.  Do PT which is set up to start 4/20.    Prescription drug management  40 minutes spent on the date of the encounter doing chart review, history and exam, documentation and further activities per the note       Tobacco Cessation:   reports that she has never smoked. Her smokeless tobacco use includes chew.    BMI:   Estimated body mass index is 27.25 kg/m  as calculated from the following:    Height as of 3/22/21: 4' 11.02\" (1.499 m).    Weight as of 3/22/21: 135 lb (61.2 kg).       Return in about 4 weeks (around 5/4/2021) for Recheck.    Kasie Mantilla MD  [facility]   Ricky Lea is 71 y.o. and presents today for the following health issues   HPI   Chronic back pain, worse the past 3 months.  Taking all meds that are set up for her, daily.    How is the pain worse? No matter what position I'm in, there is pain. I have tylenol to take prn. I take 1 pill prn.   When I move around I have to hold onto sometime to keep my back up.  She thought she'd been to the ER and that they told her there was nothing wrong. I told her she went to the spine clinic and they found a crushed vertebra. It was difficult by phone to describe this, but I told her they found a very good reason for her " pain.    I spoke with the PCA. She was not sure when the nurse would come again to set up meds. I asked her to help Ricky Lea remember to take 2 tabs tylenol when she takes it prn.    Review of Systems  No constipation      Objective       Vitals:  No vitals were obtained today due to virtual visit.    Physical Exam  No exam, phone visit            Phone call duration: 35 minutes

## 2021-06-16 NOTE — TELEPHONE ENCOUNTER
So sorry, CMT is alone and overwhelmed with calls.  Can  please assist in calling pt for COVID this Sat and in 4 weeks - follow  Up apt with PCP?  Thanks.

## 2021-06-16 NOTE — PROGRESS NOTES
"ASSESMENT AND PLAN:  Diagnoses and all orders for this visit:    Breast pain in female  We will start with mammogram today.  Her only medication is pravastatin.  Not on psych meds.  Will consider labs/additional work up.  F/U in 2 weeks.    Epigastric pain  Will try omeprazole.    Elevated BP without diagnosis of hypertension  Recheck in 2 weeks.  Start medication if indicated.      SUBJECTIVE: Ricky Lea is a 67-year-old female with history of hyperlipidemia here with the complaint of bilateral breast pain for 1 week.  No similar problem in the past.  She had normal mammogram in December 2016.  No nipple discharge.  She is taking only one medication, pravastatin.  No other muscle aches or pain.  She is also complaining of epigastric pain/burning. No nausea or vomiting.  She sometimes feels mid chest tightness, denied difficulty breathing or chest pain.    No past medical history on file.  Patient Active Problem List   Diagnosis     Hyperlipidemia, unspecified hyperlipidemia type     Pulmonary nodules     Left shoulder pain       Allergies:  No Known Allergies    History   Smoking Status     Never Smoker   Smokeless Tobacco     Current User     Types: Chew     Comment: beta nut with tobacco       Review of systems otherwise negative except as listed in HPI.   History   Smoking Status     Never Smoker   Smokeless Tobacco     Current User     Types: Chew     Comment: beta nut with tobacco       OBJECTICE: /90 (Patient Site: Left Arm, Patient Position: Sitting, Cuff Size: Adult Regular)  Pulse 76  Temp 98.6  F (37  C) (Oral)   Resp 20  Ht 4' 10.66\" (1.49 m)  Wt 130 lb 1 oz (59 kg)  BMI 26.57 kg/m2          GEN-alert,  in no apparent distress.  HEENT-mucous membranes are moist, neck is supple.  CV-regular rate and rhythm with no murmur.   RESP-lungs clear to auscultation .  ABDOMEN- Soft , mild epigastric tenderness.  BREASTS-generalized tenderness of both breasts, no palpable masses.  Axillary lymph node " enlargement.  No nipple discharge.  SKIN-normal        [facility]   2/28/2018

## 2021-06-16 NOTE — TELEPHONE ENCOUNTER
----- Message from Maureen Palafox PA-C sent at 4/2/2021 12:17 PM CDT -----  Please call the patient let her know that I reviewed her MRI lumbar spine.  This shows that the T11 compression fracture seen on xray has healed.  There is no new fracture.  Therefore patient does not need a brace.  There is some degeneration in the spine including arthritis in the joints in her lower back.  There is also a small disc protrusion causing some narrowing around nerves.  I recommend physical therapy and follow-up with me in 4 weeks.  I am happy to see the patient sooner to review the results.

## 2021-06-16 NOTE — TELEPHONE ENCOUNTER
RN cannot approve Refill Request    RN can NOT refill this medication med is not covered by policy/route to provider. Last office visit: 3/16/2021 Bassam Llanos MD Last Physical: 8/13/2019 Last MTM visit: Visit date not found Last visit same specialty: 3/16/2021 Bassam Llanos MD.  Next visit within 3 mo: Visit date not found  Next physical within 3 mo: Visit date not found      Mary Ann Lema, [facility] Triage/Med Refill 3/18/2021    Requested Prescriptions   Pending Prescriptions Disp Refills     meloxicam (MOBIC) 15 MG tablet [Pharmacy Med Name: MELOXICAM 15 MG TABLET 15 Tablet] 30 tablet 5     Sig: TAKE 1 PILL (15 MG) BY MOUTH EVERYDAY FOR PAIN       There is no refill protocol information for this order

## 2021-06-17 ENCOUNTER — COMMUNICATION - HEALTHEAST (OUTPATIENT)
Dept: FAMILY MEDICINE | Facility: CLINIC | Age: 71
End: 2021-06-17

## 2021-06-17 DIAGNOSIS — M25.512 LEFT SHOULDER PAIN, UNSPECIFIED CHRONICITY: ICD-10-CM

## 2021-06-17 DIAGNOSIS — R42 DIZZINESS: ICD-10-CM

## 2021-06-17 RX ORDER — ACETAMINOPHEN 500 MG
500 TABLET ORAL EVERY 6 HOURS PRN
Qty: 100 TABLET | Refills: 2 | Status: SHIPPED | OUTPATIENT
Start: 2021-06-17 | End: 2022-02-10

## 2021-06-17 RX ORDER — MECLIZINE HYDROCHLORIDE 25 MG/1
25 TABLET ORAL 3 TIMES DAILY PRN
Qty: 30 TABLET | Refills: 1 | Status: SHIPPED | OUTPATIENT
Start: 2021-06-17 | End: 2022-03-01

## 2021-06-17 NOTE — PROGRESS NOTES
ASSESMENT AND PLAN:  Diagnoses and all orders for this visit:    Type 2 diabetes mellitus treated without insulin (H)  No medication changes for now.  Recheck in 3 months.  Last A1c 7.2.    Essential hypertension  Normal blood pressure without medication.  We will continue to monitor.    Moderate single current episode of major depressive disorder (H)  -     DULoxetine (CYMBALTA) 60 MG capsule; Take 1 capsule (60 mg total) by mouth daily.  Dispense: 30 capsule; Refill: 5  We will increase Cymbalta to 60 mg daily also for her pain.    Dizziness  -     CT Head Without Contrast    Hyperlipidemia, unspecified hyperlipidemia type  Recheck lipid at next visit.  Continue Lipitor 20 mg daily.    Burning sensation of feet  Currently on gabapentin 300 mg 4 tablets a day.  Will consider adding additional medication.  Will hold increasing gabapentin dose due to ongoing dizziness.    Age-related osteoporosis without current pathological fracture  Continue Fosamax once a week.    Noncompliance with medication regimen  Better compliance with home care.    Compression fracture of T11 vertebra, sequela  Managed by spine center.        This transcription uses voice recognition software, which may contain typographical errors.      SUBJECTIVE: Ricky Lea is a 71-year-old female with history of depression, diabetes, hyperlipidemia, hypertension, dizziness, compression fracture and chronic back pain here for follow-up.  She has home care RN helping her with her pillbox every 2 weeks.  Her father fasting blood sugars are in the 150s to 170 range.  She is currently taking Metformin  mg once a day.  Her last A1c was 7.2 in 2/21.  She denied hypoglycemic symptoms.  She takes gabapentin 300 mg 1 in the morning and 3 at night for neuropathy and burning sensation.  She thinks is helping but still having burning sensation in her feet.  Dizziness comes and goes.  It has been several months now.  She takes meclizine, she is not sure if the  "medication is helping.  No acute headache but has history of headaches on and off.  No acute vision changes.  No nausea or vomiting.  She denied suicidal homicidal ideations.  She was referred to spine clinic for compression fracture.  She is receiving physical therapy.    No past medical history on file.  Patient Active Problem List   Diagnosis     Hyperlipidemia, unspecified hyperlipidemia type     Pulmonary nodules     Left shoulder pain     Essential hypertension     Moderate single current episode of major depressive disorder (H)     Skin cancer of face     Dry skin     Noncompliance with medication regimen     Intrinsic eczema     Age-related osteoporosis without current pathological fracture     Type 2 diabetes mellitus treated without insulin (H)     Osteoporosis, unspecified osteoporosis type, unspecified pathological fracture presence     Urinary incontinence, unspecified type     Burning sensation of feet     Chronic midline low back pain without sciatica     Chronic kidney disease, stage 3     Compression fracture of T11 vertebra, sequela       Allergies:  No Known Allergies    Social History     Tobacco Use   Smoking Status Never Smoker   Smokeless Tobacco Current User     Types: Chew   Tobacco Comment    chews betal nut        Review of systems otherwise negative except as listed in HPI.   Social History     Tobacco Use   Smoking Status Never Smoker   Smokeless Tobacco Current User     Types: Chew   Tobacco Comment    chews betal nut        OBJECTICE: /70   Pulse 72   Temp 98.4  F (36.9  C) (Oral)   Resp 16   Ht 4' 11.02\" (1.499 m)   Wt 136 lb 5 oz (61.8 kg)   BMI 27.51 kg/m      DATA REVIEWED:  Additional History from Old Records Summarized (2):   Radiology Tests Summarized or Ordered (1):   Labs Reviewed or Ordered (1):       GEN-alert,  in no apparent distress.  HEENT-mucous membranes are moist, neck is supple.  CV-regular rate and rhythm with no murmur.   RESP-lungs clear to " auscultation .  ABDOMEN- Soft , not tender.  EXTREM- No edema. Dry skin feet. No open lesions or ulcers.   SKIN-normal        [facility]   5/7/2021

## 2021-06-17 NOTE — PROGRESS NOTES
[facility] Daily Progress     Patient Name: Ricky Lea  Date: 5/14/2021  Visit #: 3  PTA visit #: 1  Referral Diagnosis: Chronic right -sided low back pain without sciatica  Referring provider: Maureen Palafox PA-C  Visit Diagnosis:     ICD-10-CM    1. Chronic right-sided low back pain without sciatica  M54.5     G89.29    2. Generalized muscle weakness  M62.81      Ricky Lea is a 71 y.o. female who presents to therapy today with chief complaints of chronic low back pain, worsening 3 months ago. Onset date of sx was 1/20/21.  Pt reported h/o remote T11 compression fracture.  Pain symptoms are consistent with chronic low back pain with limitations in lumbar ROM and LE weakness.  Functional impairments include difficulty with transitions including sitting to standing and laying to supine as well as pain and LE weakness with walking.  Pt demo's signs and sx consistent with chronic low back pain in context of muscle weakness and muscle guarding.   Skilled PT is required to address lumbar ROM limitations and LE/lumbar/core strength deficits to decrease pain and improve ability to perform activities of daily living and quality of life.     Assessment:     HEP/POC compliance is  good .  The patient presents to PT for follow up visit.  She has not noticed changes in her pain, so was given specific exercises for osteoporosis today.  She did not have c/o pain during exercises but did notice stretching.  She is appropriate to continue with skilled PT services at this time.    Goal Status:  Pt. will be independent with home exercise program in : 4 weeks    Pt will: be able to ambulate with SEC without assistance x 150 feet in 12 weeks  Pt will: be able to stand from chair without UE use and pain rating <4/10 in 12 weeks  Pt will: be able to get out of bed without assistance with <4/10 pain in 12 weeks    Plan / Patient Education:     Continue with initial plan of care.  Progress with home program as tolerated.   Continue  with osteoporosis exercises if helpful.  MT PRN.  LE strengthening.    Subjective:     Pain Ratin  The patient reports that she is still having lower back pain; it is not getting better.  She has been doing her exercises but notices no difference afterwards.  She had shooting pain in the L leg which was very painful.  Patient reports burning pain in her feet as well which is always there.    Objective:     Lumbar ROM:  Flexion:  Moderate loss with pain  Ext: minimal loss  SB: minimal loss B with pulling  Rotation: mod loss with discomfort    Exercises:  Exercise #1: lower trunk rotations HEP  Comment #1: double knee to chest DC  Exercise #2: bridging HEP  Comment #2: Clamshell HEP  Exercise #3: Leg lengthener x 5 bilaterally  Comment #3: Supine glute squeeze x 8  Exercise #4: supine march double leg HEP  Comment #4: Morning stretches x 3 each direction    Appt time: 10:37AM - 11:04AM    Treatment Today     TREATMENT MINUTES COMMENTS   Evaluation     Self-care/ Home management     Manual therapy     Neuromuscular Re-education     Therapeutic Activity     Therapeutic Exercises 27 -Subjective measures taken  -See flow sheet; added osteoporosis exercises to HEP for bone health and pain   Gait training     Modality__________________                Total 27    Blank areas are intentional and mean the treatment did not include these items.       Deb Mcmanus, PT, DPT  2021

## 2021-06-17 NOTE — PROGRESS NOTES
[facility] Daily Progress     Patient Name: Ricky Lea  Date: 5/21/2021  Visit #: 4  PTA visit #: 1  Referral Diagnosis: Chronic right -sided low back pain without sciatica  Referring provider: Maureen Palafox PA-C  Visit Diagnosis:     ICD-10-CM    1. Chronic right-sided low back pain without sciatica  M54.5     G89.29    2. Generalized muscle weakness  M62.81      Ricky Lea is a 71 y.o. female who presents to therapy today with chief complaints of chronic low back pain, worsening 3 months ago. Onset date of sx was 1/20/21.  Pt reported h/o remote T11 compression fracture.  Pain symptoms are consistent with chronic low back pain with limitations in lumbar ROM and LE weakness.  Functional impairments include difficulty with transitions including sitting to standing and laying to supine as well as pain and LE weakness with walking.  Pt demo's signs and sx consistent with chronic low back pain in context of muscle weakness and muscle guarding.   Skilled PT is required to address lumbar ROM limitations and LE/lumbar/core strength deficits to decrease pain and improve ability to perform activities of daily living and quality of life.     Assessment:     HEP/POC compliance is  good .  The patient presents to PT for follow up visit.  She still has the pain in her back but does have times where pain is less than others.  She has a good understanding of her HEP and was given slump sliders to address LE symptoms.  She is appropriate to continue with skilled PT services at this time.    Goal Status:  Pt. will be independent with home exercise program in : 4 weeks    Pt will: be able to ambulate with SEC without assistance x 150 feet in 12 weeks  Pt will: be able to stand from chair without UE use and pain rating <4/10 in 12 weeks  Pt will: be able to get out of bed without assistance with <4/10 pain in 12 weeks    Plan / Patient Education:     Continue with initial plan of care.  Progress with home program as  tolerated.   Continue with osteoporosis exercises if helpful.  MT PRN.  LE strengthening.    Subjective:     Pain Ratin-8  The patient reports that she is doing well in general, but still having the issues with her lower back.  She has not noticed any changes since last session.  She is still having pain in the lower back and knee.  She also has pain from her lower back to her foot.  She denies numbness or tingling symptoms.  There are some days with more pain and some days with less pain.     via iPad/phone    Objective:     Lumbar ROM:  Flexion:  To mid shin with pain in the lower back  Ext: WNL with pain in the lower back  SB: minimal loss B with pain on the L  Rotation: mod loss without pain    Exercises:  Exercise #1: lower trunk rotations HEP  Comment #1: double knee to chest DC  Exercise #2: bridging HEP  Comment #2: Clamshell HEP  Exercise #3: Leg lengthener x 2 bilaterally  Comment #3: Supine glute squeeze x 6  Exercise #4: supine march double leg x 3 bilaterally  Comment #4: Morning stretches x 1 each direction  Exercise #5: Slump sliders x 10    Appt time: 10:35AM - 11:04AM    Treatment Today     TREATMENT MINUTES COMMENTS   Evaluation     Self-care/ Home management     Manual therapy     Neuromuscular Re-education     Therapeutic Activity     Therapeutic Exercises 29 -Subjective measures taken  -See flow sheet; added slump sliders to HEP for nerve and LE pain symptoms  -Review of HEP   Gait training     Modality__________________                Total 29    Blank areas are intentional and mean the treatment did not include these items.       Deb Mcmanus, PT, DPT  2021

## 2021-06-17 NOTE — TELEPHONE ENCOUNTER
RN cannot approve Refill Request    RN can NOT refill this medication Protocol failed and NO refill given. Last office visit: 5/7/2021 Bassam Llanos MD Last Physical: 8/13/2019 Last MTM visit: Visit date not found Last visit same specialty: 5/7/2021 Bassam Llnaos MD.  Next visit within 3 mo: Visit date not found  Next physical within 3 mo: Visit date not found      Mary Ann Lema, [facility] Triage/Med Refill 5/20/2021    Requested Prescriptions   Pending Prescriptions Disp Refills     meclizine (ANTIVERT) 25 mg tablet [Pharmacy Med Name: MECLIZINE HCL 25 MG TABS 25 Tablet] 30 tablet 1     Sig: TAKE 1 TABLET (25 MG TOTAL) BY MOUTH 3 (THREE) TIMES A DAY AS NEEDED FOR DIZZINESS OR NAUSEA.       There is no refill protocol information for this order        alendronate (FOSAMAX) 70 MG tablet [Pharmacy Med Name: ALENDRONATE NA 70 MG TAB 70 Tablet] 12 tablet 3     Sig: TAKE 1 PILL BY MOUTH EVERY WEEK FOR BONES       Biphosphonates Refill Protocol Passed - 5/20/2021  9:32 AM        Passed - PCP or prescribing provider visit in last 12 months     Last office visit with prescriber/PCP: 5/7/2021 Bassam Llanos MD OR same dept: 5/7/2021 Bassam Llanos MD OR same specialty: 5/7/2021 Bassam Llanos MD  Last physical: 8/13/2019 Last MTM visit: Visit date not found   Next visit within 3 mo: Visit date not found  Next physical within 3 mo: Visit date not found  Prescriber OR PCP: Bassam Llanos MD  Last diagnosis associated with med order: 1. Dizziness  - meclizine (ANTIVERT) 25 mg tablet [Pharmacy Med Name: MECLIZINE HCL 25 MG TABS 25 Tablet]; Take 1 tablet (25 mg total) by mouth 3 (three) times a day as needed for dizziness or nausea.  Dispense: 30 tablet; Refill: 1    2. Age-related osteoporosis without current pathological fracture  - alendronate (FOSAMAX) 70 MG tablet [Pharmacy Med Name: ALENDRONATE NA 70 MG TAB 70 Tablet]; TAKE 1 PILL BY MOUTH EVERY WEEK FOR BONES  Dispense: 12 tablet; Refill: 3    If protocol passes may refill for 12 months  if within 3 months of last provider visit (or a total of 15 months).             Passed - Serum creatinine in last 12 months     Creatinine   Date Value Ref Range Status   02/09/2021 1.02 0.60 - 1.10 mg/dL Final

## 2021-06-17 NOTE — TELEPHONE ENCOUNTER
Refill Approved    Rx renewed per Medication Renewal Policy. Medication was last renewed on 02/09/2021.  Last office visit was 04/06/2021 with Dr Mantilla.    Constance Espinoza, [facility] Triage/Med Refill 5/3/2021     Requested Prescriptions   Pending Prescriptions Disp Refills     DULoxetine (CYMBALTA) 30 MG capsule [Pharmacy Med Name: DULOXETINE HCL 30 MG CPEP 30 Capsule] 30 capsule 2     Sig: TAKE 1 CAPSULE (30 MG TOTAL) BY MOUTH DAILY FOR DEPRESSION       Tricyclics/Misc Antidepressant/Antianxiety Meds Refill Protocol Passed - 5/3/2021  8:29 AM        Passed - PCP or prescribing provider visit in last year     Last office visit with prescriber/PCP: 2/9/2021 Ozzy Jerome MD OR same dept: 3/16/2021 Bassam Llanos MD OR same specialty: 3/16/2021 Bassam Llanos MD  Last physical: Visit date not found Last MTM visit: Visit date not found   Next visit within 3 mo: Visit date not found  Next physical within 3 mo: Visit date not found  Prescriber OR PCP: Ozzy Jerome MD  Last diagnosis associated with med order: 1. Moderate single current episode of major depressive disorder (H)  - DULoxetine (CYMBALTA) 30 MG capsule [Pharmacy Med Name: DULOXETINE HCL 30 MG CPEP 30 Capsule]; TAKE 1 CAPSULE (30 MG TOTAL) BY MOUTH DAILY FOR DEPRESSION  Dispense: 30 capsule; Refill: 2    2. Radicular pain in left arm  - DULoxetine (CYMBALTA) 30 MG capsule [Pharmacy Med Name: DULOXETINE HCL 30 MG CPEP 30 Capsule]; TAKE 1 CAPSULE (30 MG TOTAL) BY MOUTH DAILY FOR DEPRESSION  Dispense: 30 capsule; Refill: 2    If protocol passes may refill for 12 months if within 3 months of last provider visit (or a total of 15 months).

## 2021-06-17 NOTE — TELEPHONE ENCOUNTER
RN cannot approve Refill Request    RN can NOT refill this medication Refill Rx dose different that current Rx dose on current med list. Last office visit: 3/16/2021 Bassam Llanos MD Last Physical: 8/13/2019 Last MTM visit: Visit date not found Last visit same specialty: 3/16/2021 Bassam Llanos MD.  Next visit within 3 mo: Visit date not found  Next physical within 3 mo: Visit date not found      Mary Ann Lema, [facility] Triage/Med Refill 4/26/2021    Requested Prescriptions   Pending Prescriptions Disp Refills     metFORMIN (GLUCOPHAGE-XR) 500 MG 24 hr tablet [Pharmacy Med Name: METFORMIN ER 500MG 24HR TABS] 30 tablet 5     Sig: TAKE 1 TABLET(500 MG) BY MOUTH DAILY WITH BREAKFAST       Metformin Refill Protocol Passed - 4/26/2021  5:12 AM        Passed - Blood pressure in last 12 months     BP Readings from Last 1 Encounters:   03/22/21 123/79             Passed - LFT or AST or ALT in last 12 months     Albumin   Date Value Ref Range Status   02/09/2021 3.7 3.5 - 5.0 g/dL Final     Bilirubin, Total   Date Value Ref Range Status   02/09/2021 0.6 0.0 - 1.0 mg/dL Final     Bilirubin, Direct   Date Value Ref Range Status   06/09/2016 0.1 <=0.5 mg/dL Final     Alkaline Phosphatase   Date Value Ref Range Status   02/09/2021 66 45 - 120 U/L Final     AST   Date Value Ref Range Status   02/09/2021 18 0 - 40 U/L Final     ALT   Date Value Ref Range Status   02/09/2021 23 0 - 45 U/L Final     Protein, Total   Date Value Ref Range Status   02/09/2021 6.8 6.0 - 8.0 g/dL Final                Passed - GFR or Serum Creatinine in last 6 months     GFR MDRD Non Af Amer   Date Value Ref Range Status   02/09/2021 54 (L) >60 mL/min/1.73m2 Final     GFR MDRD Af Amer   Date Value Ref Range Status   02/09/2021 >60 >60 mL/min/1.73m2 Final             Passed - Visit with PCP or prescribing provider visit in last 6 months or next 3 months     Last office visit with prescriber/PCP: 3/16/2021 OR same dept: 3/16/2021 Bassam Llanos MD OR same  specialty: 3/16/2021 Bassam Llanos MD Last physical: Visit date not found Last MTM visit: Visit date not found         Next appt within 3 mo: Visit date not found  Next physical within 3 mo: Visit date not found  Prescriber OR PCP: Bassam Llanos MD  Last diagnosis associated with med order: 1. Type 2 diabetes mellitus treated without insulin (H)  - metFORMIN (GLUCOPHAGE-XR) 500 MG 24 hr tablet [Pharmacy Med Name: METFORMIN ER 500MG 24HR TABS]; TAKE 1 TABLET(500 MG) BY MOUTH DAILY WITH BREAKFAST  Dispense: 30 tablet; Refill: 5     If protocol passes may refill for 12 months if within 3 months of last provider visit (or a total of 15 months).           Passed - A1C in last 6 months     Hemoglobin A1c   Date Value Ref Range Status   02/09/2021 7.2 (H) <=5.6 % Final               Passed - Microalbumin in last year      Microalbumin, Random Urine   Date Value Ref Range Status   07/28/2020 <0.50 0.00 - 1.99 mg/dL Final

## 2021-06-17 NOTE — PROGRESS NOTES
[facility] Daily Progress     Patient Name: Ricky Lea  Date: 5/6/2021  Visit #:2  PTA visit #: 1  Referral Diagnosis: Chronic right -sided low back pain without sciatica  Referring provider: Maureen Palafox PA-C  Visit Diagnosis:     ICD-10-CM    1. Chronic right-sided low back pain without sciatica  M54.5     G89.29    2. Generalized muscle weakness  M62.81        Ricky Lea is a 71 y.o. female who presents to therapy today with chief complaints of chronic low back pain, worsening 3 months ago. Onset date of sx was 1/20/21.  Pt reported h/o remote T11 compression fracture.  Pain symptoms are consistent with chronic low back pain with limitations in lumbar ROM and LE weakness.  Functional impairments include difficulty with transitions including sitting to standing and laying to supine as well as pain and LE weakness with walking.  Pt demo's signs and sx consistent with chronic low back pain in context of muscle weakness and muscle guarding.   Skilled PT is required to address lumbar ROM limitations and LE/lumbar/core strength deficits to decrease pain and improve ability to perform activities of daily living and quality of life.   Assessment:     Patient is benefitting from skilled physical therapy and is making steady progress toward functional goals.  Patient is appropriate to continue with skilled physical therapy intervention, as indicated by initial plan of care.    Goal Status:  Pt. will be independent with home exercise program in : 4 weeks    Pt will: be able to ambulate with SEC without assistance x 150 feet in 12 weeks  Pt will: be able to stand from chair without UE use and pain rating <4/10 in 12 weeks  Pt will: be able to get out of bed without assistance with <4/10 pain in 12 weeks      Plan / Patient Education:     Continue with initial plan of care.  Progress with home program as tolerated.   Continue to review and progress HEP.  Pt instructed to perform her hEP 2x/day.     Subjective:   Pt  reports her back still hurts.  Pt reports she has been doing her exercises every morning.   Pain Ratin-9        Objective:     Lumbar ROM:  Flexion:  Moderate loss pain  Ext: minimal loss  SB: minimal loss B  Rotation: mod loss pain    Exercises:  Exercise #1: lower trunk rotations x 10 each direction  Comment #1: double knee to chest x 5 reps with 5 second holds  Exercise #2: bridging x 10 reps  Exercise #3: clamshell  Comment #3: x10 B  Exercise #4: supine march double leg  Comment #4: x10    Treatment Today     TREATMENT MINUTES COMMENTS   Evaluation     Self-care/ Home management     Manual therapy     Neuromuscular Re-education     Therapeutic Activity     Therapeutic Exercises 30 See flow sheet  Reviewed HEP  Added to HEP:  -clamshell  -supine double leg march   Gait training     Modality__________________                Total 30    Blank areas are intentional and mean the treatment did not include these items.       Ani Ba,CLT  2021

## 2021-06-18 NOTE — PATIENT INSTRUCTIONS - HE
Patient Instructions by Deb Mcmanus PT at 5/14/2021 10:30 AM     Author: Deb Mcmanus PT Service: -- Author Type: Physical Therapist    Filed: 5/14/2021 11:01 AM Encounter Date: 5/14/2021 Status: Addendum    : Deb Mcmanus PT (Physical Therapist)    Related Notes: Original Note by Deb Mcmanus PT (Physical Therapist) filed at 5/14/2021 11:01 AM                  LEG LENGTHENER    -Straighten right leg down to the floor.  Pull toes and forefoot toward the knee and extend the heel; lengthen leg by pulling your hip away from your rib cage.  -Hold 2-3 seconds, then relax.  Repeat.  Re-bend your knee  -Repeat with the left leg    x5-10 repetitions each side                               BUTTOCK SQUEEZE    -Put arms at sides, palms up, slightly away from the body, or rest them on abdomen  -Squeeze buttocks muscles as tightly as possible (say: tight, tighter, tightest) and lift tailbone slightly off the floor.  Hold 2-3 seconds                  MORNING STRETCHES    -Keeping arms extended over head and feet flexed, stretch and lengthen:  A.) Same side arm & leg. Hold 2-3 seconds.  B.) Opposite arm & leg. Hold 2-3 seconds.  C.) Both arms & legs. Hold 2-3 seconds    x3-5 each

## 2021-06-18 NOTE — PROGRESS NOTES
ASSESMENT AND PLAN:  Diagnoses and all orders for this visit:    Moderate single current episode of major depressive disorder (H)  Started on Prozac about a month ago.  No side effects reported.  Continue current dose.  Follow-up in 2 months.    Hyperlipidemia, unspecified hyperlipidemia type  Last lipid in 3/18, .  Continue current med.  Recheck at next visit.    Essential hypertension  Controlled.  Normal CMP in 3/18.    Pulmonary nodules  CT was in 2016.  Benign-appearing nodules.  No follow-up recommended.    Cough  -     benzonatate (TESSALON) 200 MG capsule; Take 1 capsule (200 mg total) by mouth 3 (three) times a day as needed for cough.  Dispense: 60 capsule; Refill: 0        SUBJECTIVE: Ricky Lea is a 68-year-old female with history of hypertension, hyperlipidemia and depression here for follow-up.  She was started on Prozac about 4 weeks ago.  No side effects reported since starting on Prozac and states it is working for her.  Denied suicidal or homicidal ideations.  Her other medications are pravastatin, lisinopril and omeprazole.  She uses hydrocortisone ointment for dry skin on her feet.  No significant improvement but not worsening.  Denied foot pain.    She has been having itchy throat and dry cough for the past 2 weeks.  No fever.  No sore throat.  No headaches.      No past medical history on file.  Patient Active Problem List   Diagnosis     Hyperlipidemia, unspecified hyperlipidemia type     Pulmonary nodules     Left shoulder pain     Essential hypertension     Moderate single current episode of major depressive disorder (H)       Allergies:  No Known Allergies    History   Smoking Status     Never Smoker   Smokeless Tobacco     Current User     Types: Chew     Comment: beta nut with tobacco       Review of systems otherwise negative except as listed in HPI.   History   Smoking Status     Never Smoker   Smokeless Tobacco     Current User     Types: Chew     Comment: beta nut with tobacco  "      OBJECTICE: /78  Pulse 68  Temp 97.9  F (36.6  C) (Oral)   Resp 20  Ht 4' 11\" (1.499 m)  Wt 126 lb 5 oz (57.3 kg)  BMI 25.51 kg/m2    DATA REVIEWED:    Labs Reviewed or Ordered (1):       GEN-alert,  in no apparent distress. Mood is normal.  HEENT-neck is supple.  CV-regular rate and rhythm with no murmur.   RESP-lungs clear to auscultation .  ABDOMEN- Soft , not tender.  EXTREM- No edema.  SKIN-normal    This transcription uses voice recognition software, which may contain typographical errors.        Bassam Llanos   6/13/2018     "

## 2021-06-18 NOTE — PROGRESS NOTES
"  Assessment and Plan:   1. Routine general medical examination at a health care facility  Deferred pap/colonoscopy per pt.     2. Essential hypertension  Controlled with lisinopril 10 mg daily    3. Hyperlipidemia, unspecified hyperlipidemia type  Last lipid in 3/18,    Recheck at next visit    4. Depression  Denied suicidal or homicidal ideations.  Started on Prozac, potential side effects discussed.  Follow-up in 4 weeks.  - FLUoxetine (PROZAC) 20 MG capsule; Take 1 capsule (20 mg total) by mouth daily.  Dispense: 30 capsule; Refill: 5    5. Immunization due  - Pneumococcal polysaccharide vaccine 23-valent 3 yo or older, subq/IM        The patient's current medical problems were reviewed.    I have had an Advance Directives discussion with the patient.  The following health maintenance schedule was reviewed with the patient and provided in printed form in the after visit summary:   Health Maintenance   Topic Date Due     ADVANCE DIRECTIVES DISCUSSED WITH PATIENT  03/25/1970     ZOSTER VACCINE  03/25/2012     DXA SCAN  03/25/2017     PNEUMOCOCCAL POLYSACCHARIDE VACCINE AGE 65 AND OVER  03/25/2017     DEPRESSION FOLLOW UP  02/10/2018     FALL RISK ASSESSMENT  05/10/2018     MAMMOGRAM  02/28/2020     COLONOSCOPY  02/13/2027     TD 18+ HE  07/26/2027     INFLUENZA VACCINE RULE BASED  Completed     PNEUMOCOCCAL CONJUGATE VACCINE FOR ADULTS (PCV13 OR PREVNAR)  Completed        Subjective:   Chief Complaint: Ricky Lea is an 68 y.o. female here for an Annual Wellness visit.   HPI:  No new concerns.  Started on lisinopril 2 months ago, tolerating well. BP normal today.  Never had pap smear before, does not want one today.  Lives with daughter and 4 grandchildren.  Current stress-daughter is getting , she feels sorry for her daughter and grandchildren.    Review of Systems:  No abnormal bleeding. No Suicidal or homicidal ideations. \" gets upset easily \" for years.       Patient Care Team:  Bassam Llanos MD as PCP - " "General (Family Medicine)     Patient Active Problem List   Diagnosis     Hyperlipidemia, unspecified hyperlipidemia type     Pulmonary nodules     Left shoulder pain     Essential hypertension     No past medical history on file.   No past surgical history on file.   Family History   Problem Relation Age of Onset     Breast cancer Neg Hx       Social History     Social History     Marital status:      Spouse name: N/A     Number of children: N/A     Years of education: N/A     Occupational History     Not on file.     Social History Main Topics     Smoking status: Never Smoker     Smokeless tobacco: Current User     Types: Chew      Comment: beta nut with tobacco     Alcohol use Not on file     Drug use: Not on file     Sexual activity: Not on file     Other Topics Concern     Not on file     Social History Narrative      Current Outpatient Prescriptions   Medication Sig Dispense Refill     acetaminophen (TYLENOL) 325 MG tablet Take 2 tablets (650 mg total) by mouth every 4 (four) hours as needed for pain. 100 tablet 2     lisinopril (PRINIVIL,ZESTRIL) 10 MG tablet Take 1 tablet (10 mg total) by mouth daily. 30 tablet 5     omeprazole (PRILOSEC) 20 MG capsule Take 1 capsule (20 mg total) by mouth daily. 90 capsule 3     pravastatin (PRAVACHOL) 20 MG tablet TAKE 1 TABLET BY MOUTH AT BEDTIME 30 tablet 11     hydrocortisone 0.5 % ointment Apply thin layer to affected area once daily 56 g 0     ibuprofen (ADVIL,MOTRIN) 600 MG tablet Take 1 tablet (600 mg total) by mouth 3 times a day after meals. 60 tablet 1     white petrolatum-mineral oil (EUCERIN) Crea Apply to affected area twice a day 120 g 3     No current facility-administered medications for this visit.       Objective:   Vital Signs:   Visit Vitals     /80 (Patient Site: Left Arm, Patient Position: Sitting, Cuff Size: Adult Regular)     Pulse 64     Temp 97.8  F (36.6  C) (Oral)     Resp 16     Ht 4' 11\" (1.499 m)     Wt 127 lb 3 oz (57.7 kg)     " BMI 25.69 kg/m2        VisionScreening:  No exam data present     PHYSICAL EXAM  Gen - alert, orientated, NAD  Eyes - fundascopic exam limited by the undialated pupil but looks symmetric  ENT - oropharynx clear, TMs clear  Neck - supple, no palpable mass or lymphadenopathy  CV - RRR, no murmur  Breast - no dominant mass on either side, no axillary mass.  Resp - lungs CTA  Ab - soft, nontender, no palpable mass or organomegaly   -  Deferred.  Extrem - warm, no edema  Neuro - CN II-XII intact, strength, sensation, reflexes intact and symmetric  Skin - no rash.  No atypical appearing lesions seen.       Assessment Results 5/16/2018   Activities of Daily Living No help needed   Instrumental Activities of Daily Living 5-6 - Severe functional impairment   Get Up and Go Score 12 seconds or more     A Mini-Cog score of 0-2 suggests the possibility of dementia, score of 3-5 suggests no dementia    Identified Health Risks:     She is at risk for lack of exercise and has been provided with information to increase physical activity for the benefit of her well-being.  The patient reports that she has difficulty with instrumental activities of daily living.She is at risk for falling and has been provided with information to reduce the risk of falling at home.  Information regarding advance directives ([facility])

## 2021-06-19 NOTE — PROGRESS NOTES
ASSESMENT AND PLAN:  Diagnoses and all orders for this visit:    Essential hypertension  -     lisinopril (PRINIVIL,ZESTRIL) 10 MG tablet; Take 1 tablet (10 mg total) by mouth daily.  Dispense: 30 tablet; Refill: 5    Hyperlipidemia, unspecified hyperlipidemia type  -     Lipid Cascade    Moderate single current episode of major depressive disorder (H)  Improving. Continue prozac.    Burning sensation of feet  Will try Neurontin.    Cough  ?? From lisinopril. Pt unable to clarify duration of cough.   Will consider alternative BP med if cough still present at next visit.   She will try robitussin.      SUBJECTIVE: Ricky Lea is here to follow up.    HTN- On lisinopril 10 mg daily, taking as Rxed. No SE reported. Trying to cut back on salt, but said it is difficult.     Depression-  On prozac sine 5/18. States she is happier , no more frequent cries, eating better. Still having trouble sleeping at night but not every night. No SI/HI.    Epigastric pain - Improved. Not on med anymore.    New concern- Burning sensation in feet, especially at night. Uses lotion for dry feet. No pain , no joint swelling or tenderness. No numbness.     Cough on and off at night , tessalon is not helping. No fever. Non productive cough.     No past medical history on file.  Patient Active Problem List   Diagnosis     Hyperlipidemia, unspecified hyperlipidemia type     Pulmonary nodules     Left shoulder pain     Essential hypertension     Moderate single current episode of major depressive disorder (H)       Allergies:  No Known Allergies    History   Smoking Status     Never Smoker   Smokeless Tobacco     Current User     Types: Chew     Comment: beta nut with tobacco       Review of systems otherwise negative except as listed in HPI.   History   Smoking Status     Never Smoker   Smokeless Tobacco     Current User     Types: Chew     Comment: beta nut with tobacco       OBJECTICE: /74 (Patient Site: Left Arm, Patient Position: Sitting,  "Cuff Size: Adult Regular)  Pulse 68  Temp 97.9  F (36.6  C) (Oral)   Resp 16  Ht 4' 11\" (1.499 m)  Wt 128 lb 1 oz (58.1 kg)  BMI 25.87 kg/m2          GEN-alert,  in no apparent distress.  HEENT-mucous membranes are moist, neck is supple.  CV-regular rate and rhythm with no murmur.   RESP-lungs clear to auscultation .  ABDOMEN- Soft , not tender.  EXTREM- No dry skin , some exfoliation on bilateral soles. No open lesions or ulcers. Sensation intact.   SKIN-normal        Topton Za   8/15/2018     "

## 2021-06-20 NOTE — PROGRESS NOTES
ASSESMENT AND PLAN:  Diagnoses and all orders for this visit:    Essential hypertension  Uncontrolled.  Out of med for one month.  ??cough due to lisinopril.  Will try Losartan.     Hyperlipidemia, unspecified hyperlipidemia type   in 8/17.  No med SE reported.   Continue current med.    Burning sensation in lower extremity  Increased neurontin to 300 mg two times a day.  F/U in 6 weeks.    Atypical nevi  -     Ambulatory referral to Dermatology    Cough  ?Lisinopril SE. Benign exam.  Will try tessalon    Need for influenza vaccination  -     Influenza High Dose, Seasonal 65+ yrs    Other orders  -     losartan (COZAAR) 50 MG tablet; Take 1 tablet (50 mg total) by mouth daily.  Dispense: 30 tablet; Refill: 3  -     benzonatate (TESSALON) 200 MG capsule; Take 1 capsule (200 mg total) by mouth 3 (three) times a day as needed for cough.  Dispense: 30 capsule; Refill: 0  -     gabapentin (NEURONTIN) 300 MG capsule; Take 1 capsule (300 mg total) by mouth 2 (two) times a day.  Dispense: 60 capsule; Refill: 5          SUBJECTIVE: Ricky Lea is here for med check.    HTN- On lisinopril but out of it for ~4 weeks now. No acute headache, no acute vision changes, no weakness.    Burning sensation in feet- Very little improvement with neurontin 300 mg q hs. No SE reported. Forget to take it sometimes.    Cough- Dry cough and throat irritation for ~ 2 months. Some what improving after running out of lisinopril. No fever or weight loss.     Mole on face- Has been there for years but increasing in size. It gets itchy and bleeds occasionally. Wants to remove.     Denied being depressed. No SI/HI.         No past medical history on file.  Patient Active Problem List   Diagnosis     Hyperlipidemia, unspecified hyperlipidemia type     Pulmonary nodules     Left shoulder pain     Essential hypertension     Moderate single current episode of major depressive disorder (H)       Allergies:  No Known Allergies    History   Smoking  "Status     Never Smoker   Smokeless Tobacco     Current User     Types: Chew     Comment: beta nut with tobacco       Review of systems otherwise negative except as listed in HPI.   History   Smoking Status     Never Smoker   Smokeless Tobacco     Current User     Types: Chew     Comment: beta nut with tobacco       OBJECTICE: /84  Pulse 68  Temp 97.9  F (36.6  C) (Oral)   Resp 16  Ht 4' 11\" (1.499 m)  Wt 129 lb (58.5 kg)  BMI 26.05 kg/m2    DATA REVIEWED:    Labs Reviewed or Ordered (1):      GEN-alert,  in no apparent distress.  HEENT-mucous membranes are moist, neck is supple.  CV-regular rate and rhythm with no murmur.   RESP-lungs clear to auscultation .  ABDOMEN- Soft , not tender.  EXTREM- No edema.  SKIN- small hyperpigmented lesion in right cheek.No active bleeding.         Bassam Llanos   9/26/2018   "

## 2021-06-20 NOTE — LETTER
Letter by Bassam Llanos MD at      Author: Bassam Llanos MD Service: -- Author Type: --    Filed:  Encounter Date: 2/4/2020 Status: (Other)                    My Depression Action Plan  Name: Ricky Lea   Date of Birth 1950  Date: 2/4/2020    My Doctor: Bassam Llanos MD   My Clinic: [facility]   [address] [place] [id number]  [phone]          GREEN    ZONE   Good Control    What it looks like:     Things are going generally well. You have normal ups and downs. You may even feel depressed from time to time, but bad moods usually last less than a day.   What you need to do:  1. Continue to care for yourself (see self care plan)  2. Check your depression survival kit and update it as needed  3. Follow your physicians recommendations including any medication.  4. Do not stop taking medication unless you consult with your physician first.           YELLOW         ZONE Getting Worse    What it looks like:     Depression is starting to interfere with your life.     It may be hard to get out of bed; you may be starting to isolate yourself from others.    Symptoms of depression are starting to last most all day and this has happened for several days.     You may have suicidal thoughts but they are not constant.   What you need to do:     1. Call your care team. Your response to treatment will improve if you keep your care team informed of your progress. Yellow periods are signs an adjustment may need to be made.     2. Continue your self-care.  Just get dressed and ready for the day.  Don't give yourself time to talk yourself out of it.    3. Talk to someone in your support network.    4. Open up your depression Depression Self-Care Plan / Wellness kit.           RED    ZONE Medical Alert - Get Help    What it looks like:     Depression is seriously interfering with your life.     You may experience these or other symptoms: You cant get out of bed most days, cant work or engage in other necessary  activities, you have trouble taking care of basic hygiene, or basic responsibilities, thoughts of suicide or death that will not go away, self-injurious behavior.     What you need to do:  1. Call your care team and request a same-day appointment. If they are not available (weekends or after hours) call your local crisis line, emergency room or 911.            Self-Care Plan / Wellness Kit    Self-Care for Depression  Heres the deal. Your body and mind are really not as separate as most people think.  What you do and think affects how you feel and how you feel influences what you do and think. This means if you do things that people who feel good do, it will help you feel better.  Sometimes this is all it takes.  There is also a place for medication and therapy depending on how severe your depression is, so be sure to consult with your medical provider and/ or Behavioral Health Consultant if your symptoms are worsening or not improving.     In order to better manage my stress, I will:    Exercise  Get some form of exercise, every day. This will help reduce pain and release endorphins, the feel good chemicals in your brain. This is almost as good as taking antidepressants!  This is not the same as joining a gym and then never going! (they count on that by the way?) It can be as simple as just going for a walk or doing some gardening, anything that will get you moving.      Hygiene   Maintain good hygiene (get out of bed in the morning, make your bed, brush your teeth, take a shower, and get dressed like you were going to work, even if you are unemployed).  If your clothes don't fit try to get ones that do.    Diet  Strive to eat foods that are good for me, drink plenty of water, and avoid excessive sugar, caffeine, alcohol, and other mood-altering substances.  Some foods that are helpful in depression are: complex carbohydrates, B vitamins, flaxseed, fish or fish oil, fresh fruits and  vegetables.    Psychotherapy  Agree to participate in Individual Therapy (if recommended).    Medication  If prescribed medications, I agree to take them.  Missing doses can result in serious side effects.  I understand that drinking alcohol, or other illicit drug use, may cause potential side effects.  I will not stop my medication abruptly without first discussing it with my provider.    Staying Connected With Others  Stay in touch with my friends, family members, and my primary care provider/team.    Use your imagination  Be creative.  We all have a creative side; it doesnt matter if its oil painting, sand castles, or mud pies! This will also kick up the endorphins.    Witness Beauty  (AKA stop and smell the roses) Take a look outside, even in mid-winter. Notice colors, textures. Watch the squirrels and birds.     Service to others  Be of service to others.  There is always someone else in need.  By helping others we can get out of ourselves and remember the really important things.  This also provides opportunities for practicing all the other parts of the program.    Humor  Laugh and be silly!  Adjust your TV habits for less news and crime-drama and more comedy.    Control your stress  Try breathing deep, massage therapy, biofeedback, and meditation. Find time to relax each day.     Crisis Text Line  http://www.crisistextline.org    The Crisis Text Line serves anyone, in any type of crisis, providing access to free, 24/7 support and information via the medium people already use and trust:    Here's how it works:  1.  Text 554-717 from anywhere in the [place], anytime, about any type of crisis.  2.  A live, trained Crisis Counselor receives the text and responds quickly.  3.  The volunteer Crisis Counselor will help you move from a 'hot moment to a cool moment'.  My support system    Clinic Contact:  Phone number:    Contact 1:  Phone number:    Contact 2:  Phone number:    [employer]ism/:  Phone  number:    Therapist:  Phone number:    [facility] crisis center:    Phone number:    Other community support:  Phone number:

## 2021-06-21 NOTE — PROGRESS NOTES
ASSESMENT AND PLAN:  Diagnoses and all orders for this visit:    Essential hypertension  Controlled.  Continue losartan 50 mg daily.    Hyperlipidemia, unspecified hyperlipidemia type  -     Lipid Cascade  -     pravastatin (PRAVACHOL) 20 MG tablet; Take 1 tablet (20 mg total) by mouth at bedtime.  Dispense: 30 tablet; Refill: 11  Out of med.  Refilled.  Last  in 8/18.    Moderate single current episode of major depressive disorder (H)  Restarted Prozac.  Declined psychotherapy.  Will discuss again.    Skin cancer of face  Mohs surgery scheduled on 11/15/2018.  Requested transportation.  We will send message to our .    Dry Skin ( Feet )  -     triamcinolone (KENALOG) 0.1 % ointment; Apply topically 2 (two) times a day.  Dispense: 453 g; Refill: 1  Try triamcinolone.        SUBJECTIVE: Ricky Lea is here for follow up.  HTN- On Losartan, doing well. No SE reported.     Hyperlipidemia- She is out of her medication.  She does not remember how long she has been out.    Depression- Out of med, does not remember since when.  She has been worried, sad and crying a lot lately since her skin cancer diagnosis.  She was referred to otology for lesion on her face, was seen on 10/30/2018, punch biopsy done.  No pathology results available in chart.  Patient states she was told it is cancer.  She brought in a pamphlet about Mohs surgery.  Surgery scheduled on 11/15/2018.  Denied suicidal or homicidal ideation.    Ongoing cough-   Improved.  No acute shortness of breath.  No fever.    Dry Skin- On feet.  States use Zofran is not helping.  Complaining of burning sensation on the feet.  She takes Neurontin 300 mg twice a day.    No past medical history on file.  Patient Active Problem List   Diagnosis     Hyperlipidemia, unspecified hyperlipidemia type     Pulmonary nodules     Left shoulder pain     Essential hypertension     Moderate single current episode of major depressive disorder (H)     Skin  "cancer of face       Allergies:  No Known Allergies    History   Smoking Status     Never Smoker   Smokeless Tobacco     Current User     Types: Chew     Comment: beta nut with tobacco       Review of systems otherwise negative except as listed in HPI.   History   Smoking Status     Never Smoker   Smokeless Tobacco     Current User     Types: Chew     Comment: beta nut with tobacco       OBJECTICE: /80  Pulse 86  Temp 98.7  F (37.1  C) (Oral)   Resp 16  Ht 4' 11.02\" (1.499 m)  Wt 128 lb 1.6 oz (58.1 kg)  SpO2 96%  BMI 25.86 kg/m2        GEN-alert,  in no apparent distress.  HEENT-mucous membranes are moist, neck is supple.  CV-regular rate and rhythm with no murmur.   RESP-lungs clear to auscultation .  ABDOMEN- Soft , not tender.  NEURO- Grossly normal.   SKIN- Healed scar on face.  Extreme dry skin on feet.  No open lesions or ulcers.      Bassam Llanos   11/7/2018   "

## 2021-06-22 NOTE — PROGRESS NOTES
ASSESMENT AND PLAN:  Diagnoses and all orders for this visit:    Essential hypertension  -     losartan (COZAAR) 50 MG tablet; Take 1 tablet (50 mg total) by mouth daily.  Dispense: 30 tablet; Refill: 3  Controlled.  No changes made today.    Hyperlipidemia, unspecified hyperlipidemia type  -     pravastatin (PRAVACHOL) 20 MG tablet; Take 1 tablet (20 mg total) by mouth at bedtime.  Dispense: 30 tablet; Refill: 11  Restarted med 1 month ago,  in 11/18.    Moderate single current episode of major depressive disorder (H)  -     FLUoxetine (PROZAC) 20 MG capsule; Take 1 capsule (20 mg total) by mouth daily.  Dispense: 90 capsule; Refill: 3  Stable.    Skin cancer of face  S/P Mohs surgery.  Doing well.    Dermatitis  -     clotrimazole-betamethasone (LOTRISONE) cream; Apply thin layer to affected area twice a day  Dispense: 30 g; Refill: 1    This transcription uses voice recognition software, which may contain typographical errors.      SUBJECTIVE: Ricky Lea is 68-year-old female with history of depression, hypertension, hyperlipidemia and dermatitis here for follow-up.  No new concerns since last visit.  She underwent Mohs surgery on her face, doing well.  No dermatology follow-up needed at this time.  States she takes all her medications as prescribed.  No worsening depression symptoms, no suicidal or homicidal ideation since last visit.  Dry skin on the soles is somewhat improving.  She uses triamcinolone cream, it helps but only minimal improvement.  Burning and tingling sensation on the feet had improved.    No past medical history on file.  Patient Active Problem List   Diagnosis     Hyperlipidemia, unspecified hyperlipidemia type     Pulmonary nodules     Left shoulder pain     Essential hypertension     Moderate single current episode of major depressive disorder (H)     Skin cancer of face     Dry skin       Allergies:  No Known Allergies    Social History     Tobacco Use   Smoking Status Never Smoker  "  Smokeless Tobacco Current User     Types: Chew   Tobacco Comment    beta nut with tobacco       Review of systems otherwise negative except as listed in HPI.   Social History     Tobacco Use   Smoking Status Never Smoker   Smokeless Tobacco Current User     Types: Chew   Tobacco Comment    beta nut with tobacco       OBJECTICE: /78 (Patient Site: Left Arm, Patient Position: Sitting, Cuff Size: Adult Regular)   Pulse 67   Temp 98  F (36.7  C) (Oral)   Ht 4' 11.02\" (1.499 m)   Wt 131 lb 4 oz (59.5 kg)   SpO2 94%   BMI 26.49 kg/m            GEN-alert,  in no apparent distress.  HEENT-mucous membranes are moist, neck is supple.  CV-regular rate and rhythm with no murmur.   RESP-lungs clear to auscultation .  ABDOMEN- Soft , not tender.  EXTREM- No edema.  SKIN- Well healed scar on right face. Dry peeling skin with erythema both soles.        Bassam Llanos   1/3/2019   "

## 2021-06-23 NOTE — TELEPHONE ENCOUNTER
RN declined refill request for Pravastatin since Rx just filled on 1/3/19.  Pharmacy informed. Matilda Winter RN, [facility] Refill/Triage, 2/7/2019 1:46 PM

## 2021-06-25 NOTE — TELEPHONE ENCOUNTER
Ashish Llanos,     Recert was done by our RN on 5/24/21, requesting orders for every other week skilled nurse visits through the end of the cert period for every other week medication set up. Please reply to this message string with approval of orders. Thank you!!

## 2021-06-25 NOTE — PROGRESS NOTES
[facility] Daily Progress     Patient Name: Ricky Lea  Date: 5/28/2021  Visit #: 5  PTA visit #: 1  Referral Diagnosis: Chronic right -sided low back pain without sciatica  Referring provider: Maureen Palafox PA-C  Visit Diagnosis:     ICD-10-CM    1. Chronic right-sided low back pain without sciatica  M54.5     G89.29    2. Generalized muscle weakness  M62.81    3. Age-related osteoporosis without current pathological fracture  M81.0      Ricky Lea is a 71 y.o. female who presents to therapy today with chief complaints of chronic low back pain, worsening 3 months ago. Onset date of sx was 1/20/21.  Pt reported h/o remote T11 compression fracture.  Pain symptoms are consistent with chronic low back pain with limitations in lumbar ROM and LE weakness.  Functional impairments include difficulty with transitions including sitting to standing and laying to supine as well as pain and LE weakness with walking.  Pt demo's signs and sx consistent with chronic low back pain in context of muscle weakness and muscle guarding.   Skilled PT is required to address lumbar ROM limitations and LE/lumbar/core strength deficits to decrease pain and improve ability to perform activities of daily living and quality of life.     Assessment:     HEP/POC compliance is  good .  The patient presents to PT for follow up visit.  She has been compliant with her HEP but has not noticed improvements in her pain symptoms.  Lumbar gentle mobilizations were performed today for creating space for nerves in lumbar region and to decrease inflammation.  She was able to tolerate treatment well without c/o pain.  She is appropriate to continue with skilled PT services at this time    Goal Status:  Pt. will be independent with home exercise program in : 4 weeks    Pt will: be able to ambulate with SEC without assistance x 150 feet in 12 weeks  Pt will: be able to stand from chair without UE use and pain rating <4/10 in 12 weeks  Pt will: be able to  get out of bed without assistance with <4/10 pain in 12 weeks    Plan / Patient Education:     Continue with initial plan of care.  Progress with home program as tolerated.   Continue with osteoporosis exercises if helpful.  MT PRN.  LE strengthening.  Continue with S/L mobs if helpful.  Consider STM to lumbar musculature.    Subjective:     Pain Ratin-8  The patient reports that everything is about the same.  She is doing the exercises, but they don't make any difference.  Her exercises do not increase her pain.  Pain is more on the L side.  She has burning pain on the bottom of her L foot.  She has joint pain in the LE and doesn't feel it is traveling down her leg.     via iPad/phone    Objective:     Patient presents to PT with SEC.    Exercises:  Exercise #1: lower trunk rotations HEP  Comment #1: double knee to chest DC  Exercise #2: bridging HEP  Comment #2: Clamshell HEP  Exercise #3: Leg lengthener HEP  Comment #3: Supine glute squeeze HEP  Exercise #4: supine march double leg HEP  Comment #4: Morning stretches HEP  Exercise #5: Slump sliders x 10; cues for proper technique    Appt time: 10:36AM - 11:03AM    Treatment Today     TREATMENT MINUTES COMMENTS   Evaluation     Self-care/ Home management     Manual therapy 10 -R S/L L lumbar rotation mobilizations grades I-II   Neuromuscular Re-education     Therapeutic Activity     Therapeutic Exercises 17 -Subjective measures taken  -See flow sheet; review of HEP  -Discussed POC; patient would like to finish out scheduled appointments   Gait training     Modality__________________                Total 27    Blank areas are intentional and mean the treatment did not include these items.       Deb Mcmanus, PT, DPT  2021

## 2021-06-25 NOTE — TELEPHONE ENCOUNTER
RN cannot approve Refill Request    RN can NOT refill this medication med is not covered by policy/route to provider. Last office visit: 1/3/2019 Bassam Llanos MD Last Physical: 5/16/2018 Last MTM visit: Visit date not found Last visit same specialty: 1/3/2019 Bassam Llanos MD.  Next visit within 3 mo: Visit date not found  Next physical within 3 mo: Visit date not found      Ernie Nolan, [facility] Triage/Med Refill 3/16/2019    Requested Prescriptions   Pending Prescriptions Disp Refills     clotrimazole-betamethasone (LOTRISONE) cream [Pharmacy Med Name: CLOTRIMAZOLE-BETAMETHASONE CRM 15GM] 30 g 0     Sig: APPLY THIN LAYER EXTERNALLY TO THE AFFECTED AREA TWICE DAILY    There is no refill protocol information for this order      Refused Prescriptions Disp Refills     pravastatin (PRAVACHOL) 20 MG tablet [Pharmacy Med Name: PRAVASTATIN 20MG TABLETS] 30 tablet 0     Sig: TAKE 1 TABLET(20 MG) BY MOUTH AT BEDTIME    Statins Refill Protocol (Hmg CoA Reductase Inhibitors) Passed - 3/12/2019  1:38 PM       Passed - PCP or prescribing provider visit in past 12 months     Last office visit with prescriber/PCP: 1/3/2019 Bassam Llanos MD OR same dept: 1/3/2019 Bassam Llanos MD OR same specialty: 1/3/2019 Bassam Llanos MD  Last physical: 5/16/2018 Last MTM visit: Visit date not found   Next visit within 3 mo: Visit date not found  Next physical within 3 mo: Visit date not found  Prescriber OR PCP: Bassam Llanos MD  Last diagnosis associated with med order: 1. Dermatitis  - clotrimazole-betamethasone (LOTRISONE) cream [Pharmacy Med Name: CLOTRIMAZOLE-BETAMETHASONE CRM 15GM]; APPLY THIN LAYER EXTERNALLY TO THE AFFECTED AREA TWICE DAILY  Dispense: 30 g; Refill: 0    2. Hyperlipidemia, unspecified hyperlipidemia type  - pravastatin (PRAVACHOL) 20 MG tablet [Pharmacy Med Name: PRAVASTATIN 20MG TABLETS]; TAKE 1 TABLET(20 MG) BY MOUTH AT BEDTIME  Dispense: 30 tablet; Refill: 0    If protocol passes may refill for 12 months if within 3 months  of last provider visit (or a total of 15 months).

## 2021-06-25 NOTE — TELEPHONE ENCOUNTER
RN cannot approve Refill Request    RN can NOT refill this medication med is not covered by policy/route to provider. Last office visit: 5/7/2021 Bassam Llanos MD Last Physical: 8/13/2019 Last MTM visit: Visit date not found Last visit same specialty: 5/7/2021 Bassam Llanos MD.  Next visit within 3 mo: Visit date not found  Next physical within 3 mo: Visit date not found      Leila Cantrell, [facility] Triage/Med Refill 6/17/2021    Requested Prescriptions   Pending Prescriptions Disp Refills     meclizine (ANTIVERT) 25 mg tablet [Pharmacy Med Name: MECLIZINE HCL 25 MG TABS 25 Tablet] 30 tablet 1     Sig: TAKE 1 TABLET (25 MG TOTAL) BY MOUTH 3 (THREE) TIMES A DAY AS NEEDED FOR DIZZINESS OR NAUSEA.       There is no refill protocol information for this order

## 2021-06-26 NOTE — PROGRESS NOTES
[facility] Daily Progress / Discharge Summary    Patient Name: Ricky Lea  Date: 2021  Visit #: 7  PTA visit #:  1  Referral Diagnosis: Chronic bilateral low back pain without sciatica  Referring provider: Maureen Palafox PA-C  Visit Diagnosis:     ICD-10-CM    1. Chronic right-sided low back pain without sciatica  M54.5     G89.29    2. Generalized muscle weakness  M62.81    3. Age-related osteoporosis without current pathological fracture  M81.0    4. Compression fracture of T11 vertebra, sequela  S22.080S        Assessment:   HEP/POC compliance is  good .  The patient has been compliant with her HEP as well as with coming to PT appointments.  A variety of treatments has been trialled without improvements in the patient's symptoms.  Her overall pain may be more tolerable, but d/t language barrier, it was difficult to determine if there had been any change in symptoms.  It was recommended that patient return to the spine center in order to determine any other treatment options.  If appropriate, the patient can return to PT with a new order in the future.    Goal Status:  Pt. will be independent with home exercise program in : 4 weeks    Pt will: be able to ambulate with SEC without assistance x 150 feet in 12 weeks  Pt will: be able to stand from chair without UE use and pain rating <4/10 in 12 weeks  Pt will: be able to get out of bed without assistance with <4/10 pain in 12 weeks      Plan / Patient Education:     The patient is not making progress with therapy goals and will be referred for further MD assessment.  No further therapy is required at this time.  The patient will initiate contact if questions or concerns arise.    Subjective:     Pain Ratin-8  The patient reports that she is doing ok.  The pain is still in the lower back.  She doesn't think anything is different.  Sometimes the pain is severe.     via iPad/phone    Objective:     Patient presents to PT with  SEC.  Pain with lumbar flexion on the L  Lumbar extension did not increase pain.  Pain with R side bending on the L  Pain with R rotation on the L  Pelvic landmarks appear level supine    Exercise #1: lower trunk rotations HEP  Comment #1: double knee to chest DC  Exercise #2: bridging HEP  Comment #2: Clamshell HEP  Exercise #3: Leg lengthener HEP  Comment #3: Supine glute squeeze HEP  Exercise #4: supine march double leg HEP  Comment #4: Morning stretches HEP  Exercise #5: Slump sliders HEP  Comment #5: Pelvic shotgun discussed for HEP  Exercise #6: Lumbar side stretch  Comment #6: S/L HEP    Appt time: 10:37AM - 11:08AM    Treatment Today  TREATMENT MINUTES COMMENTS   Evaluation     Self-care/ Home management     Manual therapy 10 -Supine L hip longitudinal mobilizations grades II-III x 30 x 5  -R S/L L lumbar rotation mobilizations grade I-II   Neuromuscular Re-education     Therapeutic Activity     Therapeutic Exercises 21 -Subjective measures taken  -See flow sheet; added S/L stretch for lumbar spine  -Discussed HEP  -Discussed POC   Gait training     Modality__________________                Total 31    Blank areas are intentional and mean the treatment did not include these items.     Deb Mcmanus, PT, DPT  6/11/21

## 2021-06-26 NOTE — PROGRESS NOTES
[facility] Daily Progress     Patient Name: Ricky Lea  Date: 6/4/2021  Visit #: 6  PTA visit #: 1  Referral Diagnosis: Chronic right -sided low back pain without sciatica  Referring provider: Maureen Palafox PA-C  Visit Diagnosis:     ICD-10-CM    1. Chronic right-sided low back pain without sciatica  M54.5     G89.29    2. Generalized muscle weakness  M62.81      Ricky Lea is a 71 y.o. female who presents to therapy today with chief complaints of chronic low back pain, worsening 3 months ago. Onset date of sx was 1/20/21.  Pt reported h/o remote T11 compression fracture.  Pain symptoms are consistent with chronic low back pain with limitations in lumbar ROM and LE weakness.  Functional impairments include difficulty with transitions including sitting to standing and laying to supine as well as pain and LE weakness with walking.  Pt demo's signs and sx consistent with chronic low back pain in context of muscle weakness and muscle guarding.   Skilled PT is required to address lumbar ROM limitations and LE/lumbar/core strength deficits to decrease pain and improve ability to perform activities of daily living and quality of life.     Assessment:     HEP/POC compliance is  good .  The patient presents to PT for follow up visit.  She has not noticed any improvements in her symptoms, but insists on finishing her therapy sessions that are scheduled.  Her pelvic alignment was off today and corrected.  She was able to tolerate MT and was given pubic shotgun to maintain pelvic stability.  She is appropriate to follow up next week.  If no changes, will attempt again to DC patient next session.    Goal Status:  Pt. will be independent with home exercise program in : 4 weeks    Pt will: be able to ambulate with SEC without assistance x 150 feet in 12 weeks  Pt will: be able to stand from chair without UE use and pain rating <4/10 in 12 weeks  Pt will: be able to get out of bed without assistance with <4/10 pain in 12  weeks    Plan / Patient Education:     Continue with initial plan of care.  Progress with home program as tolerated.   Continue with osteoporosis exercises if helpful.  MT PRN.  LE strengthening.  Recheck pelvic landmarks.  Continue with STM to lumbar musculature if helpful.    Subjective:     Pain Ratin-9  The patient reports that is doing well today.  Her back hurts, so she isn't able to get around very well.  She does not think her back is any better.  Sometimes the pain is worse and sometimes it is better.  The pain is shooting in the lower back.  She does not have any symptoms in her legs.  She is doing her exercises at home 2x/day.     via iPad/phone    Objective:     Patient presents to PT with SEC.  Pain with lumbar flexion  Pain with R side bending on the L  Pain with R rotation on the L  L LE appears longer supine  L ASIS appears higher supine  Pelvic landmarks level after treatment.    Exercises:  Exercise #1: lower trunk rotations HEP  Comment #1: double knee to chest DC  Exercise #2: bridging HEP  Comment #2: Clamshell HEP  Exercise #3: Leg lengthener HEP  Comment #3: Supine glute squeeze HEP  Exercise #4: supine march double leg HEP  Comment #4: Morning stretches HEP  Exercise #5: Slump sliders x 10; cues for proper technique    Appt time: 10:32AM - 11:00AM    Treatment Today     TREATMENT MINUTES COMMENTS   Evaluation     Self-care/ Home management     Manual therapy 15 -Supine pubic tubercle correction with overpressure x 3 followed by pelvic shotgun  -Prone STM to L lumbar paraspinal mm and along iliac crest   Neuromuscular Re-education     Therapeutic Activity     Therapeutic Exercises 13 -Subjective measures taken  -See flow sheet; added pelvic shotgun to HEP for pelvic stability   Gait training     Modality__________________                Total 28    Blank areas are intentional and mean the treatment did not include these items.       Deb Mcmanus, PT, DPT  2021

## 2021-06-30 NOTE — PROGRESS NOTES
Progress Notes by Supriya Perez PT at 4/20/2021 10:00 AM     Author: Supriya Perez PT Service: -- Author Type: Physical Therapist    Filed: 4/20/2021  1:07 PM Encounter Date: 4/20/2021 Status: Attested    : Supriya Perez PT (Physical Therapist) Cosigner: Maureen Palafox PA-C at 4/20/2021  2:38 PM    Attestation signed by Maureen Palafox PA-C at 4/20/2021  2:38 PM    Continue plan of care                    [facility] Rehabilitation Certification Request    April 20, 2021      Patient: Ricky Lea  MR Number: [id number]  YOB: 1950  Date of Visit: 4/20/2021      Dear Maureen Shaw PA-C:    Thank you for this referral.   We are seeing Ricky eLa in Physical Therapyfor chronic low back pain and generalized weakness.    Medicare and/or Medicaid requires physician review and approval of the treatment plan. Please review the plan of care and verify that you agree with the therapy plan of care by co-signing this note.      Plan of Care  Authorization / Certification Start Date: 04/20/21  Authorization / Certification End Date: 07/19/21  Authorization / Certification Number of Visits: 12  Communication with: Referral Source;Patient Caregiver  Patient Related Instruction: Nature of Condition;Body mechanics;Posture;Treatment plan and rationale;Next steps;Self Care instruction;Basis of treatment;Expected outcome;Precautions  Times per Week: 1  Number of Weeks: 10-12  Number of Visits: 10-12  Discharge Planning: when patient met goals, when patient progress plateaus, when patient independent in managing diagnosis.  Precautions / Restrictions : chronic T11 compression fx      Goals:  Pt. will be independent with home exercise program in : 4 weeks    Pt will: be able to ambulate with SEC without assistance x 150 feet in 12 weeks  Pt will: be able to stand from chair without UE use and pain rating <4/10 in 12 weeks  Pt will: be able to get out of bed without assistance with <4/10 pain in 12  weeks        If you have any questions or concerns, please don't hesitate to call.    Sincerely,      Supriya Perez, PT, DPT        Physician recommendation:     ___ Follow therapist's recommendation        ___ Modify therapy      *Physician co-signature indicates they certify the need for these services furnished within this plan and while under their care.        [facility]   [place]-Pelvic Initial Evaluation    Patient Name: Ricky Lea  Date of evaluation: 4/20/2021  Referral Diagnosis: Chronic bilateral low back pain without sciatica  Referring provider: Maureen Palafox PA-C  Visit Diagnosis:     ICD-10-CM    1. Chronic low back pain without sciatica  M54.5     G89.29    2. Generalized muscle weakness  M62.81        Assessment:        Ricky Lea is a 71 y.o. female who presents to therapy today with chief complaints of chronic low back pain, worsening 3 months ago. Onset date of sx was 1/20/21.  Pt reported h/o remote T11 compression fracture.  Pain symptoms are consistent with chronic low back pain with limitations in lumbar ROM and LE weakness.  Functional impairments include difficulty with transitions including sitting to standing and laying to supine as well as pain and LE weakness with walking.  Pt demo's signs and sx consistent with chronic low back pain in context of muscle weakness and muscle guarding.   Skilled PT is required to address lumbar ROM limitations and LE/lumbar/core strength deficits to decrease pain and improve ability to perform activities of daily living and quality of life.     Goals:  Pt. will be independent with home exercise program in : 4 weeks    Pt will: be able to ambulate with SEC without assistance x 150 feet in 12 weeks  Pt will: be able to stand from chair without UE use and pain rating <4/10 in 12 weeks  Pt will: be able to get out of bed without assistance with <4/10 pain in 12 weeks      Patient's expectations/goals are realistic.    Barriers to Learning or  Achieving Goals:  Chronicity of the problem.       Plan / Patient Instructions:        Plan of Care:   Authorization / Certification Start Date: 21  Authorization / Certification End Date: 21  Authorization / Certification Number of Visits: 12  Communication with: Referral Source;Patient Caregiver  Patient Related Instruction: Nature of Condition;Body mechanics;Posture;Treatment plan and rationale;Next steps;Self Care instruction;Basis of treatment;Expected outcome;Precautions  Times per Week: 1  Number of Weeks: 10-12  Number of Visits: 10-12  Discharge Planning: when patient met goals, when patient progress plateaus, when patient independent in managing diagnosis.  Precautions / Restrictions : chronic T11 compression fx      POC and pathology of condition were reviewed with patient.  Pt. is in agreement with the Plan of Care  A Home Exercise Program (HEP) was initiated today.  Pt. was instructed in exercises by PT and patient was given a handout with detailed instructions.  Treatment techniques, plan of care, and goals were discussed with the patient.  The patient agrees to the plan as outlined.  The plan of care is dynamic and will be modified on an ongoing basis.    Plan for next visit: review HEP, tenderness and ROM limitations on R side low back address manually or with stretching, initiate LE strengthening exercises      Subjective:       Sejal  utilized during session and male family member present.     Social information:   Living Situation:single family home, lives with others  and stairs  with railing   Occupation:homemaker   Work Status:NA   Equipment Available: SE cane, shower chair     Pain Ratin  Pain rating at best: 3  Pain rating at worst: 8  Pain description: aching in low back, has has pressure going up to the back of the neck. No pain in the legs.     Functional limitations are described as occurring with:   bending  standing for long periods of time  transitional  movements sit to stand    Patient reports benefit from:  rest  and laying down.      Patient does not do any current exercise. Patient's goal:   Objective:      Note: Items left blank indicates the item was not performed or not indicated at the time of the evaluation.    Outcome measure not measured today.    Examination  1. Chronic low back pain without sciatica     2. Generalized muscle weakness       Involved side: Right  Posture Observation: general kyphosis in upper back and decreased lumbar lordosis in standing noted.      General sitting posture is  fair.  General standing posture is fair.    Lumbar ROM:    Date: 4/20/2021     *Indicate scale AROM AROM AROM   Lumbar Flexion Mod limitations, able to reach to bilateral patella with 8/10 pain     Lumbar Extension Min limitations with less pain 6/10       Right Left Right Left Right Left   Lumbar Sidebending Mod limitations able to reach to knee with 8/10 pain Mod limitation able to reach to knee with 7/10 pain       Lumbar Rotation Mod limitation d/t pain Mod limitation d/t pain       Thoracic Flexion      Thoracic Extension      Thoracic Sidebending         Thoracic Rotation           Lower Extremity Strength:     Date: 4/20/2021     LE strength/5 Right Left Right Left Right Left   Hip Flexion (L1-3) 4- 4-       Hip Extension (L5-S1) 3- 3-       Hip Abduction (L4-5) 3 3       Hip Adduction (L2-3)         Hip External Rotation 4- 4-       Hip Internal Rotation 4- 4-       Knee Extension (L3-4) 3+ 4-       Knee Flexion 3+ 3+       Ankle Dorsiflexion (L4-5) 5 5       Great Toe Extension (L5)         Ankle Plantar flexion (S1)         Abdominals        Sensation  intact         Reflex Testing  Lumbar Dermatomes Right Left UE Reflexes Right Left   Iliac Crest and Groin (L1) WFL WFL Biceps (C5-6)     Anterior Medial Thigh (L2) WFL WFL Brachioradialis (C5-6)     Anterior Thigh, Medial Epicondyle Femur (L3) WFL WFL Triceps (C7-8)     Lateral Thigh, Anterior Knee,  Medial Leg/Malleolus (L4) WFL WFL Hoffmanns test     Lateral Leg, Dorsal Foot (L5) WFL WFL LE Reflexes     Lateral Foot (S1) WFL WFL Patellar (L3-4)     Posterior Leg (S2) WFL WFL Achilles (S1-2)     Other:   Babinski Response       Palpation: tenderness and increased tone noted on R paraspinals, QL    Lumbar Special Tests:     Lumbar Special Tests Right Left SI Tests Right  Left   Quadrant test   SI Compression - -   Straight leg raise Limitations in hamstring length Limitations in hamstring length SI Distraction - -   Crossover response   POSH Test     Slump +  Sacral Thrust     Sit-up test Unable to perform FADIR - -   Trunk extensor endurance test  Resisted Abduction - -   Prone instability test  Other:     Pubic shotgun  Other:       Repeated Motion Testing:  Slight less pain noted with extension compared with repeated flexion    Passive Mobility - Joint Integrity:  WNL    LE Screen:  global weakness noted, no sensation changes. AFSHAN/FADIR negative and hip ROM is WFL.     Appt time: 10:01 - 11:00    Treatment Today     TREATMENT MINUTES COMMENTS   Evaluation 25 -Patient educated on pathology  -Discussed POC   Self-care/ Home management     Manual therapy 10 STM to R low back: strumming along paraspinals, QL    Neuromuscular Re-education     Therapeutic Activity     Therapeutic Exercises 20 -Demo/performance of HEP  Exercise #1: lower trunk rotations x 10 each direction  Comment #1: double knee to chest x 5 reps with 5 second holds  Exercise #2: bridging x 10 reps     Gait training     Modality__________________                Total 55    Blank areas are intentional and mean the treatment did not include these items.     PT Evaluation Code: (Please list factors)  Patient History/Comorbidities: see above  Examination: see above  Clinical Presentation: stable  Clinical Decision Making: low    Patient History/  Comorbidities Examination  (body structures and functions, activity limitations, and/or participation  restrictions) Clinical Presentation Clinical Decision Making (Complexity)   No documented Comorbidities or personal factors 1-2 Elements Stable and/or uncomplicated Low   1-2 documented comorbidities or personal factor 3 Elements Evolving clinical presentation with changing characteristics Moderate   3-4 documented comorbidities or personal factors 4 or more Unstable and unpredictable High            Supriya Perez, PT, DPT  4/20/2021  10:00 AM

## 2021-07-03 NOTE — ADDENDUM NOTE
Addendum Note by Breann Horowitz MD at 12/2/2020 11:39 AM     Author: Breann Horowitz MD Service: -- Author Type: Physician    Filed: 12/2/2020 11:39 AM Encounter Date: 11/27/2020 Status: Signed    : Breann Horowitz MD (Physician)    Addended by: BREANN HOROWITZ on: 12/2/2020 11:39 AM        Modules accepted: Orders

## 2021-07-08 ASSESSMENT — PATIENT HEALTH QUESTIONNAIRE - PHQ9: SUM OF ALL RESPONSES TO PHQ QUESTIONS 1-9: 3

## 2021-07-09 ENCOUNTER — OFFICE VISIT - HEALTHEAST (OUTPATIENT)
Dept: FAMILY MEDICINE | Facility: CLINIC | Age: 71
End: 2021-07-09

## 2021-07-09 ENCOUNTER — COMMUNICATION - HEALTHEAST (OUTPATIENT)
Dept: ADMINISTRATIVE | Facility: CLINIC | Age: 71
End: 2021-07-09

## 2021-07-09 ENCOUNTER — MEDICAL CORRESPONDENCE (OUTPATIENT)
Dept: HEALTH INFORMATION MANAGEMENT | Facility: CLINIC | Age: 71
End: 2021-07-09

## 2021-07-09 DIAGNOSIS — S22.080S COMPRESSION FRACTURE OF T11 VERTEBRA, SEQUELA: ICD-10-CM

## 2021-07-09 DIAGNOSIS — R41.3 MEMORY PROBLEM: ICD-10-CM

## 2021-07-09 DIAGNOSIS — Z00.00 ROUTINE GENERAL MEDICAL EXAMINATION AT A HEALTH CARE FACILITY: ICD-10-CM

## 2021-07-09 DIAGNOSIS — F32.1 MODERATE SINGLE CURRENT EPISODE OF MAJOR DEPRESSIVE DISORDER (H): ICD-10-CM

## 2021-07-09 DIAGNOSIS — E11.9 TYPE 2 DIABETES MELLITUS TREATED WITHOUT INSULIN (H): ICD-10-CM

## 2021-07-09 LAB
CHOLEST SERPL-MCNC: 199 MG/DL
CREAT UR-MCNC: 44.2 MG/DL
FASTING STATUS PATIENT QL REPORTED: NO
HBA1C MFR BLD: 7.4 %
HDLC SERPL-MCNC: 35 MG/DL
LDLC SERPL CALC-MCNC: 117 MG/DL
MICROALBUMIN UR-MCNC: <0.5 MG/DL (ref 0–1.99)
MICROALBUMIN/CREAT UR: NORMAL MG/G{CREAT}
TRIGL SERPL-MCNC: 236 MG/DL

## 2021-07-09 RX ORDER — GLUCOSAMINE HCL/CHONDROITIN SU 500-400 MG
1 CAPSULE ORAL DAILY
Qty: 200 STRIP | Refills: 11 | Status: SHIPPED | OUTPATIENT
Start: 2021-07-09 | End: 2021-10-12

## 2021-07-09 RX ORDER — METFORMIN HCL 500 MG
TABLET, EXTENDED RELEASE 24 HR ORAL
Qty: 30 TABLET | Refills: 1 | Status: SHIPPED | OUTPATIENT
Start: 2021-07-09 | End: 2021-09-07

## 2021-07-09 ASSESSMENT — MIFFLIN-ST. JEOR: SCORE: 1040.46

## 2021-07-09 NOTE — PROGRESS NOTES
Progress Notes by Bassam Llanos MD at 7/9/2021 10:00 AM     Author: Bassam Llanos MD Service: -- Author Type: Physician    Filed: 7/9/2021 11:56 AM Encounter Date: 7/9/2021 Status: Signed    : Bassam Llanos MD (Physician)         Assessment and Plan:     1. Routine general medical examination at a health care facility      2. Type 2 diabetes mellitus treated without insulin (H)  A1c results pending at the time of the visit.   Will consider med changes if needed after A1c and CMP results.   - Glycosylated Hemoglobin A1C  - Lipid Cascade  - Microalbumin, Random Urine    3. Moderate single current episode of major depressive disorder (H)  Crying less per daughter. No SI/HI  - Ambulatory referral to Psychiatry    4. Memory problem  For citizenship waiver.   - Ambulatory referral to Psychiatry    5. Compression fracture of T11 vertebra, sequela  Done with PT per patient and daughter.  Message sent to staff to help schedule f/u appt with spine care provider.     The following health maintenance schedule was reviewed with the patient and provided in printed form in the after visit summary:   Health Maintenance   Topic Date Due   ? DEPRESSION ACTION PLAN  Never done   ? DIABETIC FOOT EXAM  Never done   ? ZOSTER VACCINES (1 of 2) Never done   ? LIPID  07/28/2021   ? MICROALBUMIN  07/28/2021   ? DIABETIC EYE EXAM  08/07/2021   ? INFLUENZA VACCINE RULE BASED (1) 08/01/2021   ? A1C  08/09/2021   ? BMP  02/09/2022   ? MEDICARE ANNUAL WELLNESS VISIT  07/09/2022   ? FALL RISK ASSESSMENT  07/09/2022   ? MAMMOGRAM  07/28/2022   ? ADVANCE CARE PLANNING  10/08/2024   ? TD 18+ HE  07/26/2027   ? COLORECTAL CANCER SCREENING  03/10/2030   ? DEXA SCAN  08/20/2034   ? HEPATITIS C SCREENING  Completed   ? Pneumococcal Vaccine: Pediatrics (0 to 5 Years) and At-Risk Patients (6 to 64 Years)  Completed   ? Pneumococcal Vaccine: 65+ Years  Completed   ? COVID-19 Vaccine  Completed       Subjective:   Chief Complaint: Ricky Lea is an 71 y.o. female  here for an Annual Wellness visit.   HPI:  Here for AWV.  No new concerns but main concern still is back pain. Saw spine care provider , recommended PT and ordered MRI. No f/u apt scheduled yet. Meds are not helping much for the pain.    Depression: Stable per daughter but reports worsening memory problems. Cries less. No SI/HI.    DM: BG mostly in the low 100 range but occasional 200- 300. Higher number are after meals per pt.   No hypo or hyperglycemic symptoms     Lipid: Tolerating med well.     Review of Systems:  Please see above.  The rest of the review of systems are negative for all systems.    Patient Care Team:  Bassam Llanos MD as PCP - General (Family Medicine)  Bassam Llanos MD as Assigned PCP  Mary Kay Mejia as Lead Care Coordinator (Licensed Clinical )  Hattie Irby, PharmD as Pharmacist (Pharmacist)     Patient Active Problem List   Diagnosis   ? Hyperlipidemia, unspecified hyperlipidemia type   ? Pulmonary nodules   ? Left shoulder pain   ? Essential hypertension   ? Moderate single current episode of major depressive disorder (H)   ? Skin cancer of face   ? Dry skin   ? Noncompliance with medication regimen   ? Intrinsic eczema   ? Age-related osteoporosis without current pathological fracture   ? Type 2 diabetes mellitus treated without insulin (H)   ? Osteoporosis, unspecified osteoporosis type, unspecified pathological fracture presence   ? Urinary incontinence, unspecified type   ? Burning sensation of feet   ? Chronic midline low back pain without sciatica   ? Chronic kidney disease, stage 3   ? Compression fracture of T11 vertebra, sequela     No past medical history on file.   No past surgical history on file.   Family History   Problem Relation Age of Onset   ? Breast cancer Neg Hx       Social History     Socioeconomic History   ? Marital status:      Spouse name: Not on file   ? Number of children: Not on file   ? Years of education: Not on file   ? Highest education  level: Not on file   Occupational History     Employer: NOT EMPLOYED   Social Needs   ? Financial resource strain: Not on file   ? Food insecurity     Worry: Not on file     Inability: Not on file   ? Transportation needs     Medical: Not on file     Non-medical: Not on file   Tobacco Use   ? Smoking status: Never Smoker   ? Smokeless tobacco: Current User     Types: Chew   ? Tobacco comment: chews betal nut    Substance and Sexual Activity   ? Alcohol use: Not on file   ? Drug use: Not on file   ? Sexual activity: Not on file   Lifestyle   ? Physical activity     Days per week: Not on file     Minutes per session: Not on file   ? Stress: Not on file   Relationships   ? Social connections     Talks on phone: Not on file     Gets together: Not on file     Attends [employer] service: Not on file     Active member of club or organization: Not on file     Attends meetings of clubs or organizations: Not on file     Relationship status: Not on file   ? Intimate partner violence     Fear of current or ex partner: Not on file     Emotionally abused: Not on file     Physically abused: Not on file     Forced sexual activity: Not on file   Other Topics Concern   ? Not on file   Social History Narrative   ? Not on file      Current Outpatient Medications   Medication Sig Dispense Refill   ? atorvastatin (LIPITOR) 20 MG tablet TAKE 1 PILL BY MOUTH EVERYDAY FOR CHOLESTEROL 90 tablet 3   ? DULoxetine (CYMBALTA) 60 MG capsule Take 1 capsule (60 mg total) by mouth daily. 30 capsule 5   ? gabapentin (NEURONTIN) 300 MG capsule Take 1 tab PO in AM and at noon, then at bedtime take 3 tabs PO. 150 capsule 11   ? meclizine (ANTIVERT) 25 mg tablet TAKE 1 TABLET (25 MG TOTAL) BY MOUTH 3 (THREE) TIMES A DAY AS NEEDED FOR DIZZINESS OR NAUSEA. 30 tablet 1   ? meloxicam (MOBIC) 15 MG tablet TAKE 1 PILL (15 MG) BY MOUTH EVERYDAY FOR PAIN 30 tablet 5   ? metFORMIN (GLUCOPHAGE-XR) 500 MG 24 hr tablet TAKE 1 TABLET(500 MG) BY MOUTH DAILY WITH  "BREAKFAST 30 tablet 1   ? acetaminophen (TYLENOL EXTRA STRENGTH) 500 MG tablet Take 1 tablet (500 mg total) by mouth every 6 (six) hours as needed for pain. Pt can take up to 2 tabs if needed. 100 tablet 2   ? alendronate (FOSAMAX) 70 MG tablet TAKE 1 PILL BY MOUTH EVERY WEEK FOR BONES 12 tablet 3   ? blood glucose test strips Use 1 each As Directed as needed. Dispense brand per patient's insurance at pharmacy discretion. 200 strip 11   ? blood-glucose meter Misc Test blood sugar twice a day 1 each 0   ? diaper,brief,adult,disposable Misc Use 2-3 a day as needed 120 each 3   ? generic lancets (FINGERSTIX LANCETS) Dispense brand per patient's insurance at pharmacy discretion. 200 each 11   ? triamcinolone (KENALOG) 0.1 % ointment APPLY EXTERNALLY TO THE AFFECTED AREA TWICE DAILY 454 g 0   ? white petrolatum-mineral oil Crea Apply to affected area twice a day 120 g 3     No current facility-administered medications for this visit.       Objective:   Vital Signs:   Visit Vitals  /86 (Patient Site: Left Arm, Patient Position: Sitting, Cuff Size: Adult Regular)   Pulse 76   Temp 97.6  F (36.4  C) (Oral)   Resp 16   Ht 4' 11.02\" (1.499 m)   Wt 134 lb 6 oz (61 kg)   SpO2 94%   BMI 27.12 kg/m           VisionScreening:  No exam data present     PHYSICAL EXAM  Gen - alert, orientated, NAD  Eyes - fundascopic exam limited by the undialated pupil but looks symmetric  ENT - oropharynx clear, TMs clear  Neck - supple, no palpable mass or lymphadenopathy  CV - RRR, no murmur  Breast - no dominant mass on either side, no axillary mass.  Resp - lungs CTA  Ab - soft, nontender, no palpable mass or organomegaly   - declined. Deferred.   Extrem - warm, no edema  BACK- Lower back tenderness unchanged.   Neuro - CN II-XII intact, strength, sensation, reflexes intact and symmetric  Skin - no rash.  No atypical appearing lesions seen.       Assessment Results 7/8/2021   Activities of Daily Living 2-4 - Moderate impairment "   Instrumental Activities of Daily Living 5-6 - Severe functional impairment   Get Up and Go Score 12 seconds or more   Mini Cog Total Score 1   Some recent data might be hidden     A Mini-Cog score of 0-2 suggests the possibility of dementia, score of 3-5 suggests no dementia    Identified Health Risks:     The patient was provided with suggestions to help her develop a healthy physical lifestyle.   She is at risk for lack of exercise and has been provided with information to increase physical activity for the benefit of her well-being.  The patient reports that she has difficulty with activities of daily living.  The patient reports that she has difficulty with instrumental activities of daily living. The patient was provided with suggestions to help her develop a healthy emotional lifestyle.   She is at risk for falling and has been provided with information to reduce the risk of falling at home.  Information regarding advance directives (living brooks), including where she can download the appropriate form, was provided to the patient via the AVS.       The patient was provided with appropriate referrals to address her memory problem.

## 2021-07-09 NOTE — TELEPHONE ENCOUNTER
Telephone Encounter by Navdeep Duron at 7/9/2021  2:17 PM     Author: Navdeep Duron Service: -- Author Type: --    Filed: 7/9/2021  2:20 PM Encounter Date: 7/9/2021 Status: Signed    : Navdeep Duron       Pt called back via interp. Attempted to connect her with [facility] x2 but went to [facility] for specialty to contact pt. Provided pt with specialty #, she will jalen them as well. No further action needed. Thanks.

## 2021-07-09 NOTE — TELEPHONE ENCOUNTER
Telephone Encounter by Kerri Molina MA at 7/9/2021  2:04 PM     Author: Kerri Molina MA Service: -- Author Type: Medical Assistant    Filed: 7/9/2021  2:05 PM Encounter Date: 7/9/2021 Status: Signed    : Kerri Molina MA (Medical Assistant)       ERROR

## 2021-07-09 NOTE — TELEPHONE ENCOUNTER
Telephone Encounter by Kerri Molina MA at 7/9/2021  2:04 PM     Author: Kerri Molina MA Service: -- Author Type: Medical Assistant    Filed: 7/9/2021  2:04 PM Encounter Date: 7/9/2021 Status: Signed    : Kerri Molina MA (Medical Assistant)       ----- Message from Bassam Llanos MD sent at 7/9/2021 10:41 AM CDT -----  Can you please help make f/u appoint to see spine care physician for this patient?    Thank you,  Dr. Bassam Llanos  7/9/2021 10:41 AM

## 2021-07-10 VITALS
HEART RATE: 76 BPM | BODY MASS INDEX: 27.09 KG/M2 | OXYGEN SATURATION: 94 % | WEIGHT: 134.38 LBS | DIASTOLIC BLOOD PRESSURE: 86 MMHG | HEIGHT: 59 IN | RESPIRATION RATE: 16 BRPM | TEMPERATURE: 97.6 F | SYSTOLIC BLOOD PRESSURE: 144 MMHG

## 2021-07-10 ASSESSMENT — PATIENT HEALTH QUESTIONNAIRE - PHQ9: SUM OF ALL RESPONSES TO PHQ QUESTIONS 1-9: 3

## 2021-07-14 PROBLEM — F32.A DEPRESSION: Status: RESOLVED | Noted: 2018-05-16 | Resolved: 2018-06-13

## 2021-07-15 ENCOUNTER — TELEPHONE (OUTPATIENT)
Dept: FAMILY MEDICINE | Facility: CLINIC | Age: 71
End: 2021-07-15

## 2021-07-15 NOTE — TELEPHONE ENCOUNTER
Reason for Call:  Other STATUS UPDATE, FYI ONLY     Detailed comments: Patient and spouse are in [place] at the moment visiting family. Patient took their medicine with them and their family will help with medication management until the patient returns home at the end of the month.     Can we leave a detailed message on this number? Not Applicable    Call taken on 7/15/2021 at 4:52 PM by Sukh Hazel

## 2021-07-20 ENCOUNTER — MEDICAL CORRESPONDENCE (OUTPATIENT)
Dept: HEALTH INFORMATION MANAGEMENT | Facility: CLINIC | Age: 71
End: 2021-07-20

## 2021-07-22 ENCOUNTER — TELEPHONE (OUTPATIENT)
Dept: FAMILY MEDICINE | Facility: CLINIC | Age: 71
End: 2021-07-22

## 2021-07-22 ENCOUNTER — MEDICAL CORRESPONDENCE (OUTPATIENT)
Dept: HEALTH INFORMATION MANAGEMENT | Facility: CLINIC | Age: 71
End: 2021-07-22

## 2021-07-22 NOTE — TELEPHONE ENCOUNTER
Caller just wanted to let you now FYI that pt did miss 4 days of medication due to not sure what had happen but her orders where on hold, caller did visit pt today and fixed her medications, pt is doing good.

## 2021-07-22 NOTE — TELEPHONE ENCOUNTER
Reason for Call:  Home Health Care    Daiana with [facility] called regarding (reason for call): Verbal call back     Orders are needed for this patient.     PT:     OT:     Skilled Nursing: every 3 x 9 weeks, mtm and general health-  3 prns adjust meds    Pt Provider: Bassam Llanos     Phone Number Homecare Nurse can be reached at:  [phone]    Can we leave a detailed message on this number? YES        Call taken on 7/22/2021 at 4:57 PM by EDITH Moyer

## 2021-07-23 ENCOUNTER — TRANSFERRED RECORDS (OUTPATIENT)
Dept: HEALTH INFORMATION MANAGEMENT | Facility: CLINIC | Age: 71
End: 2021-07-23

## 2021-07-23 NOTE — TELEPHONE ENCOUNTER
Called Daiana [facility] RN to relay PCP approves the following HC orders:      Skilled Nursing: every 3 x 9 weeks, mtm and general health-  3 prns adjust meds  Thanks.

## 2021-07-30 PROCEDURE — G0179 MD RECERTIFICATION HHA PT: HCPCS | Performed by: FAMILY MEDICINE

## 2021-08-02 DIAGNOSIS — Z53.9 DIAGNOSIS NOT YET DEFINED: Primary | ICD-10-CM

## 2021-08-05 ENCOUNTER — MEDICAL CORRESPONDENCE (OUTPATIENT)
Dept: HEALTH INFORMATION MANAGEMENT | Facility: CLINIC | Age: 71
End: 2021-08-05

## 2021-08-06 NOTE — PATIENT INSTRUCTIONS - HE
Patient Instructions by Bassam Llanos MD at 7/9/2021 10:00 AM     Author: Bassam Llanos MD Service: -- Author Type: Physician    Filed: 7/9/2021 10:50 AM Encounter Date: 7/9/2021 Status: Signed    : Bassam Llanos MD (Physician)         Patient Education     Your Health Risk Assessment indicates you feel you are not in good physical health.    A healthy lifestyle helps keep the body fit and the mind alert. It helps protect you from disease, helps you fight disease, and helps prevent chronic disease (disease that doesn't go away) from getting worse. This is important as you get older and begin to notice twinges in muscles and joints and a decline in the strength and stamina you once took for granted. A healthy lifestyle includes good healthcare, good nutrition, weight control, recreation, and regular exercise. Avoid harmful substances and do what you can to keep safe. Another part of a healthy lifestyle is stay mentally active and socially involved.    Good healthcare     Have a wellness visit every year.     If you have new symptoms, let us know right away. Don't wait until the next checkup.     Take medicines exactly as prescribed and keep your medicines in a safe place. Tell us if your medicine causes problems.   Healthy diet and weight control     Eat 3 or 4 small, nutritious, low-fat, high-fiber meals a day. Include a variety of fruits, vegetables, and whole-grain foods.     Make sure you get enough calcium in your diet. Calcium, vitamin D, and exercise help prevent osteoporosis (bone thinning).     If you live alone, try eating with others when you can. That way you get a good meal and have company while you eat it.     Try to keep a healthy weight. If you eat more calories than your body uses for energy, it will be stored as fat and you will gain weight.     Recreation   Recreation is not limited to sports and team events. It includes any activity that provides relaxation, interest, enjoyment, and exercise.  Recreation provides an outlet for physical, mental, and social energy. It can give a sense of worth and achievement. It can help you stay healthy.       Patient Education     Exercise for a Healthier Heart  You may wonder how you can improve the health of your heart. If youre thinking about exercise, youre on the right track. You dont need to become an athlete, but you do need a certain amount of brisk exercise to help strengthen your heart. If you have been diagnosed with a heart condition, your doctor may recommend exercise to help stabilize your condition. To help make exercise a habit, choose safe, fun activities.       Be sure to check with your health care provider before starting an exercise program.    Why exercise?  Exercising regularly offers many healthy rewards. It can help you do all of the following:    Improve your blood cholesterol levels to help prevent further heart trouble    Lower your blood pressure to help prevent a stroke or heart attack    Control diabetes, or reduce your risk of getting this disease    Improve your heart and lung function    Reach and maintain a healthy weight    Make your muscles stronger and more limber so you can stay active    Prevent falls and fractures by slowing the loss of bone mass (osteoporosis)    Manage stress better  Exercise tips  Ease into your routine. Set small goals. Then build on them.  Exercise on most days. Aim for a total of 150 or more minutes of moderate to  vigorous intensity activity each week. Consider 40 minutes, 3 to 4 times a week. For best results, activity should last for 40 minutes on average. It is OK to work up to the 40 minute period over time. Examples of moderate-intensity activity is walking one mile in 15 minutes or 30 to 45 minutes of yard work.  Step up your daily activity level. Along with your exercise program, try being more active throughout the day. Walk instead of drive. Do more household tasks or yard work.  Choose one or more  activities you enjoy. Walking is one of the easiest things you can do. You can also try swimming, riding a bike, or taking an exercise class.  Stop exercising and call your doctor if you:    Have chest pain or feel dizzy or lightheaded    Feel burning, tightness, pressure, or heaviness in your chest, neck, shoulders, back, or arms    Have unusual shortness of breath    Have increased joint or muscle pain    Have palpitations or an irregular heartbeat      9836-8467 [employer]. [address] [place] [address]. All rights reserved. This information is not intended as a substitute for professional medical care. Always follow your healthcare professional's instructions.         Patient Education   Activities of Daily Living  Your Health Risk Assessment indicates you have difficulties with activities of daily living such as eating, getting dressed, grooming, bathing, walking, or using the toilet. Please make a follow up appointment for us to address this issue in more detail.     Patient Education   Instrumental Activities of Daily Living  Your Health Risk Assessment indicates you have difficulties with instrumental activities of daily living which include laundry, housekeeping, banking, shopping, using the telephone, food preparation, transportation, or taking your own medications. Please make a follow up appointment for us to address this issue in more detail.    [employer] has resources available on the following website: https://[email or web address].org/caregivers.[email or web address]     Also, here is a local agency that provides help with meals and other assistance:   [facility] Line: 112.815.6271     Patient Education   Urinary Incontinence, Female (Adult)  Urinary incontinence means loss of control of the bladder. This problem affects many women, especially as they get older. If you have incontinence, you may be embarrassed to ask for help. But know that this problem can be treated.  Types of  Incontinence  There are different types of incontinence. Two of the main types are described here. You can have more than one type.    Stress incontinence. With this type, urine leaks when pressure (stress) is put on the bladder. This may happen when you cough, sneeze, or laugh. Stress incontinence most often occurs because the pelvic floor muscles that support the bladder and urethra are weak. This can happen after pregnancy and vaginal childbirth or a hysterectomy. It can also be due to excess body weight or hormone changes.    Urge incontinence (also called overactive bladder). With this type, a sudden urge to urinate is felt often. This may happen even though there may not be much urine in the bladder. The need to urinate often during the night is common. Urge incontinence most often occurs because of bladder spasms. This may be due to bladder irritation or infection. Damage to bladder nerves or pelvic muscles, constipation, and certain medicines can also lead to urge incontinence.  Treatment of urinary incontinence depends on the cause. Further evaluation is needed to find the type you have. This will likely include an exam and certain tests. Based on the results, you and your healthcare provider can then plan treatment. Until a diagnosis is made, the home care tips below can help relieve symptoms.  Home care    Do pelvic floor muscle exercises, if they are prescribed. The pelvic floor muscles help support the bladder and urethra. Many women find that their symptoms improve when doing special exercises that strengthen these muscles. To do the exercises contract the muscles you would use to stop your stream of urine, but do this when youre not urinating. Hold for 10 seconds, then relax. Repeat 10 to 20 times in a row, at least 3 times a day. Your provider may give you other instructions for how to do the exercises and how often.    Keep a bladder diary. This helps track how often and how much you urinate over a  set period of time. Bring this diary with you to your next visit with the provider. The information can help your provider learn more about your bladder problem.    Lose weight, if advised to by your provider. Excess weight puts pressure on the bladder. Your provider can help you create a weight-loss plan thats right for you. This may include exercising more and making certain diet changes.    Don't consume foods and drinks that may irritate the bladder. These can include alcohol and caffeinated drinks.    Quit smoking. Smoking and other tobacco use can lead to chronic cough that strains the pelvic floor muscles. Smoking may also damage the bladder and urethra. Talk with your provider about treatments or methods you can use to quit smoking.    If drinking large amounts of fluid causes you to have symptoms, you may be advised to limit your fluid intake. You may also be advised to drink most of your fluids during the day and to limit fluids at night.    If youre worried about urine leakage or accidents, you may wear absorbent pads to catch urine. Change the pads often. This helps reduce discomfort. It may also reduce the risk of skin or bladder infections.  Follow-up care  Follow up with your healthcare provider, or as directed. It may take some to find the right treatment for your problem. Your treatment plan may include special therapies or medicines. Certain procedures or surgery may also be options. Be sure to discuss any questions you have with your provider.  When to seek medical advice  Call the healthcare provider right away if any of these occur:    Fever of 100.4 F (38 C) or higher, or as directed by your provider    Bladder pain or fullness    Abdominal swelling    Nausea or vomiting    Back pain    Weakness, dizziness or fainting  Date Last Reviewed: 10/1/2017    4211-4598 [employer]. [address] [place] [address], [place] [address]. All rights reserved. This information is not intended as a  substitute for professional medical care. Always follow your healthcare professional's instructions.     Patient Education   Your Health Risk Assessment indicates you feel you are not in good emotional health.    Recreation   Recreation is not limited to sports and team events. It includes any activity that provides relaxation, interest, enjoyment, and exercise. Recreation provides an outlet for physical, mental, and social energy. It can give a sense of worth and achievement. It can help you stay healthy.    Mental Exercise and Social Involvement  Mental and emotional health is as important as physical health. Keep in touch with friends and family. Stay as active as possible. Continue to learn and challenge yourself.   Things you can do to stay mentally active are:    Learn something new, like a foreign language or musical instrument.     Play S[employer]BLE or do crossword puzzles. If you cannot find people to play these games with you at home, you can play them with others on your computer through the Internet.     Join a games club--anything from card games to chess or checkers or lawn bowling.     Start a new hobby.     Go back to school.     Volunteer.     Read.     Keep up with world events.       Patient Education   Preventing Falls in the Home  As you get older, falls are more likely. Thats because your reaction time slows. Your muscles and joints may also get stiffer, making them less flexible. Illness, medications, and vision changes can also affect your balance. A fall could leave you unable to live on your own. To make your home safer, follow these tips:    Floors    Put nonskid pads under area rugs.    Remove throw rugs.    Replace worn floor coverings.    Tack carpets firmly to each step on carpeted stairs. Put nonskid strips on the edges of uncarpeted stairs.    Keep floors and stairs free of clutter and cords.    Arrange furniture so there are clear pathways.    Clean up any spills right  away.    Bathrooms    Install grab bars in the tub or shower.    Apply nonskid strips or put a nonskid rubber mat in the tub or shower.    Sit on a bath chair to bathe.    Use bathmats with nonskid backing.    Lighting    Keep a flashlight in each room.    Put a nightlight along the pathway between the bedroom and the bathroom.    2970-5894 [employer] [address] [place] [address], [place] [address]. All rights reserved. This information is not intended as a substitute for professional medical care. Always follow your healthcare professional's instructions.         Patient Education   Understanding Advance Care Planning  Advance care planning is the process of deciding ones own future medical care. It helps ensure that if you cant speak for yourself, your wishes can still be carried out. The plan is a series of legal documents that note a persons wishes. The documents vary by state. Advance care planning may be done when a person has a serious illness that is expected to get worse. It may be done before major surgery. And it can help you and your family be prepared in case of a major illness or injury. Advance care planning helps with making decisions at these times.       A health care proxy is a person who acts as the voice of a patient when the patient cant speak for himself or herself. The name of this role varies by state. It may be called a Durable Medical Power of  or Durable Power of  for Healthcare. It may be called an agent, surrogate, or advocate. Or it may be called a representative or decision maker. It is an official duty that is identified by a legal document. The document also varies by state.    Why Is Advance Care Planning Important?  If a person communicates their healthcare wishes:    They will be given medical care that matches their values and goals.    Their family members will not be forced to make decisions in a crisis with no guidance.  Creating a Plan  Making an  advance care plan is often done in 3 steps:    Thinking about ones wishes. To create an advance care plan, you should think about what kind of medical treatment you would want if you lose the ability to communicate. Are there any situations in which you would refuse or stop treatment? Are there therapies you would want or not want? And whom do you want to make decisions for you? There are many places to learn more about how to plan for your care. Ask your doctor or  for resources.    Picking a health care proxy. This means choosing a trusted person to speak for you only when you cant speak for yourself. When you cannot make medical decisions, your proxy makes sure the instructions in your advance care plan are followed. A proxy does not make decisions based on his or her own opinions. They must put aside those opinions and values if needed, and carry out your wishes.    Filling out the legal documents. There are several kinds of legal documents for advance care planning. Each one tells health care providers your wishes. The documents may vary by state. They must be signed and may need to be witnessed or notarized. You can cancel or change them whenever you wish. Depending on your state, the documents may include a Healthcare Proxy form, Living Will, Durable Medical Power of , Advance Directive, or others.  The Familys Role  The best help a family can give is to support their loved ones wishes. Open and honest communication is vital. Family should express any concerns they have about the patients choices while the patient can still make decisions.    [id number] [employer]. [address] [place] [address], [place] [address]. All rights reserved. This information is not intended as a substitute for professional medical care. Always follow your healthcare professional's instructions.         Also, [employer] [place] offers a free, downloadable health care directive that allows you to  share your treatment choices and personal preferences if you cannot communicate your wishes. It also allows you to appoint another person (called a health care agent) to make health care decisions if you are unable to do so. You can download an advance directive by going here: http://www.[facility].org/honoring-choices.[email or web address]     Patient Education   Personalized Prevention Plan  You are due for the preventive services outlined below.  Your care team is available to assist you in scheduling these services.  If you have already completed any of these items, please share that information with your care team to update in your medical record.  Health Maintenance Due   Topic Date Due   ? DEPRESSION ACTION PLAN  Never done   ? DIABETIC FOOT EXAM  Never done   ? ZOSTER VACCINES (1 of 2) Never done   ? LIPID  07/28/2021   ? MICROALBUMIN  07/28/2021   ? DIABETIC EYE EXAM  08/07/2021

## 2021-08-06 NOTE — PATIENT INSTRUCTIONS - HE
Patient Instructions by Bassam Llanos MD at 8/13/2019  9:00 AM     Author: Bassam Llanos MD Service: -- Author Type: Physician    Filed: 8/13/2019 12:53 PM Encounter Date: 8/13/2019 Status: Signed    : Bassam Llanos MD (Physician)         Patient Education     Your Health Risk Assessment indicates you feel you are not in good physical health.    A healthy lifestyle helps keep the body fit and the mind alert. It helps protect you from disease, helps you fight disease, and helps prevent chronic disease (disease that doesn't go away) from getting worse. This is important as you get older and begin to notice twinges in muscles and joints and a decline in the strength and stamina you once took for granted. A healthy lifestyle includes good healthcare, good nutrition, weight control, recreation, and regular exercise. Avoid harmful substances and do what you can to keep safe. Another part of a healthy lifestyle is stay mentally active and socially involved.    Good healthcare     Have a wellness visit every year.     If you have new symptoms, let us know right away. Don't wait until the next checkup.     Take medicines exactly as prescribed and keep your medicines in a safe place. Tell us if your medicine causes problems.   Healthy diet and weight control     Eat 3 or 4 small, nutritious, low-fat, high-fiber meals a day. Include a variety of fruits, vegetables, and whole-grain foods.     Make sure you get enough calcium in your diet. Calcium, vitamin D, and exercise help prevent osteoporosis (bone thinning).     If you live alone, try eating with others when you can. That way you get a good meal and have company while you eat it.     Try to keep a healthy weight. If you eat more calories than your body uses for energy, it will be stored as fat and you will gain weight.     Recreation   Recreation is not limited to sports and team events. It includes any activity that provides relaxation, interest, enjoyment, and exercise.  Recreation provides an outlet for physical, mental, and social energy. It can give a sense of worth and achievement. It can help you stay healthy.       Patient Education     Exercise for a Healthier Heart  You may wonder how you can improve the health of your heart. If youre thinking about exercise, youre on the right track. You dont need to become an athlete, but you do need a certain amount of brisk exercise to help strengthen your heart. If you have been diagnosed with a heart condition, your doctor may recommend exercise to help stabilize your condition. To help make exercise a habit, choose safe, fun activities.       Be sure to check with your health care provider before starting an exercise program.    Why exercise?  Exercising regularly offers many healthy rewards. It can help you do all of the following:    Improve your blood cholesterol levels to help prevent further heart trouble    Lower your blood pressure to help prevent a stroke or heart attack    Control diabetes, or reduce your risk of getting this disease    Improve your heart and lung function    Reach and maintain a healthy weight    Make your muscles stronger and more limber so you can stay active    Prevent falls and fractures by slowing the loss of bone mass (osteoporosis)    Manage stress better  Exercise tips  Ease into your routine. Set small goals. Then build on them.  Exercise on most days. Aim for a total of 150 or more minutes of moderate to  vigorous intensity activity each week. Consider 40 minutes, 3 to 4 times a week. For best results, activity should last for 40 minutes on average. It is OK to work up to the 40 minute period over time. Examples of moderate-intensity activity is walking one mile in 15 minutes or 30 to 45 minutes of yard work.  Step up your daily activity level. Along with your exercise program, try being more active throughout the day. Walk instead of drive. Do more household tasks or yard work.  Choose one or more  activities you enjoy. Walking is one of the easiest things you can do. You can also try swimming, riding a bike, or taking an exercise class.  Stop exercising and call your doctor if you:    Have chest pain or feel dizzy or lightheaded    Feel burning, tightness, pressure, or heaviness in your chest, neck, shoulders, back, or arms    Have unusual shortness of breath    Have increased joint or muscle pain    Have palpitations or an irregular heartbeat      5539-7869 [employer]. [address] [place] [address]. All rights reserved. This information is not intended as a substitute for professional medical care. Always follow your healthcare professional's instructions.         Patient Education   Instrumental Activities of Daily Living  Your Health Risk Assessment indicates you have difficulties with instrumental activities of daily living which include laundry, housekeeping, banking, shopping, using the telephone, food preparation, transportation, or taking your own medications. Please make a follow up appointment for us to address this issue in more detail.    [employer] has resources available on the following website: [email or web address]     Also, here is a local agency that provides help with meals and other assistance:   [facility] Line: [phone]     Patient Education   Urinary Incontinence, Female (Adult)  Urinary incontinence means loss of control of the bladder. This problem affects many women, especially as they get older. If you have incontinence, you may be embarrassed to ask for help. But know that this problem can be treated.  Types of Incontinence  There are different types of incontinence. Two of the main types are described here. You can have more than one type.    Stress incontinence. With this type, urine leaks when pressure (stress) is put on the bladder. This may happen when you cough, sneeze, or laugh. Stress incontinence most often occurs  because the pelvic floor muscles that support the bladder and urethra are weak. This can happen after pregnancy and vaginal childbirth or a hysterectomy. It can also be due to excess body weight or hormone changes.    Urge incontinence (also called overactive bladder). With this type, a sudden urge to urinate is felt often. This may happen even though there may not be much urine in the bladder. The need to urinate often during the night is common. Urge incontinence most often occurs because of bladder spasms. This may be due to bladder irritation or infection. Damage to bladder nerves or pelvic muscles, constipation, and certain medicines can also lead to urge incontinence.  Treatment of urinary incontinence depends on the cause. Further evaluation is needed to find the type you have. This will likely include an exam and certain tests. Based on the results, you and your healthcare provider can then plan treatment. Until a diagnosis is made, the home care tips below can help relieve symptoms.  Home care    Do pelvic floor muscle exercises, if they are prescribed. The pelvic floor muscles help support the bladder and urethra. Many women find that their symptoms improve when doing special exercises that strengthen these muscles. To do the exercises contract the muscles you would use to stop your stream of urine, but do this when youre not urinating. Hold for 10 seconds, then relax. Repeat 10 to 20 times in a row, at least 3 times a day. Your provider may give you other instructions for how to do the exercises and how often.    Keep a bladder diary. This helps track how often and how much you urinate over a set period of time. Bring this diary with you to your next visit with the provider. The information can help your provider learn more about your bladder problem.    Lose weight, if advised to by your provider. Excess weight puts pressure on the bladder. Your provider can help you create a weight-loss plan thats right  for you. This may include exercising more and making certain diet changes.    Don't consume foods and drinks that may irritate the bladder. These can include alcohol and caffeinated drinks.    Quit smoking. Smoking and other tobacco use can lead to chronic cough that strains the pelvic floor muscles. Smoking may also damage the bladder and urethra. Talk with your provider about treatments or methods you can use to quit smoking.    If drinking large amounts of fluid causes you to have symptoms, you may be advised to limit your fluid intake. You may also be advised to drink most of your fluids during the day and to limit fluids at night.    If youre worried about urine leakage or accidents, you may wear absorbent pads to catch urine. Change the pads often. This helps reduce discomfort. It may also reduce the risk of skin or bladder infections.  Follow-up care  Follow up with your healthcare provider, or as directed. It may take some to find the right treatment for your problem. Your treatment plan may include special therapies or medicines. Certain procedures or surgery may also be options. Be sure to discuss any questions you have with your provider.  When to seek medical advice  Call the healthcare provider right away if any of these occur:    Fever of 100.4 F (38 C) or higher, or as directed by your provider    Bladder pain or fullness    Abdominal swelling    Nausea or vomiting    Back pain    Weakness, dizziness or fainting  Date Last Reviewed: 10/1/2017    9012-6883 [employer]. [address] [place] [address]. All rights reserved. This information is not intended as a substitute for professional medical care. Always follow your healthcare professional's instructions.     Patient Education   Your Health Risk Assessment indicates you feel you are not in good emotional health.    Recreation   Recreation is not limited to sports and team events. It includes any activity that provides  relaxation, interest, enjoyment, and exercise. Recreation provides an outlet for physical, mental, and social energy. It can give a sense of worth and achievement. It can help you stay healthy.    Mental Exercise and Social Involvement  Mental and emotional health is as important as physical health. Keep in touch with friends and family. Stay as active as possible. Continue to learn and challenge yourself.   Things you can do to stay mentally active are:    Learn something new, like a foreign language or musical instrument.     Play SCRABBLE or do crossword puzzles. If you cannot find people to play these games with you at home, you can play them with others on your computer through the Internet.     Join a games club--anything from card games to chess or checkers or lawn bowling.     Start a new hobby.     Go back to school.     Volunteer.     Read.     Keep up with world events.       Patient Education   Preventing Falls in the Home  As you get older, falls are more likely. Thats because your reaction time slows. Your muscles and joints may also get stiffer, making them less flexible. Illness, medications, and vision changes can also affect your balance. A fall could leave you unable to live on your own. To make your home safer, follow these tips:    Floors    Put nonskid pads under area rugs.    Remove throw rugs.    Replace worn floor coverings.    Tack carpets firmly to each step on carpeted stairs. Put nonskid strips on the edges of uncarpeted stairs.    Keep floors and stairs free of clutter and cords.    Arrange furniture so there are clear pathways.    Clean up any spills right away.    Bathrooms    Install grab bars in the tub or shower.    Apply nonskid strips or put a nonskid rubber mat in the tub or shower.    Sit on a bath chair to bathe.    Use bathmats with nonskid backing.    Lighting    Keep a flashlight in each room.    Put a nightlight along the pathway between the bedroom and the bathroom.     5441-4072 [employer]. [address] [place] [address], [place] [address]. All rights reserved. This information is not intended as a substitute for professional medical care. Always follow your healthcare professional's instructions.         Patient Education   Understanding Advance Care Planning  Advance care planning is the process of deciding ones own future medical care. It helps ensure that if you cant speak for yourself, your wishes can still be carried out. The plan is a series of legal documents that note a persons wishes. The documents vary by state. Advance care planning may be done when a person has a serious illness that is expected to get worse. It may be done before major surgery. And it can help you and your family be prepared in case of a major illness or injury. Advance care planning helps with making decisions at these times.       A health care proxy is a person who acts as the voice of a patient when the patient cant speak for himself or herself. The name of this role varies by state. It may be called a Durable Medical Power of  or Durable Power of  for Healthcare. It may be called an agent, surrogate, or advocate. Or it may be called a representative or decision maker. It is an official duty that is identified by a legal document. The document also varies by state.    Why Is Advance Care Planning Important?  If a person communicates their healthcare wishes:    They will be given medical care that matches their values and goals.    Their family members will not be forced to make decisions in a crisis with no guidance.  Creating a Plan  Making an advance care plan is often done in 3 steps:    Thinking about ones wishes. To create an advance care plan, you should think about what kind of medical treatment you would want if you lose the ability to communicate. Are there any situations in which you would refuse or stop treatment? Are there therapies you would want or not want?  And whom do you want to make decisions for you? There are many places to learn more about how to plan for your care. Ask your doctor or  for resources.    Picking a health care proxy. This means choosing a trusted person to speak for you only when you cant speak for yourself. When you cannot make medical decisions, your proxy makes sure the instructions in your advance care plan are followed. A proxy does not make decisions based on his or her own opinions. They must put aside those opinions and values if needed, and carry out your wishes.    Filling out the legal documents. There are several kinds of legal documents for advance care planning. Each one tells health care providers your wishes. The documents may vary by state. They must be signed and may need to be witnessed or notarized. You can cancel or change them whenever you wish. Depending on your state, the documents may include a Healthcare Proxy form, Living Will, Durable Medical Power of , Advance Directive, or others.  The Familys Role  The best help a family can give is to support their loved ones wishes. Open and honest communication is vital. Family should express any concerns they have about the patients choices while the patient can still make decisions.    6285-4060 [employer] [address] [place] [address]. All rights reserved. This information is not intended as a substitute for professional medical care. Always follow your healthcare professional's instructions.         Also, [facility] [place] offers a free, downloadable health care directive that allows you to share your treatment choices and personal preferences if you cannot communicate your wishes. It also allows you to appoint another person (called a health care agent) to make health care decisions if you are unable to do so. You can download an advance directive by going here: [email or web address]      Patient Education   Personalized Prevention Plan  You are due for the preventive services outlined below.  Your care team is available to assist you in scheduling these services.  If you have already completed any of these items, please share that information with your care team to update in your medical record.  Health Maintenance   Topic Date Due   ? ZOSTER VACCINES (1 of 2) 03/25/2002   ? DXA SCAN  03/25/2017   ? MEDICARE ANNUAL WELLNESS VISIT  05/16/2019   ? INFLUENZA VACCINE RULE BASED (1) 08/01/2019   ? DEPRESSION FOLLOW UP  11/24/2019   ? MAMMOGRAM  02/28/2020   ? FALL RISK ASSESSMENT  05/15/2020   ? ADVANCE CARE PLANNING  05/16/2023   ? COLONOSCOPY  02/13/2027   ? TD 18+ HE  07/26/2027   ? HEPATITIS C SCREENING  Completed   ? PNEUMOCOCCAL POLYSACCHARIDE VACCINE AGE 65 AND OVER  Completed   ? PNEUMOCOCCAL CONJUGATE VACCINE FOR ADULTS (PCV13 OR PREVNAR)  Completed

## 2021-08-10 DIAGNOSIS — R42 DIZZINESS: Primary | ICD-10-CM

## 2021-08-10 RX ORDER — MECLIZINE HCL 25MG 25 MG/1
TABLET, CHEWABLE ORAL
Qty: 30 TABLET | Refills: 3 | Status: SHIPPED | OUTPATIENT
Start: 2021-08-10 | End: 2021-10-19

## 2021-08-12 ENCOUNTER — MEDICAL CORRESPONDENCE (OUTPATIENT)
Dept: HEALTH INFORMATION MANAGEMENT | Facility: CLINIC | Age: 71
End: 2021-08-12

## 2021-08-22 ENCOUNTER — APPOINTMENT (OUTPATIENT)
Dept: RADIOLOGY | Facility: HOSPITAL | Age: 71
End: 2021-08-22
Attending: EMERGENCY MEDICINE
Payer: COMMERCIAL

## 2021-08-22 ENCOUNTER — HOSPITAL ENCOUNTER (EMERGENCY)
Facility: HOSPITAL | Age: 71
Discharge: HOME | End: 2021-08-22
Attending: EMERGENCY MEDICINE | Admitting: EMERGENCY MEDICINE
Payer: COMMERCIAL

## 2021-08-22 VITALS
SYSTOLIC BLOOD PRESSURE: 121 MMHG | BODY MASS INDEX: 27.05 KG/M2 | DIASTOLIC BLOOD PRESSURE: 70 MMHG | OXYGEN SATURATION: 94 % | HEART RATE: 66 BPM | RESPIRATION RATE: 16 BRPM | TEMPERATURE: 98.4 F | WEIGHT: 134 LBS

## 2021-08-22 DIAGNOSIS — M54.50 ACUTE LEFT-SIDED LOW BACK PAIN WITHOUT SCIATICA: ICD-10-CM

## 2021-08-22 PROCEDURE — 72100 X-RAY EXAM L-S SPINE 2/3 VWS: CPT

## 2021-08-22 PROCEDURE — 99284 EMERGENCY DEPT VISIT MOD MDM: CPT

## 2021-08-22 PROCEDURE — 250N000013 HC RX MED GY IP 250 OP 250 PS 637: Performed by: EMERGENCY MEDICINE

## 2021-08-22 RX ORDER — HYDROCODONE BITARTRATE AND ACETAMINOPHEN 5; 325 MG/1; MG/1
1 TABLET ORAL EVERY 6 HOURS PRN
Qty: 8 TABLET | Refills: 0 | Status: SHIPPED | OUTPATIENT
Start: 2021-08-22 | End: 2021-08-25

## 2021-08-22 RX ORDER — HYDROCODONE BITARTRATE AND ACETAMINOPHEN 5; 325 MG/1; MG/1
2 TABLET ORAL ONCE
Status: COMPLETED | OUTPATIENT
Start: 2021-08-22 | End: 2021-08-22

## 2021-08-22 RX ORDER — LIDOCAINE 4 G/G
1 PATCH TOPICAL ONCE
Status: DISCONTINUED | OUTPATIENT
Start: 2021-08-22 | End: 2021-08-22 | Stop reason: HOSPADM

## 2021-08-22 RX ORDER — TIZANIDINE 2 MG/1
2 TABLET ORAL 3 TIMES DAILY PRN
Qty: 15 TABLET | Refills: 0 | Status: SHIPPED | OUTPATIENT
Start: 2021-08-22 | End: 2022-03-01

## 2021-08-22 RX ADMIN — HYDROCODONE BITARTRATE AND ACETAMINOPHEN 2 TABLET: 5; 325 TABLET ORAL at 10:21

## 2021-08-22 NOTE — ED PROVIDER NOTES
EMERGENCY DEPARTMENT ENCOUnter      NAME: Ricky Lea  AGE: 71 year old female  YOB: 1950  MRN: [id number]  EVALUATION DATE & TIME: 8/22/2021  9:27 AM    PCP: Bassam Llanos    ED PROVIDER: Abel Easley DO      Chief Complaint   Patient presents with     Back Pain         FINAL IMPRESSION:  No diagnosis found.      ED COURSE & MEDICAL DECISION MAKING:    10:11 AM I performed my initial history and physical exam as well as discussed ED course and plan in room 10.   12:25 PM  I discharged the patient and briefed him on patent follow up and treatment going forward.     The patient presented emergency department with complaints of low back pain.  She denies any radiating symptoms.  She has no neurologic deficits or symptoms to suggest significant etiology such as cauda equina.  She has no concerning physical exam findings and x-ray of the lumbar spine does not show any acute abnormalities.  Symptoms appear most likely due to a muscular strain.  Plan to be for discharge home with pain medication and muscle relaxant and instructions for close outpatient follow-up.  She and family are comfortable with this plan.      At the conclusion of the encounter I discussed the results of all of the tests and the disposition. The questions were answered. The patient or family acknowledged understanding and was agreeable with the care plan.         MEDICATIONS GIVEN IN THE EMERGENCY:  Medications - No data to display    NEW PRESCRIPTIONS STARTED AT TODAY'S ER VISIT  Discharge Medication List as of 8/22/2021 12:16 PM      START taking these medications    Details   HYDROcodone-acetaminophen (NORCO) 5-325 MG tablet Take 1 tablet by mouth every 6 hours as needed for severe pain, Disp-8 tablet, R-0, E-Prescribe      tiZANidine (ZANAFLEX) 2 MG tablet Take 1 tablet (2 mg) by mouth 3 times daily as needed for muscle spasms, Disp-15 tablet, R-0, E-Prescribe             "    =================================================================    HPI   was used(PHONE): Janneth      Ricky Lea is a 71 year old female with a pertinent history of a compression fracture of T11 vertebra, chronic midline back pain without sciatica, and osteoporosis who presents to this ED via private car for evaluation of back pain.     Patient reports lower right sided back pain for the past 5 days with no pain radiating to her lower extremities. She says that her pain is worsened with movement and whenever she bends over. Bowel movements or urination is painful secondary to back pain. Patient sat down 3 days ago and felt a twinge of pain at home so she grabbed the door handle to get up and heard a \"sound\" come from her back. Since then her pain has been worse and constant. She has had lower back problems in the past, saying that she fell in 2016 and her back pain has been coming and going since then. Patient has taken medication for her pain but does not remember exactly what. She presents a \"[facility]\" bag with pill bottle containing gabapentin, vertigo medication, cholesterol medication, and diabetes medicine. Denies any medicinal allergies or any other symptoms at this time.     SHx:   Smoking: Never    Smokeless tobacco: Current smokeless tobacco user    REVIEW OF SYSTEMS   Musculoskeletal:  Positive for right sided lower back pain for the pat 5 days(no LE pain)  All other systems reviewed and are negative      PAST MEDICAL HISTORY:  No past medical history on file.    PAST SURGICAL HISTORY:  No past surgical history on file.        CURRENT MEDICATIONS:    acetaminophen (TYLENOL EXTRA STRENGTH) 500 MG tablet  alendronate (FOSAMAX) 70 MG tablet  atorvastatin (LIPITOR) 20 MG tablet  blood glucose test strips  blood glucose test strips  blood-glucose meter Misc  diaper,brief,adult,disposable Misc  DULoxetine (CYMBALTA) 60 MG capsule  gabapentin (NEURONTIN) 300 MG capsule  generic lancets " (FINGERSTIX LANCETS)  meclizine (ANTIVERT) 25 mg tablet  meclizine (ANTIVERT) 25 mg tablet  meclizine 25 MG CHEW  meloxicam (MOBIC) 15 MG tablet  metFORMIN (GLUCOPHAGE-XR) 500 MG 24 hr tablet  triamcinolone (KENALOG) 0.1 % ointment  white petrolatum-mineral oil Crea        ALLERGIES:  No Known Allergies    FAMILY HISTORY:  Family History   Problem Relation Age of Onset     Breast Cancer No family hx of        SOCIAL HISTORY:   Social History     Socioeconomic History     Marital status:      Spouse name: Not on file     Number of children: Not on file     Years of education: Not on file     Highest education level: Not on file   Occupational History     Not on file   Tobacco Use     Smoking status: Never Smoker     Smokeless tobacco: Current User     Types: Chew     Tobacco comment: chews betal nut   Substance and Sexual Activity     Alcohol use: Not on file     Drug use: Not on file     Sexual activity: Not on file   Other Topics Concern     Not on file   Social History Narrative     Not on file     Social Determinants of Health     Financial Resource Strain:      Difficulty of Paying Living Expenses:    Food Insecurity:      Worried About Running Out of Food in the Last Year:      Ran Out of Food in the Last Year:    Transportation Needs:      Lack of Transportation (Medical):      Lack of Transportation (Non-Medical):    Physical Activity:      Days of Exercise per Week:      Minutes of Exercise per Session:    Stress:      Feeling of Stress :    Social Connections:      Frequency of Communication with Friends and Family:      Frequency of Social Gatherings with Friends and Family:      Attends [employer] Services:      Active Member of Clubs or Organizations:      Attends Club or Organization Meetings:      Marital Status:    Intimate Partner Violence:      Fear of Current or Ex-Partner:      Emotionally Abused:      Physically Abused:      Sexually Abused:        VITALS:  Patient Vitals for the past 24  hrs:   BP Temp Temp src Pulse Resp SpO2 Weight   08/22/21 0936 127/71 -- -- 73 20 94 % --   08/22/21 0822 117/75 98.4  F (36.9  C) Oral 78 16 94 % 60.8 kg (134 lb)       PHYSICAL EXAM    Constitutional:  Well developed, Well nourished,  HENT:  Normocephalic, Atraumatic, Bilateral external ears normal, Oropharynx moist, Nose normal.   Neck:  Normal range of motion, No meningismus, No stridor.   Eyes:  EOMI, Conjunctiva normal, No discharge.   Respiratory:  Normal breath sounds, No respiratory distress, No wheezing, No chest tenderness.   Cardiovascular:  Normal heart rate, Normal rhythm, No murmurs  GI:  Soft, No tenderness, No guarding, No CVA tenderness.   Musculoskeletal:  Neurovascularly intact distally, No edema, No cyanosis, Good range of motion in all major joints. Right sided lower back pain with any movement of lower back   Integument:  Warm, Dry, No erythema, No rash.   Lymphatic:  No lymphadenopathy noted.   Neurologic:  Alert & oriented x 3, Normal motor function, Normal sensory function, No focal deficits noted.   Psychiatric:  Affect normal, Judgment normal, Mood normal.             I, Iron Mon, am serving as a scribe to document services personally performed by Dr. Easley based on my observation and the provider's statements to me. I, Abel Easley, DO attest that Iron oMn is acting in a scribe capacity, has observed my performance of the services and has documented them in accordance with my direction.    Abel Easley DO  Emergency Medicine  [facility] EMERGENCY DEPARTMENT  [facility] [facility] [address]  [phone]  Dept: [phone]       Abel Easley MD  08/22/21 2548

## 2021-08-22 NOTE — DISCHARGE INSTRUCTIONS
Your back pain today is most likely due to a muscular spasm.  Take the prescribed medication as directed and follow-up closely with your primary care doctor as an outpatient.  Return to the ER if you have any worsening symptoms or other concerns.

## 2021-09-03 ENCOUNTER — OFFICE VISIT (OUTPATIENT)
Dept: PHYSICAL MEDICINE AND REHAB | Facility: CLINIC | Age: 71
End: 2021-09-03
Attending: EMERGENCY MEDICINE
Payer: COMMERCIAL

## 2021-09-03 VITALS
HEART RATE: 86 BPM | HEIGHT: 59 IN | BODY MASS INDEX: 27.01 KG/M2 | WEIGHT: 134 LBS | DIASTOLIC BLOOD PRESSURE: 87 MMHG | SYSTOLIC BLOOD PRESSURE: 135 MMHG

## 2021-09-03 DIAGNOSIS — M54.16 LUMBAR RADICULAR PAIN: ICD-10-CM

## 2021-09-03 DIAGNOSIS — M54.16 LUMBAR RADICULITIS: ICD-10-CM

## 2021-09-03 DIAGNOSIS — M51.26 LUMBAR DISC HERNIATION: Primary | ICD-10-CM

## 2021-09-03 PROCEDURE — 99214 OFFICE O/P EST MOD 30 MIN: CPT | Performed by: PHYSICIAN ASSISTANT

## 2021-09-03 ASSESSMENT — PAIN SCALES - GENERAL: PAINLEVEL: SEVERE PAIN (6)

## 2021-09-03 ASSESSMENT — MIFFLIN-ST. JEOR: SCORE: 1038.76

## 2021-09-03 NOTE — LETTER
9/3/2021         RE: Ricky Lea  [address] [place] [address]        Dear Colleague,    Thank you for referring your patient, Ricky Lea, to the [facility] [place]. Please see a copy of my visit note below.    Assessment:   Ricky Lea is a 71 year old y.o. female with past medical history significant for hyperlipidemia, hypertension, depression, osteoporosis (on Fosamax), type 2 diabetes mellitus, urinary incontinence who presents today for follow-up regarding chronic bilateral low back pain with radiation into the left greater than right lower extremity.  Pain followed up 2 weeks ago without injury.  My review of an MRI lumbar spine shows a chronic T11 compression fracture (no new fracture).  There is a disc extrusion at L4-5 causing mild right and slight left lateral recess stenosis and low-grade spinal canal stenosis and foraminal stenosis.  Patient had an x-ray in the ED August 22 which did not show any new fracture.  I believe she is primarily symptomatic from lumbar radiculitis and secondarily symptomatic from lower lumbar facet arthropathy.  Pain has been refractory to conservative treatment including medical management and physical therapy.  Patient is neurologically intact on exam.       Plan:     A shared decision making plan was used.  The patient's values and choices were respected.  The following represents what was discussed and decided upon by the physician assistant and the patient.  A telephone  was used to visit.    1.  DIAGNOSTIC TESTS: I reviewed the MRI lumbar spine.  No further diagnostic tests were ordered.    2.  PHYSICAL THERAPY: Patient completed 7 sessions of physical therapy June 11, 2021.  She will continue with home exercises.  No further physical therapy was ordered.    3.  MEDICATIONS: No changes are made to the patient's medications.  -Patient takes meloxicam 15 mg daily.  -Patient uses duloxetine 30 mg daily.  -Patient takes gabapentin 300 mg in the  morning, 300 mg in the afternoon, and 900 mg at bedtime.  -Patient takes Tylenol as needed.    4.  INTERVENTIONS:    -I offer the patient bilateral L4-5 transforaminal epidural steroid injections.  Patient indicated she would like to proceed and an order was placed.  Tyree & Tyree vaccine was in July.  -If this does not help, we could consider bilateral L4-5, L5-S1 facet joint injections versus medial branch blocks depending on insurance coverage.    5.  PATIENT EDUCATION: Patient is in agreement the above plan.  All questions were answered.    6.  FOLLOW-UP: Patient to follow-up with me 2 weeks after her bilateral L4-5 transforaminal epidural steroid injections.  If she has questions or concerns in the meantime, she should not hesitate to call.    Subjective:     Ricky Lea is a 71 year old female who presents today for follow-up regarding chronic low back pain, currently radiating into the left greater than right lower extremity.  Pain flared up about 2 weeks ago after transitioning from seated to standing while doing some of her physical therapy exercises.  She states that she heard a noise in her lower back and experienced increased pain and fell back into the seated position.  She went to the emergency department because of her increased pain.  An x-ray was stable.  She was discharged home with prescriptions for Norco and tizanidine.  Those have helped alleviate her flare, but she continues to have significant pain.     Patient complains of bilateral low back pain.  Pain is currently worse on the left than the right.  Pain radiates into the left buttock and down the posterior thigh to the knee.  Pain radiates into the right buttock but not further down the leg.  She denies any numbness, tingling, weakness down the legs.  She rates her pain today as a 6 out of 10.  Pain is aggravated with transitioning from seated to standing and alleviated with rest.  She denies any new symptoms since she was last  seen.    Patient completed 7 sessions of physical therapy in June 2021.  She does her home exercises.  Patient takes meloxicam 15 mg daily, duloxetine 30 mg daily, gabapentin 300 milligrams in the morning, 300 mg in the afternoon, and 900 mg at bedtime.  She also takes Tylenol as needed.  Medications are helpful.  She ran out of the tizanidine and Percocet which were also helpful for her pain.    Review of Systems:  Negative for numbness/tingling, weakness, loss of bowel/bladder control, inability to urinate, headache, pain much worse at night, trip/stumble/falls, difficulty swallowing, difficulty with hand skills, fevers, unintentional weight loss.     Objective:   CONSTITUTIONAL:  Vital signs as above.  No acute distress.  The patient is well nourished and well groomed.    PSYCHIATRIC:  The patient is awake, alert, oriented to person, place and time.  The patient is answering questions appropriately with clear speech.  Normal affect.  HEENT: Normocephalic, atraumatic.  Sclera clear.    SKIN:  Skin over the face, posterior torso, bilateral upper and lower extremities is clean, dry, intact without rashes.  VASCULAR: No significant lower extremity edema.  MUSCULOSKELETAL:  Gait is unsteady.  The patient is able to rise onto toes and heels bilaterally with support.  Mild tenderness over the left greater than right lower lumbar paraspinal muscles.  Lumbar flexion mildly restricted.  Lumbar extension moderately restricted.  Lumbar facet loading maneuvers reproduce low back pain, left greater than right.    The patient has 5/5 strength for the bilateral hip flexors, knee flexors/extensors, ankle dorsiflexors/plantar flexors, ankle evertors/invertors.    NEUROLOGICAL: 1-2+ patellar, achilles reflexes which are symmetric bilaterally.  No ankle clonus bilaterally.  Sensation to light touch is intact in the bilateral L4, L5, and S1 dermatomes.       RESULTS: I reviewed the MRI lumbar spine from [facility] dated April 1,  2021.  This shows a chronic T11 compression fracture.  At L4-5 there is a right central disc extrusion causing mild right and slight left lateral recess stenosis.  There is low-grade spinal canal stenosis and bilateral foraminal stenosis at this level.  There is mild facet arthropathy L4-5 and moderate right and mild left facet arthropathy L5-S1.    EXAM: XR LUMBAR SPINE 2-3 VIEWS  LOCATION: [facility]  DATE/TIME: 08/22/2021, 10:42 AM     INDICATION: Low back pain.  COMPARISON: MR lumbar spine 04/01/2021 and lumbar spine radiographs 07/28/2020.  TECHNIQUE: CR Lumbar Spine.                                                                      IMPRESSION: Five lumbar-type vertebral bodies. Anatomic alignment. Osteopenia. The known, previously described chronic appearing inferior endplate deformity at T11 is incompletely visualized on this study with perhaps mild superior endplate deformity at   T11. If there is further clinical concern for progression of T11 fracture, would suggest dedicated CT study. Other vertebral bodies are preserved in height. Mild lower lumbar facet arthropathy. Disc space height is maintained. Visualized pelvic ring   intact. There is stable appearing irregularity involving the sacrococcygeal junction.             Again, thank you for allowing me to participate in the care of your patient.        Sincerely,        Maureen Palafox PA-C

## 2021-09-03 NOTE — PROGRESS NOTES
Assessment:   Ricky Lea is a 71 year old y.o. female with past medical history significant for hyperlipidemia, hypertension, depression, osteoporosis (on Fosamax), type 2 diabetes mellitus, urinary incontinence who presents today for follow-up regarding chronic bilateral low back pain with radiation into the left greater than right lower extremity.  Pain followed up 2 weeks ago without injury.  My review of an MRI lumbar spine shows a chronic T11 compression fracture (no new fracture).  There is a disc extrusion at L4-5 causing mild right and slight left lateral recess stenosis and low-grade spinal canal stenosis and foraminal stenosis.  Patient had an x-ray in the ED August 22 which did not show any new fracture.  I believe she is primarily symptomatic from lumbar radiculitis and secondarily symptomatic from lower lumbar facet arthropathy.  Pain has been refractory to conservative treatment including medical management and physical therapy.  Patient is neurologically intact on exam.       Plan:     A shared decision making plan was used.  The patient's values and choices were respected.  The following represents what was discussed and decided upon by the physician assistant and the patient.  A telephone  was used to visit.    1.  DIAGNOSTIC TESTS: I reviewed the MRI lumbar spine.  No further diagnostic tests were ordered.    2.  PHYSICAL THERAPY: Patient completed 7 sessions of physical therapy June 11, 2021.  She will continue with home exercises.  No further physical therapy was ordered.    3.  MEDICATIONS: No changes are made to the patient's medications.  -Patient takes meloxicam 15 mg daily.  -Patient uses duloxetine 30 mg daily.  -Patient takes gabapentin 300 mg in the morning, 300 mg in the afternoon, and 900 mg at bedtime.  -Patient takes Tylenol as needed.    4.  INTERVENTIONS:    -I offer the patient bilateral L4-5 transforaminal epidural steroid injections.  Patient indicated she would like to  proceed and an order was placed.  Tyree & Tyree vaccine was in July.  -If this does not help, we could consider bilateral L4-5, L5-S1 facet joint injections versus medial branch blocks depending on insurance coverage.    5.  PATIENT EDUCATION: Patient is in agreement the above plan.  All questions were answered.    6.  FOLLOW-UP: Patient to follow-up with me 2 weeks after her bilateral L4-5 transforaminal epidural steroid injections.  If she has questions or concerns in the meantime, she should not hesitate to call.    Subjective:     Ricky Lea is a 71 year old female who presents today for follow-up regarding chronic low back pain, currently radiating into the left greater than right lower extremity.  Pain flared up about 2 weeks ago after transitioning from seated to standing while doing some of her physical therapy exercises.  She states that she heard a noise in her lower back and experienced increased pain and fell back into the seated position.  She went to the emergency department because of her increased pain.  An x-ray was stable.  She was discharged home with prescriptions for Norco and tizanidine.  Those have helped alleviate her flare, but she continues to have significant pain.     Patient complains of bilateral low back pain.  Pain is currently worse on the left than the right.  Pain radiates into the left buttock and down the posterior thigh to the knee.  Pain radiates into the right buttock but not further down the leg.  She denies any numbness, tingling, weakness down the legs.  She rates her pain today as a 6 out of 10.  Pain is aggravated with transitioning from seated to standing and alleviated with rest.  She denies any new symptoms since she was last seen.    Patient completed 7 sessions of physical therapy in June 2021.  She does her home exercises.  Patient takes meloxicam 15 mg daily, duloxetine 30 mg daily, gabapentin 300 milligrams in the morning, 300 mg in the afternoon, and 900 mg at  bedtime.  She also takes Tylenol as needed.  Medications are helpful.  She ran out of the tizanidine and Percocet which were also helpful for her pain.    Review of Systems:  Negative for numbness/tingling, weakness, loss of bowel/bladder control, inability to urinate, headache, pain much worse at night, trip/stumble/falls, difficulty swallowing, difficulty with hand skills, fevers, unintentional weight loss.     Objective:   CONSTITUTIONAL:  Vital signs as above.  No acute distress.  The patient is well nourished and well groomed.    PSYCHIATRIC:  The patient is awake, alert, oriented to person, place and time.  The patient is answering questions appropriately with clear speech.  Normal affect.  HEENT: Normocephalic, atraumatic.  Sclera clear.    SKIN:  Skin over the face, posterior torso, bilateral upper and lower extremities is clean, dry, intact without rashes.  VASCULAR: No significant lower extremity edema.  MUSCULOSKELETAL:  Gait is unsteady.  The patient is able to rise onto toes and heels bilaterally with support.  Mild tenderness over the left greater than right lower lumbar paraspinal muscles.  Lumbar flexion mildly restricted.  Lumbar extension moderately restricted.  Lumbar facet loading maneuvers reproduce low back pain, left greater than right.    The patient has 5/5 strength for the bilateral hip flexors, knee flexors/extensors, ankle dorsiflexors/plantar flexors, ankle evertors/invertors.    NEUROLOGICAL: 1-2+ patellar, achilles reflexes which are symmetric bilaterally.  No ankle clonus bilaterally.  Sensation to light touch is intact in the bilateral L4, L5, and S1 dermatomes.       RESULTS: I reviewed the MRI lumbar spine from [facility] dated April 1, 2021.  This shows a chronic T11 compression fracture.  At L4-5 there is a right central disc extrusion causing mild right and slight left lateral recess stenosis.  There is low-grade spinal canal stenosis and bilateral foraminal stenosis at this  level.  There is mild facet arthropathy L4-5 and moderate right and mild left facet arthropathy L5-S1.    EXAM: XR LUMBAR SPINE 2-3 VIEWS  LOCATION: [facility]  DATE/TIME: 08/22/2021, 10:42 AM     INDICATION: Low back pain.  COMPARISON: MR lumbar spine 04/01/2021 and lumbar spine radiographs 07/28/2020.  TECHNIQUE: CR Lumbar Spine.                                                                      IMPRESSION: Five lumbar-type vertebral bodies. Anatomic alignment. Osteopenia. The known, previously described chronic appearing inferior endplate deformity at T11 is incompletely visualized on this study with perhaps mild superior endplate deformity at   T11. If there is further clinical concern for progression of T11 fracture, would suggest dedicated CT study. Other vertebral bodies are preserved in height. Mild lower lumbar facet arthropathy. Disc space height is maintained. Visualized pelvic ring   intact. There is stable appearing irregularity involving the sacrococcygeal junction.

## 2021-09-03 NOTE — PATIENT INSTRUCTIONS
Please schedule this injection at least 1 week  from now to allow time for insurance prior authorization.  On the day of your injection, you cannot be sick or taking antibiotics.  If you become sick and are prescribed, please call the clinic so your injection can be rescheduled for once you have completed your antibiotics.  You will need to bring a  with you for your injection.   If you have any questions or concerns prior to your injection, please do not hesitate to call the nurse navigation line at [phone] or contact Maureen Palafox through [employer].

## 2021-09-07 DIAGNOSIS — E11.9 TYPE 2 DIABETES MELLITUS TREATED WITHOUT INSULIN (H): ICD-10-CM

## 2021-09-07 DIAGNOSIS — M54.50 CHRONIC MIDLINE LOW BACK PAIN WITHOUT SCIATICA: ICD-10-CM

## 2021-09-07 DIAGNOSIS — G89.29 CHRONIC MIDLINE LOW BACK PAIN WITHOUT SCIATICA: ICD-10-CM

## 2021-09-07 RX ORDER — METFORMIN HCL 500 MG
TABLET, EXTENDED RELEASE 24 HR ORAL
Qty: 30 TABLET | Refills: 1 | Status: SHIPPED | OUTPATIENT
Start: 2021-09-07 | End: 2021-11-01

## 2021-09-07 RX ORDER — MELOXICAM 15 MG/1
TABLET ORAL
Qty: 30 TABLET | Refills: 5 | Status: SHIPPED | OUTPATIENT
Start: 2021-09-07 | End: 2022-02-28

## 2021-09-07 NOTE — TELEPHONE ENCOUNTER
"Metformin:  Last Written Prescription Date:  07/09/2021  Last Fill Quantity: 30 tablets,  # refills: 1   Last office visit provider:  Dr. Llanos on 07/09/2021      Meloxicam:  Last Written Prescription Date:  03/23/2021  Last Fill Quantity: 30 tablets,  # refills: 5  Last office visit provider:  Dr. Llanos on 07/09/2021    Last CBC had abnormal WBC and last BP was 144/86  WBC   Date Value Ref Range Status   11/01/2019 12.3 (H) 4.0 - 11.0 thou/uL Final     BP Readings from Last 3 Encounters:   09/03/21 135/87   08/22/21 121/70   07/09/21 (!) 144/86       Requested Prescriptions   Pending Prescriptions Disp Refills     metFORMIN (GLUCOPHAGE-XR) 500 MG 24 hr tablet [Pharmacy Med Name: METFORMIN HCL  MG  Tablet] 30 tablet 1     Sig: TAKE 1 TABLET(500 MG) BY MOUTH DAILY WITH BREAKFAST FOR DIABETES       Biguanide Agents Failed - 9/7/2021  9:23 AM        Failed - Recent (6 mo) or future (30 days) visit within the authorizing provider's specialty     Patient had office visit in the last 6 months or has a visit in the next 30 days with authorizing provider or within the authorizing provider's specialty.  See \"Patient Info\" tab in inbasket, or \"Choose Columns\" in Meds & Orders section of the refill encounter.            Passed - Patient is age 10 or older        Passed - Patient has documented A1c within the specified period of time.     If HgbA1C is 8 or greater, it needs to be on file within the past 3 months.  If less than 8, must be on file within the past 6 months.     Recent Labs   Lab Test 07/09/21  1042   A1C 7.4*             Passed - Patient's CR is NOT>1.4 OR Patient's EGFR is NOT<45 within past 12 mos.     Recent Labs   Lab Test 02/09/21  1236   GFRESTIMATED 54*   GFRESTBLACK >60       Recent Labs   Lab Test 02/09/21  1236   CR 1.02             Passed - Patient does NOT have a diagnosis of CHF.        Passed - Medication is active on med list        Passed - Patient is not pregnant        Passed - Patient has " "not had a positive pregnancy test within the past 12 mos.                meloxicam (MOBIC) 15 MG tablet [Pharmacy Med Name: MELOXICAM 15 MG TABS 15 Tablet] 30 tablet 5     Sig: TAKE 1 PILL (15 MG) BY MOUTH EVERYDAY FOR PAIN       NSAID Medications Failed - 9/7/2021  9:23 AM        Failed - Patient is age 6-64 years        Failed - Normal CBC on file in past 12 months     Recent Labs   Lab Test 11/01/19  1121   WBC 12.3*   RBC 4.25   HGB 13.8   HCT 41.3                    Passed - Blood pressure under 140/90 in past 12 months     BP Readings from Last 3 Encounters:   09/03/21 135/87   08/22/21 121/70   07/09/21 (!) 144/86                 Passed - Normal ALT on file in past 12 months     Recent Labs   Lab Test 02/09/21  1236   ALT 23             Passed - Normal AST on file in past 12 months     Recent Labs   Lab Test 02/09/21  1236   AST 18             Passed - Recent (12 mo) or future (30 days) visit within the authorizing provider's specialty     Patient has had an office visit with the authorizing provider or a provider within the authorizing providers department within the previous 12 mos or has a future within next 30 days. See \"Patient Info\" tab in inbasket, or \"Choose Columns\" in Meds & Orders section of the refill encounter.              Passed - Medication is active on med list        Passed - No active pregnancy on record        Passed - Normal serum creatinine on file in past 12 months     Recent Labs   Lab Test 02/09/21  1236   CR 1.02       Ok to refill medication if creatinine is low          Passed - No positive pregnancy test in past 12 months             Chioma Garsia RN 09/07/21 10:30 AM  "

## 2021-09-16 ENCOUNTER — TELEPHONE (OUTPATIENT)
Dept: PHYSICAL MEDICINE AND REHAB | Facility: CLINIC | Age: 71
End: 2021-09-16

## 2021-09-16 NOTE — TELEPHONE ENCOUNTER
PHONE CONSENT:    A professional phone  was used for the entire encounter (Jorden Lyle SPENCER [id number])    The patient wished to take pre-procedure oral sedation so a phone consent was obtained prior to their injection.  The patient has been offered other conservative treatment (physical therapy, medications, etc.) but has opted to proceed with an injection.  The risks and benefits of the injection (bilateral L4-5 transforaminal epidural steroid injections) were discussed with the patient over the phone on 9-16-21 at 8:40 am.  The patient was told that the corticosteroid injection will somewhat suppress the immune system.  This could potentially increase the chance of catching the virus if exposed.  If the patient already has the virus, having a corticosteroid injection could potentially worsen the course of the virus.  These are theoretical risks.  The patient opted to proceed with the injection at this time.   Other risks of the injection include infection, bleeding, damage to surrounding structures, nerve injury, permanent weakness, permanent paralysis, worsened pain, soreness, headache, allergic reaction, no change in pain.      She had the Tyree and Tyree COVID vaccine on 4-10-21    The patient understands that they cannot have symptoms of an infection or be on antibiotics at the time of the injection as this would increase their risk of infection. If they start antibiotics prior to the procedure, they should call the clinic to see if the procedure will need to be rescheduled.  The patient understands that if they start any blood thinners prior to the injection, the provider must be notified immediately.  The patient denies any allergies to iodine contrast dye or iodine products.  The patient denies any symptoms of an active infection (cough, fevers, myalgias) and denies taking antibiotics.  The patient knows not to come in to clinic if they have any symptoms of an active infection, particularly cough,  fevers, myalgias.   The patient denies taking any prescription blood thinning medications. The patient denies any allergies to iodine or iodine contrast.       The patient verbalized understanding of the information given. The patient was given the opportunity to ask questions of the physician.  The patient elected to proceed and gave consent over the phone with CIERRA Trivedi as a witness.  Informed consent form was signed by the physician and the witness.

## 2021-09-21 ENCOUNTER — TELEPHONE (OUTPATIENT)
Dept: FAMILY MEDICINE | Facility: CLINIC | Age: 71
End: 2021-09-21

## 2021-09-21 NOTE — TELEPHONE ENCOUNTER
Reason for Call:  Home Health Care    Annabella, RN with [facility] called regarding (reason for call): verbal orders    Orders are needed for this patient. Skilled Nursing    PT: n/a    OT: n/a    Skilled Nursing: every 3 week plus 3 PRN for next 60 for MTM    Pt Provider: Romi    Phone Number Homecare Nurse can be reached at: [phone]    Can we leave a detailed message on this number? YES - confidential    Phone number patient can be reached at: Cell number on file:    Telephone Information:   Mobile [phone]       Best Time: anytime    Call taken on 9/21/2021 at 9:20 AM by Dedrick Molina  Every 3 week 3 prn 60 day MT

## 2021-09-23 ENCOUNTER — MEDICAL CORRESPONDENCE (OUTPATIENT)
Dept: HEALTH INFORMATION MANAGEMENT | Facility: CLINIC | Age: 71
End: 2021-09-23

## 2021-09-30 ENCOUNTER — ANCILLARY PROCEDURE (OUTPATIENT)
Dept: PHYSICAL MEDICINE AND REHAB | Facility: CLINIC | Age: 71
End: 2021-09-30
Attending: PHYSICIAN ASSISTANT
Payer: COMMERCIAL

## 2021-09-30 VITALS
HEART RATE: 65 BPM | TEMPERATURE: 98.8 F | DIASTOLIC BLOOD PRESSURE: 84 MMHG | SYSTOLIC BLOOD PRESSURE: 132 MMHG | OXYGEN SATURATION: 96 %

## 2021-09-30 DIAGNOSIS — E11.9 DIABETES MELLITUS, TYPE 2 (H): Primary | ICD-10-CM

## 2021-09-30 DIAGNOSIS — M54.16 LUMBAR RADICULITIS: ICD-10-CM

## 2021-09-30 LAB — GLUCOSE SERPL-MCNC: 127 MG/DL (ref 70–99)

## 2021-09-30 PROCEDURE — 64483 NJX AA&/STRD TFRM EPI L/S 1: CPT | Mod: 50 | Performed by: PHYSICAL MEDICINE & REHABILITATION

## 2021-09-30 PROCEDURE — 82962 GLUCOSE BLOOD TEST: CPT | Performed by: PHYSICAL MEDICINE & REHABILITATION

## 2021-09-30 RX ORDER — LIDOCAINE HYDROCHLORIDE 10 MG/ML
INJECTION, SOLUTION EPIDURAL; INFILTRATION; INTRACAUDAL; PERINEURAL
Status: COMPLETED | OUTPATIENT
Start: 2021-09-30 | End: 2021-09-30

## 2021-09-30 RX ORDER — METHYLPREDNISOLONE ACETATE 80 MG/ML
INJECTION, SUSPENSION INTRA-ARTICULAR; INTRALESIONAL; INTRAMUSCULAR; SOFT TISSUE
Status: COMPLETED | OUTPATIENT
Start: 2021-09-30 | End: 2021-09-30

## 2021-09-30 RX ADMIN — METHYLPREDNISOLONE ACETATE 80 MG: 80 INJECTION, SUSPENSION INTRA-ARTICULAR; INTRALESIONAL; INTRAMUSCULAR; SOFT TISSUE at 13:32

## 2021-09-30 RX ADMIN — LIDOCAINE HYDROCHLORIDE 1 ML: 10 INJECTION, SOLUTION EPIDURAL; INFILTRATION; INTRACAUDAL; PERINEURAL at 13:31

## 2021-09-30 ASSESSMENT — PAIN SCALES - GENERAL
PAINLEVEL: SEVERE PAIN (6)
PAINLEVEL: EXTREME PAIN (8)

## 2021-09-30 NOTE — PATIENT INSTRUCTIONS
DISCHARGE INSTRUCTIONS    During office hours (8:00 a.m.- 4:00 p.m.) questions or concerns may be answered  by calling Spine Center Navigation Nurses at  [phone].  Messages received after hours will be returned the following business day.      In the case of an emergency, please dial 911 or seek assistance at the nearest Emergency Room/Urgent Care facility.     All Patients:  ? You may experience an increase in your symptoms for the first 2 days (It may take anywhere between 2 days- 2 weeks for the steroid to have maximum effect).    ? You may use ice on the injection site, as frequently as 20 minutes each hour if needed.    ? You may take your pain medicine.    ? You may continue taking your regular medication.    ? You may shower. No swimming, tub bath or hot tub for 48 hours.  You may remove your bandaid/bandage as soon as you are home.    ? You may resume light activities, as tolerated.    ? Resume your usual diet as tolerated.    ? It is strongly advised that you do not drive for 1-3 hours post injection.    ? If you have had oral sedation:  Do not drive for 8 hours post injection.      ? If you have had IV sedation:  Do not drive for 24 hours post injection.  Do not operate hazardous machinery or make important personal/business decisions for 24 hours.    POSSIBLE STEROID SIDE EFFECTS (If steroid/cortisone was used for your procedure)    -If you experience these symptoms, it should only last for a short period      Swelling of the legs                Skin redness (flushing)       Mouth (oral) irritation     Blood sugar (glucose) levels              Sweats                     Mood changes    Headache    Weakened immune system for up to 14 days, which could increase the risk of mariel the COVID-19 virus and/or experiencing more severe symptoms of the disease, if exposed.         POSSIBLE PROCEDURE SIDE EFFECTS    -Call the Spine Center if you are concerned      Increased Pain             Increased  numbness/tingling        Nausea/Vomiting            Bruising/bleeding at site        Redness or swelling                                                Difficulty walking        Weakness            Fever greater than 100.5    *In the event of a severe headache after an epidural steroid injection that is relieved by lying down, please call the [facility] to speak with a clinical staff member*

## 2021-10-12 ENCOUNTER — OFFICE VISIT (OUTPATIENT)
Dept: FAMILY MEDICINE | Facility: CLINIC | Age: 71
End: 2021-10-12
Payer: COMMERCIAL

## 2021-10-12 VITALS
RESPIRATION RATE: 16 BRPM | WEIGHT: 127 LBS | TEMPERATURE: 98.2 F | BODY MASS INDEX: 25.6 KG/M2 | HEART RATE: 82 BPM | OXYGEN SATURATION: 94 % | SYSTOLIC BLOOD PRESSURE: 128 MMHG | HEIGHT: 59 IN | DIASTOLIC BLOOD PRESSURE: 86 MMHG

## 2021-10-12 DIAGNOSIS — K59.00 CONSTIPATION, UNSPECIFIED CONSTIPATION TYPE: ICD-10-CM

## 2021-10-12 DIAGNOSIS — F32.1 MODERATE SINGLE CURRENT EPISODE OF MAJOR DEPRESSIVE DISORDER (H): ICD-10-CM

## 2021-10-12 DIAGNOSIS — N18.31 STAGE 3A CHRONIC KIDNEY DISEASE (H): ICD-10-CM

## 2021-10-12 DIAGNOSIS — S22.080S COMPRESSION FRACTURE OF T11 VERTEBRA, SEQUELA: ICD-10-CM

## 2021-10-12 DIAGNOSIS — R32 URINARY INCONTINENCE, UNSPECIFIED TYPE: ICD-10-CM

## 2021-10-12 DIAGNOSIS — E11.9 TYPE 2 DIABETES MELLITUS TREATED WITHOUT INSULIN (H): Primary | ICD-10-CM

## 2021-10-12 LAB
HBA1C MFR BLD: 7.7 % (ref 0–5.6)
HOLD SPECIMEN: NORMAL

## 2021-10-12 PROCEDURE — 83036 HEMOGLOBIN GLYCOSYLATED A1C: CPT | Performed by: FAMILY MEDICINE

## 2021-10-12 PROCEDURE — 90662 IIV NO PRSV INCREASED AG IM: CPT | Performed by: FAMILY MEDICINE

## 2021-10-12 PROCEDURE — 36415 COLL VENOUS BLD VENIPUNCTURE: CPT | Performed by: FAMILY MEDICINE

## 2021-10-12 PROCEDURE — 99214 OFFICE O/P EST MOD 30 MIN: CPT | Mod: 25 | Performed by: FAMILY MEDICINE

## 2021-10-12 PROCEDURE — 99207 PR FOOT EXAM NO CHARGE: CPT | Performed by: FAMILY MEDICINE

## 2021-10-12 PROCEDURE — 90471 IMMUNIZATION ADMIN: CPT | Performed by: FAMILY MEDICINE

## 2021-10-12 RX ORDER — POLYETHYLENE GLYCOL 3350 17 G/17G
1 POWDER, FOR SOLUTION ORAL DAILY
Qty: 850 G | Refills: 0 | Status: SHIPPED | OUTPATIENT
Start: 2021-10-12 | End: 2022-01-18

## 2021-10-12 RX ORDER — GLUCOSAMINE HCL/CHONDROITIN SU 500-400 MG
1 CAPSULE ORAL DAILY
Qty: 200 STRIP | Refills: 11 | Status: SHIPPED | OUTPATIENT
Start: 2021-10-12 | End: 2022-08-09

## 2021-10-12 ASSESSMENT — MIFFLIN-ST. JEOR: SCORE: 1007.01

## 2021-10-12 NOTE — PROGRESS NOTES
ASSESMENT AND PLAN:  Type 2 diabetes mellitus treated without insulin (H)  A1c 7.7 today.  No changes.  She will continue Metformin  mg daily.  - OH FOOT EXAM NO CHARGE  - Adult Eye Referral  - Hemoglobin A1c  - Extra Tube  - INFLUENZA, QUAD, HD, PF, 65+ (FLUZONE HD)  - Comprehensive metabolic panel (BMP + Alb, Alk Phos, ALT, AST, Total. Bili, TP)  - Lipid panel  - Glucose Blood (BLOOD GLUCOSE TEST STRIPS) STRP; 1 each daily    Stage 3a chronic kidney disease (H)  Check today.  Normal and in with GFR 54 in 2/2021.    Compression fracture of T11 vertebra, sequela  Instructed to keep appointment with spine center 10/14/2021.    Moderate single current episode of major depressive disorder (H)  Stable.    Constipation, unspecified constipation type   - polyethylene glycol (MIRALAX) 17 GM/Dose powder; Take 17 g (1 capful) by mouth daily    Urinary incontinence  Uses diaper daily    This transcription uses voice recognition software, which may contain typographical errors.        SUBJECTIVE: Ricky eLa is a 71-year-old female with history of depression, diabetes, chronic back pain and osteoporosis here for follow-up.  Her last A1c was 7.4 in 7/21.  She takes Metformin  mg daily.  Her fasting blood sugar are in the low 100s to 170 range mostly.  She has occasional 200s in the past month.  No low blood sugar with hypoglycemic symptoms.    She denied worsening depression symptoms.  Denies suicidal or homicidal ideations.  Still having trouble sleeping at night but not every night.    She is complaining of constipation.  No bowel movement in 1 week.  No abdominal pain reported.  She passes gas regularly.    She uses diaper 24/7 for urinary incontinence.     She goes to spine center for back pain, had injection on 9/30/2021.  States that injection helps.  Follow-up appointment scheduled at spine Center on 10/14/2021.    She has home care RN helping her with her pillbox.  She takes all her medications as  "prescribed.        No past medical history on file.  Patient Active Problem List   Diagnosis     Hyperlipidemia, unspecified hyperlipidemia type     Pulmonary nodules     Left shoulder pain     Essential hypertension     Moderate single current episode of major depressive disorder (H)     Skin cancer of face     Dry skin     Noncompliance with medication regimen     Intrinsic eczema     Age-related osteoporosis without current pathological fracture     Type 2 diabetes mellitus treated without insulin (H)     Osteoporosis, unspecified osteoporosis type, unspecified pathological fracture presence     Urinary incontinence, unspecified type     Burning sensation of feet     Chronic midline low back pain without sciatica     Chronic kidney disease, stage 3 (H)     Compression fracture of T11 vertebra, sequela       Allergies:  No Known Allergies    History   Smoking Status     Never Smoker   Smokeless Tobacco     Current User     Types: Chew     Comment: chews betal nut       Review of systems otherwise negative except as listed in HPI.   History   Smoking Status     Never Smoker   Smokeless Tobacco     Current User     Types: Chew     Comment: chews betal nut       OBJECTICE: /86 (BP Location: Right arm, Patient Position: Sitting, Cuff Size: Adult Regular)   Pulse 82   Temp 98.2  F (36.8  C) (Oral)   Resp 16   Ht 1.499 m (4' 11.02\")   Wt 57.6 kg (127 lb)   SpO2 94%   BMI 25.63 kg/m      DATA REVIEWED:  Additional History from Old Records Summarized (2):  Labs Reviewed or Ordered (1):       GEN-alert,  in no apparent distress. Mood is normal   HEENT-mucous membranes are moist, neck is supple.  CV-regular rate and rhythm with no murmur.   RESP-lungs clear to auscultation .  ABDOMEN- Soft , not tender.  Not distended.  EXTREM- No open lesions or ulcers.  Sensation intact.  SKIN-normal        Bassam Llanos MD   10/12/2021   "

## 2021-10-13 LAB
ALBUMIN SERPL-MCNC: 3.7 G/DL (ref 3.5–5)
ALP SERPL-CCNC: 64 U/L (ref 45–120)
ALT SERPL W P-5'-P-CCNC: 48 U/L (ref 0–45)
ANION GAP SERPL CALCULATED.3IONS-SCNC: 8 MMOL/L (ref 5–18)
AST SERPL W P-5'-P-CCNC: 27 U/L (ref 0–40)
BILIRUB SERPL-MCNC: 0.6 MG/DL (ref 0–1)
BUN SERPL-MCNC: 16 MG/DL (ref 8–22)
CALCIUM SERPL-MCNC: 9.9 MG/DL (ref 8.5–10.5)
CHLORIDE BLD-SCNC: 99 MMOL/L (ref 98–107)
CHOLEST SERPL-MCNC: 195 MG/DL
CO2 SERPL-SCNC: 31 MMOL/L (ref 22–31)
CREAT SERPL-MCNC: 1.05 MG/DL (ref 0.6–1.1)
FASTING STATUS PATIENT QL REPORTED: ABNORMAL
GFR SERPL CREATININE-BSD FRML MDRD: 54 ML/MIN/1.73M2
GLUCOSE BLD-MCNC: 211 MG/DL (ref 70–125)
HDLC SERPL-MCNC: 52 MG/DL
LDLC SERPL CALC-MCNC: 109 MG/DL
POTASSIUM BLD-SCNC: 4.8 MMOL/L (ref 3.5–5)
PROT SERPL-MCNC: 7 G/DL (ref 6–8)
SODIUM SERPL-SCNC: 138 MMOL/L (ref 136–145)
TRIGL SERPL-MCNC: 170 MG/DL

## 2021-10-13 PROCEDURE — 36415 COLL VENOUS BLD VENIPUNCTURE: CPT | Performed by: FAMILY MEDICINE

## 2021-10-13 PROCEDURE — 80061 LIPID PANEL: CPT | Performed by: FAMILY MEDICINE

## 2021-10-13 PROCEDURE — 80053 COMPREHEN METABOLIC PANEL: CPT | Performed by: FAMILY MEDICINE

## 2021-10-14 ENCOUNTER — OFFICE VISIT (OUTPATIENT)
Dept: PHYSICAL MEDICINE AND REHAB | Facility: CLINIC | Age: 71
End: 2021-10-14
Payer: COMMERCIAL

## 2021-10-14 VITALS
WEIGHT: 127 LBS | DIASTOLIC BLOOD PRESSURE: 73 MMHG | HEART RATE: 85 BPM | BODY MASS INDEX: 25.6 KG/M2 | HEIGHT: 59 IN | SYSTOLIC BLOOD PRESSURE: 129 MMHG

## 2021-10-14 DIAGNOSIS — M54.16 LUMBAR RADICULITIS: Primary | ICD-10-CM

## 2021-10-14 DIAGNOSIS — M51.26 LUMBAR DISC HERNIATION: ICD-10-CM

## 2021-10-14 DIAGNOSIS — M47.816 LUMBAR FACET ARTHROPATHY: ICD-10-CM

## 2021-10-14 PROCEDURE — 99213 OFFICE O/P EST LOW 20 MIN: CPT | Performed by: PHYSICIAN ASSISTANT

## 2021-10-14 ASSESSMENT — MIFFLIN-ST. JEOR: SCORE: 1007.01

## 2021-10-14 ASSESSMENT — PAIN SCALES - GENERAL: PAINLEVEL: MODERATE PAIN (4)

## 2021-10-14 NOTE — LETTER
10/14/2021         RE: Ricky Lea  [address] [place] [address]        Dear Colleague,    Thank you for referring your patient, Ricky Lea, to the [facility] [place]. Please see a copy of my visit note below.    Assessment:   Ricky Lea is a 71 year old y.o. female with past medical history significant for hyperlipidemia, hypertension, depression, osteoporosis (on Fosamax), type 2 diabetes mellitus, urinary incontinence who presents today for follow-up regarding acute flare of chronic bilateral low back pain with radiation into the left greater than right lower extremity.  Pain is improving.  My review of an MRI lumbar spine shows a chronic T11 compression fracture (no new fracture).  There is a disc extrusion at L4-5 causing mild right and slight left lateral recess stenosis and low-grade spinal canal stenosis and foraminal stenosis.  Patient is status post bilateral L4-5 transforaminal epidural steroid injections September 30, 2021 which has provided 50% relief of her pain overall.  She reports 100% resolution of her leg pain.  She still has some axial low back pain likely related to lumbar facet arthropathy.       Plan:     A shared decision making plan was used.  The patient's values and choices were respected.  The following represents what was discussed and decided upon by the physician assistant and the patient.  A telephone  was used to visit.    1.  DIAGNOSTIC TESTS: I reviewed the MRI lumbar spine.  No further diagnostic tests were ordered.    2.  PHYSICAL THERAPY: Patient completed 7 sessions of physical therapy June 11, 2021.  She will continue with home exercises.  No further physical therapy was ordered.    3.  MEDICATIONS: No changes are made to the patient's medications.  -Patient takes meloxicam 15 mg daily.  -Patient uses duloxetine 30 mg daily.  -Patient takes gabapentin 300 mg in the morning, 300 mg in the afternoon, and 900 mg at bedtime.  -Patient takes Tylenol  as needed.    4.  INTERVENTIONS:    -No additional interventions were ordered.  If leg pain were to return, we could repeat the bilateral L4-5 transforaminal epidural steroid injections.  -If axial low back pain worsens we could consider bilateral L4-5, L5-S1 facet joint injections versus medial branch blocks depending on insurance coverage.    5.  PATIENT EDUCATION: Patient is in agreement the above plan.  All questions were answered.    6.  FOLLOW-UP: Patient would like to follow-up in 3 months.  If she has questions or concerns in the meantime, she should not hesitate to call.    Subjective:     Ricky Lea is a 71 year old female who presents today for follow-up regarding acute on chronic low back pain, radiating into the left greater than right lower extremity.  Patient is status post bilateral L4-5 transforaminal epidural steroid injection September 30, 2021.  Patient reports 50% improvement in her pain overall.  She states that her leg pain has resolved completely.  She has some mild residual left greater than right low back pain.    Patient complains of left greater than right lower back pain.  She denies any pain radiating the buttocks or down the legs.  She rates her pain today as a 3 or 4 out of 10.  Pain is aggravated bending.  She denies any new symptoms since she was last seen.  She denies any numbness, tingling, weakness down the legs.    Patient completed 7 sessions of physical therapy in June 2021.  She does her home exercises.  Patient takes meloxicam 15 mg daily, duloxetine 30 mg daily, gabapentin 300 milligrams in the morning, 300 mg in the afternoon, and 900 mg at bedtime.  She also takes Tylenol as needed.  Medications are helpful.      Review of Systems:  Negative for numbness/tingling, weakness, loss of bowel/bladder control, inability to urinate, headache, pain much worse at night, trip/stumble/falls, difficulty swallowing, difficulty with hand skills, fevers, unintentional weight loss.      Objective:   CONSTITUTIONAL:  Vital signs as above.  No acute distress.  The patient is well nourished and well groomed.    PSYCHIATRIC:  The patient is awake, alert, oriented to person, place and time.  The patient is answering questions appropriately with clear speech.  Normal affect.  HEENT: Normocephalic, atraumatic.  Sclera clear.    SKIN:  Skin over the face, posterior torso, bilateral upper and lower extremities is clean, dry, intact without rashes.  VASCULAR: No significant lower extremity edema.  MUSCULOSKELETAL:  Gait is guarded but nonantalgic.  The patient is able to rise onto toes and heels bilaterally with support.  No tenderness over the left greater than right lower lumbar paraspinal muscles.   The patient has 5/5 strength for the bilateral hip flexors, knee flexors/extensors, ankle dorsiflexors/plantar flexors, ankle evertors/invertors.    NEUROLOGICAL: 1-2+ patellar, achilles reflexes which are symmetric bilaterally.  No ankle clonus bilaterally.  Sensation to light touch is intact in the bilateral L4, L5, and S1 dermatomes.       RESULTS: I reviewed the MRI lumbar spine from [facility] dated April 1, 2021.  This shows a chronic T11 compression fracture.  At L4-5 there is a right central disc extrusion causing mild right and slight left lateral recess stenosis.  There is low-grade spinal canal stenosis and bilateral foraminal stenosis at this level.  There is mild facet arthropathy L4-5 and moderate right and mild left facet arthropathy L5-S1.    EXAM: XR LUMBAR SPINE 2-3 VIEWS  LOCATION: [facility]  DATE/TIME: 08/22/2021, 10:42 AM     INDICATION: Low back pain.  COMPARISON: MR lumbar spine 04/01/2021 and lumbar spine radiographs 07/28/2020.  TECHNIQUE: CR Lumbar Spine.                                                                      IMPRESSION: Five lumbar-type vertebral bodies. Anatomic alignment. Osteopenia. The known, previously described chronic appearing  inferior endplate deformity at T11 is incompletely visualized on this study with perhaps mild superior endplate deformity at   T11. If there is further clinical concern for progression of T11 fracture, would suggest dedicated CT study. Other vertebral bodies are preserved in height. Mild lower lumbar facet arthropathy. Disc space height is maintained. Visualized pelvic ring   intact. There is stable appearing irregularity involving the sacrococcygeal junction.             Again, thank you for allowing me to participate in the care of your patient.        Sincerely,        Maureen Palafox PA-C

## 2021-10-16 DIAGNOSIS — E78.5 HYPERLIPIDEMIA, UNSPECIFIED HYPERLIPIDEMIA TYPE: ICD-10-CM

## 2021-10-17 RX ORDER — ATORVASTATIN CALCIUM 20 MG/1
TABLET, FILM COATED ORAL
Qty: 90 TABLET | Refills: 3 | Status: SHIPPED | OUTPATIENT
Start: 2021-10-17 | End: 2022-10-14

## 2021-10-17 NOTE — TELEPHONE ENCOUNTER
"Last Written Prescription Date:  10/22/20  Last Fill Quantity: 90,  # refills: 3   Last office visit provider:  10/12/21     Requested Prescriptions   Pending Prescriptions Disp Refills     atorvastatin (LIPITOR) 20 MG tablet [Pharmacy Med Name: ATORVASTATIN CALCIUM 20 MG 20 Tablet] 90 tablet 3     Sig: TAKE 1 PILL BY MOUTH EVERYDAY FOR CHOLESTEROL       Statins Protocol Passed - 10/16/2021  9:38 AM        Passed - LDL on file in past 12 months     Recent Labs   Lab Test 10/13/21  1057                Passed - No abnormal creatine kinase in past 12 months     No lab results found.             Passed - Recent (12 mo) or future (30 days) visit within the authorizing provider's specialty     Patient has had an office visit with the authorizing provider or a provider within the authorizing providers department within the previous 12 mos or has a future within next 30 days. See \"Patient Info\" tab in inbasket, or \"Choose Columns\" in Meds & Orders section of the refill encounter.              Passed - Medication is active on med list        Passed - Patient is age 18 or older        Passed - No active pregnancy on record        Passed - No positive pregnancy test in past 12 months             Les Javed RN 10/17/21 1:28 PM  "

## 2021-10-19 DIAGNOSIS — R42 DIZZINESS: ICD-10-CM

## 2021-10-19 DIAGNOSIS — Z76.0 ENCOUNTER FOR MEDICATION REFILL: Primary | ICD-10-CM

## 2021-10-19 RX ORDER — MECLIZINE HCL 25MG 25 MG/1
TABLET, CHEWABLE ORAL
Qty: 30 TABLET | Refills: 3 | Status: SHIPPED | OUTPATIENT
Start: 2021-10-19 | End: 2022-02-07

## 2021-10-20 ENCOUNTER — TRANSFERRED RECORDS (OUTPATIENT)
Dept: HEALTH INFORMATION MANAGEMENT | Facility: CLINIC | Age: 71
End: 2021-10-20

## 2021-10-20 LAB — RETINOPATHY: NEGATIVE

## 2021-10-26 NOTE — TELEPHONE ENCOUNTER
RN cannot approve Refill Request    RN can NOT refill this medication med is not covered by policy/route to provider. Last office visit: 5/24/2019 Ozzy Jerome MD Last Physical: Visit date not found Last MTM visit: Visit date not found Last visit same specialty: 11/1/2019 Ninoska Rodriguez MD.  Next visit within 3 mo: Visit date not found  Next physical within 3 mo: Visit date not found      Lashonda Calhoun, [facility] Connection Triage/Med Refill 11/7/2019    Requested Prescriptions   Pending Prescriptions Disp Refills     triamcinolone (KENALOG) 0.1 % ointment [Pharmacy Med Name: TRIAMCINOLONE 0.1% OINTMENT 454GM] 454 g 0     Sig: APPLY TWICE DAILY       There is no refill protocol information for this order           
87

## 2021-10-31 DIAGNOSIS — F32.1 MODERATE SINGLE CURRENT EPISODE OF MAJOR DEPRESSIVE DISORDER (H): ICD-10-CM

## 2021-10-31 DIAGNOSIS — E11.9 TYPE 2 DIABETES MELLITUS TREATED WITHOUT INSULIN (H): ICD-10-CM

## 2021-11-01 RX ORDER — DULOXETIN HYDROCHLORIDE 60 MG/1
CAPSULE, DELAYED RELEASE ORAL
Qty: 90 CAPSULE | Refills: 1 | Status: SHIPPED | OUTPATIENT
Start: 2021-11-01 | End: 2022-04-28

## 2021-11-01 RX ORDER — METFORMIN HCL 500 MG
TABLET, EXTENDED RELEASE 24 HR ORAL
Qty: 90 TABLET | Refills: 1 | Status: SHIPPED | OUTPATIENT
Start: 2021-11-01 | End: 2022-03-01

## 2021-11-01 NOTE — TELEPHONE ENCOUNTER
"Last Written Prescription Date:  5/7/21  Last Fill Quantity: 30,  # refills: 5   Last office visit provider:  10/12/21     Requested Prescriptions   Pending Prescriptions Disp Refills     DULoxetine (CYMBALTA) 60 MG capsule [Pharmacy Med Name: DULOXETINE HCL 60 MG CPEP 60 Capsule] 30 capsule 5     Sig: TAKE 1 CAPSULE (60 MG TOTAL) BY MOUTH DAILY FOR DEPRESSION       Serotonin-Norepinephrine Reuptake Inhibitors  Passed - 10/31/2021 10:19 AM        Passed - Blood pressure under 140/90 in past 12 months     BP Readings from Last 3 Encounters:   10/14/21 129/73   10/12/21 128/86   09/30/21 132/84                 Passed - PHQ-9 score of less than 5 in past 6 months     Please review last PHQ-9 score.           Passed - Medication is active on med list        Passed - Patient is age 18 or older        Passed - No active pregnancy on record        Passed - No positive pregnancy test in past 12 months        Passed - Recent (6 mo) or future (30 days) visit within the authorizing provider's specialty     Patient had office visit in the last 6 months or has a visit in the next 30 days with authorizing provider or within the authorizing provider's specialty.  See \"Patient Info\" tab in inbasket, or \"Choose Columns\" in Meds & Orders section of the refill encounter.               metFORMIN (GLUCOPHAGE-XR) 500 MG 24 hr tablet [Pharmacy Med Name: METFORMIN HCL  MG TB2 500 Tablet] 30 tablet 1     Sig: TAKE 1 TABLET (500 MG) BY MOUTH DAILY WITH BREAKFAST FOR DIABETES       Biguanide Agents Passed - 10/31/2021 10:19 AM        Passed - Patient is age 10 or older        Passed - Patient has documented A1c within the specified period of time.     If HgbA1C is 8 or greater, it needs to be on file within the past 3 months.  If less than 8, must be on file within the past 6 months.     Recent Labs   Lab Test 10/12/21  0823   A1C 7.7*             Passed - Patient's CR is NOT>1.4 OR Patient's EGFR is NOT<45 within past 12 mos.     " "Recent Labs   Lab Test 10/13/21  1057 02/09/21  1236   GFRESTIMATED 54* 54*   GFRESTBLACK  --  >60       Recent Labs   Lab Test 10/13/21  1057   CR 1.05             Passed - Patient does NOT have a diagnosis of CHF.        Passed - Medication is active on med list        Passed - Patient is not pregnant        Passed - Patient has not had a positive pregnancy test within the past 12 mos.         Passed - Recent (6 mo) or future (30 days) visit within the authorizing provider's specialty     Patient had office visit in the last 6 months or has a visit in the next 30 days with authorizing provider or within the authorizing provider's specialty.  See \"Patient Info\" tab in inbasket, or \"Choose Columns\" in Meds & Orders section of the refill encounter.                 Les Javed RN 11/01/21 11:39 AM  "

## 2021-11-18 ENCOUNTER — TELEPHONE (OUTPATIENT)
Dept: FAMILY MEDICINE | Facility: CLINIC | Age: 71
End: 2021-11-18
Payer: COMMERCIAL

## 2021-11-18 NOTE — TELEPHONE ENCOUNTER
Reason for Call:  Home Health Care    Yodit, RN with [facility] called regarding (reason for call): verbal order request    Orders are needed for this patient. Skilled nursing     PT: N/A    OT: N/A     Skilled Nursing: every 3 weeks with 3 prn visits for next 60 days med management     Pt Provider: Dr. Llanos    Phone Number Homecare Nurse can be reached at: [phone]    Can we leave a detailed message on this number? YES    Best Time: ASAP     Call taken on 11/18/2021 at 10:30 AM by Sukh Hazel

## 2021-11-22 ENCOUNTER — MEDICAL CORRESPONDENCE (OUTPATIENT)
Dept: HEALTH INFORMATION MANAGEMENT | Facility: CLINIC | Age: 71
End: 2021-11-22
Payer: COMMERCIAL

## 2021-11-24 PROCEDURE — G0179 MD RECERTIFICATION HHA PT: HCPCS | Performed by: FAMILY MEDICINE

## 2021-11-26 DIAGNOSIS — Z53.9 DIAGNOSIS NOT YET DEFINED: Primary | ICD-10-CM

## 2021-12-06 ENCOUNTER — TELEPHONE (OUTPATIENT)
Dept: PHYSICAL MEDICINE AND REHAB | Facility: CLINIC | Age: 71
End: 2021-12-06
Payer: COMMERCIAL

## 2021-12-06 DIAGNOSIS — M54.16 LUMBAR RADICULITIS: Primary | ICD-10-CM

## 2021-12-06 NOTE — TELEPHONE ENCOUNTER
PSP:  Maureen SY  Last clinic visit:  10/14/2021  Reason for call: Injection request  Clinical information:  Call received from Annabella RN through [facility], inquiring about order for repeat injection for the patient. Pt last had bilateral L4-5 TFESIs on 9/30/2021. These provided her with significant relief of her symptoms until the past week when her same bilateral low back and leg pain returned.   Advice given to patient: Informed her will check with provider to see if ok for repeat injection at this time as last injection was about 2 months ago. Did review injection requirements. Pt is diabetic.   Pt's daughter (# on file) can be called back. She will need a Sejal .   Provider to address: Please advise if ok for repeat injection at this time

## 2021-12-06 NOTE — TELEPHONE ENCOUNTER
Did discuss with home care RN that we typically do injections 3 months apart as there is a limit to the amount of steroid we would recommend in a calendar year. She stated understanding.     Message sent to scheduling to reach out to pt's daughter to make appt.

## 2021-12-24 ENCOUNTER — TELEPHONE (OUTPATIENT)
Dept: PHYSICAL MEDICINE AND REHAB | Facility: CLINIC | Age: 71
End: 2021-12-24
Payer: COMMERCIAL

## 2021-12-24 NOTE — TELEPHONE ENCOUNTER
PHONE CONSENT:    A professional  (rodolfo montez [id number]) was present by phone for the encounter.      The patient wished to take pre-procedure oral sedation so a phone consent was obtained prior to their injection.  The patient has been offered other conservative treatment (physical therapy, medications, etc.) but has opted to proceed with an injection.  The risks and benefits of the injection (Bilateral L4-5 transforaminal epidural steroid injections) were discussed with the patient over the phone on 12-24-21 at 9:07 am.   Risks of the injection include infection, bleeding, damage to surrounding structures, nerve injury, permanent weakness, permanent paralysis, worsened pain, soreness, headache, allergic reaction, no change in pain.      The patient's last COVID vaccine was 4-21-21.     The patient understands that they cannot have symptoms of an infection or be on antibiotics at the time of the injection as this would increase their risk of infection. If they start antibiotics prior to the procedure, they should call the clinic to see if the procedure will need to be rescheduled.  The patient understands that if they start any blood thinners prior to the injection, the provider must be notified immediately.  The patient denies any allergies to iodine contrast dye or iodine products.  The patient denies any symptoms of an active infection (cough, fevers, myalgias) and denies taking antibiotics.  The patient knows not to come in to clinic if they have any symptoms of an active infection, particularly cough, fevers, myalgias.   The patient denies taking any prescription blood thinning medications. The patient denies any allergies to iodine or iodine contrast.       The patient verbalized understanding of the information given. The patient was given the opportunity to ask questions of the physician.  The patient elected to proceed and gave consent over the phone with Fátima Cummings RN as a witness.  Informed  consent form was signed by the physician and the witness.

## 2021-12-27 ENCOUNTER — ANCILLARY PROCEDURE (OUTPATIENT)
Dept: PHYSICAL MEDICINE AND REHAB | Facility: CLINIC | Age: 71
End: 2021-12-27
Attending: PHYSICIAN ASSISTANT
Payer: COMMERCIAL

## 2021-12-27 VITALS
TEMPERATURE: 98.6 F | SYSTOLIC BLOOD PRESSURE: 114 MMHG | DIASTOLIC BLOOD PRESSURE: 68 MMHG | OXYGEN SATURATION: 93 % | RESPIRATION RATE: 16 BRPM | HEART RATE: 78 BPM

## 2021-12-27 DIAGNOSIS — M54.16 LUMBAR RADICULITIS: ICD-10-CM

## 2021-12-27 DIAGNOSIS — E11.9 DIABETES MELLITUS, TYPE 2 (H): Primary | ICD-10-CM

## 2021-12-27 LAB — GLUCOSE SERPL-MCNC: 217 MG/DL (ref 70–99)

## 2021-12-27 PROCEDURE — 64483 NJX AA&/STRD TFRM EPI L/S 1: CPT | Mod: 50 | Performed by: PHYSICAL MEDICINE & REHABILITATION

## 2021-12-27 RX ORDER — METHYLPREDNISOLONE ACETATE 80 MG/ML
INJECTION, SUSPENSION INTRA-ARTICULAR; INTRALESIONAL; INTRAMUSCULAR; SOFT TISSUE
Status: COMPLETED | OUTPATIENT
Start: 2021-12-27 | End: 2021-12-27

## 2021-12-27 RX ORDER — LIDOCAINE HYDROCHLORIDE 10 MG/ML
INJECTION, SOLUTION EPIDURAL; INFILTRATION; INTRACAUDAL; PERINEURAL
Status: COMPLETED | OUTPATIENT
Start: 2021-12-27 | End: 2021-12-27

## 2021-12-27 RX ADMIN — LIDOCAINE HYDROCHLORIDE 5 ML: 10 INJECTION, SOLUTION EPIDURAL; INFILTRATION; INTRACAUDAL; PERINEURAL at 14:14

## 2021-12-27 RX ADMIN — METHYLPREDNISOLONE ACETATE 80 MG: 80 INJECTION, SUSPENSION INTRA-ARTICULAR; INTRALESIONAL; INTRAMUSCULAR; SOFT TISSUE at 14:16

## 2021-12-27 ASSESSMENT — PAIN SCALES - GENERAL
PAINLEVEL: EXTREME PAIN (8)
PAINLEVEL: EXTREME PAIN (8)

## 2021-12-27 NOTE — Clinical Note
12/27/2021         RE: Ricky Lea  [address]  [place] [address]        Dear Colleague,    Thank you for referring your patient, Ricky Lea, to the [facility] [place]. Please see a copy of my visit note below.    No notes on file    Again, thank you for allowing me to participate in the care of your patient.        Sincerely,        Alannah Easley, DO

## 2021-12-27 NOTE — PATIENT INSTRUCTIONS
Follow-up visit with YOSSI Izaguirre in 2 weeks to discuss injection outcome and determine care plan going forward.    Please be advised that your blood glucose levels may be increased for the next 5-7 days as a result of the steroid you received with your injection today.  It is recommended that you monitor your blood glucose levels closely over the next week and direct any additional questions regarding your blood glucose management to your primary doctor.       DISCHARGE INSTRUCTIONS    During office hours (8:00 a.m.- 4:00 p.m.) questions or concerns may be answered  by calling [facility] Navigation Nurses at  [phone].  Messages received after hours will be returned the following business day.      In the case of an emergency, please dial 911 or seek assistance at the nearest Emergency Room/Urgent Care facility.     All Patients:    ? You may experience an increase in your symptoms for the first 2 days (It may take anywhere between 2 days- 2 weeks for the steroid to have maximum effect).    ? You may use ice on the injection site, as frequently as 20 minutes each hour if needed.    ? You may take your pain medicine.    ? You may continue taking your regular medication after your injection. If you have had a Medial Branch Block you may resume pain medication once your pain diary is completed.    ? You may shower. No swimming, tub bath or hot tub for 48 hours.  You may remove your bandaid/bandage as soon as you are home.    ? You may resume light activities, as tolerated.    ? Resume your usual diet as tolerated.    ? It is strongly advised that you do not drive for 1-3 hours post injection.    ? If you have had oral sedation:  Do not drive for 8 hours post injection.      ? If you have had IV sedation:  Do not drive for 24 hours post injection.  Do not operate hazardous machinery or make important personal/business decisions for 24 hours.      POSSIBLE STEROID SIDE EFFECTS (If steroid/cortisone was used for  your procedure)    -If you experience these symptoms, it should only last for a short period      Swelling of the legs                Skin redness (flushing)       Mouth (oral) irritation     Blood sugar (glucose) levels              Sweats                      Mood changes    Headache    Sleeplessness    Weakened immune system for up to 14 days, which could increase the risk of mariel the COVID-19 virus and/or experiencing more severe symptoms of the disease, if exposed.    Decreased effectiveness of the flu vaccine if given within 2 weeks of the steroid.         POSSIBLE PROCEDURE SIDE EFFECTS  -Call the Spine Center if you are concerned    Increased Pain             Increased numbness/tingling        Nausea/Vomiting            Bruising/bleeding at site        Redness or swelling                                                Difficulty walking        Weakness             Fever greater than 100.5    *In the event of a severe headache after an epidural steroid injection that is relieved by lying down, please call the [facility] to speak with a clinical staff member*

## 2022-01-13 ENCOUNTER — OFFICE VISIT (OUTPATIENT)
Dept: FAMILY MEDICINE | Facility: CLINIC | Age: 72
End: 2022-01-13
Payer: COMMERCIAL

## 2022-01-13 VITALS
DIASTOLIC BLOOD PRESSURE: 80 MMHG | BODY MASS INDEX: 25.8 KG/M2 | OXYGEN SATURATION: 96 % | HEART RATE: 84 BPM | TEMPERATURE: 98.8 F | HEIGHT: 59 IN | SYSTOLIC BLOOD PRESSURE: 110 MMHG | RESPIRATION RATE: 16 BRPM | WEIGHT: 128 LBS

## 2022-01-13 DIAGNOSIS — Z91.81 AT HIGH RISK FOR FALLS: ICD-10-CM

## 2022-01-13 DIAGNOSIS — G89.29 CHRONIC MIDLINE LOW BACK PAIN WITHOUT SCIATICA: ICD-10-CM

## 2022-01-13 DIAGNOSIS — E11.9 TYPE 2 DIABETES MELLITUS TREATED WITHOUT INSULIN (H): Primary | ICD-10-CM

## 2022-01-13 DIAGNOSIS — M81.0 OSTEOPOROSIS, UNSPECIFIED OSTEOPOROSIS TYPE, UNSPECIFIED PATHOLOGICAL FRACTURE PRESENCE: ICD-10-CM

## 2022-01-13 DIAGNOSIS — M54.50 CHRONIC MIDLINE LOW BACK PAIN WITHOUT SCIATICA: ICD-10-CM

## 2022-01-13 DIAGNOSIS — I10 ESSENTIAL HYPERTENSION: ICD-10-CM

## 2022-01-13 DIAGNOSIS — N18.30 STAGE 3 CHRONIC KIDNEY DISEASE, UNSPECIFIED WHETHER STAGE 3A OR 3B CKD (H): ICD-10-CM

## 2022-01-13 DIAGNOSIS — F32.1 MODERATE SINGLE CURRENT EPISODE OF MAJOR DEPRESSIVE DISORDER (H): ICD-10-CM

## 2022-01-13 DIAGNOSIS — R42 DIZZINESS: ICD-10-CM

## 2022-01-13 LAB
ANION GAP SERPL CALCULATED.3IONS-SCNC: 10 MMOL/L (ref 5–18)
BASOPHILS # BLD AUTO: 0 10E3/UL (ref 0–0.2)
BASOPHILS NFR BLD AUTO: 0 %
BUN SERPL-MCNC: 16 MG/DL (ref 8–22)
CALCIUM SERPL-MCNC: 9.6 MG/DL (ref 8.5–10.5)
CHLORIDE BLD-SCNC: 98 MMOL/L (ref 98–107)
CO2 SERPL-SCNC: 29 MMOL/L (ref 22–31)
CREAT SERPL-MCNC: 1.1 MG/DL (ref 0.6–1.1)
EOSINOPHIL # BLD AUTO: 0.2 10E3/UL (ref 0–0.7)
EOSINOPHIL NFR BLD AUTO: 2 %
ERYTHROCYTE [DISTWIDTH] IN BLOOD BY AUTOMATED COUNT: 11.7 % (ref 10–15)
GFR SERPL CREATININE-BSD FRML MDRD: 54 ML/MIN/1.73M2
GLUCOSE BLD-MCNC: 171 MG/DL (ref 70–125)
HBA1C MFR BLD: 8.6 % (ref 0–5.6)
HCT VFR BLD AUTO: 43.9 % (ref 35–47)
HGB BLD-MCNC: 14 G/DL (ref 11.7–15.7)
IMM GRANULOCYTES # BLD: 0 10E3/UL
IMM GRANULOCYTES NFR BLD: 0 %
LYMPHOCYTES # BLD AUTO: 3.4 10E3/UL (ref 0.8–5.3)
LYMPHOCYTES NFR BLD AUTO: 33 %
MCH RBC QN AUTO: 31.4 PG (ref 26.5–33)
MCHC RBC AUTO-ENTMCNC: 31.9 G/DL (ref 31.5–36.5)
MCV RBC AUTO: 98 FL (ref 78–100)
MONOCYTES # BLD AUTO: 0.7 10E3/UL (ref 0–1.3)
MONOCYTES NFR BLD AUTO: 7 %
NEUTROPHILS # BLD AUTO: 6.1 10E3/UL (ref 1.6–8.3)
NEUTROPHILS NFR BLD AUTO: 58 %
PLATELET # BLD AUTO: 307 10E3/UL (ref 150–450)
POTASSIUM BLD-SCNC: 5.1 MMOL/L (ref 3.5–5)
RBC # BLD AUTO: 4.46 10E6/UL (ref 3.8–5.2)
SODIUM SERPL-SCNC: 137 MMOL/L (ref 136–145)
WBC # BLD AUTO: 10.4 10E3/UL (ref 4–11)

## 2022-01-13 PROCEDURE — 85025 COMPLETE CBC W/AUTO DIFF WBC: CPT | Performed by: FAMILY MEDICINE

## 2022-01-13 PROCEDURE — 80048 BASIC METABOLIC PNL TOTAL CA: CPT | Performed by: FAMILY MEDICINE

## 2022-01-13 PROCEDURE — 91306 COVID-19,PF,MODERNA (18+ YRS BOOSTER .25ML): CPT | Performed by: FAMILY MEDICINE

## 2022-01-13 PROCEDURE — 83036 HEMOGLOBIN GLYCOSYLATED A1C: CPT | Performed by: FAMILY MEDICINE

## 2022-01-13 PROCEDURE — 0064A COVID-19,PF,MODERNA (18+ YRS BOOSTER .25ML): CPT | Performed by: FAMILY MEDICINE

## 2022-01-13 PROCEDURE — 99214 OFFICE O/P EST MOD 30 MIN: CPT | Performed by: FAMILY MEDICINE

## 2022-01-13 PROCEDURE — 36415 COLL VENOUS BLD VENIPUNCTURE: CPT | Performed by: FAMILY MEDICINE

## 2022-01-13 RX ORDER — MECLIZINE HYDROCHLORIDE 25 MG/1
25 TABLET ORAL 3 TIMES DAILY PRN
Qty: 90 TABLET | Refills: 1 | Status: SHIPPED | OUTPATIENT
Start: 2022-01-13

## 2022-01-13 ASSESSMENT — MIFFLIN-ST. JEOR: SCORE: 1008.97

## 2022-01-13 ASSESSMENT — PATIENT HEALTH QUESTIONNAIRE - PHQ9: SUM OF ALL RESPONSES TO PHQ QUESTIONS 1-9: 1

## 2022-01-13 NOTE — PROGRESS NOTES
"ASSESMENT AND PLAN:  Type 2 diabetes mellitus treated without insulin (H)l  - Hemoglobin A1c  - Basic metabolic panel  (Ca, Cl, CO2, Creat, Gluc, K, Na, BUN)    Osteoporosis, unspecified osteoporosis type, unspecified pathological fracture presence  Continue Fosamax.    Stage 3 chronic kidney disease, unspecified whether stage 3a or 3b CKD (H)  Recheck today.  Discontinue metformin if indicated.    Moderate single current episode of major depressive disorder (H)  Stable no suicidal homicidal ideations.    Essential hypertension  Controlled.    Chronic midline low back pain without sciatica  Follow-up with spine center tomorrow.    At high risk for falls  Disability parking permit form provided.    Dizziness  CBC   We will try meclizine.  Follow-up in a few weeks.  - meclizine (ANTIVERT) 25 MG tablet; Take 1 tablet (25 mg) by mouth 3 times daily as needed for dizziness    This transcription uses voice recognition software, which may contain typographical errors.      SUBJECTIVE: Ricky Lea is a 71-year-old female with history of diabetes, hypertension, compression fracture with chronic back pain, osteoporosis, depression, urinary incontinence and chronic kidney disease here for follow-up.  She is not on insulin for diabetes.  She takes metformin  mg once a day for diabetes.  Her last A1c was 7.7 in 10/21.  Fasting blood sugar ranges from 150s to 180s most days.  Denied hypoglycemic symptoms.  She has home care RN helping her with her medications.  Family members help her check her blood sugar.  She was referred to spine center for compression fracture and back pain.  She received injection on 12/27/2021.  Follow-up appointment scheduled tomorrow.  States the injection helped for a few weeks.  No new red flag symptoms  She reports dizziness for the past few weeks, states since the beginning of 2022.  She feels \" floating in the air\".  No spinning room sensation.  No acute vision changes.  No nausea or vomiting.  No " "falls.  No acute headaches.  She received J&J vaccine in 4/21, due for booster and wants to get it today.  Depression symptoms are stable.  No frequent cries for her nephew who is here with her today.  She denied suicidal or homicidal ideations.  She is requesting disability parking permit.  Unable to walk long distance due to back pain and risk for fall.  She uses cane for ambulation.    No past medical history on file.  Patient Active Problem List   Diagnosis     Hyperlipidemia, unspecified hyperlipidemia type     Pulmonary nodules     Left shoulder pain     Essential hypertension     Moderate single current episode of major depressive disorder (H)     Skin cancer of face     Dry skin     Noncompliance with medication regimen     Intrinsic eczema     Type 2 diabetes mellitus treated without insulin (H)     Osteoporosis, unspecified osteoporosis type, unspecified pathological fracture presence     Urinary incontinence, unspecified type     Burning sensation of feet     Chronic midline low back pain without sciatica     Chronic kidney disease, stage 3 (H)     Compression fracture of T11 vertebra, sequela     At high risk for falls       Allergies:  No Known Allergies    History   Smoking Status     Never Smoker   Smokeless Tobacco     Current User     Types: Chew     Comment: chews betal nut       Review of systems otherwise negative except as listed in HPI.   History   Smoking Status     Never Smoker   Smokeless Tobacco     Current User     Types: Chew     Comment: chews betal nut       OBJECTICE: /80 (BP Location: Left arm, Patient Position: Sitting, Cuff Size: Adult Regular)   Pulse 84   Temp 98.8  F (37.1  C) (Oral)   Resp 16   Ht 1.495 m (4' 10.86\")   Wt 58.1 kg (128 lb)   SpO2 96%   BMI 25.98 kg/m      DATA REVIEWED:  Additional History from Old Records Summarized (2): Spine , injection. Appt on 1/14/22  Radiology Tests Summarized or Ordered (1): Negative head CT in 6/21  Labs Reviewed or Ordered " (1):       GEN-alert,  in no apparent distress.  HEENT- neck is supple.  CV-regular rate and rhythm with no murmur.   RESP-lungs clear to auscultation .  ABDOMEN- Soft , not tender.  EXTREM- No edema. No open lesions or ulcers. Sensation intact.   Neuro- grossly normal.   SKIN-normal        Bassam Llanos MD   1/13/2022

## 2022-01-13 NOTE — PROGRESS NOTES
Assessment:   Ricky Lea is a 71 year old y.o. female with past medical history significant for hyperlipidemia, hypertension, depression, osteoporosis (on Fosamax), type 2 diabetes mellitus, urinary incontinence who presents today for follow-up regarding chronic bilateral low back pain with radiation into the left greater than right lower extremity.  My review of an MRI lumbar spine shows a chronic T11 compression fracture (no new fracture).  There is a disc extrusion at L4-5 causing mild right and slight left lateral recess stenosis and low-grade spinal canal stenosis and foraminal stenosis.  There is also lower lumbar facet arthropathy.  Patient is status post bilateral L4-5 transforaminal epidural steroid injections December 27, 2021 which provided significant relief of her pain but only lasted 1 week.       Plan:     A shared decision making plan was used.  The patient's values and choices were respected.  The following represents what was discussed and decided upon by the physician assistant and the patient.  A telephone  was used to visit.    1.  DIAGNOSTIC TESTS: I reviewed the MRI lumbar spine.  No further diagnostic tests were ordered.    2.  PHYSICAL THERAPY: Patient completed 7 sessions of physical therapy June 11, 2021.  She will continue with home exercises.  No further physical therapy was ordered.    3.  MEDICATIONS: No changes are made to the patient's medications.  -Patient takes meloxicam 15 mg daily.  -Patient uses duloxetine 30 mg daily.  -Patient takes gabapentin 300 mg in the morning, 300 mg in the afternoon, and 900 mg at bedtime.  -Patient takes Tylenol as needed.    4.  INTERVENTIONS:    -I offered for the patient to try bilateral L3, L4, L5 medial branch blocks.  She will consider this and will call us back if she wishes to proceed.  - If she had a positive response to the medial branch blocks and recommend radiofrequency ablation.  - If she fails the medial branch blocks we could  consider an L5-S1 interlaminar epidural steroid injection versus sacroiliac joint injections.    5.  PATIENT EDUCATION: Patient is in agreement the above plan.  All questions were answered.    6.  FOLLOW-UP: Patient will follow-up as needed.  If she has questions or concerns, she should not of the day to call.    Subjective:     Ricky Lea is a 71 year old female who presents today for follow-up regarding chronic low back pain, radiating into the left greater than right lower extremity.  Patient is status post bilateral L4-5 transforaminal epidural steroid injections December 27, 2021. Patient reports she had significant improvement in her pain for 1 week but after that pain returned.  She now states her pain is only 10% improved compared to before her injection.  She is disappointed because she had much more significant improvement in her symptoms when this injection was performed in September 2021.    Patient complains of bilateral low back pain.  Pain radiates into the bilateral buttocks.  She has occasional pain that extends into the thighs.  She denies pain distal to the knee.  Denies any numbness, tingling, weakness down the legs.  She rates her pain today as a 5 out of 10.  At its worst it is a 9 out of 10.  At its best it is a 2 out of 10.  Pain is aggravated with standing and walking.  She states her back feels tired.  Pain is alleviated with rest.  She denies any new symptoms since she was last seen.    Patient completed 7 sessions of physical therapy in June 2021.  She does her home exercises.  Patient takes meloxicam 15 mg daily, duloxetine 30 mg daily, gabapentin 300 milligrams in the morning, 300 mg in the afternoon, and 900 mg at bedtime.  She also takes Tylenol as needed.  Medications are helpful.      Review of Systems:  Positive for loss of bowel/bladder control.  Negative for numbness/tingling, weakness, inability to urinate, headache, pain much worse at night, trip/double/falls, difficulty  swallowing, difficulty with hand skills, fevers, unintentional weight loss.     Objective:   CONSTITUTIONAL:  Vital signs as above.  No acute distress.  The patient is well nourished and well groomed.    PSYCHIATRIC:  The patient is awake, alert, oriented to person, place and time.  The patient is answering questions appropriately with clear speech.  Normal affect.  HEENT: Normocephalic, atraumatic.  Sclera clear.    SKIN:  Skin over the face, posterior torso, bilateral upper and lower extremities is clean, dry, intact without rashes.  VASCULAR: No significant lower extremity edema.  MUSCULOSKELETAL:  Gait is guarded but nonantalgic.  She ambulates with a flexed forward posture at the hips.  The patient is able to rise onto toes and heels bilaterally with support.  No tenderness over the left greater than right lower lumbar paraspinal muscles.  No significant tenderness to palpation bilateral sacroiliac joints.  The patient has 5/5 strength for the bilateral hip flexors, knee flexors/extensors, ankle dorsiflexors/plantar flexors, ankle evertors/invertors.    NEUROLOGICAL: 1-2+ patellar, achilles reflexes which are symmetric bilaterally.  No ankle clonus bilaterally.  Sensation to light touch is intact in the bilateral L4, L5, and S1 dermatomes.       RESULTS: I reviewed the MRI lumbar spine from [facility] dated April 1, 2021.  This shows a chronic T11 compression fracture.  At L4-5 there is a right central disc extrusion causing mild right and slight left lateral recess stenosis.  There is low-grade spinal canal stenosis and bilateral foraminal stenosis at this level.  There is mild facet arthropathy L4-5 and moderate right and mild left facet arthropathy L5-S1.    EXAM: XR LUMBAR SPINE 2-3 VIEWS  LOCATION: [facility]  DATE/TIME: 08/22/2021, 10:42 AM     INDICATION: Low back pain.  COMPARISON: MR lumbar spine 04/01/2021 and lumbar spine radiographs 07/28/2020.  TECHNIQUE: CR Lumbar  Spine.                                                                      IMPRESSION: Five lumbar-type vertebral bodies. Anatomic alignment. Osteopenia. The known, previously described chronic appearing inferior endplate deformity at T11 is incompletely visualized on this study with perhaps mild superior endplate deformity at   T11. If there is further clinical concern for progression of T11 fracture, would suggest dedicated CT study. Other vertebral bodies are preserved in height. Mild lower lumbar facet arthropathy. Disc space height is maintained. Visualized pelvic ring   intact. There is stable appearing irregularity involving the sacrococcygeal junction.

## 2022-01-14 ENCOUNTER — OFFICE VISIT (OUTPATIENT)
Dept: PHYSICAL MEDICINE AND REHAB | Facility: CLINIC | Age: 72
End: 2022-01-14
Payer: COMMERCIAL

## 2022-01-14 ENCOUNTER — TELEPHONE (OUTPATIENT)
Dept: FAMILY MEDICINE | Facility: CLINIC | Age: 72
End: 2022-01-14

## 2022-01-14 VITALS — OXYGEN SATURATION: 92 % | SYSTOLIC BLOOD PRESSURE: 120 MMHG | HEART RATE: 108 BPM | DIASTOLIC BLOOD PRESSURE: 80 MMHG

## 2022-01-14 DIAGNOSIS — E87.5 HYPERKALEMIA: Primary | ICD-10-CM

## 2022-01-14 DIAGNOSIS — M47.816 LUMBAR FACET ARTHROPATHY: ICD-10-CM

## 2022-01-14 DIAGNOSIS — M54.16 LUMBAR RADICULITIS: Primary | ICD-10-CM

## 2022-01-14 PROCEDURE — 99213 OFFICE O/P EST LOW 20 MIN: CPT | Performed by: PHYSICIAN ASSISTANT

## 2022-01-14 ASSESSMENT — PAIN SCALES - GENERAL: PAINLEVEL: MODERATE PAIN (5)

## 2022-01-14 NOTE — TELEPHONE ENCOUNTER
----- Message from Bassam Llanos MD sent at 1/14/2022  2:51 PM CST -----  Potassium is slightly elevated. Needs to recheck in 2-3 weeks. Future lab order placed. Please call the patient.    Dr. Bassam Llanos  1/14/2022 2:51 PM

## 2022-01-14 NOTE — LETTER
1/14/2022         RE: Ricky Lea  [address] [place] [address]        Dear Colleague,    Thank you for referring your patient, Ricky Lea, to the [facility] [place]. Please see a copy of my visit note below.    Assessment:   Ricky Lea is a 71 year old y.o. female with past medical history significant for hyperlipidemia, hypertension, depression, osteoporosis (on Fosamax), type 2 diabetes mellitus, urinary incontinence who presents today for follow-up regarding chronic bilateral low back pain with radiation into the left greater than right lower extremity.  My review of an MRI lumbar spine shows a chronic T11 compression fracture (no new fracture).  There is a disc extrusion at L4-5 causing mild right and slight left lateral recess stenosis and low-grade spinal canal stenosis and foraminal stenosis.  There is also lower lumbar facet arthropathy.  Patient is status post bilateral L4-5 transforaminal epidural steroid injections December 27, 2021 which provided significant relief of her pain but only lasted 1 week.       Plan:     A shared decision making plan was used.  The patient's values and choices were respected.  The following represents what was discussed and decided upon by the physician assistant and the patient.  A telephone  was used to visit.    1.  DIAGNOSTIC TESTS: I reviewed the MRI lumbar spine.  No further diagnostic tests were ordered.    2.  PHYSICAL THERAPY: Patient completed 7 sessions of physical therapy June 11, 2021.  She will continue with home exercises.  No further physical therapy was ordered.    3.  MEDICATIONS: No changes are made to the patient's medications.  -Patient takes meloxicam 15 mg daily.  -Patient uses duloxetine 30 mg daily.  -Patient takes gabapentin 300 mg in the morning, 300 mg in the afternoon, and 900 mg at bedtime.  -Patient takes Tylenol as needed.    4.  INTERVENTIONS:    -I offered for the patient to try bilateral L3, L4, L5 medial  branch blocks.  She will consider this and will call us back if she wishes to proceed.  - If she had a positive response to the medial branch blocks and recommend radiofrequency ablation.  - If she fails the medial branch blocks we could consider an L5-S1 interlaminar epidural steroid injection versus sacroiliac joint injections.    5.  PATIENT EDUCATION: Patient is in agreement the above plan.  All questions were answered.    6.  FOLLOW-UP: Patient will follow-up as needed.  If she has questions or concerns, she should not of the day to call.    Subjective:     Ricky Lea is a 71 year old female who presents today for follow-up regarding chronic low back pain, radiating into the left greater than right lower extremity.  Patient is status post bilateral L4-5 transforaminal epidural steroid injections December 27, 2021. Patient reports she had significant improvement in her pain for 1 week but after that pain returned.  She now states her pain is only 10% improved compared to before her injection.  She is disappointed because she had much more significant improvement in her symptoms when this injection was performed in September 2021.    Patient complains of bilateral low back pain.  Pain radiates into the bilateral buttocks.  She has occasional pain that extends into the thighs.  She denies pain distal to the knee.  Denies any numbness, tingling, weakness down the legs.  She rates her pain today as a 5 out of 10.  At its worst it is a 9 out of 10.  At its best it is a 2 out of 10.  Pain is aggravated with standing and walking.  She states her back feels tired.  Pain is alleviated with rest.  She denies any new symptoms since she was last seen.    Patient completed 7 sessions of physical therapy in June 2021.  She does her home exercises.  Patient takes meloxicam 15 mg daily, duloxetine 30 mg daily, gabapentin 300 milligrams in the morning, 300 mg in the afternoon, and 900 mg at bedtime.  She also takes Tylenol as  needed.  Medications are helpful.      Review of Systems:  Positive for loss of bowel/bladder control.  Negative for numbness/tingling, weakness, inability to urinate, headache, pain much worse at night, trip/double/falls, difficulty swallowing, difficulty with hand skills, fevers, unintentional weight loss.     Objective:   CONSTITUTIONAL:  Vital signs as above.  No acute distress.  The patient is well nourished and well groomed.    PSYCHIATRIC:  The patient is awake, alert, oriented to person, place and time.  The patient is answering questions appropriately with clear speech.  Normal affect.  HEENT: Normocephalic, atraumatic.  Sclera clear.    SKIN:  Skin over the face, posterior torso, bilateral upper and lower extremities is clean, dry, intact without rashes.  VASCULAR: No significant lower extremity edema.  MUSCULOSKELETAL:  Gait is guarded but nonantalgic.  She ambulates with a flexed forward posture at the hips.  The patient is able to rise onto toes and heels bilaterally with support.  No tenderness over the left greater than right lower lumbar paraspinal muscles.  No significant tenderness to palpation bilateral sacroiliac joints.  The patient has 5/5 strength for the bilateral hip flexors, knee flexors/extensors, ankle dorsiflexors/plantar flexors, ankle evertors/invertors.    NEUROLOGICAL: 1-2+ patellar, achilles reflexes which are symmetric bilaterally.  No ankle clonus bilaterally.  Sensation to light touch is intact in the bilateral L4, L5, and S1 dermatomes.       RESULTS: I reviewed the MRI lumbar spine from [facility] dated April 1, 2021.  This shows a chronic T11 compression fracture.  At L4-5 there is a right central disc extrusion causing mild right and slight left lateral recess stenosis.  There is low-grade spinal canal stenosis and bilateral foraminal stenosis at this level.  There is mild facet arthropathy L4-5 and moderate right and mild left facet arthropathy L5-S1.    EXAM: XR LUMBAR  SPINE 2-3 VIEWS  LOCATION: [facility]  DATE/TIME: 08/22/2021, 10:42 AM     INDICATION: Low back pain.  COMPARISON: MR lumbar spine 04/01/2021 and lumbar spine radiographs 07/28/2020.  TECHNIQUE: CR Lumbar Spine.                                                                      IMPRESSION: Five lumbar-type vertebral bodies. Anatomic alignment. Osteopenia. The known, previously described chronic appearing inferior endplate deformity at T11 is incompletely visualized on this study with perhaps mild superior endplate deformity at   T11. If there is further clinical concern for progression of T11 fracture, would suggest dedicated CT study. Other vertebral bodies are preserved in height. Mild lower lumbar facet arthropathy. Disc space height is maintained. Visualized pelvic ring   intact. There is stable appearing irregularity involving the sacrococcygeal junction.             Again, thank you for allowing me to participate in the care of your patient.        Sincerely,        Maureen Palafox PA-C

## 2022-01-17 NOTE — TELEPHONE ENCOUNTER
The patient verbalizes understanding of provider/CSS instructions for follow-up and continued care per provider message.     Follow up lab scheduled 1/28/22.

## 2022-01-18 ENCOUNTER — TELEPHONE (OUTPATIENT)
Dept: FAMILY MEDICINE | Facility: CLINIC | Age: 72
End: 2022-01-18
Payer: COMMERCIAL

## 2022-01-18 DIAGNOSIS — Z76.0 ENCOUNTER FOR MEDICATION REFILL: Primary | ICD-10-CM

## 2022-01-18 DIAGNOSIS — K59.00 CONSTIPATION, UNSPECIFIED CONSTIPATION TYPE: ICD-10-CM

## 2022-01-18 RX ORDER — POLYETHYLENE GLYCOL 3350 17 G/17G
1 POWDER, FOR SOLUTION ORAL DAILY
Qty: 510 G | Refills: 3 | Status: SHIPPED | OUTPATIENT
Start: 2022-01-18 | End: 2023-03-30

## 2022-01-18 NOTE — TELEPHONE ENCOUNTER
Reason for Call:  Home Health Care      [facility] (reason for call): verbal order and clarify medication    Can Dr. Llanos clarify if patient is still taking the metformin?    Orders are needed for this patient. Skilled Nursing    PT: n/a    OT: n/a    Skilled Nursing: 1x4 with 3 prn for next 60 day for MTM    Pt Provider: Romi    Phone Number Homecare Nurse can be reached at: [phone]    Can we leave a detailed message on this number? yes confidential    Phone number patient can be reached at: Cell number on file:    Telephone Information:   Mobile [phone]       Best Time: any    Call taken on 1/18/2022 at 9:16 AM by Dedrick Molina

## 2022-01-18 NOTE — TELEPHONE ENCOUNTER
Requested orders approved.  Yes she needs to be on metformin for diabetes. Refill sent in 11/21.    Dr. Bassam Llanos  1/18/2022 9:46 AM

## 2022-01-19 ENCOUNTER — MEDICAL CORRESPONDENCE (OUTPATIENT)
Dept: HEALTH INFORMATION MANAGEMENT | Facility: CLINIC | Age: 72
End: 2022-01-19
Payer: COMMERCIAL

## 2022-01-22 ENCOUNTER — MEDICAL CORRESPONDENCE (OUTPATIENT)
Dept: HEALTH INFORMATION MANAGEMENT | Facility: CLINIC | Age: 72
End: 2022-01-22
Payer: COMMERCIAL

## 2022-01-28 ENCOUNTER — LAB (OUTPATIENT)
Dept: LAB | Facility: CLINIC | Age: 72
End: 2022-01-28
Payer: COMMERCIAL

## 2022-01-28 DIAGNOSIS — E87.5 HYPERKALEMIA: ICD-10-CM

## 2022-01-28 LAB — POTASSIUM BLD-SCNC: 4.6 MMOL/L (ref 3.5–5)

## 2022-01-28 PROCEDURE — 84132 ASSAY OF SERUM POTASSIUM: CPT

## 2022-01-28 PROCEDURE — 36415 COLL VENOUS BLD VENIPUNCTURE: CPT

## 2022-02-07 DIAGNOSIS — Z76.0 ENCOUNTER FOR MEDICATION REFILL: Primary | ICD-10-CM

## 2022-02-07 RX ORDER — MECLIZINE HCL 25MG 25 MG/1
TABLET, CHEWABLE ORAL
Qty: 30 TABLET | Refills: 3 | Status: SHIPPED | OUTPATIENT
Start: 2022-02-07 | End: 2022-06-23

## 2022-02-10 ENCOUNTER — MEDICAL CORRESPONDENCE (OUTPATIENT)
Dept: HEALTH INFORMATION MANAGEMENT | Facility: CLINIC | Age: 72
End: 2022-02-10
Payer: COMMERCIAL

## 2022-02-10 DIAGNOSIS — M25.512 LEFT SHOULDER PAIN, UNSPECIFIED CHRONICITY: ICD-10-CM

## 2022-02-10 DIAGNOSIS — Z76.0 ENCOUNTER FOR MEDICATION REFILL: Primary | ICD-10-CM

## 2022-02-10 RX ORDER — ACETAMINOPHEN 500 MG
TABLET ORAL
Qty: 100 TABLET | Refills: 2 | Status: SHIPPED | OUTPATIENT
Start: 2022-02-10 | End: 2023-07-20

## 2022-02-27 DIAGNOSIS — Z76.0 ENCOUNTER FOR MEDICATION REFILL: Primary | ICD-10-CM

## 2022-02-27 DIAGNOSIS — M54.50 CHRONIC MIDLINE LOW BACK PAIN WITHOUT SCIATICA: ICD-10-CM

## 2022-02-27 DIAGNOSIS — G89.29 CHRONIC MIDLINE LOW BACK PAIN WITHOUT SCIATICA: ICD-10-CM

## 2022-02-28 RX ORDER — MELOXICAM 15 MG/1
TABLET ORAL
Qty: 90 TABLET | Refills: 3 | Status: SHIPPED | OUTPATIENT
Start: 2022-02-28 | End: 2023-01-25

## 2022-03-01 ENCOUNTER — TELEPHONE (OUTPATIENT)
Dept: FAMILY MEDICINE | Facility: CLINIC | Age: 72
End: 2022-03-01

## 2022-03-01 ENCOUNTER — OFFICE VISIT (OUTPATIENT)
Dept: FAMILY MEDICINE | Facility: CLINIC | Age: 72
End: 2022-03-01
Payer: COMMERCIAL

## 2022-03-01 VITALS
SYSTOLIC BLOOD PRESSURE: 130 MMHG | OXYGEN SATURATION: 94 % | HEIGHT: 59 IN | TEMPERATURE: 98.4 F | WEIGHT: 132.19 LBS | DIASTOLIC BLOOD PRESSURE: 78 MMHG | RESPIRATION RATE: 16 BRPM | BODY MASS INDEX: 26.65 KG/M2 | HEART RATE: 86 BPM

## 2022-03-01 DIAGNOSIS — R32 URINARY INCONTINENCE, UNSPECIFIED TYPE: ICD-10-CM

## 2022-03-01 DIAGNOSIS — N18.30 STAGE 3 CHRONIC KIDNEY DISEASE, UNSPECIFIED WHETHER STAGE 3A OR 3B CKD (H): ICD-10-CM

## 2022-03-01 DIAGNOSIS — F32.1 MODERATE SINGLE CURRENT EPISODE OF MAJOR DEPRESSIVE DISORDER (H): ICD-10-CM

## 2022-03-01 DIAGNOSIS — I10 ESSENTIAL HYPERTENSION: ICD-10-CM

## 2022-03-01 DIAGNOSIS — E11.9 TYPE 2 DIABETES MELLITUS TREATED WITHOUT INSULIN (H): Primary | ICD-10-CM

## 2022-03-01 DIAGNOSIS — S22.080S COMPRESSION FRACTURE OF T11 VERTEBRA, SEQUELA: ICD-10-CM

## 2022-03-01 DIAGNOSIS — G62.9 NEUROPATHY: ICD-10-CM

## 2022-03-01 PROCEDURE — 99214 OFFICE O/P EST MOD 30 MIN: CPT | Performed by: FAMILY MEDICINE

## 2022-03-01 RX ORDER — METFORMIN HYDROCHLORIDE 750 MG/1
750 TABLET, EXTENDED RELEASE ORAL
Qty: 90 TABLET | Refills: 1 | Status: SHIPPED | OUTPATIENT
Start: 2022-03-01 | End: 2022-06-23

## 2022-03-01 RX ORDER — PREGABALIN 25 MG/1
25 CAPSULE ORAL 2 TIMES DAILY
Qty: 60 CAPSULE | Refills: 1 | Status: SHIPPED | OUTPATIENT
Start: 2022-03-01 | End: 2022-04-26

## 2022-03-01 NOTE — PROGRESS NOTES
ASSESMENT AND PLAN:  Type 2 diabetes mellitus treated without insulin (H)  Increased Metformin XR to 750 mg daily.  - metFORMIN (GLUCOPHAGE-XR) 750 MG 24 hr tablet; Take 1 tablet (750 mg) by mouth daily (with dinner) Dose increased.    Compression fracture of T11 vertebra, sequela  Released from spine center.     Essential hypertension  Controlled    Moderate single current episode of major depressive disorder (H)  Stable.    Urinary incontinence, unspecified type  Wears diaper.    Stage 3 chronic kidney disease, unspecified whether stage 3a or 3b CKD (H)  Normal creatinine in January 2022.  Recheck at next visit.    Neuropathy  Repentant is not working.  We will taper and discontinue.  Called home care agency with instructions.  We will start Lyrica after stopping gabapentin  - pregabalin (LYRICA) 25 MG capsule; Take 1 capsule (25 mg) by mouth 2 times daily    This transcription uses voice recognition software, which may contain typographical errors.      SUBJECTIVE: Ricky Lea is a 71-year-old female with history of depression, hypertension, hyperlipidemia, neuropathy, compression fracture, chronic low back pain and chronic kidney disease here for follow-up.  She was referred to spine center for back pain/compression fracture.  She states she received injection but it helped only for few weeks.  She was last seen at spine Center about 6 weeks ago, no follow-up recommended.  States the pain is still the same.  She is currently taking gabapentin 300 mg 5 capsules a day.  She does not think the medication is helping.    Depression symptoms are stable for now.  She denied suicidal homicidal ideations.  Denied psychotherapy in the past.  She is complaining of itchiness all over her body for the past few weeks.  No rash.  The itchiness comes and goes.  No shortness of breath, cough or wheezing with the itchiness.  She is still complaining of tingling and numbness in the lower extremities.  This is not new.  Her fasting  "blood sugar ranges from 150s to 170s most days.  No low blood sugar with hypoglycemic symptoms in the recent months.  Her last A1c was 8.6, discussed insulin at that time but patient declined.  She is currently taking Metformin  mg once a day.  Normal creatinine in January 2022.  She is still complaining of bilateral knee pain.  No swelling.  The pain worse when she climbs stairs.  She is still complaining of dry, peeling soles of both feet.  She is using lotion but is not helping.    History reviewed. No pertinent past medical history.  Patient Active Problem List   Diagnosis     Hyperlipidemia, unspecified hyperlipidemia type     Pulmonary nodules     Left shoulder pain     Essential hypertension     Moderate single current episode of major depressive disorder (H)     Skin cancer of face     Dry skin     Noncompliance with medication regimen     Intrinsic eczema     Type 2 diabetes mellitus treated without insulin (H)     Osteoporosis, unspecified osteoporosis type, unspecified pathological fracture presence     Urinary incontinence, unspecified type     Burning sensation of feet     Chronic midline low back pain without sciatica     Chronic kidney disease, stage 3 (H)     Compression fracture of T11 vertebra, sequela     At high risk for falls       Allergies:  No Known Allergies    History   Smoking Status     Never Smoker   Smokeless Tobacco     Current User     Types: Chew     Comment: chews betal nut       Review of systems otherwise negative except as listed in HPI.   History   Smoking Status     Never Smoker   Smokeless Tobacco     Current User     Types: Chew     Comment: chews betal nut       OBJECTICE: /78 (BP Location: Left arm, Patient Position: Sitting, Cuff Size: Adult Regular)   Pulse 86   Temp 98.4  F (36.9  C) (Oral)   Resp 16   Ht 1.495 m (4' 10.86\")   Wt 60 kg (132 lb 3 oz)   SpO2 94%   BMI 26.83 kg/m      DATA REVIEWED:  Additional History from Old Records Summarized (2): "       GEN-alert,  in no apparent distress.  HEENT- neck is supple.  CV-regular rate and rhythm with no murmur.   RESP-lungs clear to auscultation .  ABDOMEN- Soft , not tender.  KNEES- No swelling, global tenderness bilaterally. No effusion.   BACK-no significant tenderness on palpation. Negative SLR test.   SKIN-normal        Bassam Llanos MD   3/1/2022

## 2022-03-01 NOTE — TELEPHONE ENCOUNTER
Called [facility] and left message with Antonieta for Nurse to call clinic back.  would like to taper off and then discontinue Gabapentin.        Gabapentin 300 mg three times a day for 7 days .Then  once a day for 7 days and stop.  Start Lyrica after stopping Gabapentin .

## 2022-03-01 NOTE — TELEPHONE ENCOUNTER
Ashley Petersen @ [facility] return call, writer relayed message below to caller. Caller verbalizes understanding.

## 2022-03-18 ENCOUNTER — TELEPHONE (OUTPATIENT)
Dept: FAMILY MEDICINE | Facility: CLINIC | Age: 72
End: 2022-03-18
Payer: COMMERCIAL

## 2022-03-18 DIAGNOSIS — E11.9 TYPE 2 DIABETES MELLITUS TREATED WITHOUT INSULIN (H): Primary | ICD-10-CM

## 2022-03-18 NOTE — TELEPHONE ENCOUNTER
Reason for Call:  Home Health Care    Annabella with [facility] called regarding (reason for call): Verbal Order    Orders are needed for this patient. Skilled Nursing    Skilled Nursing: every 4 wks with 3 PRNs for the next 60 days for MTM    Pt Provider: Dr. Llanos    Phone Number Homecare Nurse can be reached at: [phone]    Can we leave a detailed message on this number? YES    Phone number patient can be reached at: Cell number on file:    Telephone Information:   Mobile [phone]       Best Time: anytime    Call taken on 3/18/2022 at 8:41 AM by Navdeep Duron

## 2022-03-20 NOTE — TELEPHONE ENCOUNTER
"Routing refill request to provider for review/approval because:  These Lancets are not listed on patients medication list.   Generic lancets refilled on 1/26/2021 Disp:200 R:11    Last Written Prescription Date:  n/a  Last Fill Quantity: n/a,  # refills: n/a   Last office visit provider:  3/1/2022     Requested Prescriptions   Pending Prescriptions Disp Refills     Global Inject Ease Lancets 30G MISC [Pharmacy Med Name: LANCETS 30G INJECT EASE Miscellaneous] 100 each 1     Sig: USE AS DIRECTED *100 DAYS*       Diabetic Supplies Protocol Failed - 3/18/2022  5:22 PM        Failed - Medication is active on med list        Passed - Patient is 18 years of age or older        Passed - Recent (6 mo) or future (30 days) visit within the authorizing provider's specialty     Patient had office visit in the last 6 months or has a visit in the next 30 days with authorizing provider.  See \"Patient Info\" tab in inbasket, or \"Choose Columns\" in Meds & Orders section of the refill encounter.                 Tuyet Oliver RN 03/20/22 4:56 PM  "

## 2022-03-21 RX ORDER — LANCETS 30 GAUGE
EACH MISCELLANEOUS
Qty: 100 EACH | Refills: 1 | Status: SHIPPED | OUTPATIENT
Start: 2022-03-21 | End: 2024-04-03

## 2022-03-23 ENCOUNTER — MEDICAL CORRESPONDENCE (OUTPATIENT)
Dept: HEALTH INFORMATION MANAGEMENT | Facility: CLINIC | Age: 72
End: 2022-03-23
Payer: COMMERCIAL

## 2022-03-24 DIAGNOSIS — Z53.9 DIAGNOSIS NOT YET DEFINED: Primary | ICD-10-CM

## 2022-03-24 PROCEDURE — G0179 MD RECERTIFICATION HHA PT: HCPCS | Performed by: FAMILY MEDICINE

## 2022-04-26 DIAGNOSIS — Z76.0 ENCOUNTER FOR MEDICATION REFILL: Primary | ICD-10-CM

## 2022-04-26 DIAGNOSIS — G62.9 NEUROPATHY: ICD-10-CM

## 2022-04-26 RX ORDER — PREGABALIN 25 MG/1
25 CAPSULE ORAL 2 TIMES DAILY
Qty: 180 CAPSULE | Refills: 3 | Status: SHIPPED | OUTPATIENT
Start: 2022-04-26 | End: 2022-08-09

## 2022-04-28 DIAGNOSIS — Z76.0 ENCOUNTER FOR MEDICATION REFILL: Primary | ICD-10-CM

## 2022-04-28 DIAGNOSIS — F32.1 MODERATE SINGLE CURRENT EPISODE OF MAJOR DEPRESSIVE DISORDER (H): ICD-10-CM

## 2022-04-28 RX ORDER — DULOXETIN HYDROCHLORIDE 60 MG/1
CAPSULE, DELAYED RELEASE ORAL
Qty: 90 CAPSULE | Refills: 3 | Status: SHIPPED | OUTPATIENT
Start: 2022-04-28 | End: 2023-06-02

## 2022-05-03 ENCOUNTER — OFFICE VISIT (OUTPATIENT)
Dept: FAMILY MEDICINE | Facility: CLINIC | Age: 72
End: 2022-05-03
Payer: COMMERCIAL

## 2022-05-03 VITALS
OXYGEN SATURATION: 95 % | HEIGHT: 59 IN | SYSTOLIC BLOOD PRESSURE: 110 MMHG | DIASTOLIC BLOOD PRESSURE: 80 MMHG | TEMPERATURE: 97.9 F | BODY MASS INDEX: 25.58 KG/M2 | RESPIRATION RATE: 20 BRPM | HEART RATE: 80 BPM | WEIGHT: 126.9 LBS

## 2022-05-03 DIAGNOSIS — E78.5 HYPERLIPIDEMIA, UNSPECIFIED HYPERLIPIDEMIA TYPE: ICD-10-CM

## 2022-05-03 DIAGNOSIS — N18.30 STAGE 3 CHRONIC KIDNEY DISEASE, UNSPECIFIED WHETHER STAGE 3A OR 3B CKD (H): ICD-10-CM

## 2022-05-03 DIAGNOSIS — G89.29 BILATERAL CHRONIC KNEE PAIN: ICD-10-CM

## 2022-05-03 DIAGNOSIS — E11.9 TYPE 2 DIABETES MELLITUS TREATED WITHOUT INSULIN (H): Primary | ICD-10-CM

## 2022-05-03 DIAGNOSIS — F32.1 MODERATE SINGLE CURRENT EPISODE OF MAJOR DEPRESSIVE DISORDER (H): ICD-10-CM

## 2022-05-03 DIAGNOSIS — M54.50 CHRONIC MIDLINE LOW BACK PAIN WITHOUT SCIATICA: ICD-10-CM

## 2022-05-03 DIAGNOSIS — M25.561 BILATERAL CHRONIC KNEE PAIN: ICD-10-CM

## 2022-05-03 DIAGNOSIS — R42 DIZZINESS: ICD-10-CM

## 2022-05-03 DIAGNOSIS — G89.29 CHRONIC MIDLINE LOW BACK PAIN WITHOUT SCIATICA: ICD-10-CM

## 2022-05-03 DIAGNOSIS — M25.562 BILATERAL CHRONIC KNEE PAIN: ICD-10-CM

## 2022-05-03 DIAGNOSIS — I10 ESSENTIAL HYPERTENSION: ICD-10-CM

## 2022-05-03 LAB
ALBUMIN SERPL-MCNC: 3.6 G/DL (ref 3.5–5)
ALP SERPL-CCNC: 55 U/L (ref 45–120)
ALT SERPL W P-5'-P-CCNC: 25 U/L (ref 0–45)
ANION GAP SERPL CALCULATED.3IONS-SCNC: 12 MMOL/L (ref 5–18)
AST SERPL W P-5'-P-CCNC: 25 U/L (ref 0–40)
BILIRUB SERPL-MCNC: 0.6 MG/DL (ref 0–1)
BUN SERPL-MCNC: 13 MG/DL (ref 8–28)
CALCIUM SERPL-MCNC: 9.5 MG/DL (ref 8.5–10.5)
CHLORIDE BLD-SCNC: 99 MMOL/L (ref 98–107)
CHOLEST SERPL-MCNC: 211 MG/DL
CO2 SERPL-SCNC: 27 MMOL/L (ref 22–31)
CREAT SERPL-MCNC: 1.35 MG/DL (ref 0.6–1.1)
FASTING STATUS PATIENT QL REPORTED: NO
GFR SERPL CREATININE-BSD FRML MDRD: 42 ML/MIN/1.73M2
GLUCOSE BLD-MCNC: 162 MG/DL (ref 70–125)
HBA1C MFR BLD: 7.5 % (ref 0–5.6)
HDLC SERPL-MCNC: 36 MG/DL
LDLC SERPL CALC-MCNC: 138 MG/DL
POTASSIUM BLD-SCNC: 4.7 MMOL/L (ref 3.5–5)
PROT SERPL-MCNC: 6.7 G/DL (ref 6–8)
SODIUM SERPL-SCNC: 138 MMOL/L (ref 136–145)
TRIGL SERPL-MCNC: 184 MG/DL

## 2022-05-03 PROCEDURE — 80061 LIPID PANEL: CPT | Performed by: FAMILY MEDICINE

## 2022-05-03 PROCEDURE — 80053 COMPREHEN METABOLIC PANEL: CPT | Performed by: FAMILY MEDICINE

## 2022-05-03 PROCEDURE — 99214 OFFICE O/P EST MOD 30 MIN: CPT | Performed by: FAMILY MEDICINE

## 2022-05-03 PROCEDURE — 36415 COLL VENOUS BLD VENIPUNCTURE: CPT | Performed by: FAMILY MEDICINE

## 2022-05-03 PROCEDURE — 83036 HEMOGLOBIN GLYCOSYLATED A1C: CPT | Performed by: FAMILY MEDICINE

## 2022-05-03 NOTE — PROGRESS NOTES
ASSESMENT AND PLAN:  Type 2 diabetes mellitus treated without insulin (H)  A1c 7.5 today, was 8.6 three months ago.  No medication changes.  - Comprehensive metabolic panel (BMP + Alb, Alk Phos, ALT, AST, Total. Bili, TP)  - Lipid panel  - Hemoglobin A1c    Hyperlipidemia, unspecified hyperlipidemia type  Recheck lipid today.    Essential hypertension  Controlled.  No medication changes.    Stage 3 chronic kidney disease, unspecified whether stage 3a or 3b CKD (H)  Recheck today.    Moderate single current episode of major depressive disorder (H)  Stable.    Chronic midline low back pain without sciatica  Completed physical therapy.  Evaluated by spine care.  Continue gabapentin and Lyrica.    Dizziness  Improved.    Bilateral chronic knee pain  - diclofenac (VOLTAREN) 1 % topical gel; Apply 4 g topically 4 times daily    This transcription uses voice recognition software, which may contain typographical errors.      SUBJECTIVE: Ricky Lea is a 72-year-old female with history of diabetes, hypertension, hyperlipidemia, depression, chronic back pain, osteoporosis and history of urinary incontinence here for follow-up.  She has home care RN helping her with her pillbox.  States she takes all her medications from the pillbox except for Tylenol.  She was referred to spine center for compression fracture of T11, was recommended physical therapy.  She states she is done with physical therapy.  The back pain is improving.  She is currently taking gabapentin and Lyrica.  She is also complaining of bilateral knee pain on and off, but this is not new.  Are fasting blood sugar are in the 140s to 160 range mostly, the highest was 182 in the past month.  No low blood sugar with hypoglycemic symptoms.  Her dizziness has also improved.    No past medical history on file.  Patient Active Problem List   Diagnosis     Hyperlipidemia, unspecified hyperlipidemia type     Pulmonary nodules     Left shoulder pain     Essential hypertension  "    Moderate single current episode of major depressive disorder (H)     Skin cancer of face     Dry skin     Noncompliance with medication regimen     Intrinsic eczema     Type 2 diabetes mellitus treated without insulin (H)     Osteoporosis, unspecified osteoporosis type, unspecified pathological fracture presence     Urinary incontinence, unspecified type     Burning sensation of feet     Chronic midline low back pain without sciatica     Chronic kidney disease, stage 3 (H)     Compression fracture of T11 vertebra, sequela     At high risk for falls       Allergies:  No Known Allergies    History   Smoking Status     Never Smoker   Smokeless Tobacco     Current User     Types: Chew     Comment: chews betal nut       Review of systems otherwise negative except as listed in HPI.   History   Smoking Status     Never Smoker   Smokeless Tobacco     Current User     Types: Chew     Comment: chews betal nut       OBJECTICE: /80 (BP Location: Left arm, Patient Position: Sitting, Cuff Size: Adult Small)   Pulse 80   Temp 97.9  F (36.6  C) (Oral)   Resp 20   Ht 1.495 m (4' 10.86\")   Wt 57.6 kg (126 lb 14.4 oz)   SpO2 95%   BMI 25.75 kg/m      DATA REVIEWED:    Labs Reviewed or Ordered (1):       GEN-alert,  in no apparent distress.  HEENT- neck is supple.  CV-regular rate and rhythm with no murmur.   RESP-lungs clear to auscultation .  ABDOMEN- Soft , not tender.  BACK-mild tenderness lower lumbar paraspinal area, negative straight leg raising test today.  EXTREM- No open lesions or ulcers. Sensation intact.   KNEES-no effusion, no erythema.  Mild global tenderness bilaterally.  SKIN-normal        Bassam Llanos MD   5/3/2022   "

## 2022-05-14 NOTE — PROGRESS NOTES
Assessment:   Ricky Lea is a 71 year old y.o. female with past medical history significant for hyperlipidemia, hypertension, depression, osteoporosis (on Fosamax), type 2 diabetes mellitus, urinary incontinence who presents today for follow-up regarding acute flare of chronic bilateral low back pain with radiation into the left greater than right lower extremity.  Pain is improving.  My review of an MRI lumbar spine shows a chronic T11 compression fracture (no new fracture).  There is a disc extrusion at L4-5 causing mild right and slight left lateral recess stenosis and low-grade spinal canal stenosis and foraminal stenosis.  Patient is status post bilateral L4-5 transforaminal epidural steroid injections September 30, 2021 which has provided 50% relief of her pain overall.  She reports 100% resolution of her leg pain.  She still has some axial low back pain likely related to lumbar facet arthropathy.       Plan:     A shared decision making plan was used.  The patient's values and choices were respected.  The following represents what was discussed and decided upon by the physician assistant and the patient.  A telephone  was used to visit.    1.  DIAGNOSTIC TESTS: I reviewed the MRI lumbar spine.  No further diagnostic tests were ordered.    2.  PHYSICAL THERAPY: Patient completed 7 sessions of physical therapy June 11, 2021.  She will continue with home exercises.  No further physical therapy was ordered.    3.  MEDICATIONS: No changes are made to the patient's medications.  -Patient takes meloxicam 15 mg daily.  -Patient uses duloxetine 30 mg daily.  -Patient takes gabapentin 300 mg in the morning, 300 mg in the afternoon, and 900 mg at bedtime.  -Patient takes Tylenol as needed.    4.  INTERVENTIONS:    -No additional interventions were ordered.  If leg pain were to return, we could repeat the bilateral L4-5 transforaminal epidural steroid injections.  -If axial low back pain worsens we could consider  bilateral L4-5, L5-S1 facet joint injections versus medial branch blocks depending on insurance coverage.    5.  PATIENT EDUCATION: Patient is in agreement the above plan.  All questions were answered.    6.  FOLLOW-UP: Patient would like to follow-up in 3 months.  If she has questions or concerns in the meantime, she should not hesitate to call.    Subjective:     Ricky Lea is a 71 year old female who presents today for follow-up regarding acute on chronic low back pain, radiating into the left greater than right lower extremity.  Patient is status post bilateral L4-5 transforaminal epidural steroid injection September 30, 2021.  Patient reports 50% improvement in her pain overall.  She states that her leg pain has resolved completely.  She has some mild residual left greater than right low back pain.    Patient complains of left greater than right lower back pain.  She denies any pain radiating the buttocks or down the legs.  She rates her pain today as a 3 or 4 out of 10.  Pain is aggravated bending.  She denies any new symptoms since she was last seen.  She denies any numbness, tingling, weakness down the legs.    Patient completed 7 sessions of physical therapy in June 2021.  She does her home exercises.  Patient takes meloxicam 15 mg daily, duloxetine 30 mg daily, gabapentin 300 milligrams in the morning, 300 mg in the afternoon, and 900 mg at bedtime.  She also takes Tylenol as needed.  Medications are helpful.      Review of Systems:  Negative for numbness/tingling, weakness, loss of bowel/bladder control, inability to urinate, headache, pain much worse at night, trip/stumble/falls, difficulty swallowing, difficulty with hand skills, fevers, unintentional weight loss.     Objective:   CONSTITUTIONAL:  Vital signs as above.  No acute distress.  The patient is well nourished and well groomed.    PSYCHIATRIC:  The patient is awake, alert, oriented to person, place and time.  The patient is answering questions  appropriately with clear speech.  Normal affect.  HEENT: Normocephalic, atraumatic.  Sclera clear.    SKIN:  Skin over the face, posterior torso, bilateral upper and lower extremities is clean, dry, intact without rashes.  VASCULAR: No significant lower extremity edema.  MUSCULOSKELETAL:  Gait is guarded but nonantalgic.  The patient is able to rise onto toes and heels bilaterally with support.  No tenderness over the left greater than right lower lumbar paraspinal muscles.   The patient has 5/5 strength for the bilateral hip flexors, knee flexors/extensors, ankle dorsiflexors/plantar flexors, ankle evertors/invertors.    NEUROLOGICAL: 1-2+ patellar, achilles reflexes which are symmetric bilaterally.  No ankle clonus bilaterally.  Sensation to light touch is intact in the bilateral L4, L5, and S1 dermatomes.       RESULTS: I reviewed the MRI lumbar spine from [facility] dated April 1, 2021.  This shows a chronic T11 compression fracture.  At L4-5 there is a right central disc extrusion causing mild right and slight left lateral recess stenosis.  There is low-grade spinal canal stenosis and bilateral foraminal stenosis at this level.  There is mild facet arthropathy L4-5 and moderate right and mild left facet arthropathy L5-S1.    EXAM: XR LUMBAR SPINE 2-3 VIEWS  LOCATION: [facility]  DATE/TIME: 08/22/2021, 10:42 AM     INDICATION: Low back pain.  COMPARISON: MR lumbar spine 04/01/2021 and lumbar spine radiographs 07/28/2020.  TECHNIQUE: CR Lumbar Spine.                                                                      IMPRESSION: Five lumbar-type vertebral bodies. Anatomic alignment. Osteopenia. The known, previously described chronic appearing inferior endplate deformity at T11 is incompletely visualized on this study with perhaps mild superior endplate deformity at   T11. If there is further clinical concern for progression of T11 fracture, would suggest dedicated CT study. Other  vertebral bodies are preserved in height. Mild lower lumbar facet arthropathy. Disc space height is maintained. Visualized pelvic ring   intact. There is stable appearing irregularity involving the sacrococcygeal junction.          c/o cp since last night, pt has loop recorder.

## 2022-05-19 ENCOUNTER — TELEPHONE (OUTPATIENT)
Dept: FAMILY MEDICINE | Facility: CLINIC | Age: 72
End: 2022-05-19
Payer: COMMERCIAL

## 2022-05-19 NOTE — TELEPHONE ENCOUNTER
Called [facility],  RN [facility] to relay PCP approved the following orders:    Skilled Nursing: ever 4 weeks and 3 PRNs for the next 60 days for medication management   Thanks

## 2022-05-19 NOTE — TELEPHONE ENCOUNTER
Reason for Call:  Home Health Care    [facility] called regarding (reason for call): verbal order    Orders are needed for this patient. Skilled Nursing    Skilled Nursing: ever 4 weeks and 3 PRNs for the next 60 days for medication management    Pt Provider: Dr. Llanos    Phone Number Homecare Nurse can be reached at: [phone]    Can we leave a detailed message on this number? YES      Call taken on 5/19/2022 at 11:45 AM by Gia Chang

## 2022-05-22 ENCOUNTER — MEDICAL CORRESPONDENCE (OUTPATIENT)
Dept: HEALTH INFORMATION MANAGEMENT | Facility: CLINIC | Age: 72
End: 2022-05-22

## 2022-05-22 DIAGNOSIS — M81.0 AGE-RELATED OSTEOPOROSIS WITHOUT CURRENT PATHOLOGICAL FRACTURE: ICD-10-CM

## 2022-05-22 DIAGNOSIS — Z76.0 ENCOUNTER FOR MEDICATION REFILL: Primary | ICD-10-CM

## 2022-05-24 RX ORDER — ALENDRONATE SODIUM 70 MG/1
TABLET ORAL
Qty: 12 TABLET | Refills: 3 | Status: SHIPPED | OUTPATIENT
Start: 2022-05-24 | End: 2023-03-30

## 2022-06-06 ENCOUNTER — LAB (OUTPATIENT)
Dept: LAB | Facility: CLINIC | Age: 72
End: 2022-06-06
Payer: COMMERCIAL

## 2022-06-06 LAB
ANION GAP SERPL CALCULATED.3IONS-SCNC: 13 MMOL/L (ref 5–18)
BUN SERPL-MCNC: 9 MG/DL (ref 8–28)
CALCIUM SERPL-MCNC: 9.4 MG/DL (ref 8.5–10.5)
CHLORIDE BLD-SCNC: 101 MMOL/L (ref 98–107)
CO2 SERPL-SCNC: 24 MMOL/L (ref 22–31)
CREAT SERPL-MCNC: 1.08 MG/DL (ref 0.6–1.1)
GFR SERPL CREATININE-BSD FRML MDRD: 55 ML/MIN/1.73M2
GLUCOSE BLD-MCNC: 221 MG/DL (ref 70–125)
POTASSIUM BLD-SCNC: 4.1 MMOL/L (ref 3.5–5)
SODIUM SERPL-SCNC: 138 MMOL/L (ref 136–145)

## 2022-06-06 PROCEDURE — 80048 BASIC METABOLIC PNL TOTAL CA: CPT

## 2022-06-06 PROCEDURE — 36415 COLL VENOUS BLD VENIPUNCTURE: CPT

## 2022-06-07 ENCOUNTER — MEDICAL CORRESPONDENCE (OUTPATIENT)
Dept: HEALTH INFORMATION MANAGEMENT | Facility: CLINIC | Age: 72
End: 2022-06-07
Payer: COMMERCIAL

## 2022-06-23 DIAGNOSIS — E11.9 TYPE 2 DIABETES MELLITUS TREATED WITHOUT INSULIN (H): ICD-10-CM

## 2022-06-23 DIAGNOSIS — Z76.0 ENCOUNTER FOR MEDICATION REFILL: Primary | ICD-10-CM

## 2022-06-23 RX ORDER — METFORMIN HYDROCHLORIDE 750 MG/1
750 TABLET, EXTENDED RELEASE ORAL
Qty: 90 TABLET | Refills: 3 | Status: SHIPPED | OUTPATIENT
Start: 2022-06-23 | End: 2023-04-11

## 2022-06-23 RX ORDER — MECLIZINE HCL 25MG 25 MG/1
TABLET, CHEWABLE ORAL
Qty: 270 TABLET | Refills: 3 | Status: SHIPPED | OUTPATIENT
Start: 2022-06-23 | End: 2023-01-10

## 2022-07-18 ENCOUNTER — TELEPHONE (OUTPATIENT)
Dept: FAMILY MEDICINE | Facility: CLINIC | Age: 72
End: 2022-07-18

## 2022-07-18 NOTE — TELEPHONE ENCOUNTER
Reason for Call: Request for an order or referral:    Order or referral being requested: HC ORDERS    [facility] ONE EVERY 4 WKS PLUS 3 PRNS X 60 DAYS FOR MTM    Date needed: as soon as possible    Has the patient been seen by the PCP for this problem? YES    Phone number Katelynn can be reached at: [phone]    Best Time:  daytime    Can we leave a detailed message on this number?  YES    Call taken on 7/18/2022 at 4:22 PM by Char Schilling CMA, TC

## 2022-07-19 NOTE — TELEPHONE ENCOUNTER
Spoke to LUCY Pace RN on the phone to relay provider verbal order below.      ROBBIE ShayN, RN  [facility]

## 2022-07-21 ENCOUNTER — MEDICAL CORRESPONDENCE (OUTPATIENT)
Dept: HEALTH INFORMATION MANAGEMENT | Facility: CLINIC | Age: 72
End: 2022-07-21

## 2022-08-09 ENCOUNTER — OFFICE VISIT (OUTPATIENT)
Dept: FAMILY MEDICINE | Facility: CLINIC | Age: 72
End: 2022-08-09
Payer: COMMERCIAL

## 2022-08-09 VITALS
RESPIRATION RATE: 12 BRPM | TEMPERATURE: 98.9 F | SYSTOLIC BLOOD PRESSURE: 138 MMHG | DIASTOLIC BLOOD PRESSURE: 78 MMHG | WEIGHT: 124 LBS | BODY MASS INDEX: 25 KG/M2 | HEIGHT: 59 IN | HEART RATE: 68 BPM | OXYGEN SATURATION: 96 %

## 2022-08-09 DIAGNOSIS — Z76.0 ENCOUNTER FOR MEDICATION REFILL: ICD-10-CM

## 2022-08-09 DIAGNOSIS — Z12.31 VISIT FOR SCREENING MAMMOGRAM: ICD-10-CM

## 2022-08-09 DIAGNOSIS — M25.561 BILATERAL CHRONIC KNEE PAIN: ICD-10-CM

## 2022-08-09 DIAGNOSIS — G89.29 CHRONIC MIDLINE LOW BACK PAIN WITHOUT SCIATICA: ICD-10-CM

## 2022-08-09 DIAGNOSIS — E11.9 TYPE 2 DIABETES MELLITUS TREATED WITHOUT INSULIN (H): Primary | ICD-10-CM

## 2022-08-09 DIAGNOSIS — G89.29 BILATERAL CHRONIC KNEE PAIN: ICD-10-CM

## 2022-08-09 DIAGNOSIS — M54.50 CHRONIC MIDLINE LOW BACK PAIN WITHOUT SCIATICA: ICD-10-CM

## 2022-08-09 DIAGNOSIS — M25.562 BILATERAL CHRONIC KNEE PAIN: ICD-10-CM

## 2022-08-09 DIAGNOSIS — G62.9 NEUROPATHY: ICD-10-CM

## 2022-08-09 DIAGNOSIS — Z23 IMMUNIZATION DUE: ICD-10-CM

## 2022-08-09 DIAGNOSIS — Z91.81 AT HIGH RISK FOR FALLS: ICD-10-CM

## 2022-08-09 LAB
CREAT UR-MCNC: 40.6 MG/DL
HBA1C MFR BLD: 6.5 % (ref 0–5.6)
MICROALBUMIN UR-MCNC: <12 MG/L
MICROALBUMIN/CREAT UR: NORMAL MG/G{CREAT}

## 2022-08-09 PROCEDURE — 99214 OFFICE O/P EST MOD 30 MIN: CPT | Mod: 25 | Performed by: FAMILY MEDICINE

## 2022-08-09 PROCEDURE — 91306 COVID-19,PF,MODERNA (18+ YRS BOOSTER .25ML): CPT | Performed by: FAMILY MEDICINE

## 2022-08-09 PROCEDURE — 36415 COLL VENOUS BLD VENIPUNCTURE: CPT | Performed by: FAMILY MEDICINE

## 2022-08-09 PROCEDURE — 82043 UR ALBUMIN QUANTITATIVE: CPT | Performed by: FAMILY MEDICINE

## 2022-08-09 PROCEDURE — 0064A COVID-19,PF,MODERNA (18+ YRS BOOSTER .25ML): CPT | Performed by: FAMILY MEDICINE

## 2022-08-09 PROCEDURE — 83036 HEMOGLOBIN GLYCOSYLATED A1C: CPT | Performed by: FAMILY MEDICINE

## 2022-08-09 RX ORDER — PREGABALIN 50 MG/1
50 CAPSULE ORAL 2 TIMES DAILY
Qty: 180 CAPSULE | Refills: 0 | Status: SHIPPED | OUTPATIENT
Start: 2022-08-09 | End: 2022-11-03

## 2022-08-09 RX ORDER — GLUCOSAMINE HCL/CHONDROITIN SU 500-400 MG
1 CAPSULE ORAL DAILY
Qty: 200 STRIP | Refills: 11 | Status: SHIPPED | OUTPATIENT
Start: 2022-08-09 | End: 2023-08-10

## 2022-08-09 ASSESSMENT — PATIENT HEALTH QUESTIONNAIRE - PHQ9
10. IF YOU CHECKED OFF ANY PROBLEMS, HOW DIFFICULT HAVE THESE PROBLEMS MADE IT FOR YOU TO DO YOUR WORK, TAKE CARE OF THINGS AT HOME, OR GET ALONG WITH OTHER PEOPLE: NOT DIFFICULT AT ALL
SUM OF ALL RESPONSES TO PHQ QUESTIONS 1-9: 0
SUM OF ALL RESPONSES TO PHQ QUESTIONS 1-9: 0

## 2022-08-09 NOTE — PROGRESS NOTES
Type 2 diabetes mellitus treated without insulin (H)  A1c 6.5 today.  No medication changes.  - Hemoglobin A1c; Future  - Hemoglobin A1c  - Glucose Blood (BLOOD GLUCOSE TEST STRIPS) STRP; 1 each daily  - Albumin Random Urine Quantitative with Creat Ratio; Future  - Albumin Random Urine Quantitative with Creat Ratio      Chronic midline low back pain without sciatica  - Walker Order for DME - ONLY FOR DME  Message sent to staff to help her schedule follow-up appointment at spine clinic.    Immunization due  - COVID-19,PF,MODERNA (18+ YRS BOOSTER .25ML)    Neuropathy  Increased Lyrica to 50 mg from 25 mg twice a day.  - pregabalin (LYRICA) 50 MG capsule; Take 1 capsule (50 mg) by mouth 2 times daily      At high risk for falls  - Walker Order for DME - ONLY FOR DME    Bilateral chronic knee pain  - Walker Order for DME - ONLY FOR DME  Increased Lyrica.  Will consider Ortho referral in the future.    Subjective   Pa is a 72 year old accompanied by her daughter, presenting for the following health issues:  Medication Check and Knee Pain  She forgot to bring her blood sugar log but states her numbers are in low 100s most days.  No medication side effects from metformin.  Back pain is somewhat improving.  She was last seen at spine clinic in January 2022.  Note reviewed.  The plan is to try injection and patient is interested in it.  She does not know how to make follow-up appointment.  Knee pain is bothering her more than her back pain.  She takes her medications and using Voltaren gel.  No swelling in the knees.  Depression symptoms are improving per patient and daughter.  She is not having frequent cries any longer.  Denies suicidal or homicidal ideations.          Review of Systems   CONSTITUTIONAL: NEGATIVE for fever, chills, change in weight  RESP: NEGATIVE for significant cough or SOB  CV: NEGATIVE for chest pain, palpitations or peripheral edema      Objective    /78 (BP Location: Left arm, Patient  "Position: Sitting, Cuff Size: Adult Regular)   Pulse 68   Temp 98.9  F (37.2  C) (Oral)   Resp 12   Ht 1.495 m (4' 10.86\")   Wt 56.2 kg (124 lb)   SpO2 96%   BMI 25.16 kg/m    Body mass index is 25.16 kg/m .  Physical Exam   GENERAL: healthy, alert and no distress  NECK: no adenopathy, no asymmetry, masses, or scars and thyroid normal to palpation  RESP: lungs clear to auscultation - no rales, rhonchi or wheezes  CV: regular rate and rhythm, normal S1 S2, no S3 or S4, no murmur, click or rub, no peripheral edema and peripheral pulses strong  ABDOMEN: soft, nontender, no hepatosplenomegaly, no masses and bowel sounds normal  MS: KNEES-no acute swelling or effusion.  Mild global tenderness both knees.              .  ..  Answers for HPI/ROS submitted by the patient on 8/9/2022  If you checked off any problems, how difficult have these problems made it for you to do your work, take care of things at home, or get along with other people?: Not difficult at all  PHQ9 TOTAL SCORE: 0  Frequency of checking blood sugars:: a few times a week  What time of day are you checking your blood sugars : before meals  Have you had any blood sugars above 200?: No  Have you had any blood sugars below 70?: No  Hypoglycemia symptoms:: other  Diabetic concerns:: none  Paraesthesia present:: burning in feet  What is the reason for your visit today? : DM and med check      "

## 2022-08-10 ENCOUNTER — TELEPHONE (OUTPATIENT)
Dept: FAMILY MEDICINE | Facility: CLINIC | Age: 72
End: 2022-08-10

## 2022-08-10 NOTE — TELEPHONE ENCOUNTER
Scheduled pt with Maureen Palafox at [facility] for 9/1/22.  Pt is aware and mailed out hard copy to pt home yesterday. Thanks            ----- Message from Dedrick Molina sent at 8/9/2022  1:41 PM CDT -----    ----- Message -----  From: Bassam Llanos MD  Sent: 8/9/2022   1:15 PM CDT  To: Dedrick Molina    Can you please help the patient schedule follow-up appointment at the spine clinic?  The last saw her in 1/22.    Thank you,    Dr. Bassam Llanos  8/9/2022 1:15 PM

## 2022-08-17 ENCOUNTER — MEDICAL CORRESPONDENCE (OUTPATIENT)
Dept: FAMILY MEDICINE | Facility: CLINIC | Age: 72
End: 2022-08-17

## 2022-08-31 NOTE — PROGRESS NOTES
Assessment:   Ricky Lea is a 72 year old y.o. female with past medical history significant for hyperlipidemia, hypertension, depression, osteoporosis (on Fosamax), type 2 diabetes mellitus, urinary incontinence who presents today for follow-up regarding chronic bilateral low back pain with radiation into the left greater than right lower extremity with associated numbness, tingling, weakness.  My review of an MRI lumbar spine from April 2021 shows a chronic T11 compression fracture (no new fracture).  There is a disc extrusion at L4-5 causing mild right and slight left lateral recess stenosis and low-grade spinal canal stenosis and foraminal stenosis.  There is also lower lumbar facet arthropathy.  Patient is status post bilateral L4-5 transforaminal epidural steroid injections December 27, 2021 which provided significant relief of her pain but only lasted 1 week.  Patient is describing pain in more of an S1 pattern.  I would like to rule out new disc protrusion at the L5-S1 level which could be contributing to her pain.  She is also symptomatic from lumbar facet arthropathy.  She had tremulous weakness in the left greater than right knee flexors/extensors.  She had some breakaway weakness left ankle dorsiflexors and EHL.     Plan:     A shared decision making plan was used.  The patient's values and choices were respected.  The following represents what was discussed and decided upon by the physician assistant and the patient.  A telephone  was used to visit.    1.  DIAGNOSTIC TESTS: I reviewed the MRI lumbar spine.  I ordered an updated MRI lumbar spine for further evaluation.  She had weakness on exam today and she is describing pain and more of an S1 pattern.    2.  PHYSICAL THERAPY: Patient completed 7 sessions of physical therapy June 11, 2021.  She will continue with home exercises.  No further physical therapy was ordered.    3.  MEDICATIONS: No changes are made to the patient's  medications.  -Patient takes meloxicam 15 mg daily.  -Patient uses duloxetine 60 mg daily.  -Patient takes Lyrica 50 mg twice daily.  This dose was recently increased by her primary care provider.  She is not sure if it is helping.  -Patient takes Tylenol as needed.    4.  INTERVENTIONS: No interventions were ordered today.  We will await the results of her updated MRI lumbar spine.    .    5.  PATIENT EDUCATION: Patient is in agreement the above plan.  All questions were answered.    6.  FOLLOW-UP: Patient is going to follow-up with me in 2 weeks to review the results of her MRI lumbar spine.  If she has questions or concerns in the meantime, she should not hesitate to call.    Subjective:     Ricky Lea is a 72 year old female who presents today for follow-up regarding chronic low back pain with radiation into the left greater than right lower extremity.  I last saw the patient January 2022.  Patient denies any change in her pain since then.  Her primary care provider recommended that she follow-up with us to discuss options.    Patient complains of bilateral low back pain.  Pain spans across low back in a broadband distribution at the belt line.  Pain radiates into the buttocks, down the posterior thighs, into the posterior calves.  Pain is worse in the left leg than the right.  She has a burning discomfort on the plantar aspect of the left foot.  She feels weak in the left leg.  She rates her pain today as a 6 out of 10.  Pain is aggravated with walking and alleviated with rest.  She denies any new symptoms since she was last seen.    Patient completed 7 sessions of physical therapy in June 2021.  She does her home exercises.  Patient takes meloxicam 15 mg daily, duloxetine 60 mg daily, pregabalin 50 mg twice daily.  She also takes Tylenol as needed.  Medications are somewhat helpful.    Review of Systems:  Positive for numbness/tingling, weakness.  Negative for loss of bowel/bladder control, inability to urinate,  headache, pain much worse at night, trip/stumble/falls, difficulty swallowing, difficulty with hand skills, fevers, unintentional weight loss.     Objective:   CONSTITUTIONAL:  Vital signs as above.  No acute distress.  The patient is well nourished and well groomed.    PSYCHIATRIC:  The patient is awake, alert, oriented to person, place and time.  The patient is answering questions appropriately with clear speech.  Normal affect.  HEENT: Normocephalic, atraumatic.  Sclera clear.    SKIN:  Skin over the face, posterior torso, bilateral upper and lower extremities is clean, dry, intact without rashes.  VASCULAR: No significant lower extremity edema.  2/4 bilateral dorsalis pedis pulses.  MUSCULOSKELETAL:  Gait is guarded.  She uses a cane for assistance..  She ambulates with a flexed forward posture at the hips.  Tender to palpation bilateral lower lumbar paraspinous muscles, left greater than right.   The patient has tremulous weakness bilateral knee flexors and extensors.  Breakaway weakness left ankle dorsiflexors and left EHL.  5/5 strength bilateral hip flexors, right ankle dorsiflexors, bilateral plantar flexors.   NEUROLOGICAL: 1-2+ patellar, achilles reflexes which are symmetric bilaterally.  No ankle clonus bilaterally.  Diminished sensation stocking distribution on the left.     RESULTS: I reviewed the MRI lumbar spine from [facility] dated April 1, 2021.  This shows a chronic T11 compression fracture.  At L4-5 there is a right central disc extrusion causing mild right and slight left lateral recess stenosis.  There is low-grade spinal canal stenosis and bilateral foraminal stenosis at this level.  There is mild facet arthropathy L4-5 and moderate right and mild left facet arthropathy L5-S1.    EXAM: XR LUMBAR SPINE 2-3 VIEWS  LOCATION: [facility]  DATE/TIME: 08/22/2021, 10:42 AM     INDICATION: Low back pain.  COMPARISON: MR lumbar spine 04/01/2021 and lumbar spine radiographs  07/28/2020.  TECHNIQUE: CR Lumbar Spine.                                                                      IMPRESSION: Five lumbar-type vertebral bodies. Anatomic alignment. Osteopenia. The known, previously described chronic appearing inferior endplate deformity at T11 is incompletely visualized on this study with perhaps mild superior endplate deformity at   T11. If there is further clinical concern for progression of T11 fracture, would suggest dedicated CT study. Other vertebral bodies are preserved in height. Mild lower lumbar facet arthropathy. Disc space height is maintained. Visualized pelvic ring   intact. There is stable appearing irregularity involving the sacrococcygeal junction.

## 2022-09-01 ENCOUNTER — OFFICE VISIT (OUTPATIENT)
Dept: PHYSICAL MEDICINE AND REHAB | Facility: CLINIC | Age: 72
End: 2022-09-01
Payer: COMMERCIAL

## 2022-09-01 VITALS
HEIGHT: 59 IN | DIASTOLIC BLOOD PRESSURE: 80 MMHG | HEART RATE: 73 BPM | BODY MASS INDEX: 24.65 KG/M2 | WEIGHT: 122.3 LBS | SYSTOLIC BLOOD PRESSURE: 139 MMHG

## 2022-09-01 DIAGNOSIS — M54.16 LUMBAR RADICULITIS: Primary | ICD-10-CM

## 2022-09-01 DIAGNOSIS — M47.816 LUMBAR FACET ARTHROPATHY: ICD-10-CM

## 2022-09-01 PROCEDURE — 99214 OFFICE O/P EST MOD 30 MIN: CPT | Performed by: PHYSICIAN ASSISTANT

## 2022-09-01 ASSESSMENT — PAIN SCALES - GENERAL: PAINLEVEL: SEVERE PAIN (6)

## 2022-09-01 NOTE — PATIENT INSTRUCTIONS
[facility] Scheduling    Please call [phone] to schedule your image(s) (select option#1). There are 2 different locations, see below. You can do walk-in visits for xray only images if you want.     [facility]  [address] [place] [id number]    [address] [id number]

## 2022-09-01 NOTE — LETTER
9/1/2022         RE: Ricky Lea  [address] [place] [address]        Dear Colleague,    Thank you for referring your patient, Ricky Lea, to the [facility]. Please see a copy of my visit note below.    Assessment:   Ricky Lea is a 72 year old y.o. female with past medical history significant for hyperlipidemia, hypertension, depression, osteoporosis (on Fosamax), type 2 diabetes mellitus, urinary incontinence who presents today for follow-up regarding chronic bilateral low back pain with radiation into the left greater than right lower extremity with associated numbness, tingling, weakness.  My review of an MRI lumbar spine from April 2021 shows a chronic T11 compression fracture (no new fracture).  There is a disc extrusion at L4-5 causing mild right and slight left lateral recess stenosis and low-grade spinal canal stenosis and foraminal stenosis.  There is also lower lumbar facet arthropathy.  Patient is status post bilateral L4-5 transforaminal epidural steroid injections December 27, 2021 which provided significant relief of her pain but only lasted 1 week.  Patient is describing pain in more of an S1 pattern.  I would like to rule out new disc protrusion at the L5-S1 level which could be contributing to her pain.  She is also symptomatic from lumbar facet arthropathy.  She had tremulous weakness in the left greater than right knee flexors/extensors.  She had some breakaway weakness left ankle dorsiflexors and EHL.     Plan:     A shared decision making plan was used.  The patient's values and choices were respected.  The following represents what was discussed and decided upon by the physician assistant and the patient.  A telephone  was used to visit.    1.  DIAGNOSTIC TESTS: I reviewed the MRI lumbar spine.  I ordered an updated MRI lumbar spine for further evaluation.  She had weakness on exam today and she is describing pain and more of an S1 pattern.    2.   PHYSICAL THERAPY: Patient completed 7 sessions of physical therapy June 11, 2021.  She will continue with home exercises.  No further physical therapy was ordered.    3.  MEDICATIONS: No changes are made to the patient's medications.  -Patient takes meloxicam 15 mg daily.  -Patient uses duloxetine 60 mg daily.  -Patient takes Lyrica 50 mg twice daily.  This dose was recently increased by her primary care provider.  She is not sure if it is helping.  -Patient takes Tylenol as needed.    4.  INTERVENTIONS: No interventions were ordered today.  We will await the results of her updated MRI lumbar spine.    .    5.  PATIENT EDUCATION: Patient is in agreement the above plan.  All questions were answered.    6.  FOLLOW-UP: Patient is going to follow-up with me in 2 weeks to review the results of her MRI lumbar spine.  If she has questions or concerns in the meantime, she should not hesitate to call.    Subjective:     Ricky Lea is a 72 year old female who presents today for follow-up regarding chronic low back pain with radiation into the left greater than right lower extremity.  I last saw the patient January 2022.  Patient denies any change in her pain since then.  Her primary care provider recommended that she follow-up with us to discuss options.    Patient complains of bilateral low back pain.  Pain spans across low back in a broadband distribution at the belt line.  Pain radiates into the buttocks, down the posterior thighs, into the posterior calves.  Pain is worse in the left leg than the right.  She has a burning discomfort on the plantar aspect of the left foot.  She feels weak in the left leg.  She rates her pain today as a 6 out of 10.  Pain is aggravated with walking and alleviated with rest.  She denies any new symptoms since she was last seen.    Patient completed 7 sessions of physical therapy in June 2021.  She does her home exercises.  Patient takes meloxicam 15 mg daily, duloxetine 60 mg daily, pregabalin  50 mg twice daily.  She also takes Tylenol as needed.  Medications are somewhat helpful.    Review of Systems:  Positive for numbness/tingling, weakness.  Negative for loss of bowel/bladder control, inability to urinate, headache, pain much worse at night, trip/stumble/falls, difficulty swallowing, difficulty with hand skills, fevers, unintentional weight loss.     Objective:   CONSTITUTIONAL:  Vital signs as above.  No acute distress.  The patient is well nourished and well groomed.    PSYCHIATRIC:  The patient is awake, alert, oriented to person, place and time.  The patient is answering questions appropriately with clear speech.  Normal affect.  HEENT: Normocephalic, atraumatic.  Sclera clear.    SKIN:  Skin over the face, posterior torso, bilateral upper and lower extremities is clean, dry, intact without rashes.  VASCULAR: No significant lower extremity edema.  2/4 bilateral dorsalis pedis pulses.  MUSCULOSKELETAL:  Gait is guarded.  She uses a cane for assistance..  She ambulates with a flexed forward posture at the hips.  Tender to palpation bilateral lower lumbar paraspinous muscles, left greater than right.   The patient has tremulous weakness bilateral knee flexors and extensors.  Breakaway weakness left ankle dorsiflexors and left EHL.  5/5 strength bilateral hip flexors, right ankle dorsiflexors, bilateral plantar flexors.   NEUROLOGICAL: 1-2+ patellar, achilles reflexes which are symmetric bilaterally.  No ankle clonus bilaterally.  Diminished sensation stocking distribution on the left.     RESULTS: I reviewed the MRI lumbar spine from [facility] dated April 1, 2021.  This shows a chronic T11 compression fracture.  At L4-5 there is a right central disc extrusion causing mild right and slight left lateral recess stenosis.  There is low-grade spinal canal stenosis and bilateral foraminal stenosis at this level.  There is mild facet arthropathy L4-5 and moderate right and mild left facet arthropathy  L5-S1.    EXAM: XR LUMBAR SPINE 2-3 VIEWS  LOCATION: [facility]  DATE/TIME: 08/22/2021, 10:42 AM     INDICATION: Low back pain.  COMPARISON: MR lumbar spine 04/01/2021 and lumbar spine radiographs 07/28/2020.  TECHNIQUE: CR Lumbar Spine.                                                                      IMPRESSION: Five lumbar-type vertebral bodies. Anatomic alignment. Osteopenia. The known, previously described chronic appearing inferior endplate deformity at T11 is incompletely visualized on this study with perhaps mild superior endplate deformity at   T11. If there is further clinical concern for progression of T11 fracture, would suggest dedicated CT study. Other vertebral bodies are preserved in height. Mild lower lumbar facet arthropathy. Disc space height is maintained. Visualized pelvic ring   intact. There is stable appearing irregularity involving the sacrococcygeal junction.             Again, thank you for allowing me to participate in the care of your patient.        Sincerely,        Maureen Palafox PA-C

## 2022-09-13 ENCOUNTER — TELEPHONE (OUTPATIENT)
Dept: NURSING | Facility: CLINIC | Age: 72
End: 2022-09-13

## 2022-09-13 NOTE — PROGRESS NOTES
This patient had a visit scheduled today to review her MRI lumbar spine.  Unfortunately, she was not able to schedule the MRI lumbar spine.  She was given the phone number to schedule.  Her daughter speaks English and can assist with scheduling.  She will reschedule with me for 2 weeks.  We will review her MRI lumbar spine done.  No charge for today's visit.

## 2022-09-13 NOTE — TELEPHONE ENCOUNTER
Home Health orders  - [facility] RNJenni calling for;    Orders for medication management 1 time every 4 weeks with 3 PRN visits.    Advised Home Care RN to fax orders for PCP to sign.    Zelda Benitez RN, Nurse Advisor 4:43 PM 9/13/2022

## 2022-09-15 ENCOUNTER — OFFICE VISIT (OUTPATIENT)
Dept: PHYSICAL MEDICINE AND REHAB | Facility: CLINIC | Age: 72
End: 2022-09-15
Payer: COMMERCIAL

## 2022-09-15 VITALS
BODY MASS INDEX: 24.6 KG/M2 | DIASTOLIC BLOOD PRESSURE: 79 MMHG | HEART RATE: 77 BPM | SYSTOLIC BLOOD PRESSURE: 138 MMHG | WEIGHT: 122 LBS | HEIGHT: 59 IN

## 2022-09-15 DIAGNOSIS — M54.16 LUMBAR RADICULITIS: Primary | ICD-10-CM

## 2022-09-15 PROCEDURE — 99207 PR NON-BILLABLE SERV PER CHARTING: CPT | Performed by: PHYSICIAN ASSISTANT

## 2022-09-15 ASSESSMENT — PAIN SCALES - GENERAL: PAINLEVEL: SEVERE PAIN (6)

## 2022-09-15 NOTE — LETTER
9/15/2022         RE: Ricky Lea  [address] [place] [address]        Dear Colleague,    Thank you for referring your patient, Ricky Lea, to the [facility] SPINE [facility] NEUROSURGERY. Please see a copy of my visit note below.    This patient had a visit scheduled today to review her MRI lumbar spine.  Unfortunately, she was not able to schedule the MRI lumbar spine.  She was given the phone number to schedule.  Her daughter speaks English and can assist with scheduling.  She will reschedule with me for 2 weeks.  We will review her MRI lumbar spine done.  No charge for today's visit.          Again, thank you for allowing me to participate in the care of your patient.        Sincerely,        Maureen Palafox PA-C

## 2022-09-15 NOTE — PATIENT INSTRUCTIONS
[facility] Scheduling    Please call [phone] to schedule your image(s).

## 2022-09-19 ENCOUNTER — MEDICAL CORRESPONDENCE (OUTPATIENT)
Dept: HEALTH INFORMATION MANAGEMENT | Facility: CLINIC | Age: 72
End: 2022-09-19

## 2022-09-29 NOTE — PROGRESS NOTES
Assessment:   Ricky Lea is a 72 year old y.o. female with past medical history significant for hyperlipidemia, hypertension, depression, osteoporosis (on Fosamax), type 2 diabetes mellitus, urinary incontinence who presents today for follow-up regarding chronic bilateral low back pain.  Patient previously complained of pain radiating from the low back down the legs but she states that pain has resolved.  She complains of left knee pain today but states that feels like a separate pain from her lower back.  My review of an MRI lumbar spine does not show any overall change compared with her MRI lumbar spine from April 2021.  There are small posterior disc bulges L2-3, L3-4, and L4-5 but no significant spinal canal or neuroforaminal stenosis at any level.  Patient is status post bilateral L4-5 transforaminal epidural steroid injections December 27, 2021 which provided significant relief of her pain but only lasted 1 week.  Question if her pain may be related to lumbar facet syndrome.  She describes today mechanical type back pain.     Plan:     A shared decision making plan was used.  The patient's values and choices were respected.  The following represents what was discussed and decided upon by the physician assistant and the patient.  A telephone  was used to visit.    1.  DIAGNOSTIC TESTS: I reviewed the MRI lumbar spine.  No further diagnostic tests were ordered.    2.  PHYSICAL THERAPY: Patient completed 7 sessions of physical therapy June 11, 2021.  She will continue with home exercises.  No further physical therapy was ordered.    3.  MEDICATIONS: No changes are made to the patient's medications.  Patient did not bring her medications into today's visit and she does not know what medications she takes for pain other than Tylenol which she takes every 6 hours as needed.  -Also on her medication list is pregabalin 50 mg twice daily, Voltaren gel, duloxetine 60 mg daily, meloxicam 15 mg daily.    4.   INTERVENTIONS:  - I offer the patient bilateral L3, L4, L5 medial branch blocks to determine if pain is facet mediated.  She does describe mechanical type back pain today and she was no longer experiencing lower extremity radicular pain.  Patient indicated she would like to proceed and an order was placed.  If she has a positive sponsor medial branch blocks x2, I recommend radiofrequency ablation.    5.  PATIENT EDUCATION: Patient is in agreement the above plan.  All questions were answered.    6.  FOLLOW-UP: Patient return to the clinic for her bilateral L3, L4, L5 medial branch blocks.  If she has questions or concerns in the meantime, she should not hesitate to call.    Subjective:     Ricky Lea is a 72 year old female who presents today for follow-up regarding chronic low back pain.  Patient previously complained of pain radiating into the bilateral lower extremities but she states that pain has resolved.  She does complain of left anterior knee pain today but she states that feels separate from her back pain.  Back pain is worse than her knee pain.  I last saw the patient September 1, 2022.  She returns today to review her updated MRI lumbar spine.    Patient complains of bilateral low back pain.  Pain is located the lower lumbar region.  She denies any pain radiating from the back down the legs.  She has left anterior knee pain that she states that feels separate.  Back pain is worse than the leg pain.  She rates her pain today as an 8 out of 10.  Back pain is worse with bending and alleviated with rest.  She feels generally weak.  Denies numbness or tingling.    Patient completed 7 sessions of physical therapy in June 2021.  She does her home exercises.  Patient states that she takes Tylenol 500 mg every 6 hours as needed.  She is not sure what else she is taking for pain.  She does not know if the medications are helpful.    Review of Systems:  Positive for weakness.  Negative for numbness/tingling, loss of  bowel/bladder control, inability to urinate, headache, pain much worse at night, trip/stumble/falls, difficulty swallowing, difficulty with hand skills, fevers, unintentional weight loss.     Objective:   CONSTITUTIONAL:  Vital signs as above.  No acute distress.  The patient is well nourished and well groomed.    PSYCHIATRIC:  The patient is awake, alert, oriented to person, place and time.  The patient is answering questions appropriately with clear speech.  Normal affect.  HEENT: Normocephalic, atraumatic.  Sclera clear.    SKIN:  Skin over the face, posterior torso, bilateral upper and lower extremities is clean, dry, intact without rashes.  VASCULAR: No significant lower extremity edema.  2/4 bilateral dorsalis pedis pulses.  MUSCULOSKELETAL:  Gait is guarded.  She uses a cane for assistance..  She ambulates with a flexed forward posture at the hips.  Tender to palpation bilateral lower lumbar paraspinous muscles.  Lumbar flexion restricted due to increased pain.  Lumbar extension restricted due to increased pain.  The patient has tremulous weakness bilateral knee flexors and extensors.  Breakaway weakness left ankle dorsiflexors and left EHL.  5/5 strength bilateral hip flexors, right ankle dorsiflexors, bilateral plantar flexors.   NEUROLOGICAL:  No ankle clonus bilaterally.  Diminished sensation stocking distribution on the left.     RESULTS: I reviewed the MRI lumbar spine from [facility] dated October 1, 2022.  This shows degenerative disc changes with posterior disc bulges at L2-3, L3-4, and L4-5.  There is no significant spinal canal or neuroforaminal stenosis at any level.  There is no significant change compared with her MRI from April 2021.  There is a chronic T11 compression fracture.  No acute fracture.

## 2022-10-01 ENCOUNTER — HOSPITAL ENCOUNTER (OUTPATIENT)
Dept: MRI IMAGING | Facility: HOSPITAL | Age: 72
Discharge: HOME | End: 2022-10-01
Attending: PHYSICIAN ASSISTANT | Admitting: PHYSICIAN ASSISTANT
Payer: COMMERCIAL

## 2022-10-01 DIAGNOSIS — M54.16 LUMBAR RADICULITIS: ICD-10-CM

## 2022-10-01 PROCEDURE — 72148 MRI LUMBAR SPINE W/O DYE: CPT

## 2022-10-03 ENCOUNTER — TELEPHONE (OUTPATIENT)
Dept: FAMILY MEDICINE | Facility: CLINIC | Age: 72
End: 2022-10-03

## 2022-10-03 ENCOUNTER — TELEPHONE (OUTPATIENT)
Dept: NURSING | Facility: CLINIC | Age: 72
End: 2022-10-03

## 2022-10-03 ENCOUNTER — OFFICE VISIT (OUTPATIENT)
Dept: PHYSICAL MEDICINE AND REHAB | Facility: CLINIC | Age: 72
End: 2022-10-03
Payer: COMMERCIAL

## 2022-10-03 VITALS
WEIGHT: 122 LBS | HEIGHT: 59 IN | DIASTOLIC BLOOD PRESSURE: 80 MMHG | HEART RATE: 85 BPM | SYSTOLIC BLOOD PRESSURE: 139 MMHG | BODY MASS INDEX: 24.6 KG/M2

## 2022-10-03 DIAGNOSIS — M47.816 LUMBAR FACET JOINT SYNDROME: Primary | ICD-10-CM

## 2022-10-03 PROCEDURE — 99214 OFFICE O/P EST MOD 30 MIN: CPT | Performed by: PHYSICIAN ASSISTANT

## 2022-10-03 ASSESSMENT — PAIN SCALES - GENERAL: PAINLEVEL: EXTREME PAIN (8)

## 2022-10-03 NOTE — TELEPHONE ENCOUNTER
Patient being d'cd from [facility] services.  Last visit 9/30/2022.  Patient being transferred to another  - [facility] for continuity of care for medications management.     Will route encounter to Dr. Llanos for FYI only.    ROBBIE ShayN, RN  [facility]

## 2022-10-03 NOTE — PATIENT INSTRUCTIONS
Medial Branch Block (MBB) TEST    This is a TEST injection to find out what is causing your pain.  Specifically, this test is trying to identify whichjoint in your back or neck is causing pain.   This injection will NOT provide any long term pain relief because it is just a TEST.  Steroid is NOT used for this injection.   Steroid is what gives longterm pain relief.  Since there is no steroid used, there is no long term pain relief.    You needed a  for the procedure.    Because we want to determine what is causing your pain, we need you to do a few things on the day of your Medial Branch Block :     Do not take any pain medications on the day of your Medial Branch Block/Test.  Try to do activities that make our pain increase.  We want you tohave a high level of pain on the day of the test.  This makes it easier to know the results of the test.  Other information:    You may eatand drink on the day of the test.  You should take your other medications (just not pain medications) at the times you usually take them  You will be asked to fill out a pain diary for 6 hours after thetest.    AFTER THE TEST:    Please do not take any pain medications for 6 hours after the TEST.  After the pain diary is filled out, youmay resume taking your pain medications.  Please return the pain diary to the Spine Center the next day or as soon as you are able.  You will be contacted with what will happen next after the physicianreviews your pain diary.  You may be asked to come in for a follow-up appointment to discuss other treatment options  It may be recommended that you have an injection to help treatyour pain.  You may need to come in for a second set of Medial Branch Blocks (TESTS).        Please call the spine center at [phone] if you have any questions.

## 2022-10-03 NOTE — LETTER
10/3/2022         RE: Ricky Lea  [address] [place] [address]        Dear Colleague,    Thank you for referring your patient, Ricky Lea, to the [facility]UR[facility]ERY. Please see a copy of my visit note below.    Assessment:   Ricky Lea is a 72 year old y.o. female with past medical history significant for hyperlipidemia, hypertension, depression, osteoporosis (on Fosamax), type 2 diabetes mellitus, urinary incontinence who presents today for follow-up regarding chronic bilateral low back pain.  Patient previously complained of pain radiating from the low back down the legs but she states that pain has resolved.  She complains of left knee pain today but states that feels like a separate pain from her lower back.  My review of an MRI lumbar spine does not show any overall change compared with her MRI lumbar spine from April 2021.  There are small posterior disc bulges L2-3, L3-4, and L4-5 but no significant spinal canal or neuroforaminal stenosis at any level.  Patient is status post bilateral L4-5 transforaminal epidural steroid injections December 27, 2021 which provided significant relief of her pain but only lasted 1 week.  Question if her pain may be related to lumbar facet syndrome.  She describes today mechanical type back pain.     Plan:     A shared decision making plan was used.  The patient's values and choices were respected.  The following represents what was discussed and decided upon by the physician assistant and the patient.  A telephone  was used to visit.    1.  DIAGNOSTIC TESTS: I reviewed the MRI lumbar spine.  No further diagnostic tests were ordered.    2.  PHYSICAL THERAPY: Patient completed 7 sessions of physical therapy June 11, 2021.  She will continue with home exercises.  No further physical therapy was ordered.    3.  MEDICATIONS: No changes are made to the patient's medications.  Patient did not bring her medications into today's visit and she does not  know what medications she takes for pain other than Tylenol which she takes every 6 hours as needed.  -Also on her medication list is pregabalin 50 mg twice daily, Voltaren gel, duloxetine 60 mg daily, meloxicam 15 mg daily.    4.  INTERVENTIONS:  - I offer the patient bilateral L3, L4, L5 medial branch blocks to determine if pain is facet mediated.  She does describe mechanical type back pain today and she was no longer experiencing lower extremity radicular pain.  Patient indicated she would like to proceed and an order was placed.  If she has a positive sponsor medial branch blocks x2, I recommend radiofrequency ablation.    5.  PATIENT EDUCATION: Patient is in agreement the above plan.  All questions were answered.    6.  FOLLOW-UP: Patient return to the clinic for her bilateral L3, L4, L5 medial branch blocks.  If she has questions or concerns in the meantime, she should not hesitate to call.    Subjective:     Ricky Lea is a 72 year old female who presents today for follow-up regarding chronic low back pain.  Patient previously complained of pain radiating into the bilateral lower extremities but she states that pain has resolved.  She does complain of left anterior knee pain today but she states that feels separate from her back pain.  Back pain is worse than her knee pain.  I last saw the patient September 1, 2022.  She returns today to review her updated MRI lumbar spine.    Patient complains of bilateral low back pain.  Pain is located the lower lumbar region.  She denies any pain radiating from the back down the legs.  She has left anterior knee pain that she states that feels separate.  Back pain is worse than the leg pain.  She rates her pain today as an 8 out of 10.  Back pain is worse with bending and alleviated with rest.  She feels generally weak.  Denies numbness or tingling.    Patient completed 7 sessions of physical therapy in June 2021.  She does her home exercises.  Patient states that she takes  Tylenol 500 mg every 6 hours as needed.  She is not sure what else she is taking for pain.  She does not know if the medications are helpful.    Review of Systems:  Positive for weakness.  Negative for numbness/tingling, loss of bowel/bladder control, inability to urinate, headache, pain much worse at night, trip/stumble/falls, difficulty swallowing, difficulty with hand skills, fevers, unintentional weight loss.     Objective:   CONSTITUTIONAL:  Vital signs as above.  No acute distress.  The patient is well nourished and well groomed.    PSYCHIATRIC:  The patient is awake, alert, oriented to person, place and time.  The patient is answering questions appropriately with clear speech.  Normal affect.  HEENT: Normocephalic, atraumatic.  Sclera clear.    SKIN:  Skin over the face, posterior torso, bilateral upper and lower extremities is clean, dry, intact without rashes.  VASCULAR: No significant lower extremity edema.  2/4 bilateral dorsalis pedis pulses.  MUSCULOSKELETAL:  Gait is guarded.  She uses a cane for assistance..  She ambulates with a flexed forward posture at the hips.  Tender to palpation bilateral lower lumbar paraspinous muscles.  Lumbar flexion restricted due to increased pain.  Lumbar extension restricted due to increased pain.  The patient has tremulous weakness bilateral knee flexors and extensors.  Breakaway weakness left ankle dorsiflexors and left EHL.  5/5 strength bilateral hip flexors, right ankle dorsiflexors, bilateral plantar flexors.   NEUROLOGICAL:  No ankle clonus bilaterally.  Diminished sensation stocking distribution on the left.     RESULTS: I reviewed the MRI lumbar spine from [facility] dated October 1, 2022.  This shows degenerative disc changes with posterior disc bulges at L2-3, L3-4, and L4-5.  There is no significant spinal canal or neuroforaminal stenosis at any level.  There is no significant change compared with her MRI from April 2021.  There is a chronic T11 compression  fracture.  No acute fracture.             Again, thank you for allowing me to participate in the care of your patient.        Sincerely,        Maureen Palafox PA-C

## 2022-10-03 NOTE — TELEPHONE ENCOUNTER
Oralia RN from [facility] calling to request   V.O. SN soc for 10/5/22    Phone: [phone]      Serene Clements RN on 10/3/2022 at 2:47 PM

## 2022-10-04 ENCOUNTER — MEDICAL CORRESPONDENCE (OUTPATIENT)
Dept: HEALTH INFORMATION MANAGEMENT | Facility: CLINIC | Age: 72
End: 2022-10-04

## 2022-10-05 ENCOUNTER — TELEPHONE (OUTPATIENT)
Dept: NURSING | Facility: CLINIC | Age: 72
End: 2022-10-05

## 2022-10-06 ENCOUNTER — TELEPHONE (OUTPATIENT)
Dept: FAMILY MEDICINE | Facility: CLINIC | Age: 72
End: 2022-10-06

## 2022-10-06 NOTE — TELEPHONE ENCOUNTER
Called received from [facility] check on status for verbal order for SNV.  Chart reviewed, covering provider Dr. Jerome authorizes the verbal order dated 10/5/2022 @ 4:35pm.  No further questions.    CHRIS Shay, RN  [facility]

## 2022-10-11 ENCOUNTER — MEDICAL CORRESPONDENCE (OUTPATIENT)
Dept: HEALTH INFORMATION MANAGEMENT | Facility: CLINIC | Age: 72
End: 2022-10-11

## 2022-10-13 ENCOUNTER — MEDICAL CORRESPONDENCE (OUTPATIENT)
Dept: HEALTH INFORMATION MANAGEMENT | Facility: CLINIC | Age: 72
End: 2022-10-13

## 2022-10-14 DIAGNOSIS — Z76.0 ENCOUNTER FOR MEDICATION REFILL: Primary | ICD-10-CM

## 2022-10-14 DIAGNOSIS — E78.5 HYPERLIPIDEMIA, UNSPECIFIED HYPERLIPIDEMIA TYPE: ICD-10-CM

## 2022-10-14 RX ORDER — ATORVASTATIN CALCIUM 20 MG/1
TABLET, FILM COATED ORAL
Qty: 90 TABLET | Refills: 3 | Status: SHIPPED | OUTPATIENT
Start: 2022-10-14 | End: 2023-10-02

## 2022-10-18 ENCOUNTER — RADIOLOGY INJECTION OFFICE VISIT (OUTPATIENT)
Dept: PHYSICAL MEDICINE AND REHAB | Facility: CLINIC | Age: 72
End: 2022-10-18
Attending: PHYSICIAN ASSISTANT
Payer: COMMERCIAL

## 2022-10-18 VITALS
OXYGEN SATURATION: 97 % | TEMPERATURE: 98.3 F | HEART RATE: 64 BPM | SYSTOLIC BLOOD PRESSURE: 142 MMHG | DIASTOLIC BLOOD PRESSURE: 76 MMHG

## 2022-10-18 DIAGNOSIS — M47.816 LUMBAR FACET JOINT SYNDROME: ICD-10-CM

## 2022-10-18 PROCEDURE — A9576 INJ PROHANCE MULTIPACK: HCPCS | Performed by: PHYSICAL MEDICINE & REHABILITATION

## 2022-10-18 PROCEDURE — 64494 INJ PARAVERT F JNT L/S 2 LEV: CPT | Mod: 50 | Performed by: PHYSICAL MEDICINE & REHABILITATION

## 2022-10-18 PROCEDURE — 64493 INJ PARAVERT F JNT L/S 1 LEV: CPT | Mod: 50 | Performed by: PHYSICAL MEDICINE & REHABILITATION

## 2022-10-18 RX ORDER — BUPIVACAINE HYDROCHLORIDE 7.5 MG/ML
INJECTION, SOLUTION EPIDURAL; RETROBULBAR
Status: COMPLETED | OUTPATIENT
Start: 2022-10-18 | End: 2022-10-18

## 2022-10-18 RX ADMIN — BUPIVACAINE HYDROCHLORIDE 3 ML: 7.5 INJECTION, SOLUTION EPIDURAL; RETROBULBAR at 11:42

## 2022-10-18 ASSESSMENT — PAIN SCALES - GENERAL
PAINLEVEL: MODERATE PAIN (5)
PAINLEVEL: MILD PAIN (3)

## 2022-10-18 NOTE — PATIENT INSTRUCTIONS
Please complete your pain diary and return the diary to the Spine Center at your earliest convenience.  The Spine Center will contact you to schedule your next appointment after your pain diary is reviewed by your doctor.  Thank you.  DISCHARGE INSTRUCTIONS    During office hours (8:00 a.m.- 4:30 p.m.) questions or concerns may be answered  by calling  [phone] or spine navigation [phone]. If you experience any problems after hours  please call [phone] and you will be connected to [facility]     All Patients:  You may experience an increase in your symptoms for the first 2 days, once the numbing medication wears off.    You may resume your regular medication, no pain medication until you have completed your diary    You may shower. No swimming, tub bath or hot tub for 24 hours; remove bandage after 4 hours    Continue your activities that can cause you pain to test the blocks.                         You should not drive for the next 3 to 5 hours (have someone drive you)           POSSIBLE PROCEDURE SIDE EFFECTS  -Call Spine Center if concerned-    Increased Pain  Increased numbness/tingling     Nausea/Vomiting  Bruising/bleeding at site (hematoma)             Swelling at site (edema) Headache  Difficulty walking  Infection        Fever greater than 100.5

## 2022-10-20 NOTE — PROGRESS NOTES
Assessment:   Ricky Lea is a 72 year old y.o. female with past medical history significant for hyperlipidemia, hypertension, depression, osteoporosis (on Fosamax), type 2 diabetes mellitus, urinary incontinence who presents today for follow-up regarding chronic bilateral low back pain.  Patient previously complained of pain radiating from the low back down the legs but she states that pain has resolved.  She complains of left knee pain but states that feels like a separate pain from her lower back.  My review of an MRI lumbar spine does not show any overall change compared with her MRI lumbar spine from April 2021.  There are small posterior disc bulges L2-3, L3-4, and L4-5 but no significant spinal canal or neuroforaminal stenosis at any level.   - Patient is status post bilateral L4-5 transforaminal epidural steroid injections December 27, 2021 which provided significant relief of her pain but only lasted 1 week.    -She then failed bilateral L3, L4, L5 medial branch blocks October 18, 2022.  - On exam today she actually had more significant tenderness over the L3-4 facet joints.       Plan:     A shared decision making plan was used.  The patient's values and choices were respected.  The following represents what was discussed and decided upon by the physician assistant and the patient.  A telephone  was used to visit.    1.  DIAGNOSTIC TESTS: I reviewed the MRI lumbar spine.  No further diagnostic tests were ordered.    2.  PHYSICAL THERAPY: Patient completed 7 sessions of physical therapy June 11, 2021.  She will continue with home exercises.  No further physical therapy was ordered.    3.  MEDICATIONS: No changes are made to the patient's medications.  Patient did not bring her medications into today's visit and she does not know what medications she takes for pain other than Tylenol which she takes every 6 hours as needed.  -Also on her medication list is pregabalin 50 mg twice daily, Voltaren gel,  duloxetine 60 mg daily, meloxicam 15 mg daily.    4.  INTERVENTIONS: I offer the patient bilateral L2, L3 medial branch blocks as a work-up for radiofrequency ablation.  I explained how this would be different than the bilateral L3, L4, L5 medial branch blocks.  Hopefully they are more effective.  Patient indicated she would like to proceed and an order was placed.    5.  PATIENT EDUCATION: Patient is in agreement the above plan.  All questions were answered.    6.  FOLLOW-UP: Patient will return to the clinic for bilateral L2, L3 medial branch blocks.  If she has questions or concerns in the meantime, she should not hesitate to call.    Subjective:     Ricky Lea is a 72 year old female who presents today for follow-up regarding chronic low back pain.  Patient is following up after she failed bilateral L3, 4, 5 medial branch blocks October 18, 2022.    Patient complains of bilateral low back pain.  Patient reports that the pain feels like it is coming from the bones in her lower back.  She denies any pain radiating from the lower back into the buttocks or down the legs.  She does have chronic left knee pain but states that that is a separate pain from her back.  She is unable to identify aggravating factors to the pain.  Pain is alleviated with rest.  She denies numbness, tingling, weakness down the legs.  Denies loss of bowel or bladder control.  Denies recent fevers.    Patient completed 7 sessions of physical therapy in June 2021.  She does her home exercises.  Patient states that she takes Tylenol 500 mg every 6 hours as needed.  She is not sure what else she is taking for pain.  Tylenol is somewhat helpful.    Review of Systems:   Negative for numbness/tingling, weakness, loss of bowel/bladder control, inability to urinate, headache, pain much worse at night, trip/stumble/falls, difficulty swallowing, difficulty with hand skills, fevers, unintentional weight loss.     Objective:   CONSTITUTIONAL:  Vital signs as  above.  No acute distress.  The patient is well nourished and well groomed.    PSYCHIATRIC:  The patient is awake, alert, oriented to person, place and time.  The patient is answering questions appropriately with clear speech.  Normal affect.  HEENT: Normocephalic, atraumatic.  Sclera clear.    SKIN:  Skin over the face, posterior torso, bilateral upper and lower extremities is clean, dry, intact without rashes.  VASCULAR: No significant lower extremity edema.  2/4 bilateral dorsalis pedis pulses.  MUSCULOSKELETAL:  Gait is guarded.    She ambulates with a flexed forward posture at the hips.  Tender to palpation bilateral lower lumbar paraspinous muscles.  Lumbar flexion restricted due to increased pain.  Lumbar extension restricted due to increased pain.  The patient has tremulous weakness bilateral knee flexors and extensors.  Breakaway weakness left ankle dorsiflexors and left EHL.  5/5 strength bilateral hip flexors, right ankle dorsiflexors, bilateral plantar flexors.   NEUROLOGICAL: 2+ bilateral patellar and 1+ bilateral Achilles reflexes.  Negative Babinski's.  No ankle clonus bilaterally.  Sensation light touch intact bilateral lower extremities throughout.     RESULTS: I reviewed the MRI lumbar spine from [facility] dated October 1, 2022.  This shows degenerative disc changes with posterior disc bulges at L2-3, L3-4, and L4-5.  There is no significant spinal canal or neuroforaminal stenosis at any level.  There is no significant change compared with her MRI from April 2021.  There is a chronic T11 compression fracture.  No acute fracture.

## 2022-10-24 ENCOUNTER — OFFICE VISIT (OUTPATIENT)
Dept: PHYSICAL MEDICINE AND REHAB | Facility: CLINIC | Age: 72
End: 2022-10-24
Payer: COMMERCIAL

## 2022-10-24 VITALS
HEIGHT: 59 IN | DIASTOLIC BLOOD PRESSURE: 62 MMHG | HEART RATE: 88 BPM | SYSTOLIC BLOOD PRESSURE: 106 MMHG | BODY MASS INDEX: 24.6 KG/M2 | WEIGHT: 122 LBS

## 2022-10-24 DIAGNOSIS — M47.816 LUMBAR FACET JOINT SYNDROME: Primary | ICD-10-CM

## 2022-10-24 PROCEDURE — 99214 OFFICE O/P EST MOD 30 MIN: CPT | Performed by: PHYSICIAN ASSISTANT

## 2022-10-24 ASSESSMENT — PAIN SCALES - GENERAL: PAINLEVEL: SEVERE PAIN (6)

## 2022-10-24 NOTE — PATIENT INSTRUCTIONS
Medial Branch Block (MBB) TEST    This is a TEST injection to find out what is causing your pain.  Specifically, this test is trying to identify whichjoint in your back or neck is causing pain.   This injection will NOT provide any long term pain relief because it is just a TEST.  Steroid is NOT used for this injection.   Steroid is what gives longterm pain relief.  Since there is no steroid used, there is no long term pain relief.    You needed a  for the procedure.    Because we want to determine what is causing your pain, we need you to do a few things on the day of your Medial Branch Block :     Do not take any pain medications on the day of your Medial Branch Block/Test.  Try to do activities that make our pain increase.  We want you tohave a high level of pain on the day of the test.  This makes it easier to know the results of the test.  Other information:    You may eatand drink on the day of the test.  You should take your other medications (just not pain medications) at the times you usually take them  You will be asked to fill out a pain diary for 6 hours after thetest.    AFTER THE TEST:    Please do not take any pain medications for 6 hours after the TEST.  After the pain diary is filled out, youmay resume taking your pain medications.  Please return the pain diary to the Spine Center the next day or as soon as you are able.  You will be contacted with what will happen next after the physicianreviews your pain diary.  You may be asked to come in for a follow-up appointment to discuss other treatment options  It may be recommended that you have an injection to help treatyour pain.  You may need to come in for a second set of Medial Branch Blocks (TESTS).        Please call the spine center at [phone] if you have any questions.

## 2022-10-24 NOTE — LETTER
10/24/2022         RE: Ricky Lea  [address] [place] [address]        Dear Colleague,    Thank you for referring your patient, Ricky Lea, to the [facility]UROS[facility]GERY. Please see a copy of my visit note below.    Assessment:   Ricky Lea is a 72 year old y.o. female with past medical history significant for hyperlipidemia, hypertension, depression, osteoporosis (on Fosamax), type 2 diabetes mellitus, urinary incontinence who presents today for follow-up regarding chronic bilateral low back pain.  Patient previously complained of pain radiating from the low back down the legs but she states that pain has resolved.  She complains of left knee pain but states that feels like a separate pain from her lower back.  My review of an MRI lumbar spine does not show any overall change compared with her MRI lumbar spine from April 2021.  There are small posterior disc bulges L2-3, L3-4, and L4-5 but no significant spinal canal or neuroforaminal stenosis at any level.   - Patient is status post bilateral L4-5 transforaminal epidural steroid injections December 27, 2021 which provided significant relief of her pain but only lasted 1 week.    -She then failed bilateral L3, L4, L5 medial branch blocks October 18, 2022.  - On exam today she actually had more significant tenderness over the L3-4 facet joints.       Plan:     A shared decision making plan was used.  The patient's values and choices were respected.  The following represents what was discussed and decided upon by the physician assistant and the patient.  A telephone  was used to visit.    1.  DIAGNOSTIC TESTS: I reviewed the MRI lumbar spine.  No further diagnostic tests were ordered.    2.  PHYSICAL THERAPY: Patient completed 7 sessions of physical therapy June 11, 2021.  She will continue with home exercises.  No further physical therapy was ordered.    3.  MEDICATIONS: No changes are made to the patient's medications.  Patient did not  bring her medications into today's visit and she does not know what medications she takes for pain other than Tylenol which she takes every 6 hours as needed.  -Also on her medication list is pregabalin 50 mg twice daily, Voltaren gel, duloxetine 60 mg daily, meloxicam 15 mg daily.    4.  INTERVENTIONS: I offer the patient bilateral L2, L3 medial branch blocks as a work-up for radiofrequency ablation.  I explained how this would be different than the bilateral L3, L4, L5 medial branch blocks.  Hopefully they are more effective.  Patient indicated she would like to proceed and an order was placed.    5.  PATIENT EDUCATION: Patient is in agreement the above plan.  All questions were answered.    6.  FOLLOW-UP: Patient will return to the clinic for bilateral L2, L3 medial branch blocks.  If she has questions or concerns in the meantime, she should not hesitate to call.    Subjective:     Ricky Lea is a 72 year old female who presents today for follow-up regarding chronic low back pain.  Patient is following up after she failed bilateral L3, 4, 5 medial branch blocks October 18, 2022.    Patient complains of bilateral low back pain.  Patient reports that the pain feels like it is coming from the bones in her lower back.  She denies any pain radiating from the lower back into the buttocks or down the legs.  She does have chronic left knee pain but states that that is a separate pain from her back.  She is unable to identify aggravating factors to the pain.  Pain is alleviated with rest.  She denies numbness, tingling, weakness down the legs.  Denies loss of bowel or bladder control.  Denies recent fevers.    Patient completed 7 sessions of physical therapy in June 2021.  She does her home exercises.  Patient states that she takes Tylenol 500 mg every 6 hours as needed.  She is not sure what else she is taking for pain.  Tylenol is somewhat helpful.    Review of Systems:   Negative for numbness/tingling, weakness, loss of  bowel/bladder control, inability to urinate, headache, pain much worse at night, trip/stumble/falls, difficulty swallowing, difficulty with hand skills, fevers, unintentional weight loss.     Objective:   CONSTITUTIONAL:  Vital signs as above.  No acute distress.  The patient is well nourished and well groomed.    PSYCHIATRIC:  The patient is awake, alert, oriented to person, place and time.  The patient is answering questions appropriately with clear speech.  Normal affect.  HEENT: Normocephalic, atraumatic.  Sclera clear.    SKIN:  Skin over the face, posterior torso, bilateral upper and lower extremities is clean, dry, intact without rashes.  VASCULAR: No significant lower extremity edema.  2/4 bilateral dorsalis pedis pulses.  MUSCULOSKELETAL:  Gait is guarded.    She ambulates with a flexed forward posture at the hips.  Tender to palpation bilateral lower lumbar paraspinous muscles.  Lumbar flexion restricted due to increased pain.  Lumbar extension restricted due to increased pain.  The patient has tremulous weakness bilateral knee flexors and extensors.  Breakaway weakness left ankle dorsiflexors and left EHL.  5/5 strength bilateral hip flexors, right ankle dorsiflexors, bilateral plantar flexors.   NEUROLOGICAL: 2+ bilateral patellar and 1+ bilateral Achilles reflexes.  Negative Babinski's.  No ankle clonus bilaterally.  Sensation light touch intact bilateral lower extremities throughout.     RESULTS: I reviewed the MRI lumbar spine from [facility] dated October 1, 2022.  This shows degenerative disc changes with posterior disc bulges at L2-3, L3-4, and L4-5.  There is no significant spinal canal or neuroforaminal stenosis at any level.  There is no significant change compared with her MRI from April 2021.  There is a chronic T11 compression fracture.  No acute fracture.             Again, thank you for allowing me to participate in the care of your patient.        Sincerely,        Maureen Palafox PA-C

## 2022-11-03 DIAGNOSIS — G62.9 NEUROPATHY: ICD-10-CM

## 2022-11-03 RX ORDER — PREGABALIN 50 MG/1
50 CAPSULE ORAL 2 TIMES DAILY
Qty: 60 CAPSULE | Refills: 0 | Status: SHIPPED | OUTPATIENT
Start: 2022-11-03 | End: 2022-11-08

## 2022-11-06 DIAGNOSIS — G62.9 NEUROPATHY: ICD-10-CM

## 2022-11-08 RX ORDER — PREGABALIN 50 MG/1
50 CAPSULE ORAL 2 TIMES DAILY
Qty: 60 CAPSULE | Refills: 0 | Status: SHIPPED | OUTPATIENT
Start: 2022-11-08 | End: 2022-11-29

## 2022-11-15 ENCOUNTER — RADIOLOGY INJECTION OFFICE VISIT (OUTPATIENT)
Dept: PHYSICAL MEDICINE AND REHAB | Facility: CLINIC | Age: 72
End: 2022-11-15
Attending: PHYSICIAN ASSISTANT
Payer: COMMERCIAL

## 2022-11-15 VITALS
TEMPERATURE: 99 F | SYSTOLIC BLOOD PRESSURE: 110 MMHG | DIASTOLIC BLOOD PRESSURE: 68 MMHG | RESPIRATION RATE: 16 BRPM | HEART RATE: 73 BPM | OXYGEN SATURATION: 98 %

## 2022-11-15 DIAGNOSIS — M47.816 LUMBAR FACET JOINT SYNDROME: ICD-10-CM

## 2022-11-15 PROCEDURE — 64493 INJ PARAVERT F JNT L/S 1 LEV: CPT | Mod: 50 | Performed by: PHYSICAL MEDICINE & REHABILITATION

## 2022-11-15 PROCEDURE — A9576 INJ PROHANCE MULTIPACK: HCPCS | Performed by: PHYSICAL MEDICINE & REHABILITATION

## 2022-11-15 RX ORDER — BUPIVACAINE HYDROCHLORIDE 7.5 MG/ML
INJECTION, SOLUTION EPIDURAL; RETROBULBAR
Status: COMPLETED | OUTPATIENT
Start: 2022-11-15 | End: 2022-11-15

## 2022-11-15 RX ADMIN — BUPIVACAINE HYDROCHLORIDE 2 ML: 7.5 INJECTION, SOLUTION EPIDURAL; RETROBULBAR at 11:54

## 2022-11-15 ASSESSMENT — PAIN SCALES - GENERAL
PAINLEVEL: MODERATE PAIN (4)
PAINLEVEL: SEVERE PAIN (6)

## 2022-11-15 NOTE — PATIENT INSTRUCTIONS
Please complete your pain diary and return the diary to the Spine Center at your earliest convenience.  The Spine Center will contact you to schedule your next appointment after your pain diary is reviewed by your doctor.  Thank you.  DISCHARGE INSTRUCTIONS    During office hours (8:00 a.m.- 4:00 p.m.) questions or concerns may be answered  by calling Spine Center Navigation Nurses at  [phone].  Messages received after hours will be returned the following business day.      In the case of an emergency, please dial 911 or seek assistance at the nearest Emergency Room/Urgent Care facility.     All Patients:  You may experience an increase in your symptoms for the first 2 days, once the numbing medication wears off.    You may resume your regular medication, no pain medication until you have completed your diary    You may shower. No swimming, tub bath or hot tub for 24 hours; remove bandage after 4 hours    Continue your activities that can cause you pain to test the blocks.                         You should not drive for the next 3 to 5 hours (have someone drive you)           POSSIBLE PROCEDURE SIDE EFFECTS  -Call Spine Center if concerned-    Increased Pain  Increased numbness/tingling     Nausea/Vomiting  Bruising/bleeding at site (hematoma)             Swelling at site (edema) Headache  Difficulty walking  Infection        Fever greater than 100.5

## 2022-11-18 ENCOUNTER — TELEPHONE (OUTPATIENT)
Dept: PHYSICAL MEDICINE AND REHAB | Facility: CLINIC | Age: 72
End: 2022-11-18

## 2022-11-18 NOTE — TELEPHONE ENCOUNTER
Via Sejal  for phone conversation, patient expressed hesitation about moving forward with confirmatory medial branch blocks. She does not feel the first bilateral MBBs at L2 and L3 offered enough pain relief to move forward. I recommended patient schedule with Maureen to discuss plan of care and any additional options for pain relief. Order for confirmatory blocks not placed at this time.

## 2022-11-23 NOTE — PROGRESS NOTES
Assessment:   Ricky Lea is a 72 year old y.o. female with past medical history significant for hyperlipidemia, hypertension, depression, osteoporosis (on Fosamax), type 2 diabetes mellitus, urinary incontinence who presents today for follow-up regarding chronic bilateral low back pain.  Patient previously complained of pain radiating from the low back down the legs but she states that pain has resolved.  She complains of left knee pain but states that feels like a separate pain from her lower back.  My review of an MRI lumbar spine does not show any overall change compared with her MRI lumbar spine from April 2021.  There are small posterior disc bulges L2-3, L3-4, and L4-5 but no significant spinal canal or neuroforaminal stenosis at any level.   - Patient is status post bilateral L4-5 transforaminal epidural steroid injections December 27, 2021 which provided significant relief of her pain but only lasted 1 week.    -She then failed bilateral L3, L4, L5 medial branch blocks October 18, 2022.  - Patient had 50% relief with bilateral L2, L3 medial branch blocks November 15, 2022.  I checked with the PA team and this is adequate relief to move forward with confirmatory blocks, however, patient is not sure that she felt that the relief was significant enough to move forward.       Plan:     A shared decision making plan was used.  The patient's values and choices were respected.  The following represents what was discussed and decided upon by the physician assistant and the patient.  A telephone  was used to visit.    1.  DIAGNOSTIC TESTS: I reviewed the MRI lumbar spine.  No further diagnostic tests were ordered.    2.  PHYSICAL THERAPY: Patient completed 7 sessions of physical therapy June 11, 2021.  She will continue with home exercises.  No further physical therapy was ordered.    3.  MEDICATIONS:  - I prescribed tizanidine 2 mg 3 times daily as needed.  - Patient will continue Tylenol 1000 g 3 times  daily.  - Patient applies diclofenac gel as needed.  - Patient take Cymbalta 60 mg daily.  - Patient takes meloxicam 15 mg daily.  - Patient takes pregabalin 50 mg twice daily.      4.  INTERVENTIONS: No additional interventions were ordered.  Patient would like to try the tizanidine first.  If pain fails to improve, we could consider moving forward with the Confirmatory bilateral L2, L3 medial branch blocks as a work-up toward radiofrequency ablation.  I told the patient we would only want to move forward with the confirmatory blocks if she felt sure that she would be satisfied with 50% relief of her pain.    5.  PATIENT EDUCATION: Patient is in agreement the above plan.  All questions were answered.    6.  FOLLOW-UP: Patient is going to follow-up with me in about 1 month to see how she is doing with the tizanidine.  If she has questions or concerns in the meantime, she should not hesitate to call.    Subjective:     Ricky Lea is a 72 year old female who presents today for follow-up regarding chronic low back pain.  Patient is following up after bilateral L2, L3 medial branch blocks.  She had a positive response (50% relief), but patient is not sure that the relief was enough to move forward with the confirmatory blocks.  Her pain went from a 6 out of 10 to a 3 out of 10.    Patient complains of midline back pain.  Pain extends from the upper/mid lumbar region toward the lumbosacral junction.  She denies any pain down the legs.  Denies numbness, tingling, weakness on the legs.  She rates her pain today as a 7 out of 10.  She denies any aggravating or alleviating factors of the pain.  Denies any new symptoms since she was last seen.    Patient completed 7 sessions of physical therapy in June 2021.  She does her home exercises.  Patient states that she takes Tylenol 1000 mg 3 times daily, duloxetine 60 mg daily, Loxitane 15 mg daily, pregabalin 50 mg twice daily.  She also applies diclofenac gel twice daily as  needed.    Review of Systems:   Negative for numbness/tingling, weakness, loss of bowel/bladder control, inability to urinate, headache, pain much worse at night, trip/stumble/falls, difficulty swallowing, difficulty with hand skills, fevers, unintentional weight loss.     Objective:   CONSTITUTIONAL:  Vital signs as above.  No acute distress.  The patient is well nourished and well groomed.    PSYCHIATRIC:  The patient is awake, alert, oriented to person, place and time.  The patient is answering questions appropriately with clear speech.  Normal affect.  HEENT: Normocephalic, atraumatic.  Sclera clear.    SKIN:  Skin over the face, posterior torso, bilateral upper and lower extremities is clean, dry, intact without rashes.  VASCULAR: No significant lower extremity edema.  2/4 bilateral dorsalis pedis pulses.  MUSCULOSKELETAL:  Gait is guarded.    She ambulates with a flexed forward posture at the hips.   able to rise onto toes and heels bilaterally with support.  Tender to palpation midline L2-S1.  Strength is 5/5 bilateral hip flexors, knee flexors/extensors, ankle dorsi/plantar flexors.  NEUROLOGICAL:   Sensation light touch intact bilateral lower extremities throughout.     RESULTS: I reviewed the MRI lumbar spine from [facility] dated October 1, 2022.  This shows degenerative disc changes with posterior disc bulges at L2-3, L3-4, and L4-5.  There is no significant spinal canal or neuroforaminal stenosis at any level.  There is no significant change compared with her MRI from April 2021.  There is a chronic T11 compression fracture.  No acute fracture.

## 2022-11-29 ENCOUNTER — TELEPHONE (OUTPATIENT)
Dept: NURSING | Facility: CLINIC | Age: 72
End: 2022-11-29

## 2022-11-29 ENCOUNTER — OFFICE VISIT (OUTPATIENT)
Dept: PHYSICAL MEDICINE AND REHAB | Facility: CLINIC | Age: 72
End: 2022-11-29
Payer: COMMERCIAL

## 2022-11-29 VITALS
DIASTOLIC BLOOD PRESSURE: 67 MMHG | HEART RATE: 88 BPM | SYSTOLIC BLOOD PRESSURE: 145 MMHG | HEIGHT: 59 IN | BODY MASS INDEX: 24.6 KG/M2 | WEIGHT: 122 LBS

## 2022-11-29 DIAGNOSIS — M79.18 MYOFASCIAL PAIN: ICD-10-CM

## 2022-11-29 DIAGNOSIS — M47.816 LUMBAR FACET ARTHROPATHY: Primary | ICD-10-CM

## 2022-11-29 DIAGNOSIS — G62.9 NEUROPATHY: ICD-10-CM

## 2022-11-29 PROCEDURE — 99214 OFFICE O/P EST MOD 30 MIN: CPT | Performed by: PHYSICIAN ASSISTANT

## 2022-11-29 RX ORDER — TIZANIDINE 2 MG/1
2 TABLET ORAL 3 TIMES DAILY PRN
Qty: 90 TABLET | Refills: 1 | Status: SHIPPED | OUTPATIENT
Start: 2022-11-29 | End: 2023-01-02 | Stop reason: SINTOL

## 2022-11-29 RX ORDER — PREGABALIN 50 MG/1
50 CAPSULE ORAL 2 TIMES DAILY
Qty: 60 CAPSULE | Refills: 0 | Status: SHIPPED | OUTPATIENT
Start: 2022-11-29 | End: 2023-01-25

## 2022-11-29 ASSESSMENT — PAIN SCALES - GENERAL: PAINLEVEL: SEVERE PAIN (7)

## 2022-11-29 NOTE — LETTER
11/29/2022         RE: Ricky Lea  [address] [place] [address]        Dear Colleague,    Thank you for referring your patient, Ricky Lea, to the [facility]. Please see a copy of my visit note below.    Assessment:   Ricky Lea is a 72 year old y.o. female with past medical history significant for hyperlipidemia, hypertension, depression, osteoporosis (on Fosamax), type 2 diabetes mellitus, urinary incontinence who presents today for follow-up regarding chronic bilateral low back pain.  Patient previously complained of pain radiating from the low back down the legs but she states that pain has resolved.  She complains of left knee pain but states that feels like a separate pain from her lower back.  My review of an MRI lumbar spine does not show any overall change compared with her MRI lumbar spine from April 2021.  There are small posterior disc bulges L2-3, L3-4, and L4-5 but no significant spinal canal or neuroforaminal stenosis at any level.   - Patient is status post bilateral L4-5 transforaminal epidural steroid injections December 27, 2021 which provided significant relief of her pain but only lasted 1 week.    -She then failed bilateral L3, L4, L5 medial branch blocks October 18, 2022.  - Patient had 50% relief with bilateral L2, L3 medial branch blocks November 15, 2022.  I checked with the PA team and this is adequate relief to move forward with confirmatory blocks, however, patient is not sure that she felt that the relief was significant enough to move forward.       Plan:     A shared decision making plan was used.  The patient's values and choices were respected.  The following represents what was discussed and decided upon by the physician assistant and the patient.  A telephone  was used to visit.    1.  DIAGNOSTIC TESTS: I reviewed the MRI lumbar spine.  No further diagnostic tests were ordered.    2.  PHYSICAL THERAPY: Patient completed 7 sessions of  physical therapy June 11, 2021.  She will continue with home exercises.  No further physical therapy was ordered.    3.  MEDICATIONS:  - I prescribed tizanidine 2 mg 3 times daily as needed.  - Patient will continue Tylenol 1000 g 3 times daily.  - Patient applies diclofenac gel as needed.  - Patient take Cymbalta 60 mg daily.  - Patient takes meloxicam 15 mg daily.  - Patient takes pregabalin 50 mg twice daily.      4.  INTERVENTIONS: No additional interventions were ordered.  Patient would like to try the tizanidine first.  If pain fails to improve, we could consider moving forward with the Confirmatory bilateral L2, L3 medial branch blocks as a work-up toward radiofrequency ablation.  I told the patient we would only want to move forward with the confirmatory blocks if she felt sure that she would be satisfied with 50% relief of her pain.    5.  PATIENT EDUCATION: Patient is in agreement the above plan.  All questions were answered.    6.  FOLLOW-UP: Patient is going to follow-up with me in about 1 month to see how she is doing with the tizanidine.  If she has questions or concerns in the meantime, she should not hesitate to call.    Subjective:     Ricky Lea is a 72 year old female who presents today for follow-up regarding chronic low back pain.  Patient is following up after bilateral L2, L3 medial branch blocks.  She had a positive response (50% relief), but patient is not sure that the relief was enough to move forward with the confirmatory blocks.  Her pain went from a 6 out of 10 to a 3 out of 10.    Patient complains of midline back pain.  Pain extends from the upper/mid lumbar region toward the lumbosacral junction.  She denies any pain down the legs.  Denies numbness, tingling, weakness on the legs.  She rates her pain today as a 7 out of 10.  She denies any aggravating or alleviating factors of the pain.  Denies any new symptoms since she was last seen.    Patient completed 7 sessions of physical therapy  in June 2021.  She does her home exercises.  Patient states that she takes Tylenol 1000 mg 3 times daily, duloxetine 60 mg daily, Loxitane 15 mg daily, pregabalin 50 mg twice daily.  She also applies diclofenac gel twice daily as needed.    Review of Systems:   Negative for numbness/tingling, weakness, loss of bowel/bladder control, inability to urinate, headache, pain much worse at night, trip/stumble/falls, difficulty swallowing, difficulty with hand skills, fevers, unintentional weight loss.     Objective:   CONSTITUTIONAL:  Vital signs as above.  No acute distress.  The patient is well nourished and well groomed.    PSYCHIATRIC:  The patient is awake, alert, oriented to person, place and time.  The patient is answering questions appropriately with clear speech.  Normal affect.  HEENT: Normocephalic, atraumatic.  Sclera clear.    SKIN:  Skin over the face, posterior torso, bilateral upper and lower extremities is clean, dry, intact without rashes.  VASCULAR: No significant lower extremity edema.  2/4 bilateral dorsalis pedis pulses.  MUSCULOSKELETAL:  Gait is guarded.    She ambulates with a flexed forward posture at the hips.   able to rise onto toes and heels bilaterally with support.  Tender to palpation midline L2-S1.  Strength is 5/5 bilateral hip flexors, knee flexors/extensors, ankle dorsi/plantar flexors.  NEUROLOGICAL:   Sensation light touch intact bilateral lower extremities throughout.     RESULTS: I reviewed the MRI lumbar spine from [facility] dated October 1, 2022.  This shows degenerative disc changes with posterior disc bulges at L2-3, L3-4, and L4-5.  There is no significant spinal canal or neuroforaminal stenosis at any level.  There is no significant change compared with her MRI from April 2021.  There is a chronic T11 compression fracture.  No acute fracture.             Again, thank you for allowing me to participate in the care of your patient.        Sincerely,        Maureen Palafox PA-C

## 2022-11-29 NOTE — TELEPHONE ENCOUNTER
The Home Care/Assisted Living/Nursing Facility is calling regarding an established patient.  Has the patient seen Home Care in the past or is currently residing in Assisted Living or Nursing Facility? Yes.     [place] calling from [facility] requesting the following orders that are within the Home Care, Assisted Living or Nursing Home Eval and Treatment standing order and can be signed as standing order signature required by RN.    Preferred Call Back Number: [phone]    Home Care Visits Continuation- Skilled nursing  1x q 3 weeks x 9 weeks  And 3 PRNs  Any additional Orders:  Are there any orders requested, not stated above, that are outside of the standing order and must be routed to a licensed practitioner for approval?    Yes: Pregabalin 50mg BID (will route separate refill encounter).    Writer has verified [employer] will send fax to have orders signed.    Supriya Mcmanus RN, BSN  [facility]   Triage Nurse Advisor

## 2022-12-12 ENCOUNTER — MEDICAL CORRESPONDENCE (OUTPATIENT)
Dept: HEALTH INFORMATION MANAGEMENT | Facility: CLINIC | Age: 72
End: 2022-12-12

## 2022-12-29 NOTE — PROGRESS NOTES
Assessment:   Ricky Lea is a 72 year old y.o. female with past medical history significant for hyperlipidemia, hypertension, depression, osteoporosis (on Fosamax), type 2 diabetes mellitus, urinary incontinence who presents today for follow-up regarding chronic centralized low back pain.  Patient previously complained of pain radiating from the low back down the legs but she states that pain has resolved.  She complains of  knee pain but states that feels like a separate pain from her lower back.  My review of an MRI lumbar spine does not show any overall change compared with her MRI lumbar spine from April 2021.  There are small posterior disc bulges L2-3, L3-4, and L4-5 but no significant spinal canal or neuroforaminal stenosis at any level.   - Patient is status post bilateral L4-5 transforaminal epidural steroid injections December 27, 2021 which provided significant relief of her pain but only lasted 1 week.    -She then failed bilateral L3, L4, L5 medial branch blocks October 18, 2022.  - Patient had 50% relief with bilateral L2, L3 medial branch blocks November 15, 2022.  I checked with the PA team and this is adequate relief to move forward with confirmatory blocks.       Plan:     A shared decision making plan was used.  The patient's values and choices were respected.  The following represents what was discussed and decided upon by the physician assistant and the patient.  A telephone  was used to visit.    1.  DIAGNOSTIC TESTS: I reviewed the MRI lumbar spine.  No further diagnostic tests were ordered.    2.  PHYSICAL THERAPY: Patient completed 7 sessions of physical therapy June 11, 2021.  She will continue with home exercises.  No further physical therapy was ordered.    3.  MEDICATIONS:  - Patient did not tolerate tizanidine 2 mg due to balance problems.  - Patient will continue Tylenol 1000 g 3 times daily.  - Patient applies diclofenac gel as needed.  - Patient take Cymbalta 60 mg daily.  -  Patient takes meloxicam 15 mg daily.  - Patient is not sure if she still takes pregabalin 50 mg twice daily.      4.  INTERVENTIONS: I offered the patient confirmatory bilateral L2, L3 medial branch blocks as a work-up toward radiofrequency ablation.  Patient indicated she would like to proceed and an order was placed.    5.  PATIENT EDUCATION: Patient is in agreement the above plan.  All questions were answered.    6.  FOLLOW-UP: Patient will return to the clinic for confirmatory bilateral L2, L3 medial branch blocks.  If she has questions or concerns in the meantime, she should not hesitate to call.    Subjective:     Ricky Lea is a 72 year old female who presents today for follow-up regarding chronic low back pain.  I last saw the patient November 29, 2022.  At that time she was following up after bilateral L2, L3 medial branch blocks which were performed on November 15, 2022.  She had 50% relief of her pain which was adequate to move forward with confirmatory blocks, but the patient was not sure that she felt that the relief was significant enough to move forward.  I prescribed tizanidine.  Patient reports that she did not tolerate the tizanidine due to balance changes.    Patient complains of centralized low back pain.  She denies any pain down the legs.  Denies numbness, tingling, weakness down the legs.  She rates her pain today as an 8 out of 10.  Pain is aggravated with sitting and walking.  She denies any alleviating factors to the pain.  Denies any new symptoms since she was last seen.    Patient completed 7 sessions of physical therapy in June 2021.  She does her home exercises.  Patient states that she takes  Tylenol 1000 mg 3 times daily, duloxetine 60 mg daily, meloxicam 15 mg daily, and she applies diclofenac gel.  She is not sure if she is taking pregabalin.    Review of Systems:  Positive for wetting pants, falls.  Negative for numbness/tingling, weakness, loss of bowel/bladder control, inability to  urinate, headache, pain much worse at night, difficulty swallowing, difficulty with hand skills, fevers, unintentional weight loss.     Objective:   CONSTITUTIONAL:  Vital signs as above.  No acute distress.  The patient is well nourished and well groomed.    PSYCHIATRIC:  The patient is awake, alert, oriented to person, place and time.  The patient is answering questions appropriately with clear speech.  Normal affect.  HEENT: Normocephalic, atraumatic.  Sclera clear.    SKIN:  Skin over the face, posterior torso, bilateral upper and lower extremities is clean, dry, intact without rashes.  VASCULAR: No significant lower extremity edema.  2/4 bilateral dorsalis pedis pulses.  MUSCULOSKELETAL:  Gait is guarded.    She ambulates with a flexed forward posture at the hips.   able to rise onto toes and heels bilaterally with support.  Tender to palpation midline approximately L3.  Strength is 5/5 bilateral hip flexors, knee flexors/extensors, ankle dorsi/plantar flexors.  NEUROLOGICAL:   Sensation light touch intact bilateral lower extremities throughout.     RESULTS: I reviewed the MRI lumbar spine from [facility] dated October 1, 2022.  This shows degenerative disc changes with posterior disc bulges at L2-3, L3-4, and L4-5.  There is no significant spinal canal or neuroforaminal stenosis at any level.  There is no significant change compared with her MRI from April 2021.  There is a chronic T11 compression fracture.  No acute fracture.

## 2023-01-02 ENCOUNTER — OFFICE VISIT (OUTPATIENT)
Dept: PHYSICAL MEDICINE AND REHAB | Facility: CLINIC | Age: 73
End: 2023-01-02
Payer: COMMERCIAL

## 2023-01-02 VITALS — TEMPERATURE: 98.2 F | HEART RATE: 84 BPM | DIASTOLIC BLOOD PRESSURE: 77 MMHG | SYSTOLIC BLOOD PRESSURE: 161 MMHG

## 2023-01-02 DIAGNOSIS — M47.816 LUMBAR FACET ARTHROPATHY: Primary | ICD-10-CM

## 2023-01-02 PROCEDURE — 99214 OFFICE O/P EST MOD 30 MIN: CPT | Performed by: PHYSICIAN ASSISTANT

## 2023-01-02 ASSESSMENT — PAIN SCALES - GENERAL: PAINLEVEL: EXTREME PAIN (8)

## 2023-01-02 NOTE — PATIENT INSTRUCTIONS
Medial Branch Block (MBB) TEST    This is a TEST injection to find out what is causing your pain.  Specifically, this test is trying to identify whichjoint in your back or neck is causing pain.   This injection will NOT provide any long term pain relief because it is just a TEST.  Steroid is NOT used for this injection.   Steroid is what gives longterm pain relief.  Since there is no steroid used, there is no long term pain relief.    You needed a  for the procedure.    Because we want to determine what is causing your pain, we need you to do a few things on the day of your Medial Branch Block :     Do not take any pain medications on the day of your Medial Branch Block/Test.  Try to do activities that make our pain increase.  We want you tohave a high level of pain on the day of the test.  This makes it easier to know the results of the test.  Other information:    You may eatand drink on the day of the test.  You should take your other medications (just not pain medications) at the times you usually take them  You will be asked to fill out a pain diary for 6 hours after thetest.    AFTER THE TEST:    Please do not take any pain medications for 6 hours after the TEST.  After the pain diary is filled out, youmay resume taking your pain medications.  Please return the pain diary to the Spine Center the next day or as soon as you are able.  You will be contacted with what will happen next after the physicianreviews your pain diary.  You may be asked to come in for a follow-up appointment to discuss other treatment options  It may be recommended that you have an injection to help treatyour pain.  You may need to come in for a second set of Medial Branch Blocks (TESTS).        Please call the spine center at [phone] if you have any questions.

## 2023-01-10 ENCOUNTER — OFFICE VISIT (OUTPATIENT)
Dept: FAMILY MEDICINE | Facility: CLINIC | Age: 73
End: 2023-01-10
Payer: COMMERCIAL

## 2023-01-10 VITALS
RESPIRATION RATE: 16 BRPM | HEIGHT: 59 IN | WEIGHT: 119.5 LBS | HEART RATE: 97 BPM | SYSTOLIC BLOOD PRESSURE: 94 MMHG | OXYGEN SATURATION: 92 % | TEMPERATURE: 98.1 F | DIASTOLIC BLOOD PRESSURE: 66 MMHG | BODY MASS INDEX: 24.09 KG/M2

## 2023-01-10 DIAGNOSIS — Z00.00 ANNUAL PHYSICAL EXAM: Primary | ICD-10-CM

## 2023-01-10 DIAGNOSIS — M54.50 CHRONIC MIDLINE LOW BACK PAIN WITHOUT SCIATICA: ICD-10-CM

## 2023-01-10 DIAGNOSIS — Z23 IMMUNIZATION DUE: ICD-10-CM

## 2023-01-10 DIAGNOSIS — E78.5 HYPERLIPIDEMIA, UNSPECIFIED HYPERLIPIDEMIA TYPE: ICD-10-CM

## 2023-01-10 DIAGNOSIS — G89.29 CHRONIC MIDLINE LOW BACK PAIN WITHOUT SCIATICA: ICD-10-CM

## 2023-01-10 DIAGNOSIS — E11.9 TYPE 2 DIABETES MELLITUS TREATED WITHOUT INSULIN (H): ICD-10-CM

## 2023-01-10 PROCEDURE — 99397 PER PM REEVAL EST PAT 65+ YR: CPT | Mod: 25 | Performed by: FAMILY MEDICINE

## 2023-01-10 PROCEDURE — 90662 IIV NO PRSV INCREASED AG IM: CPT | Performed by: FAMILY MEDICINE

## 2023-01-10 PROCEDURE — 90471 IMMUNIZATION ADMIN: CPT | Performed by: FAMILY MEDICINE

## 2023-01-10 ASSESSMENT — ENCOUNTER SYMPTOMS
JOINT SWELLING: 0
BREAST MASS: 0
COUGH: 0
WEAKNESS: 1
HEMATURIA: 0
ABDOMINAL PAIN: 0
CONSTIPATION: 1
DYSURIA: 0
CHILLS: 0
NERVOUS/ANXIOUS: 0
EYE PAIN: 0
SORE THROAT: 0
HEARTBURN: 0
HEADACHES: 0
SHORTNESS OF BREATH: 0
FREQUENCY: 0
DIARRHEA: 0
PALPITATIONS: 0
NAUSEA: 0
HEMATOCHEZIA: 0
DIZZINESS: 1
FEVER: 0
MYALGIAS: 1
ARTHRALGIAS: 1
PARESTHESIAS: 0

## 2023-01-10 ASSESSMENT — ACTIVITIES OF DAILY LIVING (ADL)
CURRENT_FUNCTION: PREPARING MEALS REQUIRES ASSISTANCE
CURRENT_FUNCTION: LAUNDRY REQUIRES ASSISTANCE
CURRENT_FUNCTION: HOUSEWORK REQUIRES ASSISTANCE
CURRENT_FUNCTION: TRANSPORTATION REQUIRES ASSISTANCE
CURRENT_FUNCTION: SHOPPING REQUIRES ASSISTANCE
CURRENT_FUNCTION: BATHING REQUIRES ASSISTANCE
CURRENT_FUNCTION: TELEPHONE REQUIRES ASSISTANCE

## 2023-01-10 NOTE — PROGRESS NOTES
SUBJECTIVE:   CC: Pa is an 72 year old who presents for preventive health visit.   Patient has been advised of split billing requirements and indicates understanding: Yes  The patient is here with her nephew, here for physical.  Main concern is back pain, recently seen at spine center.  Future appointment scheduled on 1/17/2023.  His A1c was 6.5.  Taking medications with no difficulties, no side effects reported..  Home blood sugar are in the 110s to 140s most days.      Today's PHQ-2 Score:   PHQ-2 ( 1999 Pfizer) 1/10/2023   Q1: Little interest or pleasure in doing things 0   Q2: Feeling down, depressed or hopeless 0   PHQ-2 Score 0   Q1: Little interest or pleasure in doing things Not at all   Q2: Feeling down, depressed or hopeless Not at all   PHQ-2 Score 0           Social History     Tobacco Use     Smoking status: Never     Smokeless tobacco: Current     Types: Chew     Tobacco comments:     chews betal nut   Substance Use Topics     Alcohol use: Not on file     If you drink alcohol do you typically have >3 drinks per day or >7 drinks per week? Not applicable    Alcohol Use 1/10/2023   Prescreen: >3 drinks/day or >7 drinks/week? No           Breast Cancer Screening:        History of abnormal Pap smear: NO - age 65 - see link Cervical Cytology Screening Guidelines     Reviewed and updated as needed this visit by clinical staff   Tobacco  Allergies  Meds              Reviewed and updated as needed this visit by Provider                     Review of Systems  CONSTITUTIONAL: NEGATIVE for fever, chills, change in weight  BREAST: NEGATIVE for masses, tenderness or discharge  CV: NEGATIVE for chest pain, palpitations or peripheral edema  : NEGATIVE for unusual urinary or vaginal symptoms. No vaginal bleeding.  MUSCULOSKELETAL: NEGATIVE for significant arthralgias or myalgia  NEURO: NEGATIVE for weakness, dizziness or paresthesias  PSYCHIATRIC: NEGATIVE for changes in mood or affect      OBJECTIVE:   There  "were no vitals taken for this visit.  Physical Exam  GENERAL: healthy, alert and no distress  NECK: no adenopathy, no asymmetry, masses, or scars and thyroid normal to palpation  RESP: lungs clear to auscultation - no rales, rhonchi or wheezes  CV: regular rate and rhythm, normal S1 S2, no S3 or S4, no murmur, click or rub, no peripheral edema and peripheral pulses strong  ABDOMEN: soft, nontender, no hepatosplenomegaly, no masses and bowel sounds normal  Diabetic foot exam: normal DP and PT pulses, no trophic changes or ulcerative lesions and normal sensory exam        ASSESSMENT/PLAN:   Annual physical exam      Type 2 diabetes mellitus treated without insulin (H)  Recheck A1c at next visit.  No medication changes today.  - Adult Eye  Referral; Future    Chronic kidney disease, stage 3 (H)  Last creatinine was normal.  Recheck at next visit.    Immunization due  - INFLUENZA VACCINE 65+ (FLUZONE HD)    Hyperlipidemia, unspecified hyperlipidemia type  Continue current medication.  Recheck at next visit.    Chronic midline low back pain without sciatica  Advised to keep appointment with spine center.          COUNSELING:  Reviewed preventive health counseling, as reflected in patient instructions       Healthy diet/nutrition        She reports that she has never smoked. Her smokeless tobacco use includes chew.      Bassam Llanos MD  [facility]  Answers for HPI/ROS submitted by the patient on 1/10/2023  In general, how would you rate your overall physical health?: poor  Frequency of exercise:: None  Do you usually eat at least 4 servings of fruit and vegetables a day, include whole grains & fiber, and avoid regularly eating high fat or \"junk\" foods? : No  Taking medications regularly:: No  Medication side effects:: Other  Activities of Daily Living: telephone requires assistance, transportation requires assistance, shopping requires assistance, preparing meals requires assistance, housework " requires assistance, bathing requires assistance, laundry requires assistance  Home safety: lack of grab bars in the bathroom  Hearing Impairment:: difficulty following a conversation in a noisy restaurant or crowded room, feel that people are mumbling or not speaking clearly  In the past 6 months, have you been bothered by leaking of urine?: Yes  In general, how would you rate your overall mental or emotional health?: good  Additional concerns today:: Yes  Barriers to taking medications:: Side effects

## 2023-01-16 ENCOUNTER — PATIENT OUTREACH (OUTPATIENT)
Dept: GERIATRIC MEDICINE | Facility: CLINIC | Age: 73
End: 2023-01-16
Payer: COMMERCIAL

## 2023-01-16 NOTE — LETTER
January 16, 2023    YOSSI CEDENO  [address] [place] [address]    Dear  Pa,    Welcome to [facility] s MSC+ health program. My name is Melia Bella RN, PHN. I am your MSC+ care coordinator. You are eligible for Care Coordination through [facility] MSC+ pla.    As your care coordinator, we ll:    Meet to go over your care coordination benefits    Talk about your physical and mental health care needs     Review your preventative care needs    Create a plan that meets your needs with the services you choose    What happens next?  I ll call you soon to introduce myself and tell you more about my role. We ll then plan time to go over your health and safety needs. Our goal is to keep you as healthy and independent as possible.    Soon, you will receive a new MSC+ member identification (ID) card from [facility]. When you receive it, please use this card along with your [place] [facility] card and Prescription Drug Coverage Program card. When you receive, it please use this card where you get your health services. If you have Medicare, you will need to show your Medicare card when you get health services.    The [facility] care coordination program is voluntary and offered to you at no cost. If you wish to stop being in the care coordination program or have questions, call me at [phone]. If you reach my voicemail, leave a message and your phone number. TTY users, call the [place] Relay at 466 or 1-315.643.1181 (hlvaxx-uu-ydizaw relay service).    Sincerely,      Melia Bella RN, PHN  [phone]  [email or web address]    [id number] accepted   [id number]       [id number] (07/2022)

## 2023-01-16 NOTE — PROGRESS NOTES
[facility] Care Coordination Contact    Member became effective with  [facility]01/[facility]  Previous Health Plan: [employer] MSC+  Previous Care System: [facility]  Previous care coordinators name and number: Fatmata Gecirilo   Janice Type: N/A  Last MMIS Entry: Date 01/13/2022 and Type 02 Comm N/Pgm  MMIS visit date (and type) if different from above: 02/2022/21  02 Comm N/Pgm  Services Listed in MMIS:   UTF received: Yes: Received and saved to member file     Sophie Berman  Care Management Specialist  [facility]  [phone]

## 2023-01-17 ENCOUNTER — RADIOLOGY INJECTION OFFICE VISIT (OUTPATIENT)
Dept: PHYSICAL MEDICINE AND REHAB | Facility: CLINIC | Age: 73
End: 2023-01-17
Attending: PHYSICIAN ASSISTANT
Payer: COMMERCIAL

## 2023-01-17 ENCOUNTER — MEDICAL CORRESPONDENCE (OUTPATIENT)
Dept: HEALTH INFORMATION MANAGEMENT | Facility: CLINIC | Age: 73
End: 2023-01-17

## 2023-01-17 VITALS
OXYGEN SATURATION: 95 % | HEART RATE: 85 BPM | DIASTOLIC BLOOD PRESSURE: 52 MMHG | RESPIRATION RATE: 16 BRPM | SYSTOLIC BLOOD PRESSURE: 102 MMHG | TEMPERATURE: 99.3 F

## 2023-01-17 DIAGNOSIS — M47.816 LUMBAR FACET ARTHROPATHY: ICD-10-CM

## 2023-01-17 PROCEDURE — A9576 INJ PROHANCE MULTIPACK: HCPCS | Performed by: PAIN MEDICINE

## 2023-01-17 PROCEDURE — 64493 INJ PARAVERT F JNT L/S 1 LEV: CPT | Mod: KX | Performed by: PAIN MEDICINE

## 2023-01-17 RX ORDER — LIDOCAINE HYDROCHLORIDE 10 MG/ML
INJECTION, SOLUTION EPIDURAL; INFILTRATION; INTRACAUDAL; PERINEURAL
Status: COMPLETED | OUTPATIENT
Start: 2023-01-17 | End: 2023-01-17

## 2023-01-17 RX ADMIN — LIDOCAINE HYDROCHLORIDE 4 ML: 10 INJECTION, SOLUTION EPIDURAL; INFILTRATION; INTRACAUDAL; PERINEURAL at 11:12

## 2023-01-17 ASSESSMENT — PAIN SCALES - GENERAL
PAINLEVEL: EXTREME PAIN (8)
PAINLEVEL: EXTREME PAIN (8)

## 2023-01-17 NOTE — PATIENT INSTRUCTIONS
DISCHARGE INSTRUCTIONS    During office hours (8:00 a.m.- 4:00 p.m.) questions or concerns may be answered  by calling Spine Center Navigation Nurses at  [phone].  Messages received after hours will be returned the following business day.      In the case of an emergency, please dial 911 or seek assistance at the nearest Emergency Room/Urgent Care facility.     All Patients:  You may experience an increase in your symptoms for the first 2 days, once the numbing medication wears off.    You may resume your regular medication, no pain medication until you have completed your diary    You may shower. No swimming, tub bath or hot tub for 24 hours; remove bandage after 4 hours    Continue your activities that can cause you pain to test the blocks.                         You should not drive for the next 3 to 5 hours (have someone drive you)           POSSIBLE PROCEDURE SIDE EFFECTS  -Call Spine Center if concerned-    Increased Pain  Increased numbness/tingling     Nausea/Vomiting  Bruising/bleeding at site (hematoma)             Swelling at site (edema) Headache  Difficulty walking  Infection        Fever greater than 100.5

## 2023-01-19 ENCOUNTER — PATIENT OUTREACH (OUTPATIENT)
Dept: GERIATRIC MEDICINE | Facility: CLINIC | Age: 73
End: 2023-01-19
Payer: COMMERCIAL

## 2023-01-19 NOTE — PROGRESS NOTES
[facility] Care Coordination Contact    Called member to schedule Initial HRA home visit. HRA has been scheduled for 1/23/23.     Lowell Woods RN, BSN, PHN  [facility]  Phone: [phone]

## 2023-01-23 ENCOUNTER — PATIENT OUTREACH (OUTPATIENT)
Dept: GERIATRIC MEDICINE | Facility: CLINIC | Age: 73
End: 2023-01-23
Payer: COMMERCIAL

## 2023-01-23 ASSESSMENT — ACTIVITIES OF DAILY LIVING (ADL)
DEPENDENT_IADLS:: CLEANING;COOKING;LAUNDRY;SHOPPING;MEAL PREPARATION;MEDICATION MANAGEMENT;MONEY MANAGEMENT;TRANSPORTATION;INCONTINENCE

## 2023-01-23 NOTE — Clinical Note
Hi Dr. Llanos, Please see my note from my visit with patient for their annual reassessment. I also put in an order for some incontinence supplies. Please let me know if you have any questions.   Thanks, Lowell Woods RN, BSN, [phone]
Discharged

## 2023-01-24 DIAGNOSIS — M54.50 CHRONIC MIDLINE LOW BACK PAIN WITHOUT SCIATICA: ICD-10-CM

## 2023-01-24 DIAGNOSIS — Z76.0 ENCOUNTER FOR MEDICATION REFILL: ICD-10-CM

## 2023-01-24 DIAGNOSIS — G89.29 CHRONIC MIDLINE LOW BACK PAIN WITHOUT SCIATICA: ICD-10-CM

## 2023-01-24 DIAGNOSIS — G62.9 NEUROPATHY: ICD-10-CM

## 2023-01-24 NOTE — PROGRESS NOTES
[facility] Care Coordination Contact    [facility] Initial Assessment     Home visit for Initial Health Risk Assessment with Ricky Lea completed on January 23, 2023    Type of residence:: Private home - no stairs  Current living arrangement:: I live in a private home     Assessment completed with:: Care Team Member, Patient, Family, Children    Current Care Plan  Member currently receiving the following home care services: Skilled Nursing (medication set up)   Member currently receiving the following community resources: Home Care, PCA, Transportation Services, DME.    Medication Review  Medication reconciliation completed in Epic: Yes  Medication set-up & administration: RN set up every three weeks.  Family caregiver administers medications.  Medication Risk Assessment Medication (1 or more, place referral to MTM): N/A: No risk factors identified  MTM Referral Placed: No: No risk factors idenified    Mental/Behavioral Health   Depression Screening:   PHQ-2 Total Score (Adult) - Positive if 3 or more points; Administer PHQ-9 if positive: 2       Mental health DX:: No        No current MH services-will place referral for Medication and followed by PCP.    Falls Assessment:   Fallen 2 or more times in the past year?: Yes   Any fall with injury in the past year?: No    ADL/IADL Dependencies:   Dependent ADLs:: Ambulation-cane, Bathing, Dressing, Grooming, Incontinence, Positioning, Transfers, Toileting  Dependent IADLs:: Cleaning, Cooking, Laundry, Shopping, Meal Preparation, Medication Management, Money Management, Transportation, Incontinence    [facility] sponsored benefits: Shared information re: Silver Sneakers/gym memberships, ASA, Calcium +D.    PCA Assessment completed at visit: Yes Annual PCA assessment indicated 23 units per day of PCA. This is a decrease from the  previous assessment.     Elderly Waiver Eligibility: Not currently opened to Elderly Waiver.     Care Plan & Recommendations: Pa  Deh will continue to live at home with family and continue personal care attendant services. Family is wondering about a walker with a seat, discussed with family would need to discuss if walker with seat with be appropriate for member with her compression fracture. Encouraged family to discuss with Spine Center about getting PT eval to discuss with option would be best. Family will discuss and contact CC once they have seen PT.     See [facility]CC for detailed assessment information.    Follow-Up Plan: Member informed of future contact, plan to f/u with member with a 6 month telephone assessment.  Contact information shared with member and family, encouraged member to call with any questions or concerns at any time.    [place] care continuum providers: Please see Snapshot and Care Management Flowsheets for Specific details of care plan.    This CC note routed to PCP.     Lowell Woods RN, BSN, PHN  [facility]  Phone: [phone]

## 2023-01-25 RX ORDER — MELOXICAM 15 MG/1
TABLET ORAL
Qty: 30 TABLET | Refills: 3 | Status: SHIPPED | OUTPATIENT
Start: 2023-01-25 | End: 2023-06-02

## 2023-01-25 RX ORDER — PREGABALIN 50 MG/1
50 CAPSULE ORAL 2 TIMES DAILY
Qty: 60 CAPSULE | Refills: 3 | Status: SHIPPED | OUTPATIENT
Start: 2023-01-25 | End: 2023-05-23

## 2023-01-27 ENCOUNTER — PATIENT OUTREACH (OUTPATIENT)
Dept: GERIATRIC MEDICINE | Facility: CLINIC | Age: 73
End: 2023-01-27
Payer: COMMERCIAL

## 2023-01-27 DIAGNOSIS — R32 URINARY INCONTINENCE, UNSPECIFIED TYPE: Primary | ICD-10-CM

## 2023-01-27 NOTE — PROGRESS NOTES
[facility] Care Coordination Contact    Received after visit chart from care coordinator.  Completed following tasks: Mailed copy of care plan to client, Updated services in Database and Mailed [facility] Safe Medication Disposal     [facility]:  Emailed completed PCA assessment to [facility].  Faxed copy of PCA assessment to PCA Agency and mailed copy to member.  Faxed MD Communication to PCP.     Candy Ace  [facility]  Case Management Specialist  [phone]

## 2023-01-27 NOTE — LETTER
January 27, 2023    YOSSI CEDENO  [address] [place] [address]        Dear Pa:    At [facility], we re dedicated to improving your health and wellness. Enclosed is the Care Plan developed with you on 1/23/23. Please review the Care Plan carefully.    As a reminder, during your visit we talked about:    Ways to manage your physical and mental health    Using health care to maintain and improve your health     Your preventive care needs     Remember to contact your care coordinator if you:    Are hospitalized, or plan to be hospitalized     Have a fall      Have a change in your physical or mental health    Need help finding support or services    If you have questions, or don t agree with your Care Plan, call me at [phone]. You can also call me if your needs change. TTY users, call the [place] Relay at (235) or 1-344.591.3536 (kukksq-of-coluxa relay service).    Sincerely,          Lowell Woods RN, PHN  [phone]  [email or web address]      [employer]    [id number] accepted    [id number] (07/2022)

## 2023-01-27 NOTE — PROGRESS NOTES
Member reassigned to Lowell Woods new welcome letter sent.    Loan Carbajal  Care Management Specialist Manager  [facility]  [phone]

## 2023-02-02 ENCOUNTER — TELEPHONE (OUTPATIENT)
Dept: FAMILY MEDICINE | Facility: CLINIC | Age: 73
End: 2023-02-02
Payer: COMMERCIAL

## 2023-02-02 NOTE — TELEPHONE ENCOUNTER
Called Home Care nurse and gave verbal order per PCP's approval.    Sharif Yin, BSN RN  [facility]

## 2023-02-02 NOTE — TELEPHONE ENCOUNTER
Reason for Call:  Home Health Care    Jane with [facility] called regarding (reason for call): Verbal orders     Orders are needed for this patient. Skilled Nursing    PT: tamara    OT: tamara    Skilled Nursing: Re-certification- 1 times every 3 weeks for 9 weeks, 3 PRN    Pt Provider:     Phone Number Homecare Nurse can be reached at: [phone]    Can we leave a detailed message on this number? YES    Phone number patient can be reached at: Home number on file [phone] (home)    Best Time: anytime     Call taken on 2/2/2023 at 12:48 PM by Charlene Kerr

## 2023-02-06 ENCOUNTER — MEDICAL CORRESPONDENCE (OUTPATIENT)
Dept: HEALTH INFORMATION MANAGEMENT | Facility: CLINIC | Age: 73
End: 2023-02-06
Payer: COMMERCIAL

## 2023-02-06 NOTE — PROGRESS NOTES
Assessment:   Ricky Lea is a 72 year old y.o. female with past medical history significant for hyperlipidemia, hypertension, depression, osteoporosis (on Fosamax), type 2 diabetes mellitus, urinary incontinence who presents today for follow-up regarding chronic centralized low back pain with radiation into the bilateral buttocks.  My review of an MRI lumbar spine does not show any overall change compared with her MRI lumbar spine from April 2021.  There are small posterior disc bulges L2-3, L3-4, and L4-5 but no significant spinal canal or neuroforaminal stenosis at any level.          Plan:     A shared decision making plan was used.  The patient's values and choices were respected.  The following represents what was discussed and decided upon by the physician assistant and the patient.  A telephone  was used to visit.    1.  DIAGNOSTIC TESTS: I reviewed the MRI lumbar spine.  No further diagnostic tests were ordered.    2.  PHYSICAL THERAPY: Patient completed 7 sessions of physical therapy June 11, 2021.  She will continue with home exercises.  No further physical therapy was ordered.    3.  MEDICATIONS: No changes are made to the patient's medications.  - Patient did not tolerate tizanidine 2 mg due to balance problems.  - Patient will continue Tylenol 1000 g 3 times daily.  - Patient applies diclofenac gel as needed.  - Patient take Cymbalta 60 mg daily.  - Patient takes meloxicam 15 mg daily.  - Patient will continue pregabalin 50 mg twice daily.      4.  INTERVENTIONS: I offer the patient an L4-5 interlaminar epidural steroid injection.  I explained how this would be different than her previous injections.  I chose this injection because patient has had some relief with epidural steroid injections in the past with a transforaminal approach.  She describes midline pain.  Hopefully a midline approach will provide more significant relief.  Patient indicated she would like to proceed and an order was  placed.  - If this injection does not help I do not think I have another injection that will be more effective.    5.  PATIENT EDUCATION: Patient is in agreement the above plan.  All questions were answered.    6.  FOLLOW-UP: Patient will follow-up with me 2 weeks after her L4-5 interlaminar epidural steroid injection.  If she has questions or concerns in the meantime, she should not hesitate to call.    Subjective:     Ricky Lea is a 72 year old female who presents today for follow-up regarding chronic low back pain.  Patient is following up after she failed confirmatory bilateral L2, L3 medial branch blocks January 17, 2023.  She continues to complain of midline low back pain.  She has some pain that radiates into the bilateral buttocks.  Denies pain further down the legs.  She rates her pain today as a 7 out of 10.  Pain is aggravated with sitting.  She denies any alleviating symptoms.  Denies any new symptoms since she was last seen.  Denies any numbness, tingling, weakness down the legs.  Denies loss of bowel or bladder control.  Denies recent fevers.        Treatment to date:  - Bilateral L4-5 transforaminal epidural steroid injection September 30, 2021 with significant relief of her pain for about 6 to 8 weeks  - Patient is status post bilateral L4-5 transforaminal epidural steroid injections December 27, 2021 which provided significant relief of her pain but only lasted 1 week.    -She then failed bilateral L3, L4, L5 medial branch blocks October 18, 2022.  - Patient had 50% relief with bilateral L2, L3 medial branch blocks November 15, 2022.  - Patient then failed confirmatory bilateral L2, L3 medial branch blocks.  -Patient completed 7 sessions of physical therapy in June 2021.  She does her home exercises.  - Patient states that she takes  Tylenol 1000 mg 3 times daily, duloxetine 60 mg daily, meloxicam 15 mg daily, and she applies diclofenac gel.  She is not sure if she is taking pregabalin.     Review of  Systems:  Negative for numbness/tingling, weakness, loss of bowel/bladder control, inability to urinate, headache, pain much worse at night, difficulty swallowing, difficulty with hand skills, fevers, unintentional weight loss, falls.     Objective:   CONSTITUTIONAL:  Vital signs as above.  No acute distress.  The patient is well nourished and well groomed.    PSYCHIATRIC:  The patient is awake, alert, oriented to person, place and time.  The patient is answering questions appropriately with clear speech.  Normal affect.  HEENT: Normocephalic, atraumatic.  Sclera clear.    SKIN:  Skin over the face, posterior torso, bilateral upper and lower extremities is clean, dry, intact without rashes.  VASCULAR: No significant lower extremity edema.  2/4 bilateral dorsalis pedis pulses.  MUSCULOSKELETAL:  Gait is guarded.    She ambulates with a flexed forward posture at the hips.  Tender to palpation midline L4.  Strength is 5/5 bilateral hip flexors, knee flexors/extensors, ankle dorsi/plantar flexors.  NEUROLOGICAL: No ankle clonus.  Sensation light touch intact bilateral lower extremities throughout.     RESULTS: I reviewed the MRI lumbar spine from [facility] dated October 1, 2022.  This shows degenerative disc changes with posterior disc bulges at L2-3, L3-4, and L4-5.  There is no significant spinal canal or neuroforaminal stenosis at any level.  There is no significant change compared with her MRI from April 2021.  There is a chronic T11 compression fracture.  No acute fracture.

## 2023-02-07 DIAGNOSIS — Z53.9 DIAGNOSIS NOT YET DEFINED: Primary | ICD-10-CM

## 2023-02-07 PROCEDURE — G0179 MD RECERTIFICATION HHA PT: HCPCS | Performed by: FAMILY MEDICINE

## 2023-02-13 ENCOUNTER — OFFICE VISIT (OUTPATIENT)
Dept: PHYSICAL MEDICINE AND REHAB | Facility: CLINIC | Age: 73
End: 2023-02-13
Payer: COMMERCIAL

## 2023-02-13 VITALS
BODY MASS INDEX: 23.99 KG/M2 | WEIGHT: 119 LBS | HEIGHT: 59 IN | HEART RATE: 84 BPM | SYSTOLIC BLOOD PRESSURE: 136 MMHG | DIASTOLIC BLOOD PRESSURE: 76 MMHG

## 2023-02-13 DIAGNOSIS — M54.16 LUMBAR RADICULITIS: Primary | ICD-10-CM

## 2023-02-13 PROCEDURE — 99214 OFFICE O/P EST MOD 30 MIN: CPT | Performed by: PHYSICIAN ASSISTANT

## 2023-02-13 ASSESSMENT — PAIN SCALES - GENERAL: PAINLEVEL: SEVERE PAIN (7)

## 2023-02-13 NOTE — PATIENT INSTRUCTIONS
L4/5 epidural steroid injection has been ordered today.      Please note that this injection uses cortisone.  The cortisone may somewhat weaken the immune system.  It is unknown how much the immune system is weakened.  It is unknown if it is weakened to the point that you may be more likely to get the COVID-19 virus, or if you do get the COVID-19 virus, if you would be sicker than you would have been if you had not had the cortisone injection.  If you do not wish to proceed with the injection, please let the nurse/physician know and do NOT schedule the injection.    Please note that since your immune system is weakened from the cortisone, having any vaccine/shot may be less effective if you have this vaccine within 2 weeks from your cortisone injection.  It is advised to wait 2 weeks after your cortisone injection to have any vaccine (or if you have a vaccine first, wait 2 weeks before you have the cortisone injection).    Please schedule this injection at least 1 week  from now to allow time for insurance prior authorization.  On the day of your injection, you cannot be sick or taking antibiotics.  If you become sick and are prescribed, please call the clinic so your injection can be rescheduled for once you have completed your antibiotics.  You will need to bring a  with you for your injection.   If you have any questions or concerns prior to your injection, please do not hesitate to call the nurse navigation line at [phone] or contact Maureen Palafox through [employer].

## 2023-02-13 NOTE — LETTER
2/13/2023         RE: Ricky Lea  [address] [place] [address]        Dear Colleague,    Thank you for referring your patient, Ricky Lea, to the [facility]ERY. Please see a copy of my visit note below.    Assessment:   Ricky Lea is a 72 year old y.o. female with past medical history significant for hyperlipidemia, hypertension, depression, osteoporosis (on Fosamax), type 2 diabetes mellitus, urinary incontinence who presents today for follow-up regarding chronic centralized low back pain with radiation into the bilateral buttocks.  My review of an MRI lumbar spine does not show any overall change compared with her MRI lumbar spine from April 2021.  There are small posterior disc bulges L2-3, L3-4, and L4-5 but no significant spinal canal or neuroforaminal stenosis at any level.          Plan:     A shared decision making plan was used.  The patient's values and choices were respected.  The following represents what was discussed and decided upon by the physician assistant and the patient.  A telephone  was used to visit.    1.  DIAGNOSTIC TESTS: I reviewed the MRI lumbar spine.  No further diagnostic tests were ordered.    2.  PHYSICAL THERAPY: Patient completed 7 sessions of physical therapy June 11, 2021.  She will continue with home exercises.  No further physical therapy was ordered.    3.  MEDICATIONS: No changes are made to the patient's medications.  - Patient did not tolerate tizanidine 2 mg due to balance problems.  - Patient will continue Tylenol 1000 g 3 times daily.  - Patient applies diclofenac gel as needed.  - Patient take Cymbalta 60 mg daily.  - Patient takes meloxicam 15 mg daily.  - Patient will continue pregabalin 50 mg twice daily.      4.  INTERVENTIONS: I offer the patient an L4-5 interlaminar epidural steroid injection.  I explained how this would be different than her previous injections.  I chose this injection because patient has had some relief with  epidural steroid injections in the past with a transforaminal approach.  She describes midline pain.  Hopefully a midline approach will provide more significant relief.  Patient indicated she would like to proceed and an order was placed.  - If this injection does not help I do not think I have another injection that will be more effective.    5.  PATIENT EDUCATION: Patient is in agreement the above plan.  All questions were answered.    6.  FOLLOW-UP: Patient will follow-up with me 2 weeks after her L4-5 interlaminar epidural steroid injection.  If she has questions or concerns in the meantime, she should not hesitate to call.    Subjective:     Ricky Lea is a 72 year old female who presents today for follow-up regarding chronic low back pain.  Patient is following up after she failed confirmatory bilateral L2, L3 medial branch blocks January 17, 2023.  She continues to complain of midline low back pain.  She has some pain that radiates into the bilateral buttocks.  Denies pain further down the legs.  She rates her pain today as a 7 out of 10.  Pain is aggravated with sitting.  She denies any alleviating symptoms.  Denies any new symptoms since she was last seen.  Denies any numbness, tingling, weakness down the legs.  Denies loss of bowel or bladder control.  Denies recent fevers.        Treatment to date:  - Bilateral L4-5 transforaminal epidural steroid injection September 30, 2021 with significant relief of her pain for about 6 to 8 weeks  - Patient is status post bilateral L4-5 transforaminal epidural steroid injections December 27, 2021 which provided significant relief of her pain but only lasted 1 week.    -She then failed bilateral L3, L4, L5 medial branch blocks October 18, 2022.  - Patient had 50% relief with bilateral L2, L3 medial branch blocks November 15, 2022.  - Patient then failed confirmatory bilateral L2, L3 medial branch blocks.  -Patient completed 7 sessions of physical therapy in June 2021.  She  does her home exercises.  - Patient states that she takes  Tylenol 1000 mg 3 times daily, duloxetine 60 mg daily, meloxicam 15 mg daily, and she applies diclofenac gel.  She is not sure if she is taking pregabalin.     Review of Systems:  Negative for numbness/tingling, weakness, loss of bowel/bladder control, inability to urinate, headache, pain much worse at night, difficulty swallowing, difficulty with hand skills, fevers, unintentional weight loss, falls.     Objective:   CONSTITUTIONAL:  Vital signs as above.  No acute distress.  The patient is well nourished and well groomed.    PSYCHIATRIC:  The patient is awake, alert, oriented to person, place and time.  The patient is answering questions appropriately with clear speech.  Normal affect.  HEENT: Normocephalic, atraumatic.  Sclera clear.    SKIN:  Skin over the face, posterior torso, bilateral upper and lower extremities is clean, dry, intact without rashes.  VASCULAR: No significant lower extremity edema.  2/4 bilateral dorsalis pedis pulses.  MUSCULOSKELETAL:  Gait is guarded.    She ambulates with a flexed forward posture at the hips.  Tender to palpation midline L4.  Strength is 5/5 bilateral hip flexors, knee flexors/extensors, ankle dorsi/plantar flexors.  NEUROLOGICAL: No ankle clonus.  Sensation light touch intact bilateral lower extremities throughout.     RESULTS: I reviewed the MRI lumbar spine from [facility] dated October 1, 2022.  This shows degenerative disc changes with posterior disc bulges at L2-3, L3-4, and L4-5.  There is no significant spinal canal or neuroforaminal stenosis at any level.  There is no significant change compared with her MRI from April 2021.  There is a chronic T11 compression fracture.  No acute fracture.             Again, thank you for allowing me to participate in the care of your patient.        Sincerely,        Maureen Palafox PA-C

## 2023-02-20 ENCOUNTER — PATIENT OUTREACH (OUTPATIENT)
Dept: GERIATRIC MEDICINE | Facility: CLINIC | Age: 73
End: 2023-02-20
Payer: COMMERCIAL

## 2023-02-20 NOTE — PROGRESS NOTES
[facility] Care Coordination Contact    CC received a phone call from [facility] stating that Ricky Lea has been attending adult day care weekly. She has not been consistently attending, but had asked adult day care to contact CC to discuss allowing her to attend 4 days a week.     CC called Daughter and Ricky Lea and spoke with family about services. She is not currently open to elder waiver, but CC can start process. Explained that with recent LT assessment, she is eligible. CC was in agreement that attending adult day care at least once a week could be beneficial for her depression. Spoke with daughter concerns about dizziness and back pain and safety. Discussed that we can trial once a week for this year and reassess next year. Daughter is looking into getting a seated walker to help with her ambulation. Daughter had questions about personal care attendant services and what that would look like if member increases her adult day care hours. Educated that it could mean a decrease in personal care attendant hours, but CC would reassess at annual visit.   Daughter verbalized understanding.      DHS 5900 and DHS 8957 form filled out. CC will send to [facility] for processing.     Lowell Woods RN, BSN, PHN  [facility]  Phone: [phone].

## 2023-02-27 NOTE — PROGRESS NOTES
[facility] Care Coordination Contact    CC spoke with daughter today to confirm time to stop by to have member sign Elderly Waiver form, but daughter stated that member has decided she no longer wants to attend adult day care, she gets too dizzy from the transportation. Plan will be to reassess and consider it for next year. Daughter will contact CC sooner if things change before next review.     Lowell Woods RN, BSN, PHN  [facility]  Phone: [phone].

## 2023-03-10 ENCOUNTER — RADIOLOGY INJECTION OFFICE VISIT (OUTPATIENT)
Dept: PHYSICAL MEDICINE AND REHAB | Facility: CLINIC | Age: 73
End: 2023-03-10
Attending: PHYSICIAN ASSISTANT
Payer: COMMERCIAL

## 2023-03-10 VITALS
RESPIRATION RATE: 18 BRPM | HEART RATE: 72 BPM | DIASTOLIC BLOOD PRESSURE: 88 MMHG | TEMPERATURE: 98.5 F | OXYGEN SATURATION: 97 % | SYSTOLIC BLOOD PRESSURE: 158 MMHG

## 2023-03-10 DIAGNOSIS — M54.16 LUMBAR RADICULITIS: ICD-10-CM

## 2023-03-10 DIAGNOSIS — E11.9 TYPE 2 DIABETES MELLITUS TREATED WITHOUT INSULIN (H): Primary | ICD-10-CM

## 2023-03-10 LAB — GLUCOSE SERPL-MCNC: 216 MG/DL (ref 70–99)

## 2023-03-10 PROCEDURE — 62323 NJX INTERLAMINAR LMBR/SAC: CPT | Performed by: PAIN MEDICINE

## 2023-03-10 PROCEDURE — 82962 GLUCOSE BLOOD TEST: CPT | Performed by: PAIN MEDICINE

## 2023-03-10 RX ORDER — METHYLPREDNISOLONE ACETATE 40 MG/ML
INJECTION, SUSPENSION INTRA-ARTICULAR; INTRALESIONAL; INTRAMUSCULAR; SOFT TISSUE
Status: COMPLETED | OUTPATIENT
Start: 2023-03-10 | End: 2023-03-10

## 2023-03-10 RX ORDER — LIDOCAINE HYDROCHLORIDE 10 MG/ML
INJECTION, SOLUTION EPIDURAL; INFILTRATION; INTRACAUDAL; PERINEURAL
Status: COMPLETED | OUTPATIENT
Start: 2023-03-10 | End: 2023-03-10

## 2023-03-10 RX ADMIN — LIDOCAINE HYDROCHLORIDE 2 ML: 10 INJECTION, SOLUTION EPIDURAL; INFILTRATION; INTRACAUDAL; PERINEURAL at 11:08

## 2023-03-10 RX ADMIN — METHYLPREDNISOLONE ACETATE 40 MG: 40 INJECTION, SUSPENSION INTRA-ARTICULAR; INTRALESIONAL; INTRAMUSCULAR; SOFT TISSUE at 11:10

## 2023-03-10 ASSESSMENT — PAIN SCALES - GENERAL
PAINLEVEL: MILD PAIN (2)
PAINLEVEL: NO PAIN (0)

## 2023-03-10 NOTE — PATIENT INSTRUCTIONS
Please be advised that your blood glucose levels may be increased for the next 5-7 days as a result of the steroid you received with your injection today.  It is recommended that you monitor your blood glucose levels closely over the next week and direct any additional questions regarding your blood glucose management to your primary doctor.       DISCHARGE INSTRUCTIONS    During office hours (8:00 a.m.- 4:00 p.m.) questions or concerns may be answered  by calling [facility] Navigation Nurses at  [phone].  Messages received after hours will be returned the following business day.      In the case of an emergency, please dial 911 or seek assistance at the nearest Emergency Room/Urgent Care facility.     All Patients:    You may experience an increase in your symptoms for the first 2 days (It may take anywhere between 2 days- 2 weeks for the steroid to have maximum effect).    You may use ice on the injection site, as frequently as 20 minutes each hour if needed.    You may take your pain medicine.    You may continue taking your regular medication after your injection. If you have had a Medial Branch Block you may resume pain medication once your pain diary is completed.    You may shower. No swimming, tub bath or hot tub for 48 hours.  You may remove your bandaid/bandage as soon as you are home.    You may resume light activities, as tolerated.    Resume your usual diet as tolerated.    It is strongly advised that you do not drive for 1-3 hours post injection.    If you have had oral sedation:  Do not drive for 8 hours post injection.      If you have had IV sedation:  Do not drive for 24 hours post injection.  Do not operate hazardous machinery or make important personal/business decisions for 24 hours.      POSSIBLE STEROID SIDE EFFECTS (If steroid/cortisone was used for your procedure)    -If you experience these symptoms, it should only last for a short period    Swelling of the legs              Skin  redness (flushing)     Mouth (oral) irritation   Blood sugar (glucose) levels            Sweats                    Mood changes  Headache  Sleeplessness  Weakened immune system for up to 14 days, which could increase the risk of mariel the COVID-19 virus and/or experiencing more severe symptoms of the disease, if exposed.  Decreased effectiveness of the flu vaccine if given within 2 weeks of the steroid.         POSSIBLE PROCEDURE SIDE EFFECTS  -Call the Spine Center if you are concerned  Increased Pain           Increased numbness/tingling      Nausea/Vomiting          Bruising/bleeding at site      Redness or swelling                                              Difficulty walking      Weakness           Fever greater than 100.5    *In the event of a severe headache after an epidural steroid injection that is relieved by lying down, please call the [facility] to speak with a clinical staff member*

## 2023-03-28 ENCOUNTER — TELEPHONE (OUTPATIENT)
Dept: FAMILY MEDICINE | Facility: CLINIC | Age: 73
End: 2023-03-28
Payer: COMMERCIAL

## 2023-03-28 DIAGNOSIS — M81.0 AGE-RELATED OSTEOPOROSIS WITHOUT CURRENT PATHOLOGICAL FRACTURE: ICD-10-CM

## 2023-03-28 DIAGNOSIS — Z76.0 ENCOUNTER FOR MEDICATION REFILL: ICD-10-CM

## 2023-03-29 NOTE — TELEPHONE ENCOUNTER
"Routing refill request to provider for review/approval because:  Dexa results    Last Written Prescription Date:  5/24/22  Last Fill Quantity: 12,  # refills: 3   Last office visit provider:  1/10/23     Requested Prescriptions   Pending Prescriptions Disp Refills     alendronate (FOSAMAX) 70 MG tablet [Pharmacy Med Name: ALENDRONATE NA 70 MG TAB 70 Tablet] 12 tablet 3     Sig: TAKE 1 PILL BY MOUTH EVERY WEEK FOR BONES       Bisphosphonates Failed - 3/29/2023  2:45 PM        Failed - Dexa on file within past 2 years     Please review last Dexa result.           Passed - Recent (12 mo) or future (30 days) visit within the authorizing provider's specialty     Patient has had an office visit with the authorizing provider or a provider within the authorizing providers department within the previous 12 mos or has a future within next 30 days. See \"Patient Info\" tab in inbasket, or \"Choose Columns\" in Meds & Orders section of the refill encounter.              Passed - Medication is active on med list        Passed - Patient is age 18 or older        Passed - Normal serum creatinine on file within past 12 months     Recent Labs   Lab Test 06/06/22  1452   CR 1.08       Ok to refill medication if creatinine is low               Katelynn Molina, RN 03/29/23 2:45 PM  "

## 2023-03-30 ENCOUNTER — MEDICAL CORRESPONDENCE (OUTPATIENT)
Dept: HEALTH INFORMATION MANAGEMENT | Facility: CLINIC | Age: 73
End: 2023-03-30

## 2023-03-30 DIAGNOSIS — Z76.0 ENCOUNTER FOR MEDICATION REFILL: ICD-10-CM

## 2023-03-30 DIAGNOSIS — K59.00 CONSTIPATION, UNSPECIFIED CONSTIPATION TYPE: ICD-10-CM

## 2023-03-30 RX ORDER — ALENDRONATE SODIUM 70 MG/1
TABLET ORAL
Qty: 12 TABLET | Refills: 3 | Status: SHIPPED | OUTPATIENT
Start: 2023-03-30 | End: 2024-04-26

## 2023-03-30 RX ORDER — POLYETHYLENE GLYCOL 3350 17 G/17G
1 POWDER, FOR SOLUTION ORAL DAILY
Qty: 510 G | Refills: 3 | Status: SHIPPED | OUTPATIENT
Start: 2023-03-30 | End: 2024-07-26

## 2023-03-30 NOTE — TELEPHONE ENCOUNTER
Ok for verbal orders.     Will forward to PCP to decide on constipation medication.       Parag Kauffman MD

## 2023-03-30 NOTE — TELEPHONE ENCOUNTER
Called [facility] and  Gave verbal order.    Oralia stated that pt does not want miraLAX as it causes heartburn for her. Pt would like something else to help with constipation.    Sharif Aragon Cem Say, BSN RN  [facility]        MiraLAX refilled for constipation.  Please let the patient know.     Dr. Bassam Llanos   3/30/2023 9:26 AM

## 2023-04-03 ENCOUNTER — MEDICAL CORRESPONDENCE (OUTPATIENT)
Dept: HEALTH INFORMATION MANAGEMENT | Facility: CLINIC | Age: 73
End: 2023-04-03

## 2023-04-03 NOTE — PROGRESS NOTES
Assessment:   Ricky Lea is a 73 year old y.o. female with past medical history significant for hyperlipidemia, hypertension, depression, osteoporosis (on Fosamax), type 2 diabetes mellitus, urinary incontinence who presents today for follow-up regarding chronic centralized low back pain with radiation into the bilateral buttocks.  My review of an MRI lumbar spine does not show any overall change compared with her MRI lumbar spine from April 2021.  There are small posterior disc bulges L2-3, L3-4, and L4-5 but no significant spinal canal or neuroforaminal stenosis at any level.  Patient is status post an L4-5 interlaminar epidural steroid injection March 10, 2023 which only provided partial relief of her pain for a few days.         Plan:     A shared decision making plan was used.  The patient's values and choices were respected.  The following represents what was discussed and decided upon by the physician assistant and the patient.  A telephone  was used to visit.    1.  DIAGNOSTIC TESTS: I reviewed the MRI lumbar spine.  No further diagnostic tests were ordered.    2.  PHYSICAL THERAPY: Patient completed 7 sessions of physical therapy June 11, 2021.  She states she does home exercises.  Offered to refer her for additional physical therapy.  She will consider this.    3.  MEDICATIONS: No changes are made to the patient's medications.  Patient does not know what she takes for pain relief medications.  She has the following medications on her list:  - Tylenol 1000 mg 3 times daily.  - diclofenac gel as needed.  - Cymbalta 60 mg daily.  - meloxicam 15 mg daily.  - pregabalin 50 mg twice daily.    - Patient did not tolerate tizanidine 2 mg due to balance problems.    4.  INTERVENTIONS: No additional interventions were ordered.  She has tried multiple injections with no lasting improvement.  I am not overly optimistic another injection that will be more effective.    5.  PATIENT EDUCATION:  - I offered to refer  the patient for chiropractic treatment or acupuncture.  She will consider this.    6.  FOLLOW-UP: Patient is going to follow-up with me as needed.  If she has questions or concerns, she should not hesitate to call.    Subjective:     Ricky Lea is a 73 year old female who presents today for follow-up regarding chronic low back pain.  Patient is following up after an L4-5 interlaminar epidural steroid injection March 10, 2023.  Patient reports partial relief of her pain only lasting several days.    Patient complains of centralized low back pain.  Pain radiates into the bilateral buttocks.  Denies pain further down the legs.  She has numbness and tingling left hand.  Denies numbness or tingling down the legs.  She feels generally weak.  She rates her pain today as an 8 out of 10.  She cannot identify any aggravating factors to the pain.  Pain is alleviated with applying heat and using a back brace.  She denies any new symptoms since she was last seen.    Treatment to date:  - Bilateral L4-5 transforaminal epidural steroid injection September 30, 2021 with significant relief of her pain for about 6 to 8 weeks  - Patient is status post bilateral L4-5 transforaminal epidural steroid injections December 27, 2021 which provided significant relief of her pain but only lasted 1 week.    -She then failed bilateral L3, L4, L5 medial branch blocks October 18, 2022.  - Patient had 50% relief with bilateral L2, L3 medial branch blocks November 15, 2022.  - Patient then failed confirmatory bilateral L2, L3 medial branch blocks.  - L4-5 interlaminar epidural steroid injection March 10, 2023 with partial relief of her pain only lasting several days.  -Patient completed 7 sessions of physical therapy in June 2021.  She does her home exercises.  - Patient states that she takes  Tylenol 1000 mg 3 times daily, duloxetine 60 mg daily, meloxicam 15 mg daily, and she applies diclofenac gel.  She is not sure if she is taking pregabalin.      Review of Systems:  Positive for numbness/tingling (left hand), weakness (generalized).  Negative for loss of bowel/bladder control, inability to urinate, headache, pain much worse at night, difficulty swallowing, difficulty with hand skills, fevers, unintentional weight loss, falls.     Objective:   CONSTITUTIONAL:  Vital signs as above.  No acute distress.  The patient is well nourished and well groomed.    PSYCHIATRIC:  The patient is awake, alert, oriented to person, place and time.  The patient is answering questions appropriately with clear speech.  Normal affect.  HEENT: Normocephalic, atraumatic.  Sclera clear.    SKIN: Exposed skin is clean, dry, intact without rashes.  VASCULAR: No significant lower extremity edema.   MUSCULOSKELETAL:   Strength is 5/5 bilateral hip flexors, knee flexors/extensors, ankle dorsi/plantar flexors.  NEUROLOGICAL: No ankle clonus.  Sensation light touch intact bilateral lower extremities throughout.     RESULTS: I reviewed the MRI lumbar spine from [facility] dated October 1, 2022.  This shows degenerative disc changes with posterior disc bulges at L2-3, L3-4, and L4-5.  There is no significant spinal canal or neuroforaminal stenosis at any level.  There is no significant change compared with her MRI from April 2021.  There is a chronic T11 compression fracture.  No acute fracture.

## 2023-04-04 DIAGNOSIS — Z53.9 DIAGNOSIS NOT YET DEFINED: Primary | ICD-10-CM

## 2023-04-04 PROCEDURE — G0179 MD RECERTIFICATION HHA PT: HCPCS | Performed by: FAMILY MEDICINE

## 2023-04-06 ENCOUNTER — OFFICE VISIT (OUTPATIENT)
Dept: PHYSICAL MEDICINE AND REHAB | Facility: CLINIC | Age: 73
End: 2023-04-06
Payer: COMMERCIAL

## 2023-04-06 VITALS — SYSTOLIC BLOOD PRESSURE: 119 MMHG | HEART RATE: 78 BPM | DIASTOLIC BLOOD PRESSURE: 73 MMHG

## 2023-04-06 DIAGNOSIS — M47.816 LUMBAR FACET ARTHROPATHY: Primary | ICD-10-CM

## 2023-04-06 DIAGNOSIS — M54.16 LUMBAR RADICULITIS: ICD-10-CM

## 2023-04-06 PROCEDURE — 99213 OFFICE O/P EST LOW 20 MIN: CPT | Performed by: PHYSICIAN ASSISTANT

## 2023-04-06 ASSESSMENT — PAIN SCALES - GENERAL: PAINLEVEL: EXTREME PAIN (8)

## 2023-04-06 NOTE — LETTER
4/6/2023         RE: Ricky Lea  [address] [place] [address]        Dear Colleague,    Thank you for referring your patient, Ricky Lea, to the [facility] SPINE [facility] NEUROSURGERY. Please see a copy of my visit note below.    Assessment:   Ricky Lea is a 73 year old y.o. female with past medical history significant for hyperlipidemia, hypertension, depression, osteoporosis (on Fosamax), type 2 diabetes mellitus, urinary incontinence who presents today for follow-up regarding chronic centralized low back pain with radiation into the bilateral buttocks.  My review of an MRI lumbar spine does not show any overall change compared with her MRI lumbar spine from April 2021.  There are small posterior disc bulges L2-3, L3-4, and L4-5 but no significant spinal canal or neuroforaminal stenosis at any level.  Patient is status post an L4-5 interlaminar epidural steroid injection March 10, 2023 which only provided partial relief of her pain for a few days.         Plan:     A shared decision making plan was used.  The patient's values and choices were respected.  The following represents what was discussed and decided upon by the physician assistant and the patient.  A telephone  was used to visit.    1.  DIAGNOSTIC TESTS: I reviewed the MRI lumbar spine.  No further diagnostic tests were ordered.    2.  PHYSICAL THERAPY: Patient completed 7 sessions of physical therapy June 11, 2021.  She states she does home exercises.  Offered to refer her for additional physical therapy.  She will consider this.    3.  MEDICATIONS: No changes are made to the patient's medications.  Patient does not know what she takes for pain relief medications.  She has the following medications on her list:  - Tylenol 1000 mg 3 times daily.  - diclofenac gel as needed.  - Cymbalta 60 mg daily.  - meloxicam 15 mg daily.  - pregabalin 50 mg twice daily.    - Patient did not tolerate tizanidine 2 mg due to balance problems.    4.   INTERVENTIONS: No additional interventions were ordered.  She has tried multiple injections with no lasting improvement.  I am not overly optimistic another injection that will be more effective.    5.  PATIENT EDUCATION:  - I offered to refer the patient for chiropractic treatment or acupuncture.  She will consider this.    6.  FOLLOW-UP: Patient is going to follow-up with me as needed.  If she has questions or concerns, she should not hesitate to call.    Subjective:     Ricky Lea is a 73 year old female who presents today for follow-up regarding chronic low back pain.  Patient is following up after an L4-5 interlaminar epidural steroid injection March 10, 2023.  Patient reports partial relief of her pain only lasting several days.    Patient complains of centralized low back pain.  Pain radiates into the bilateral buttocks.  Denies pain further down the legs.  She has numbness and tingling left hand.  Denies numbness or tingling down the legs.  She feels generally weak.  She rates her pain today as an 8 out of 10.  She cannot identify any aggravating factors to the pain.  Pain is alleviated with applying heat and using a back brace.  She denies any new symptoms since she was last seen.    Treatment to date:  - Bilateral L4-5 transforaminal epidural steroid injection September 30, 2021 with significant relief of her pain for about 6 to 8 weeks  - Patient is status post bilateral L4-5 transforaminal epidural steroid injections December 27, 2021 which provided significant relief of her pain but only lasted 1 week.    -She then failed bilateral L3, L4, L5 medial branch blocks October 18, 2022.  - Patient had 50% relief with bilateral L2, L3 medial branch blocks November 15, 2022.  - Patient then failed confirmatory bilateral L2, L3 medial branch blocks.  - L4-5 interlaminar epidural steroid injection March 10, 2023 with partial relief of her pain only lasting several days.  -Patient completed 7 sessions of physical  therapy in June 2021.  She does her home exercises.  - Patient states that she takes  Tylenol 1000 mg 3 times daily, duloxetine 60 mg daily, meloxicam 15 mg daily, and she applies diclofenac gel.  She is not sure if she is taking pregabalin.     Review of Systems:  Positive for numbness/tingling (left hand), weakness (generalized).  Negative for loss of bowel/bladder control, inability to urinate, headache, pain much worse at night, difficulty swallowing, difficulty with hand skills, fevers, unintentional weight loss, falls.     Objective:   CONSTITUTIONAL:  Vital signs as above.  No acute distress.  The patient is well nourished and well groomed.    PSYCHIATRIC:  The patient is awake, alert, oriented to person, place and time.  The patient is answering questions appropriately with clear speech.  Normal affect.  HEENT: Normocephalic, atraumatic.  Sclera clear.    SKIN: Exposed skin is clean, dry, intact without rashes.  VASCULAR: No significant lower extremity edema.   MUSCULOSKELETAL:   Strength is 5/5 bilateral hip flexors, knee flexors/extensors, ankle dorsi/plantar flexors.  NEUROLOGICAL: No ankle clonus.  Sensation light touch intact bilateral lower extremities throughout.     RESULTS: I reviewed the MRI lumbar spine from [facility] dated October 1, 2022.  This shows degenerative disc changes with posterior disc bulges at L2-3, L3-4, and L4-5.  There is no significant spinal canal or neuroforaminal stenosis at any level.  There is no significant change compared with her MRI from April 2021.  There is a chronic T11 compression fracture.  No acute fracture.             Again, thank you for allowing me to participate in the care of your patient.        Sincerely,        Maureen Palafox PA-C

## 2023-04-06 NOTE — PATIENT INSTRUCTIONS
I am sorry that the injections were not helpful.  At this point I do not recommend another injection.    We talked about 3 other options for treatments:  - Chiropractor  - Acupuncture  - Additional physical therapy    Please let me know if you would like to pursue any of these treatments and I can enter referrals.    Otherwise, you can follow-up as needed.  If you have any questions or concerns please call our nurse line at [phone].

## 2023-04-11 ENCOUNTER — OFFICE VISIT (OUTPATIENT)
Dept: FAMILY MEDICINE | Facility: CLINIC | Age: 73
End: 2023-04-11
Payer: COMMERCIAL

## 2023-04-11 VITALS
TEMPERATURE: 98.6 F | DIASTOLIC BLOOD PRESSURE: 82 MMHG | OXYGEN SATURATION: 93 % | HEIGHT: 59 IN | RESPIRATION RATE: 16 BRPM | HEART RATE: 88 BPM | SYSTOLIC BLOOD PRESSURE: 138 MMHG | WEIGHT: 127.44 LBS | BODY MASS INDEX: 25.69 KG/M2

## 2023-04-11 DIAGNOSIS — G47.00 INSOMNIA, UNSPECIFIED TYPE: ICD-10-CM

## 2023-04-11 DIAGNOSIS — R32 URINARY INCONTINENCE, UNSPECIFIED TYPE: ICD-10-CM

## 2023-04-11 DIAGNOSIS — F32.1 MODERATE SINGLE CURRENT EPISODE OF MAJOR DEPRESSIVE DISORDER (H): ICD-10-CM

## 2023-04-11 DIAGNOSIS — E78.5 HYPERLIPIDEMIA, UNSPECIFIED HYPERLIPIDEMIA TYPE: ICD-10-CM

## 2023-04-11 DIAGNOSIS — M54.50 CHRONIC MIDLINE LOW BACK PAIN WITHOUT SCIATICA: ICD-10-CM

## 2023-04-11 DIAGNOSIS — G89.29 CHRONIC MIDLINE LOW BACK PAIN WITHOUT SCIATICA: ICD-10-CM

## 2023-04-11 DIAGNOSIS — E11.9 TYPE 2 DIABETES MELLITUS TREATED WITHOUT INSULIN (H): Primary | ICD-10-CM

## 2023-04-11 DIAGNOSIS — I10 ESSENTIAL HYPERTENSION: ICD-10-CM

## 2023-04-11 LAB
ANION GAP SERPL CALCULATED.3IONS-SCNC: 12 MMOL/L (ref 7–15)
BUN SERPL-MCNC: 12.6 MG/DL (ref 8–23)
CALCIUM SERPL-MCNC: 9.3 MG/DL (ref 8.8–10.2)
CHLORIDE SERPL-SCNC: 103 MMOL/L (ref 98–107)
CHOLEST SERPL-MCNC: 196 MG/DL
CREAT SERPL-MCNC: 1.01 MG/DL (ref 0.51–0.95)
DEPRECATED HCO3 PLAS-SCNC: 26 MMOL/L (ref 22–29)
GFR SERPL CREATININE-BSD FRML MDRD: 59 ML/MIN/1.73M2
GLUCOSE SERPL-MCNC: 138 MG/DL (ref 70–99)
HBA1C MFR BLD: 7.4 % (ref 0–5.6)
HDLC SERPL-MCNC: 39 MG/DL
LDLC SERPL CALC-MCNC: 97 MG/DL
NONHDLC SERPL-MCNC: 157 MG/DL
POTASSIUM SERPL-SCNC: 4.2 MMOL/L (ref 3.4–5.3)
SODIUM SERPL-SCNC: 141 MMOL/L (ref 136–145)
TRIGL SERPL-MCNC: 299 MG/DL

## 2023-04-11 PROCEDURE — 80061 LIPID PANEL: CPT | Performed by: FAMILY MEDICINE

## 2023-04-11 PROCEDURE — 83036 HEMOGLOBIN GLYCOSYLATED A1C: CPT | Performed by: FAMILY MEDICINE

## 2023-04-11 PROCEDURE — 99214 OFFICE O/P EST MOD 30 MIN: CPT | Performed by: FAMILY MEDICINE

## 2023-04-11 PROCEDURE — 36415 COLL VENOUS BLD VENIPUNCTURE: CPT | Performed by: FAMILY MEDICINE

## 2023-04-11 PROCEDURE — 80048 BASIC METABOLIC PNL TOTAL CA: CPT | Performed by: FAMILY MEDICINE

## 2023-04-11 RX ORDER — METFORMIN HYDROCHLORIDE 750 MG/1
750 TABLET, EXTENDED RELEASE ORAL
Qty: 90 TABLET | Refills: 3 | Status: SHIPPED | OUTPATIENT
Start: 2023-04-11 | End: 2024-04-26

## 2023-04-11 ASSESSMENT — PATIENT HEALTH QUESTIONNAIRE - PHQ9
SUM OF ALL RESPONSES TO PHQ QUESTIONS 1-9: 0
SUM OF ALL RESPONSES TO PHQ QUESTIONS 1-9: 0
10. IF YOU CHECKED OFF ANY PROBLEMS, HOW DIFFICULT HAVE THESE PROBLEMS MADE IT FOR YOU TO DO YOUR WORK, TAKE CARE OF THINGS AT HOME, OR GET ALONG WITH OTHER PEOPLE: NOT DIFFICULT AT ALL

## 2023-04-11 NOTE — PROGRESS NOTES
Type 2 diabetes mellitus treated without insulin (H)  A1c pending at the time of the visit, will call if needs changes on diabetic medications.  - Hemoglobin A1c  - Basic metabolic panel  - Adult [facility] Referral; Future  - metFORMIN (GLUCOPHAGE-XR) 750 MG 24 hr tablet; Take 1 tablet (750 mg) by mouth daily (with dinner) Dose increased.    Chronic kidney disease, stage 3 (H)  - Basic metabolic panel    Moderate single current episode of major depressive disorder (H)  Denied suicidal or homicidal ideations.    Insomnia, unspecified type  Discussed adding additional medication for sleep but patient elected to defer for now.    Chronic midline low back pain without sciatica  Released from spine center.    Essential hypertension  Normal blood pressure without medication.  We will continue to monitor.    Hyperlipidemia, unspecified hyperlipidemia type  Tolerating medication well.  - Lipid panel    Urinary incontinence, unspecified type  Wears diaper.    Subjective   Pa is a 73 year old, presenting for the following health issues:  Medication Check   The patient is here with her nephew.  She is here for follow-up.  She was last seen by spine center 5 days ago, note reviewed.  Spine center released the patient.  She is taking Lyrica 50 mg twice a day.  No worsening pain since last visit with spine center.  Her fasting blood sugar are in the 130s to 150s most days.  No low blood sugar with hypoglycemic symptoms.    The patient nephew reports that the patient's been crying a lot.  He does not live with the patient but sees her crying sometimes when he visits her.  The patient lives with her daughter and grandchildren.  No relationship issues at home.  The patient was not able to tell me what makes her want to cry.  She denied suicidal or homicidal ideations.  She reports trouble sleeping at night sometimes but does not want to add medication for it.              4/11/2023     2:17 PM   Additional Questions   Roomed  "by Matilda King   Accompanied by nephew : Raw Lay     History of Present Illness       Reason for visit:  Medication check     Today's PHQ-9         PHQ-9 Total Score: 0    PHQ-9 Q9 Thoughts of better off dead/self-harm past 2 weeks :   Not at all    How difficult have these problems made it for you to do your work, take care of things at home, or get along with other people: Not difficult at all       Review of Systems   CONSTITUTIONAL: NEGATIVE for fever, chills, change in weight  RESP: NEGATIVE for significant cough or SOB  CV: NEGATIVE for chest pain, palpitations or peripheral edema      Objective    /82 (BP Location: Left arm, Patient Position: Sitting, Cuff Size: Adult Regular)   Pulse 88   Temp 98.6  F (37  C) (Oral)   Resp 16   Ht 1.495 m (4' 10.86\")   Wt 57.8 kg (127 lb 7 oz)   SpO2 93%   BMI 25.86 kg/m    Body mass index is 25.86 kg/m .  Physical Exam   GENERAL: healthy, alert and no distress  NECK: Supple.  RESP: lungs clear to auscultation - no rales, rhonchi or wheezes  CV: regular rate and rhythm, normal S1 S2, no S3 or S4, no murmur, click or rub, no peripheral edema and peripheral pulses strong  ABDOMEN: soft, nontender, no hepatosplenomegaly, no masses and bowel sounds normal  Diabetic foot exam: normal sensory exam            This transcription uses voice recognition software, which may contain typographical errors.      "

## 2023-04-13 ENCOUNTER — TELEPHONE (OUTPATIENT)
Dept: FAMILY MEDICINE | Facility: CLINIC | Age: 73
End: 2023-04-13
Payer: COMMERCIAL

## 2023-04-13 NOTE — TELEPHONE ENCOUNTER
Called pt and spoke to daughter (C2C on file) and relayed message. Daughter verbalized understanding.    Sharif Yin, BSN RN  [facility]      ----- Message from Bassam Llanos MD sent at 4/13/2023  7:00 AM CDT -----  Kidney test is slightly abnormal, no medication changes needed yet. But needs to increase daily water intake. Will recheck when she comes in follow up.     Please call the patient.     Dr. Bassam Llanos  4/13/2023 6:59 AM

## 2023-04-15 DIAGNOSIS — Z76.0 ENCOUNTER FOR MEDICATION REFILL: ICD-10-CM

## 2023-04-15 DIAGNOSIS — F32.1 MODERATE SINGLE CURRENT EPISODE OF MAJOR DEPRESSIVE DISORDER (H): ICD-10-CM

## 2023-04-16 RX ORDER — DULOXETIN HYDROCHLORIDE 60 MG/1
CAPSULE, DELAYED RELEASE ORAL
Qty: 90 CAPSULE | Refills: 3 | OUTPATIENT
Start: 2023-04-16

## 2023-04-16 NOTE — TELEPHONE ENCOUNTER
"Last Written Prescription Date:  4/28/2022  Last Fill Quantity: 90,  # refills: 3   Last office visit provider:  4/11/2023     Requested Prescriptions   Pending Prescriptions Disp Refills     DULoxetine (CYMBALTA) 60 MG capsule [Pharmacy Med Name: DULOXETINE HCL 60 MG CPEP 60 Capsule] 90 capsule 3     Sig: TAKE 1 CAPSULE (60 MG TOTAL) BY MOUTH DAILY FOR DEPRESSION       Serotonin-Norepinephrine Reuptake Inhibitors  Passed - 4/15/2023 10:37 AM        Passed - Blood pressure under 140/90 in past 12 months     BP Readings from Last 3 Encounters:   04/11/23 138/82   04/06/23 119/73   03/10/23 (!) 158/88                 Passed - PHQ-9 score of less than 5 in past 6 months     Please review last PHQ-9 score.           Passed - Medication is active on med list        Passed - Patient is age 18 or older        Passed - No active pregnancy on record        Passed - No positive pregnancy test in past 12 months        Passed - Recent (6 mo) or future (30 days) visit within the authorizing provider's specialty     Patient had office visit in the last 6 months or has a visit in the next 30 days with authorizing provider or within the authorizing provider's specialty.  See \"Patient Info\" tab in inbasket, or \"Choose Columns\" in Meds & Orders section of the refill encounter.                 Willa Morales RN 04/16/23 2:12 AM  "

## 2023-04-19 ENCOUNTER — TRANSFERRED RECORDS (OUTPATIENT)
Dept: HEALTH INFORMATION MANAGEMENT | Facility: CLINIC | Age: 73
End: 2023-04-19
Payer: COMMERCIAL

## 2023-04-19 LAB — RETINOPATHY: NEGATIVE

## 2023-05-23 DIAGNOSIS — G62.9 NEUROPATHY: ICD-10-CM

## 2023-05-23 RX ORDER — PREGABALIN 50 MG/1
50 CAPSULE ORAL 2 TIMES DAILY
Qty: 60 CAPSULE | Refills: 3 | Status: SHIPPED | OUTPATIENT
Start: 2023-05-23 | End: 2023-07-11

## 2023-06-01 DIAGNOSIS — M54.50 CHRONIC MIDLINE LOW BACK PAIN WITHOUT SCIATICA: ICD-10-CM

## 2023-06-01 DIAGNOSIS — G89.29 CHRONIC MIDLINE LOW BACK PAIN WITHOUT SCIATICA: ICD-10-CM

## 2023-06-01 DIAGNOSIS — F32.1 MODERATE SINGLE CURRENT EPISODE OF MAJOR DEPRESSIVE DISORDER (H): ICD-10-CM

## 2023-06-01 DIAGNOSIS — Z76.0 ENCOUNTER FOR MEDICATION REFILL: ICD-10-CM

## 2023-06-02 RX ORDER — MELOXICAM 15 MG/1
TABLET ORAL
Qty: 30 TABLET | Refills: 9 | Status: SHIPPED | OUTPATIENT
Start: 2023-06-02 | End: 2024-04-03

## 2023-06-02 RX ORDER — DULOXETIN HYDROCHLORIDE 60 MG/1
CAPSULE, DELAYED RELEASE ORAL
Qty: 90 CAPSULE | Refills: 1 | Status: SHIPPED | OUTPATIENT
Start: 2023-06-02 | End: 2023-10-17

## 2023-06-02 NOTE — TELEPHONE ENCOUNTER
"Routing refill request to provider for review/approval because:  Labs not current:  multiple    Last Written Prescription Date:  1/25/2023  Last Fill Quantity: 30,  # refills: 3   Last office visit provider:  4/11/2023     Requested Prescriptions   Pending Prescriptions Disp Refills     meloxicam (MOBIC) 15 MG tablet [Pharmacy Med Name: MELOXICAM 15 MG TABLET 15 Tablet] 30 tablet 3     Sig: TAKE 1 PILL (15 MG) BY MOUTH EVERYDAY FOR PAIN       NSAID Medications Failed - 6/1/2023  3:14 PM        Failed - Normal ALT on file in past 12 months     Recent Labs   Lab Test 05/03/22  1319   ALT 25             Failed - Normal AST on file in past 12 months     Recent Labs   Lab Test 05/03/22  1319   AST 25             Failed - Patient is age 6-64 years        Failed - Normal CBC on file in past 12 months     Recent Labs   Lab Test 01/13/22  1046   WBC 10.4   RBC 4.46   HGB 14.0   HCT 43.9                    Failed - Normal serum creatinine on file in past 12 months     Recent Labs   Lab Test 04/11/23  1454   CR 1.01*       Ok to refill medication if creatinine is low          Passed - Blood pressure under 140/90 in past 12 months     BP Readings from Last 3 Encounters:   04/11/23 138/82   04/06/23 119/73   03/10/23 (!) 158/88                 Passed - Recent (12 mo) or future (30 days) visit within the authorizing provider's specialty     Patient has had an office visit with the authorizing provider or a provider within the authorizing providers department within the previous 12 mos or has a future within next 30 days. See \"Patient Info\" tab in inbasket, or \"Choose Columns\" in Meds & Orders section of the refill encounter.              Passed - Medication is active on med list        Passed - No active pregnancy on record        Passed - No positive pregnancy test in past 12 months             Bee Mancera RN 06/02/23 12:33 PM      "

## 2023-06-02 NOTE — TELEPHONE ENCOUNTER
"Last Written Prescription Date:  4/28/2022  Last Fill Quantity: 90,  # refills: 3   Last office visit provider:  4/11/2023     Requested Prescriptions   Pending Prescriptions Disp Refills     DULoxetine (CYMBALTA) 60 MG capsule [Pharmacy Med Name: DULOXETINE HCL 60 MG CPEP 60 Capsule] 90 capsule 3     Sig: TAKE 1 CAPSULE (60 MG TOTAL) BY MOUTH DAILY FOR DEPRESSION       Serotonin-Norepinephrine Reuptake Inhibitors  Passed - 6/1/2023  3:14 PM        Passed - Blood pressure under 140/90 in past 12 months     BP Readings from Last 3 Encounters:   04/11/23 138/82   04/06/23 119/73   03/10/23 (!) 158/88                 Passed - PHQ-9 score of less than 5 in past 6 months     Please review last PHQ-9 score.           Passed - Medication is active on med list        Passed - Patient is age 18 or older        Passed - No active pregnancy on record        Passed - No positive pregnancy test in past 12 months        Passed - Recent (6 mo) or future (30 days) visit within the authorizing provider's specialty     Patient had office visit in the last 6 months or has a visit in the next 30 days with authorizing provider or within the authorizing provider's specialty.  See \"Patient Info\" tab in inbasket, or \"Choose Columns\" in Meds & Orders section of the refill encounter.                 Bee Mancera RN 06/02/23 12:32 PM      "

## 2023-06-06 ENCOUNTER — MEDICAL CORRESPONDENCE (OUTPATIENT)
Dept: HEALTH INFORMATION MANAGEMENT | Facility: CLINIC | Age: 73
End: 2023-06-06
Payer: COMMERCIAL

## 2023-06-07 NOTE — ED TRIAGE NOTES
Patient has been experiencing low back pain for the past five days. She has a history of recurrent back pain. Although I am using a Sejal interpretor, it is not clear if she has been treating the pain with OTC or prescription analgesics.  
You can access the FollowMyHealth Patient Portal offered by [facility] by registering at the following website: [email or web address]. By joining [facility]’s FollowMyHealth portal, you will also be able to view your health information using other applications (apps) compatible with our system.

## 2023-07-11 ENCOUNTER — OFFICE VISIT (OUTPATIENT)
Dept: FAMILY MEDICINE | Facility: CLINIC | Age: 73
End: 2023-07-11
Payer: COMMERCIAL

## 2023-07-11 VITALS
BODY MASS INDEX: 25.3 KG/M2 | SYSTOLIC BLOOD PRESSURE: 108 MMHG | RESPIRATION RATE: 16 BRPM | HEIGHT: 59 IN | WEIGHT: 125.5 LBS | TEMPERATURE: 98.2 F | DIASTOLIC BLOOD PRESSURE: 62 MMHG | HEART RATE: 68 BPM | OXYGEN SATURATION: 100 %

## 2023-07-11 DIAGNOSIS — G62.9 NEUROPATHY: ICD-10-CM

## 2023-07-11 DIAGNOSIS — E11.9 TYPE 2 DIABETES MELLITUS TREATED WITHOUT INSULIN (H): Primary | ICD-10-CM

## 2023-07-11 DIAGNOSIS — N18.9 CHRONIC KIDNEY DISEASE, UNSPECIFIED CKD STAGE: ICD-10-CM

## 2023-07-11 DIAGNOSIS — S22.080S COMPRESSION FRACTURE OF T11 VERTEBRA, SEQUELA: ICD-10-CM

## 2023-07-11 DIAGNOSIS — F32.1 MODERATE SINGLE CURRENT EPISODE OF MAJOR DEPRESSIVE DISORDER (H): ICD-10-CM

## 2023-07-11 DIAGNOSIS — M81.0 OSTEOPOROSIS, UNSPECIFIED OSTEOPOROSIS TYPE, UNSPECIFIED PATHOLOGICAL FRACTURE PRESENCE: ICD-10-CM

## 2023-07-11 LAB
CREAT SERPL-MCNC: 1.01 MG/DL (ref 0.51–0.95)
GFR SERPL CREATININE-BSD FRML MDRD: 59 ML/MIN/1.73M2
HBA1C MFR BLD: 7.2 % (ref 0–5.6)

## 2023-07-11 PROCEDURE — 83036 HEMOGLOBIN GLYCOSYLATED A1C: CPT | Performed by: FAMILY MEDICINE

## 2023-07-11 PROCEDURE — 82565 ASSAY OF CREATININE: CPT | Performed by: FAMILY MEDICINE

## 2023-07-11 PROCEDURE — 36415 COLL VENOUS BLD VENIPUNCTURE: CPT | Performed by: FAMILY MEDICINE

## 2023-07-11 PROCEDURE — 99214 OFFICE O/P EST MOD 30 MIN: CPT | Performed by: FAMILY MEDICINE

## 2023-07-11 RX ORDER — PREGABALIN 100 MG/1
100 CAPSULE ORAL 2 TIMES DAILY
Qty: 180 CAPSULE | Refills: 0 | Status: SHIPPED | OUTPATIENT
Start: 2023-07-11 | End: 2023-10-05

## 2023-07-11 NOTE — PROGRESS NOTES
"  Type 2 diabetes mellitus treated without insulin (H)  A1c 7.2 , no med changes.   - Albumin Random Urine Quantitative with Creat Ratio; Future  - REVIEW OF HEALTH MAINTENANCE PROTOCOL ORDERS  - Creatinine; Future  - Hemoglobin A1c; Future  - Creatinine  - Hemoglobin A1c    Chronic kidney disease, stage 3 (H)  Recheck today.  Last creatinine 1.01, 3 months ago.    Compression fracture of T11 vertebra, sequela  We will try to get a walker.  Injection did not help.  We will increase Lyrica to 100 mg twice daily.    Neuropathy  - pregabalin (LYRICA) 100 MG capsule; Take 1 capsule (100 mg) by mouth 2 times daily    Osteoporosis, unspecified osteoporosis type, unspecified pathological fracture presence  Continue weekly Fosamax.    Moderate single current episode of major depressive disorder (H)  Stable.    Subjective   Pa is a 73 year old, presenting for the following health issues:  Follow Up (Diabetes , and depression)      Fasting blood sugar are in the 120s to 140s most day.  The lowest was 89 in the month.  No hypoglycemic symptoms reported.  No problem taking her medication.  She was seen by spine center for ongoing back pain.  Injection did not help.  Physical therapy did not help for patient.  She is currently taking Lyrica 50 mg twice daily.  She is also on Cymbalta for pain and depression.  Depression symptoms seems to be stable.  Main concern is her ongoing back pain.        7/11/2023    10:13 AM   Additional Questions   Roomed by Matilda King   Accompanied by Daughter : Barrie Fang     Review of Systems   CONSTITUTIONAL: NEGATIVE for fever, chills, change in weight  RESP: NEGATIVE for significant cough or SOB  CV: NEGATIVE for chest pain/chest pressure      Objective    /62 (BP Location: Right arm, Patient Position: Sitting, Cuff Size: Adult Regular)   Pulse 68   Temp 98.2  F (36.8  C) (Oral)   Resp 16   Ht 1.495 m (4' 10.86\")   Wt 56.9 kg (125 lb 8 oz)   SpO2 100%   BMI 25.47 kg/m    Body mass " index is 25.47 kg/m .  Physical Exam   GENERAL: healthy, alert and no distress  NECK: Supple  RESP: lungs clear to auscultation - no rales, rhonchi or wheezes  CV: regular rates and rhythm and no murmur, click or rub  ABDOMEN: soft, nontender, no hepatosplenomegaly, no masses and bowel sounds normal  Comprehensive back pain exam:  Tenderness of Lower lumbar area.  No significant deformities noted.                Answers for HPI/ROS submitted by the patient on 7/11/2023  What is the reason for your visit today? : f/u

## 2023-07-13 ENCOUNTER — TELEPHONE (OUTPATIENT)
Dept: FAMILY MEDICINE | Facility: CLINIC | Age: 73
End: 2023-07-13
Payer: COMMERCIAL

## 2023-07-13 NOTE — TELEPHONE ENCOUNTER
Called [facility] and spoke with [employer] regarding 4 wheel walker to be deliver to patient per [employer] patient or family member needs to be home to received walker on Monday July 17, 2023 between 9 am-1 pm.      2)Called patient relay message and daughter Barrie Samuels agree with delivery date and choose Red color walker ( Red, Black, Green, or Purple).      3)Called [employer] Color was relay and inform patient family agree with delivery date.

## 2023-07-20 DIAGNOSIS — Z76.0 ENCOUNTER FOR MEDICATION REFILL: ICD-10-CM

## 2023-07-20 DIAGNOSIS — M25.512 LEFT SHOULDER PAIN, UNSPECIFIED CHRONICITY: ICD-10-CM

## 2023-07-20 RX ORDER — ACETAMINOPHEN 500 MG
TABLET ORAL
Qty: 100 TABLET | Refills: 2 | Status: SHIPPED | OUTPATIENT
Start: 2023-07-20 | End: 2024-04-26

## 2023-07-20 NOTE — TELEPHONE ENCOUNTER
"Routing refill request to provider for review/approval because:  PRN med, needs provider review for frequency of use.     Last Written Prescription Date:  02/10/2022  Last Fill Quantity: 100,  # refills: 2   Last office visit provider:  07/11/2023     Requested Prescriptions   Pending Prescriptions Disp Refills     acetaminophen (TYLENOL) 500 MG tablet [Pharmacy Med Name: ACETAMINOPHEN 500 MG TABS 500 Tablet] 100 tablet 2     Sig: TAKE 1 TABLET (500 MG TOTAL) BY MOUTH EVERY 6 (SIX) HOURS AS NEEDED FOR PAIN. PT CAN TAKE UP TO 2 TABS IF NEEDED.       Analgesics (Non-Narcotic Tylenol and ASA Only) Passed - 7/20/2023  3:44 PM        Passed - Recent (12 mo) or future (30 days) visit within the authorizing provider's specialty     Patient has had an office visit with the authorizing provider or a provider within the authorizing providers department within the previous 12 mos or has a future within next 30 days. See \"Patient Info\" tab in inbasket, or \"Choose Columns\" in Meds & Orders section of the refill encounter.              Passed - Patient is 7 months old or older     If patient is a peds patient of the age 7 mos -12 years, ok to refill using weight-based dosing.     If >3g daily and/or sig is not \"prn\", check for liver enzymes. If normal in the last year, ok to refill.  If not, refer to the provider.          Passed - Medication is active on med list             Remy Glaser RN 07/20/23 3:45 PM  "

## 2023-07-27 ENCOUNTER — PATIENT OUTREACH (OUTPATIENT)
Dept: GERIATRIC MEDICINE | Facility: CLINIC | Age: 73
End: 2023-07-27
Payer: COMMERCIAL

## 2023-07-27 NOTE — PROGRESS NOTES
[facility] Care Coordination Contact      [facility] Six-Month Telephone Assessment    6 month telephone assessment completed on 7/27/2023.    ER visits: No  Hospitalizations: No  TCU stays: No  Significant health status changes: No changes in health.   Falls/Injuries: No  ADL/IADL changes: No  Changes in services: No    Caregiver Assessment follow up:  Daughters are still caregivers.     Goals: See POC in chart for goal progress documentation.      Will see member in 6 months for an annual health risk assessment.   Encouraged member to call CC with any questions or concerns in the meantime.     Lowell Woods RN, BSN, PHN  [facility]  Phone: [phone]

## 2023-08-01 NOTE — TELEPHONE ENCOUNTER
RN cannot approve Refill Request    RN can NOT refill this medication Protocol failed and NO refill given. Last office visit: 12/3/2019 Bassam Llanos MD Last Physical: 8/13/2019 Last MTM visit: Visit date not found Last visit same specialty: 12/3/2019 Bassam Llanos MD.  Next visit within 3 mo: Visit date not found  Next physical within 3 mo: Visit date not found      Mary Ann Lema, Care Connection Triage/Med Refill 12/7/2019    Requested Prescriptions   Pending Prescriptions Disp Refills     triamcinolone (KENALOG) 0.1 % ointment [Pharmacy Med Name: TRIAMCINOLONE 0.1% OINTMENT 454GM] 454 g 0     Sig: APPLY EXTERNALLY TO THE AFFECTED AREA TWICE DAILY       There is no refill protocol information for this order            PAIN

## 2023-08-10 DIAGNOSIS — E11.9 TYPE 2 DIABETES MELLITUS TREATED WITHOUT INSULIN (H): ICD-10-CM

## 2023-08-10 NOTE — TELEPHONE ENCOUNTER
"Last Written Prescription Date:  8/9/2022  Last Fill Quantity: 200,  # refills: 11   Last office visit provider:  7/11/2023 with PCP Dr KARENA Llanos     Requested Prescriptions   Pending Prescriptions Disp Refills    CONTOUR NEXT TEST test strip [Pharmacy Med Name: CONTOUR NEXT STRIPS 50 Strip] 50 strip 11     Sig: USE 1 EACH DAILY *50 DAYS SUPPLY*       Diabetic Supplies Protocol Passed - 8/10/2023 12:32 PM        Passed - Medication is active on med list        Passed - Patient is 18 years of age or older        Passed - Recent (6 mo) or future (30 days) visit within the authorizing provider's specialty     Patient had office visit in the last 6 months or has a visit in the next 30 days with authorizing provider.  See \"Patient Info\" tab in inbasket, or \"Choose Columns\" in Meds & Orders section of the refill encounter.                 Alma Ferro RN 08/10/23 3:29 PM  "

## 2023-08-28 ENCOUNTER — ANCILLARY PROCEDURE (OUTPATIENT)
Dept: MAMMOGRAPHY | Facility: CLINIC | Age: 73
End: 2023-08-28
Attending: FAMILY MEDICINE
Payer: COMMERCIAL

## 2023-08-28 DIAGNOSIS — Z12.31 VISIT FOR SCREENING MAMMOGRAM: ICD-10-CM

## 2023-08-28 PROCEDURE — 77067 SCR MAMMO BI INCL CAD: CPT | Mod: TC | Performed by: RADIOLOGY

## 2023-09-07 ENCOUNTER — MEDICAL CORRESPONDENCE (OUTPATIENT)
Dept: HEALTH INFORMATION MANAGEMENT | Facility: CLINIC | Age: 73
End: 2023-09-07
Payer: COMMERCIAL

## 2023-09-22 ENCOUNTER — MEDICAL CORRESPONDENCE (OUTPATIENT)
Dept: FAMILY MEDICINE | Facility: CLINIC | Age: 73
End: 2023-09-22
Payer: COMMERCIAL

## 2023-09-28 ENCOUNTER — MEDICAL CORRESPONDENCE (OUTPATIENT)
Dept: HEALTH INFORMATION MANAGEMENT | Facility: CLINIC | Age: 73
End: 2023-09-28
Payer: COMMERCIAL

## 2023-09-30 DIAGNOSIS — Z76.0 ENCOUNTER FOR MEDICATION REFILL: ICD-10-CM

## 2023-09-30 DIAGNOSIS — E78.5 HYPERLIPIDEMIA, UNSPECIFIED HYPERLIPIDEMIA TYPE: ICD-10-CM

## 2023-10-02 RX ORDER — ATORVASTATIN CALCIUM 20 MG/1
TABLET, FILM COATED ORAL
Qty: 90 TABLET | Refills: 0 | Status: SHIPPED | OUTPATIENT
Start: 2023-10-02 | End: 2024-01-03

## 2023-10-03 ENCOUNTER — MEDICAL CORRESPONDENCE (OUTPATIENT)
Dept: HEALTH INFORMATION MANAGEMENT | Facility: CLINIC | Age: 73
End: 2023-10-03
Payer: COMMERCIAL

## 2023-10-04 DIAGNOSIS — G62.9 NEUROPATHY: ICD-10-CM

## 2023-10-05 RX ORDER — PREGABALIN 100 MG/1
100 CAPSULE ORAL 2 TIMES DAILY
Qty: 60 CAPSULE | Refills: 3 | Status: SHIPPED | OUTPATIENT
Start: 2023-10-05 | End: 2024-02-01

## 2023-10-10 ENCOUNTER — MEDICAL CORRESPONDENCE (OUTPATIENT)
Dept: HEALTH INFORMATION MANAGEMENT | Facility: CLINIC | Age: 73
End: 2023-10-10
Payer: COMMERCIAL

## 2023-10-17 ENCOUNTER — OFFICE VISIT (OUTPATIENT)
Dept: FAMILY MEDICINE | Facility: CLINIC | Age: 73
End: 2023-10-17
Payer: COMMERCIAL

## 2023-10-17 VITALS
TEMPERATURE: 98.2 F | HEART RATE: 70 BPM | HEIGHT: 59 IN | BODY MASS INDEX: 25.4 KG/M2 | DIASTOLIC BLOOD PRESSURE: 79 MMHG | RESPIRATION RATE: 16 BRPM | OXYGEN SATURATION: 98 % | WEIGHT: 126 LBS | SYSTOLIC BLOOD PRESSURE: 137 MMHG

## 2023-10-17 DIAGNOSIS — E11.9 TYPE 2 DIABETES MELLITUS TREATED WITHOUT INSULIN (H): ICD-10-CM

## 2023-10-17 DIAGNOSIS — S22.080S COMPRESSION FRACTURE OF T11 VERTEBRA, SEQUELA: ICD-10-CM

## 2023-10-17 DIAGNOSIS — R94.4 ABNORMAL RENAL FUNCTION TEST: ICD-10-CM

## 2023-10-17 DIAGNOSIS — F32.1 MODERATE SINGLE CURRENT EPISODE OF MAJOR DEPRESSIVE DISORDER (H): Primary | ICD-10-CM

## 2023-10-17 DIAGNOSIS — Z23 IMMUNIZATION DUE: ICD-10-CM

## 2023-10-17 DIAGNOSIS — E78.5 HYPERLIPIDEMIA, UNSPECIFIED HYPERLIPIDEMIA TYPE: ICD-10-CM

## 2023-10-17 DIAGNOSIS — M81.0 OSTEOPOROSIS, UNSPECIFIED OSTEOPOROSIS TYPE, UNSPECIFIED PATHOLOGICAL FRACTURE PRESENCE: ICD-10-CM

## 2023-10-17 LAB
CHOLEST SERPL-MCNC: 165 MG/DL
CREAT SERPL-MCNC: 1.08 MG/DL (ref 0.51–0.95)
EGFRCR SERPLBLD CKD-EPI 2021: 55 ML/MIN/1.73M2
HBA1C MFR BLD: 6.3 % (ref 0–5.6)
HDLC SERPL-MCNC: 41 MG/DL
LDLC SERPL CALC-MCNC: 94 MG/DL
NONHDLC SERPL-MCNC: 124 MG/DL
TRIGL SERPL-MCNC: 149 MG/DL

## 2023-10-17 PROCEDURE — 80061 LIPID PANEL: CPT | Performed by: FAMILY MEDICINE

## 2023-10-17 PROCEDURE — 90471 IMMUNIZATION ADMIN: CPT | Performed by: FAMILY MEDICINE

## 2023-10-17 PROCEDURE — 99214 OFFICE O/P EST MOD 30 MIN: CPT | Mod: 25 | Performed by: FAMILY MEDICINE

## 2023-10-17 PROCEDURE — 83036 HEMOGLOBIN GLYCOSYLATED A1C: CPT | Performed by: FAMILY MEDICINE

## 2023-10-17 PROCEDURE — 90662 IIV NO PRSV INCREASED AG IM: CPT | Performed by: FAMILY MEDICINE

## 2023-10-17 PROCEDURE — 82565 ASSAY OF CREATININE: CPT | Performed by: FAMILY MEDICINE

## 2023-10-17 PROCEDURE — 36415 COLL VENOUS BLD VENIPUNCTURE: CPT | Performed by: FAMILY MEDICINE

## 2023-10-17 RX ORDER — DULOXETIN HYDROCHLORIDE 60 MG/1
CAPSULE, DELAYED RELEASE ORAL
Qty: 90 CAPSULE | Refills: 1 | Status: SHIPPED | OUTPATIENT
Start: 2023-10-17 | End: 2024-06-27

## 2023-10-17 ASSESSMENT — PATIENT HEALTH QUESTIONNAIRE - PHQ9
10. IF YOU CHECKED OFF ANY PROBLEMS, HOW DIFFICULT HAVE THESE PROBLEMS MADE IT FOR YOU TO DO YOUR WORK, TAKE CARE OF THINGS AT HOME, OR GET ALONG WITH OTHER PEOPLE: NOT DIFFICULT AT ALL
SUM OF ALL RESPONSES TO PHQ QUESTIONS 1-9: 2
SUM OF ALL RESPONSES TO PHQ QUESTIONS 1-9: 2

## 2023-10-17 NOTE — PROGRESS NOTES
Moderate single current episode of major depressive disorder (H)  Stable. No SI/HI  - DULoxetine (CYMBALTA) 60 MG capsule; TAKE 1 CAPSULE (60 MG TOTAL) BY MOUTH DAILY FOR DEPRESSION    Abnormal renal function test  Recheck, Stop Metformin and alternative if indicated.   - Creatinine    Type 2 diabetes mellitus treated without insulin (H)  - Hemoglobin A1c; Future  - Hemoglobin A1c    Hyperlipidemia, unspecified hyperlipidemia type  - Lipid panel    Osteoporosis, unspecified osteoporosis type, unspecified pathological fracture presence      Compression fracture of T11 vertebra, sequela  Pain improving    Immunization due  - INFLUENZA VACCINE 65+ (FLUZONE HD)     Subjective   Pa is a 73 year old female with history of depression, diabetes, hypertension, hyperlipidemia, chronic back pain and compression fracture here for follow-up.  She is here with her daughter.  Lyrica was increased to 100 mg from 50 mg, states back pain is improving.  No side effects including drowsiness reported with higher dose of Lyrica.  She checks her blood sugar once a day before breakfast.  Fasting blood glucose numbers are in the 120s to 140s most days.  1 reading above 200 in the past 2 months.  No low blood sugar with hypoglycemic symptoms reported.  Depression symptoms seems to be stable.  Daughter states she is not crying frequently any longer.  Patient states depression symptoms are improving.  She has no problem taking her medications.  Home care RN set up her pillbox regularly.  No new symptoms or concerns since last visit.        10/17/2023     9:02 AM   Additional Questions   Roomed by Veronica THURSTON   Accompanied by daughter Barrie       History of Present Illness       Diabetes:   She presents for follow up of diabetes.  She is checking home blood glucose one time daily.   She checks blood glucose before meals.  Blood glucose is never over 200 and never under 70. She is aware of hypoglycemia symptoms including other.    She has no  "concerns regarding her diabetes at this time.  She is having burning in feet.             Review of Systems   CONSTITUTIONAL: NEGATIVE for fever, chills, change in weight  RESP: NEGATIVE for significant cough or SOB  CV: NEGATIVE for chest pain/chest pressure      Objective    /79   Pulse 70   Temp 98.2  F (36.8  C) (Oral)   Resp 16   Ht 1.499 m (4' 11\")   Wt 57.2 kg (126 lb)   SpO2 98%   BMI 25.45 kg/m    Body mass index is 25.45 kg/m .  Physical Exam   GENERAL: healthy, alert and no distress.  Uses cane for ambulation.  NECK: Supple  RESP: lungs clear to auscultation - no rales, rhonchi or wheezes  CV: regular rates and rhythm and no peripheral edema  ABDOMEN: soft, nontender, no hepatosplenomegaly, no masses and bowel sounds normal  MS: BACK-tenderness mid lower lumbar area.  Diabetic foot exam: no trophic changes or ulcerative lesions and normal sensory exam    This transcription uses voice recognition software, which may contain typographical errors.                "

## 2023-11-28 ENCOUNTER — PATIENT OUTREACH (OUTPATIENT)
Dept: GERIATRIC MEDICINE | Facility: CLINIC | Age: 73
End: 2023-11-28
Payer: COMMERCIAL

## 2023-11-28 NOTE — PROGRESS NOTES
[facility] Care Coordination Contact    Called adult daughter Barrie Samuels  to schedule annual HRA home visit. HRA has been scheduled for 12/7/2023.  Called Yamel Saab and scheduled an  for the home visit.    Lowell Woods RN, BSN, PHN  [facility]  Phone: [phone]

## 2023-12-05 ENCOUNTER — MEDICAL CORRESPONDENCE (OUTPATIENT)
Dept: HEALTH INFORMATION MANAGEMENT | Facility: CLINIC | Age: 73
End: 2023-12-05
Payer: COMMERCIAL

## 2023-12-05 DIAGNOSIS — Z53.9 DIAGNOSIS NOT YET DEFINED: Primary | ICD-10-CM

## 2023-12-05 PROCEDURE — G0179 MD RECERTIFICATION HHA PT: HCPCS | Performed by: FAMILY MEDICINE

## 2023-12-07 ENCOUNTER — PATIENT OUTREACH (OUTPATIENT)
Dept: GERIATRIC MEDICINE | Facility: CLINIC | Age: 73
End: 2023-12-07
Payer: COMMERCIAL

## 2023-12-07 ENCOUNTER — MEDICAL CORRESPONDENCE (OUTPATIENT)
Dept: HEALTH INFORMATION MANAGEMENT | Facility: CLINIC | Age: 73
End: 2023-12-07
Payer: COMMERCIAL

## 2023-12-07 NOTE — Clinical Note
CALEBI, Please let me know if you have any questions or concerns.   Lowell Woods RN, BSN, PHN [facility] Phone: [phone]

## 2023-12-07 NOTE — PROGRESS NOTES
[facility] Care Coordination Contact    [facility] Home Visit Assessment     Home visit for Health Risk Assessment with Ricky Lea completed on December 7, 2023    Type of residence:: Private home - no stairs  Current living arrangement:: I live in a private home with family     Assessment completed with:: Children, Care Team Member, Patient    Current Care Plan  Member currently receiving the following home care services: Skilled Nursing   Member currently receiving the following community resources: DME, PCA, Home Care      Medication Review  Medication reconciliation completed in Epic: Yes  Medication set-up & administration: RN set up every two weeks.  Family caregiver administers medications.  Medication Risk Assessment Medication (1 or more, place referral to MTM): N/A: No risk factors identified  MTM Referral Placed: No: No risk factors idenified    Mental/Behavioral Health   Depression Screening:   PHQ-2 Total Score (Adult) - Positive if 3 or more points; Administer PHQ-9 if positive: 1       Mental health DX:: Yes   Mental health DX how managed:: Medication    Falls Assessment:   Fallen 2 or more times in the past year?: No   Any fall with injury in the past year?: No    ADL/IADL Dependencies:   Dependent ADLs:: Ambulation-walker, Ambulation-cane, Bathing, Dressing, Grooming, Positioning, Transfers, Toileting, Incontinence  Dependent IADLs:: Cleaning, Cooking, Laundry, Shopping, Meal Preparation, Medication Management, Money Management, Transportation, Incontinence    [facility] sponsored benefits: Shared information re: Silver Sneakers/gym memberships, ASA, Calcium +D.    PCA Assessment completed at visit: Yes Annual PCA assessment indicated 5.75 hours per day of PCA. This is the same as the previous assessment.     Elderly Waiver Eligibility: Yes, but member declines EW services; will not open to EW    Care Plan & Recommendations: Pa will continue to receive personal care attendant  services to help support her ADLs and IADLs. Daughter is wondering about having kidney testing or follow up about kidneys. CC will review records and give daughter a call with a referral phone number.     See [facility] for detailed assessment information.    Follow-Up Plan: Member informed of future contact, plan to f/u with member with a 6 month telephone assessment.  Contact information shared with member and family, encouraged member to call with any questions or concerns at any time.    [facility] care continuum providers: Please see Snapshot and Care Management Flowsheets for Specific details of care plan.    This CC note routed to PCP, Bassam Llanos.    Lowell Woods RN, BSN, PHN  [facility]  Phone: [phone]

## 2023-12-18 ENCOUNTER — PATIENT OUTREACH (OUTPATIENT)
Dept: GERIATRIC MEDICINE | Facility: CLINIC | Age: 73
End: 2023-12-18
Payer: COMMERCIAL

## 2023-12-18 NOTE — PROGRESS NOTES
[facility] Care Coordination Contact    [facility]:  Emailed required PCA documents to [facility].  Faxed copy of PCA assessment to PCA Agency and mailed copy to member.  Faxed MD Communication to PCP.     Candy Ace  [facility]  Case Management Specialist  [phone]

## 2023-12-18 NOTE — LETTER
[facility]  [address], [address]  [place] [address]  Phone:  [phone]  Fax:  [phone]      December 18, 2023    YOSSI CEDENO  [address]  [place] [facility] [address]    Dear Pa,    Enclosed is a copy of your completed PCA Assessment and Service Plan.  This is for your records and no action is required by you.  If you have additional questions regarding your assessment please contact me at [phone]. If you feel that your needs are not being met, please contact the Clinical Supervisor at [phone].    Sincerely,      Lowell Woods RN, PHN  [phone]  [email or web address]      [facility]            Enclosure:  Completed PCA assessment

## 2023-12-21 ENCOUNTER — PATIENT OUTREACH (OUTPATIENT)
Dept: GERIATRIC MEDICINE | Facility: CLINIC | Age: 73
End: 2023-12-21
Payer: COMMERCIAL

## 2023-12-21 NOTE — LETTER
December 21, 2023    YOSSI CEDENO  [address] [place] [address]        Dear Pa:    At [facility], we re dedicated to improving your health and wellness. Enclosed is the Care Plan developed with you on 12/7/23. Please review the Care Plan carefully.    As a reminder, during your visit we talked about:  Ways to manage your physical and mental health  Using health care to maintain and improve your health   Your preventive care needs     Remember to contact your care coordinator if you:  Are hospitalized, or plan to be hospitalized   Have a fall    Have a change in your physical or mental health  Need help finding support or services    If you have questions, or don t agree with your Care Plan, call me at [phone]. You can also call me if your needs change. TTY users, call the [place] [employer] at (398) or 1-297.336.2212 (thllel-oy-oumznv relay service).    Sincerely,          Lowell Woods RN, PHN  [phone]  [email or web address]      [facility]    [id number] accepted    [id number] (07/2022)

## 2023-12-21 NOTE — PROGRESS NOTES
[facility] Care Coordination Contact    Received after visit chart from care coordinator.  Completed following tasks: Mailed copy of care plan/support plan to member, Mailed Safe Medication Disposal , and Updated services in Database    Candy Ace  [facility]  Case Management Specialist  [phone]

## 2024-01-01 DIAGNOSIS — E78.5 HYPERLIPIDEMIA, UNSPECIFIED HYPERLIPIDEMIA TYPE: ICD-10-CM

## 2024-01-01 DIAGNOSIS — Z76.0 ENCOUNTER FOR MEDICATION REFILL: ICD-10-CM

## 2024-01-03 DIAGNOSIS — F32.1 MODERATE SINGLE CURRENT EPISODE OF MAJOR DEPRESSIVE DISORDER (H): ICD-10-CM

## 2024-01-03 RX ORDER — ATORVASTATIN CALCIUM 20 MG/1
TABLET, FILM COATED ORAL
Qty: 90 TABLET | Refills: 2 | Status: SHIPPED | OUTPATIENT
Start: 2024-01-03 | End: 2024-10-01

## 2024-01-03 RX ORDER — DULOXETIN HYDROCHLORIDE 60 MG/1
CAPSULE, DELAYED RELEASE ORAL
Qty: 90 CAPSULE | Refills: 1 | OUTPATIENT
Start: 2024-01-03

## 2024-01-04 ENCOUNTER — PATIENT OUTREACH (OUTPATIENT)
Dept: CARE COORDINATION | Facility: CLINIC | Age: 74
End: 2024-01-04
Payer: COMMERCIAL

## 2024-01-18 ENCOUNTER — PATIENT OUTREACH (OUTPATIENT)
Dept: CARE COORDINATION | Facility: CLINIC | Age: 74
End: 2024-01-18

## 2024-01-18 ENCOUNTER — OFFICE VISIT (OUTPATIENT)
Dept: FAMILY MEDICINE | Facility: CLINIC | Age: 74
End: 2024-01-18
Payer: COMMERCIAL

## 2024-01-18 VITALS
HEART RATE: 83 BPM | HEIGHT: 59 IN | TEMPERATURE: 98.3 F | BODY MASS INDEX: 25.72 KG/M2 | SYSTOLIC BLOOD PRESSURE: 132 MMHG | DIASTOLIC BLOOD PRESSURE: 77 MMHG | WEIGHT: 127.6 LBS | OXYGEN SATURATION: 96 %

## 2024-01-18 DIAGNOSIS — F32.1 MODERATE SINGLE CURRENT EPISODE OF MAJOR DEPRESSIVE DISORDER (H): ICD-10-CM

## 2024-01-18 DIAGNOSIS — I10 ESSENTIAL HYPERTENSION: ICD-10-CM

## 2024-01-18 DIAGNOSIS — E11.9 TYPE 2 DIABETES MELLITUS TREATED WITHOUT INSULIN (H): Primary | ICD-10-CM

## 2024-01-18 DIAGNOSIS — M81.0 OSTEOPOROSIS, UNSPECIFIED OSTEOPOROSIS TYPE, UNSPECIFIED PATHOLOGICAL FRACTURE PRESENCE: ICD-10-CM

## 2024-01-18 DIAGNOSIS — E78.5 HYPERLIPIDEMIA, UNSPECIFIED HYPERLIPIDEMIA TYPE: ICD-10-CM

## 2024-01-18 LAB — HBA1C MFR BLD: 6.9 % (ref 0–5.6)

## 2024-01-18 PROCEDURE — 80061 LIPID PANEL: CPT | Performed by: FAMILY MEDICINE

## 2024-01-18 PROCEDURE — 83036 HEMOGLOBIN GLYCOSYLATED A1C: CPT | Performed by: FAMILY MEDICINE

## 2024-01-18 PROCEDURE — 36415 COLL VENOUS BLD VENIPUNCTURE: CPT | Performed by: FAMILY MEDICINE

## 2024-01-18 PROCEDURE — 99214 OFFICE O/P EST MOD 30 MIN: CPT | Performed by: FAMILY MEDICINE

## 2024-01-18 PROCEDURE — 82565 ASSAY OF CREATININE: CPT | Performed by: FAMILY MEDICINE

## 2024-01-18 NOTE — PROGRESS NOTES
"  Type 2 diabetes mellitus treated without insulin (H)  Check kidney function today.  Discontinue and substitute metformin if indicated after results.  - Lipid panel  - Creatinine  - Hemoglobin A1c  - Albumin Random Urine Quantitative with Creat Ratio    Hyperlipidemia, unspecified hyperlipidemia type  Tolerating medication well.  Recheck today.    Essential hypertension  Controlled.    Moderate single current episode of major depressive disorder (H)  Stable.    Osteoporosis, unspecified osteoporosis type, unspecified pathological fracture presence  Tolerating medication well.    Subjective   Pa is a 73 year old female with history of type 2 diabetes, hypertension, hyperlipidemia, osteoporosis and depression here for follow-up.  She is here with her daughter.  No new concerns or complaints since last visit per patient and daughter.  Fasting blood sugar in the low 100s most days with the highest 155 in the past 2 weeks.  No low blood sugar with hypoglycemic symptoms.  She is currently taking metformin  mg twice a day.  Her last creatinine was 1.08.  Depression symptoms seems to be stable per daughter.  Patient denied suicidal or homicidal ideations.  She has trouble sleeping at night but only occasionally.  No nightmare or night terrors.  Tolerating cholesterol medication well.  She has home care RN set up pillbox every 2 weeks.  Denied problems taking medications from the pillbox.        1/18/2024     9:14 AM   Additional Questions   Roomed by JUDY STEPHENS CMA   Accompanied by DAUGHTER         Objective    /77 (BP Location: Left arm, Patient Position: Sitting, Cuff Size: Adult Regular)   Pulse 83   Temp 98.3  F (36.8  C) (Oral)   Ht 1.499 m (4' 11\")   Wt 57.9 kg (127 lb 9.6 oz)   SpO2 96%   BMI 25.77 kg/m    Body mass index is 25.77 kg/m .      Physical Exam   GENERAL: alert and no distress  NECK: Supple  RESP: lungs clear to auscultation - no rales, rhonchi or wheezes  CV: regular rates and rhythm " and no murmur, click or rub  ABDOMEN: soft, nontender, no hepatosplenomegaly, no masses and bowel sounds normal  MS: Knees- No swelling, no effusion.  No erythema.  BACK: no CVA tenderness, no paralumbar tenderness  PSYCH: mentation appears normal, affect normal/bright    This transcription uses voice recognition software, which may contain typographical errors.          Signed Electronically by: Bassam Llanos MD    Answers submitted by the patient for this visit:  Back Pain Visit Questionnaire (Submitted on 1/18/2024)  Your back pain is: chronic  Chronic or Recurring Back Pain Visit Questionnaire (Submitted on 1/18/2024)  Where is your back pain located? : left lower back  How would you describe your back pain? : dull ache  Where does your back pain spread? : nowhere  Since you noticed your back pain, how has it changed? : unchanged  Does your back pain interfere with your job?: Yes  General Questionnaire (Submitted on 1/18/2024)  Chief Complaint: Chronic problems general questions HPI Form

## 2024-01-19 ENCOUNTER — TELEPHONE (OUTPATIENT)
Dept: FAMILY MEDICINE | Facility: CLINIC | Age: 74
End: 2024-01-19
Payer: COMMERCIAL

## 2024-01-19 DIAGNOSIS — E11.9 TYPE 2 DIABETES MELLITUS TREATED WITHOUT INSULIN (H): Primary | ICD-10-CM

## 2024-01-19 LAB
CHOLEST SERPL-MCNC: 202 MG/DL
CREAT SERPL-MCNC: 1.16 MG/DL (ref 0.51–0.95)
EGFRCR SERPLBLD CKD-EPI 2021: 50 ML/MIN/1.73M2
FASTING STATUS PATIENT QL REPORTED: NO
HDLC SERPL-MCNC: 46 MG/DL
LDLC SERPL CALC-MCNC: 120 MG/DL
NONHDLC SERPL-MCNC: 156 MG/DL
TRIGL SERPL-MCNC: 181 MG/DL

## 2024-01-19 RX ORDER — GLIPIZIDE 5 MG/1
5 TABLET ORAL
Qty: 60 TABLET | Refills: 3 | Status: SHIPPED | OUTPATIENT
Start: 2024-01-19 | End: 2024-04-26

## 2024-01-19 NOTE — TELEPHONE ENCOUNTER
Called pt and relayed message. Pt verbalized understanding.      Sharif Aragon Cem Say, BSN RN  [facility]        ----- Message from Bassam Llanos MD sent at 1/19/2024  3:42 PM CST -----  Kidney function is a little worse than before.  I would recommend stopping metformin for now and start glipizide.  New prescription for glipizide to take 5 mg twice daily sent to the patient's pharmacy.  She should increase daily water intake.  Please call the patient.  Thank you,

## 2024-01-30 ENCOUNTER — TELEPHONE (OUTPATIENT)
Dept: FAMILY MEDICINE | Facility: CLINIC | Age: 74
End: 2024-01-30
Payer: COMMERCIAL

## 2024-01-30 DIAGNOSIS — Z53.9 DIAGNOSIS NOT YET DEFINED: Primary | ICD-10-CM

## 2024-01-30 PROCEDURE — G0179 MD RECERTIFICATION HHA PT: HCPCS | Performed by: FAMILY MEDICINE

## 2024-01-30 NOTE — TELEPHONE ENCOUNTER
Forms/Letter Request     Type of form/letter: [facility] Plan of Care Form: Period 01/28/2023-03/27/2024     Do we have the form/letter: Yes     Who is the form from? Home care     Where did/will the form come from? form was faxed in     When is form/letter needed by: N/A     How would you like the form/letter returned: Fax: [phone]     Patient Notified form requests are processed in 5-7 business days: No     Okay to leave a detailed message?: N/A at [phone]

## 2024-02-01 ENCOUNTER — MEDICAL CORRESPONDENCE (OUTPATIENT)
Dept: HEALTH INFORMATION MANAGEMENT | Facility: CLINIC | Age: 74
End: 2024-02-01
Payer: COMMERCIAL

## 2024-02-01 DIAGNOSIS — G62.9 NEUROPATHY: ICD-10-CM

## 2024-02-01 RX ORDER — PREGABALIN 100 MG/1
100 CAPSULE ORAL 2 TIMES DAILY
Qty: 60 CAPSULE | Refills: 3 | Status: SHIPPED | OUTPATIENT
Start: 2024-02-01 | End: 2024-05-28

## 2024-02-15 DIAGNOSIS — Z76.0 ENCOUNTER FOR MEDICATION REFILL: ICD-10-CM

## 2024-02-15 DIAGNOSIS — M81.0 AGE-RELATED OSTEOPOROSIS WITHOUT CURRENT PATHOLOGICAL FRACTURE: ICD-10-CM

## 2024-02-16 RX ORDER — ALENDRONATE SODIUM 70 MG/1
TABLET ORAL
Qty: 12 TABLET | Refills: 3 | OUTPATIENT
Start: 2024-02-16

## 2024-02-22 ENCOUNTER — TELEPHONE (OUTPATIENT)
Dept: FAMILY MEDICINE | Facility: CLINIC | Age: 74
End: 2024-02-22
Payer: COMMERCIAL

## 2024-02-22 NOTE — TELEPHONE ENCOUNTER
General Call    Contacts         Type Contact Phone/Fax    02/22/2024 01:05 PM CST Phone (Incoming) Cely RN with [facility] (Home Care) [phone]          Reason for Call: glipiZIDE (GLUCOTROL) 5 MG tablet and metFORMIN (GLUCOPHAGE-XR) 750 MG 24 hr tablet     What are your questions or concerns:  Cely called to ask if patient was stop prescribing Metformin due to patient has been prescribed Glipizied. Per Cely pharmacy unable to refill glipiZIDE due to medication change. Please call Cely back to provide calrification.    Date of last appointment with provider: 01/18/2024    Okay to leave a detailed message?: Yes at [phone] secure line.

## 2024-02-22 NOTE — TELEPHONE ENCOUNTER
Call pharmacy to clarify why pt could not get RX. Pharmacy stated they filled Glipizide on 2/15/24.    They filled metformin on 12/11/23 for a 3 month supply so pt should still have about 2 weeks left of medication.    Per chart review: 1/19/24 TE, PCP advise to stop taking metformin due to worsening kidney function. Pt should only take glipizide for now.      Called Cely and relayed message. Cely verbalized understanding.      Sharif Aragon Cem Say, BSN RN  [facility]

## 2024-03-08 DIAGNOSIS — M81.0 AGE-RELATED OSTEOPOROSIS WITHOUT CURRENT PATHOLOGICAL FRACTURE: ICD-10-CM

## 2024-03-08 DIAGNOSIS — Z76.0 ENCOUNTER FOR MEDICATION REFILL: ICD-10-CM

## 2024-03-08 RX ORDER — ALENDRONATE SODIUM 70 MG/1
TABLET ORAL
Qty: 12 TABLET | Refills: 3 | OUTPATIENT
Start: 2024-03-08

## 2024-03-13 ENCOUNTER — TELEPHONE (OUTPATIENT)
Dept: FAMILY MEDICINE | Facility: CLINIC | Age: 74
End: 2024-03-13
Payer: COMMERCIAL

## 2024-03-13 NOTE — TELEPHONE ENCOUNTER
"  Forms/Letter Request    Type of form/letter: OTHER: Incontinence supply:\Pull up prevail med 28-40\" female       Do we have the form/letter: Yes: MZ    Who is the form from? [facility] (if other please explain)    Where did/will the form come from? form was faxed in    When is form/letter needed by: asap    How would you like the form/letter returned: Fax : [phone]       "

## 2024-03-14 ENCOUNTER — MEDICAL CORRESPONDENCE (OUTPATIENT)
Dept: HEALTH INFORMATION MANAGEMENT | Facility: CLINIC | Age: 74
End: 2024-03-14
Payer: COMMERCIAL

## 2024-03-23 ENCOUNTER — TRANSFERRED RECORDS (OUTPATIENT)
Dept: HEALTH INFORMATION MANAGEMENT | Facility: CLINIC | Age: 74
End: 2024-03-23
Payer: COMMERCIAL

## 2024-04-01 ENCOUNTER — TELEPHONE (OUTPATIENT)
Dept: FAMILY MEDICINE | Facility: CLINIC | Age: 74
End: 2024-04-01
Payer: COMMERCIAL

## 2024-04-01 NOTE — TELEPHONE ENCOUNTER
[place] calling to follow up on status of fax,. Can PCP Team call to update?      Forms/Letter Request    Type of form/letter: [facility] Plan of Care and Order    Do we have the form/letter: Yes: Per caller, was faxed to main fa on 03/28/24    Who is the form from? Home care    Where did/will the form come from? form was faxed in    When is form/letter needed by: ASAP    How would you like the form/letter returned: Fax : see fax    Patient Notified form requests are processed in 5-7 business days:Yes    Okay to leave a detailed message?: Yes at Home number on file [phone] (home)

## 2024-04-02 ENCOUNTER — MEDICAL CORRESPONDENCE (OUTPATIENT)
Dept: HEALTH INFORMATION MANAGEMENT | Facility: CLINIC | Age: 74
End: 2024-04-02
Payer: COMMERCIAL

## 2024-04-02 DIAGNOSIS — M54.50 CHRONIC MIDLINE LOW BACK PAIN WITHOUT SCIATICA: ICD-10-CM

## 2024-04-02 DIAGNOSIS — G89.29 CHRONIC MIDLINE LOW BACK PAIN WITHOUT SCIATICA: ICD-10-CM

## 2024-04-02 DIAGNOSIS — Z76.0 ENCOUNTER FOR MEDICATION REFILL: ICD-10-CM

## 2024-04-02 DIAGNOSIS — E11.9 TYPE 2 DIABETES MELLITUS TREATED WITHOUT INSULIN (H): ICD-10-CM

## 2024-04-03 RX ORDER — LANCETS 30 GAUGE
EACH MISCELLANEOUS
Qty: 100 EACH | Refills: 1 | Status: SHIPPED | OUTPATIENT
Start: 2024-04-03 | End: 2024-07-06

## 2024-04-03 RX ORDER — MELOXICAM 15 MG/1
TABLET ORAL
Qty: 30 TABLET | Refills: 9 | Status: SHIPPED | OUTPATIENT
Start: 2024-04-03

## 2024-04-04 ENCOUNTER — MEDICAL CORRESPONDENCE (OUTPATIENT)
Dept: HEALTH INFORMATION MANAGEMENT | Facility: CLINIC | Age: 74
End: 2024-04-04
Payer: COMMERCIAL

## 2024-04-04 DIAGNOSIS — Z53.9 DIAGNOSIS NOT YET DEFINED: Primary | ICD-10-CM

## 2024-04-04 PROCEDURE — G0179 MD RECERTIFICATION HHA PT: HCPCS | Performed by: FAMILY MEDICINE

## 2024-04-04 PROCEDURE — 99207 PR MD RECERTIFICATION HHA PT: CPT | Performed by: FAMILY MEDICINE

## 2024-04-11 ENCOUNTER — MEDICAL CORRESPONDENCE (OUTPATIENT)
Dept: HEALTH INFORMATION MANAGEMENT | Facility: CLINIC | Age: 74
End: 2024-04-11
Payer: COMMERCIAL

## 2024-04-22 NOTE — TELEPHONE ENCOUNTER
Writer spoke to Ju at [facility] to get more info on the type of forms that they are missing.  Per Ju, Plan of Care form period is from 3/28 to 5/26.  For was scanned in patients chart sometime around 4/4 (Date of Dr. Llanos's signature)  Writer printed and re-faxed forms to [phone].    Done.

## 2024-04-26 ENCOUNTER — OFFICE VISIT (OUTPATIENT)
Dept: FAMILY MEDICINE | Facility: CLINIC | Age: 74
End: 2024-04-26
Payer: COMMERCIAL

## 2024-04-26 VITALS
WEIGHT: 128.4 LBS | TEMPERATURE: 98.1 F | BODY MASS INDEX: 25.88 KG/M2 | OXYGEN SATURATION: 100 % | DIASTOLIC BLOOD PRESSURE: 85 MMHG | RESPIRATION RATE: 16 BRPM | HEART RATE: 69 BPM | SYSTOLIC BLOOD PRESSURE: 148 MMHG | HEIGHT: 59 IN

## 2024-04-26 DIAGNOSIS — Z00.00 ANNUAL PHYSICAL EXAM: Primary | ICD-10-CM

## 2024-04-26 DIAGNOSIS — N18.9 CHRONIC KIDNEY DISEASE, UNSPECIFIED CKD STAGE: ICD-10-CM

## 2024-04-26 DIAGNOSIS — I10 ESSENTIAL HYPERTENSION: ICD-10-CM

## 2024-04-26 DIAGNOSIS — M81.0 AGE-RELATED OSTEOPOROSIS WITHOUT CURRENT PATHOLOGICAL FRACTURE: ICD-10-CM

## 2024-04-26 DIAGNOSIS — E11.9 TYPE 2 DIABETES MELLITUS TREATED WITHOUT INSULIN (H): ICD-10-CM

## 2024-04-26 DIAGNOSIS — S22.080S COMPRESSION FRACTURE OF T11 VERTEBRA, SEQUELA: ICD-10-CM

## 2024-04-26 DIAGNOSIS — M25.512 LEFT SHOULDER PAIN, UNSPECIFIED CHRONICITY: ICD-10-CM

## 2024-04-26 PROCEDURE — 99397 PER PM REEVAL EST PAT 65+ YR: CPT | Performed by: FAMILY MEDICINE

## 2024-04-26 PROCEDURE — 99214 OFFICE O/P EST MOD 30 MIN: CPT | Mod: 25 | Performed by: FAMILY MEDICINE

## 2024-04-26 PROCEDURE — 36415 COLL VENOUS BLD VENIPUNCTURE: CPT | Performed by: FAMILY MEDICINE

## 2024-04-26 PROCEDURE — 80048 BASIC METABOLIC PNL TOTAL CA: CPT | Performed by: FAMILY MEDICINE

## 2024-04-26 RX ORDER — GLIPIZIDE 5 MG/1
5 TABLET ORAL
Qty: 180 TABLET | Refills: 3 | Status: SHIPPED | OUTPATIENT
Start: 2024-04-26

## 2024-04-26 RX ORDER — ALENDRONATE SODIUM 70 MG/1
TABLET ORAL
Qty: 12 TABLET | Refills: 3 | Status: CANCELLED | OUTPATIENT
Start: 2024-04-26

## 2024-04-26 RX ORDER — ALENDRONATE SODIUM 70 MG/1
70 TABLET ORAL
Qty: 12 TABLET | Refills: 3 | Status: SHIPPED | OUTPATIENT
Start: 2024-04-26

## 2024-04-26 RX ORDER — ACETAMINOPHEN 500 MG
500 TABLET ORAL EVERY 8 HOURS PRN
Qty: 100 TABLET | Refills: 2 | Status: SHIPPED | OUTPATIENT
Start: 2024-04-26

## 2024-04-26 SDOH — HEALTH STABILITY: PHYSICAL HEALTH: ON AVERAGE, HOW MANY DAYS PER WEEK DO YOU ENGAGE IN MODERATE TO STRENUOUS EXERCISE (LIKE A BRISK WALK)?: 0 DAYS

## 2024-04-26 SDOH — HEALTH STABILITY: PHYSICAL HEALTH: ON AVERAGE, HOW MANY MINUTES DO YOU ENGAGE IN EXERCISE AT THIS LEVEL?: 0 MIN

## 2024-04-26 ASSESSMENT — PATIENT HEALTH QUESTIONNAIRE - PHQ9
SUM OF ALL RESPONSES TO PHQ QUESTIONS 1-9: 1
SUM OF ALL RESPONSES TO PHQ QUESTIONS 1-9: 1
10. IF YOU CHECKED OFF ANY PROBLEMS, HOW DIFFICULT HAVE THESE PROBLEMS MADE IT FOR YOU TO DO YOUR WORK, TAKE CARE OF THINGS AT HOME, OR GET ALONG WITH OTHER PEOPLE: NOT DIFFICULT AT ALL

## 2024-04-26 ASSESSMENT — SOCIAL DETERMINANTS OF HEALTH (SDOH): HOW OFTEN DO YOU GET TOGETHER WITH FRIENDS OR RELATIVES?: ONCE A WEEK

## 2024-04-26 NOTE — COMMUNITY RESOURCES LIST (ENGLISH)
April 26, 2024           YOUR PERSONALIZED LIST OF SERVICES & PROGRAMS           & RECREATION    Sports      Community Services - [facility]  [address] [place] [address] (Distance: 5.5 miles)  Phone: [phone]  Website: [email or web address]  Language: English, Algerian  Fee: Free, Self pay, Sliding scale  Accessibility: Translation services      [facility] [place] - Sports clubs and recreational activities  [address] [place] [address] (Distance: 0.3 miles)  Language: English  Fee: Free, Self pay  Accessibility: Ada accessible      [facility] - Adult Enrichment  Phone: [phone]  Website: [email or web address]/adults-seniors/adult-enrichment/  Language: English  Hours: Mon 7:30 AM - 4:00 PM Tue 7:30 AM - 4:00 PM Wed 7:30 AM - 4:00 PM Thu 7:30 AM - 4:00 PM Fri 7:30 AM - 4:00 PM    Classes/Groups      [facility] - Personal training  [address] [place] [address] (Distance: 22.7 miles)  Phone: [phone]  Website: https://www.TM[employer]/  Language: English, Algerian  Fee: Sliding scale, Self pay, Insurance      [facility] [place] - [employer]  [address] [place] [address] (Distance: 0.3 miles)  Language: English  Accessibility: Ada accessible      [facility] - Adult Enrichment  Phone: [phone]  Website: [email or web address]://[employer]/adults-seniors/adult-enrichment/  Language: English  Hours: Mon 7:30 AM - 4:00 PM Tue 7:30 AM - 4:00 PM Wed 7:30 AM - 4:00 PM Thu 7:30 AM - 4:00 PM Fri 7:30 AM - 4:00 PM               IMPORTANT NUMBERS & WEBSITES        Emergency Services  911  .   United Way  211 http://211unitedway.org  .   Poison Control  (707) 657-8104 http://[place]poison.org http://w[place]poison.org  .     Suicide and Crisis Lifeline  988 http://988[facility].org  .   Childhelp National Child Abuse Hotline  569.256.6077 http://Childhelphotline.org   .   National Sexual Assault  Hotline  (217) 581-7972 (HOPE) http://[employer].Beauty Booked   .     National Runaway Safeline  (477) 775-5348 (RUNAWAY) http://"[employer].Beauty Booked  .   Pregnancy & Postpartum Support  Call/text 478-198-8576  [place]: http://ppsupportmn.org  [place]: http://psichapters.com/wi  .   Substance Abuse National Helpline ([facility])  309-864-HELP (5581) http://Findtreatment.gov   .                DISCLAIMER: These resources have been generated via the [employer] Platform. [employer] does not endorse any service providers mentioned in this resource list. [employer] does not guarantee that the services mentioned in this resource list will be available to you or will improve your health or wellness.    [facility], [place]

## 2024-04-26 NOTE — PROGRESS NOTES
Preventive Care Visit  [facility] ARIEL Llanos MD, Family Medicine  Apr 26, 2024      Annual physical exam      Age-related osteoporosis without current pathological fracture  History of compression fracture.  - alendronate (FOSAMAX) 70 MG tablet; Take 1 tablet (70 mg) by mouth every 7 days    Chronic kidney disease, unspecified CKD stage  Metformin was discontinued but patient is still taking.  Asked staff to call the pharmacy to discontinue metformin.  Refilled glipizide.    Essential hypertension  - BASIC METABOLIC PANEL    Type 2 diabetes mellitus treated without insulin (H)  Last A1c was 6.9 in 1/24.  - glipiZIDE (GLUCOTROL) 5 MG tablet; Take 1 tablet (5 mg) by mouth 2 times daily (before meals)    Compression fracture of T11 vertebra, sequela  Known problem with no acute concerns.    Left shoulder pain, unspecified chronicity  Improving.   - acetaminophen (TYLENOL) 500 MG tablet; Take 1 tablet (500 mg) by mouth every 8 hours as needed for mild pain     Subjective   Pa is a 74 year old, presenting for the following:  Physical    DM -metformin was discontinued few months ago due to abnormal kidney function but patient is still taking it.  She ran out of glipizide.  Fasting blood sugar in the 70s to 140s.      Chronic kidney disease-last creatinine was 1.16, 3 months ago.  The highest creatinine was 1.35, 1 year ago.    Pain- back pain from compression fracture and shoulder pain.  She is taking Lyrica twice a day and Tylenol as needed.  Requesting Tylenol refill.  No worsening pain since last visit.  Since last visit.  The symptoms comes and goes.  She  denied suicidal or homicidal ideations.    History of compression fracture-severe pain is improving.  Needs refill on Fosamax.          4/26/2024    10:24 AM   Additional Questions   Roomed by Darin sims   Accompanied by Daughter        Health Care Directive  Patient does not have a Health Care Directive or Living Will:       4/26/2024    General Health   How would you rate your overall physical health? (!) FAIR   Feel stress (tense, anxious, or unable to sleep) Not at all         4/26/2024   Nutrition   Diet: Regular (no restrictions)         4/26/2024   Exercise   Days per week of moderate/strenous exercise 0 days   Average minutes spent exercising at this level 0 min   (!) EXERCISE CONCERN      4/26/2024   Social Factors   Frequency of gathering with friends or relatives Once a week   Worry food won't last until get money to buy more No   Food not last or not have enough money for food? No   Do you have housing?  Yes   Are you worried about losing your housing? No   Lack of transportation? No   Unable to get utilities (heat,electricity)? No         4/26/2024   Fall Risk   Fallen 2 or more times in the past year? No   Trouble with walking or balance? No          4/26/2024   Activities of Daily Living- Home Safety   Needs help with the following daily activites Telephone use    Transportation    Shopping    Preparing meals    Housework    Laundry    Medication administration    Dressing   Safety concerns in the home None of the above         4/26/2024   Dental   Dentist two times every year? (!) NO         4/26/2024   Hearing Screening   Hearing concerns? (!) I NEED TO ASK PEOPLE TO SPEAK UP OR REPEAT THEMSELVES.    (!) TROUBLE UNDERSTANDING SOFT OR WHISPERED SPEECH.    (!) TROUBLE UNDERSTANDING SPEECH ON THE TELEPHONE         4/26/2024   Driving Risk Screening   Patient/family members have concerns about driving No         4/26/2024   General Alertness/Fatigue Screening   Have you been more tired than usual lately? (!) YES         4/26/2024   Urinary Incontinence Screening   Bothered by leaking urine in past 6 months Yes         4/26/2024   TB Screening   Were you born outside of the [place]? Yes       Today's PHQ-9 Score:       4/26/2024    10:08 AM   PHQ-9 SCORE   PHQ-9 Total Score MyChart 1 (Minimal depression)   PHQ-9 Total Score 1          4/26/2024   Substance Use   Alcohol more than 3/day or more than 7/wk No   Do you have a current opioid prescription? No   How severe/bad is pain from 1 to 10? 9/10   Do you use any other substances recreationally? No     Social History     Tobacco Use    Smoking status: Never     Passive exposure: Never    Smokeless tobacco: Current     Types: Chew    Tobacco comments:     chews betal nut   Vaping Use    Vaping status: Never Used         8/28/2023   LAST FHS-7 RESULTS   1st degree relative breast or ovarian cancer No   Any relative bilateral breast cancer No   Any male have breast cancer No   Any ONE woman have BOTH breast AND ovarian cancer No   Any woman with breast cancer before 50yrs No   2 or more relatives with breast AND/OR ovarian cancer No   2 or more relatives with breast AND/OR bowel cancer No        Mammogram Screening - Mammogram every 1-2 years updated in Health Maintenance based on mutual decision making    ASCVD Risk   The 10-year ASCVD risk score (Marcus GARCIA, et al., 2019) is: 28%    Values used to calculate the score:      Age: 74 years      Sex: Female      Is Non- : No      Diabetic: Yes      Tobacco smoker: No      Systolic Blood Pressure: 148 mmHg      Is BP treated: No      HDL Cholesterol: 46 mg/dL      Total Cholesterol: 202 mg/dL          Reviewed and updated as needed this visit by Provider                    Current providers sharing in care for this patient include:  Patient Care Team:  Bassam Llanos MD as PCP - General (Family Medicine)  Hattie Hummel, PharmD as Pharmacist (Pharmacist)  Bassam Llanos MD as Assigned PCP  Lowell Woods RN as Lead Care Coordinator (Primary Care - CC)  Maureen Palafox PA-C as Assigned Musculoskeletal Provider    The following health maintenance items are reviewed in Epic and correct as of today:  Health Maintenance   Topic Date Due    ZOSTER IMMUNIZATION (1 of 2) Never done    RSV VACCINE (Pregnancy & 60+) (1 - 1-dose 60+  "series) Never done    MICROALBUMIN  08/09/2023    BMP  04/11/2024    DEPRESSION ACTION PLAN  08/20/2025 (Originally 1950)    ANNUAL REVIEW OF HM ORDERS  07/11/2024    A1C  07/18/2024    ADVANCE CARE PLANNING  10/08/2024    DIABETIC FOOT EXAM  10/17/2024    PHQ-9  10/26/2024    LIPID  01/18/2025    EYE EXAM  03/23/2025    MEDICARE ANNUAL WELLNESS VISIT  04/26/2025    FALL RISK ASSESSMENT  04/26/2025    MAMMO SCREENING  08/28/2025    DTAP/TDAP/TD IMMUNIZATION (2 - Td or Tdap) 07/26/2027    COLORECTAL CANCER SCREENING  03/10/2030    DEXA  08/20/2034    HEPATITIS C SCREENING  Completed    INFLUENZA VACCINE  Completed    Pneumococcal Vaccine: 65+ Years  Completed    URINALYSIS  Completed    IPV IMMUNIZATION  Aged Out    HPV IMMUNIZATION  Aged Out    MENINGITIS IMMUNIZATION  Aged Out    RSV MONOCLONAL ANTIBODY  Aged Out    COVID-19 Vaccine  Discontinued         Review of Systems  CONSTITUTIONAL: NEGATIVE for fever, chills, change in weight  ENT/MOUTH: NEGATIVE for ear, mouth and throat problems  RESP: NEGATIVE for significant cough or SOB  CV: NEGATIVE for chest pain, palpitations or peripheral edema     Objective    Exam  BP (!) 148/85 (BP Location: Right arm, Patient Position: Sitting, Cuff Size: Adult Regular)   Pulse 69   Temp 98.1  F (36.7  C) (Oral)   Resp 16   Ht 1.499 m (4' 11\")   Wt 58.2 kg (128 lb 6.4 oz)   SpO2 100%   BMI 25.93 kg/m     Estimated body mass index is 25.93 kg/m  as calculated from the following:    Height as of this encounter: 1.499 m (4' 11\").    Weight as of this encounter: 58.2 kg (128 lb 6.4 oz).    Physical Exam  GENERAL: alert and no distress  NECK: no adenopathy, no asymmetry, masses, or scars  RESP: lungs clear to auscultation - no rales, rhonchi or wheezes  CV: regular rate and rhythm, normal S1 S2, no S3 or S4, no murmur, click or rub, no peripheral edema  ABDOMEN: soft, nontender, no hepatosplenomegaly, no masses and bowel sounds normal  PSYCH: mentation appears " normal        4/26/2024   Mini Cog   Mini-Cog Not Completed (choose reason) Patient declines       Signed Electronically by: Bassam Llanos MD    Answers submitted by the patient for this visit:  Patient Health Questionnaire (Submitted on 4/26/2024)  If you checked off any problems, how difficult have these problems made it for you to do your work, take care of things at home, or get along with other people?: Not difficult at all  PHQ9 TOTAL SCORE: 1

## 2024-04-27 LAB
ANION GAP SERPL CALCULATED.3IONS-SCNC: 8 MMOL/L (ref 7–15)
BUN SERPL-MCNC: 12.1 MG/DL (ref 8–23)
CALCIUM SERPL-MCNC: 9.1 MG/DL (ref 8.8–10.2)
CHLORIDE SERPL-SCNC: 101 MMOL/L (ref 98–107)
CREAT SERPL-MCNC: 0.99 MG/DL (ref 0.51–0.95)
DEPRECATED HCO3 PLAS-SCNC: 29 MMOL/L (ref 22–29)
EGFRCR SERPLBLD CKD-EPI 2021: 60 ML/MIN/1.73M2
GLUCOSE SERPL-MCNC: 113 MG/DL (ref 70–99)
POTASSIUM SERPL-SCNC: 5.1 MMOL/L (ref 3.4–5.3)
SODIUM SERPL-SCNC: 138 MMOL/L (ref 135–145)

## 2024-05-17 NOTE — TELEPHONE ENCOUNTER
Called Annabella, HC RN to relay approval from PCP for the following HC orders:    Skilled Nursing: every 3 week plus 3 PRN for next 60 for MTM  Thanks   I have talked with Mr. Samano and have him scheduled on June 4th at 840.

## 2024-05-27 DIAGNOSIS — G62.9 NEUROPATHY: ICD-10-CM

## 2024-05-28 RX ORDER — PREGABALIN 100 MG/1
100 CAPSULE ORAL 2 TIMES DAILY
Qty: 60 CAPSULE | Refills: 3 | Status: SHIPPED | OUTPATIENT
Start: 2024-05-28 | End: 2024-09-23

## 2024-06-21 ENCOUNTER — MEDICAL CORRESPONDENCE (OUTPATIENT)
Dept: HEALTH INFORMATION MANAGEMENT | Facility: CLINIC | Age: 74
End: 2024-06-21
Payer: COMMERCIAL

## 2024-06-27 DIAGNOSIS — F32.1 MODERATE SINGLE CURRENT EPISODE OF MAJOR DEPRESSIVE DISORDER (H): ICD-10-CM

## 2024-06-27 RX ORDER — DULOXETIN HYDROCHLORIDE 60 MG/1
CAPSULE, DELAYED RELEASE ORAL
Qty: 90 CAPSULE | Refills: 1 | Status: SHIPPED | OUTPATIENT
Start: 2024-06-27

## 2024-07-06 ENCOUNTER — HOSPITAL ENCOUNTER (OUTPATIENT)
Dept: GENERAL RADIOLOGY | Facility: HOSPITAL | Age: 74
Discharge: HOME | End: 2024-07-06
Attending: NURSE PRACTITIONER
Payer: COMMERCIAL

## 2024-07-06 ENCOUNTER — OFFICE VISIT (OUTPATIENT)
Dept: FAMILY MEDICINE | Facility: CLINIC | Age: 74
End: 2024-07-06
Payer: COMMERCIAL

## 2024-07-06 VITALS
HEART RATE: 69 BPM | OXYGEN SATURATION: 95 % | SYSTOLIC BLOOD PRESSURE: 125 MMHG | DIASTOLIC BLOOD PRESSURE: 76 MMHG | RESPIRATION RATE: 20 BRPM | TEMPERATURE: 98.5 F

## 2024-07-06 DIAGNOSIS — G89.29 CHRONIC RIGHT-SIDED LOW BACK PAIN WITHOUT SCIATICA: ICD-10-CM

## 2024-07-06 DIAGNOSIS — G89.29 CHRONIC LEFT-SIDED LOW BACK PAIN WITHOUT SCIATICA: ICD-10-CM

## 2024-07-06 DIAGNOSIS — M25.552 HIP PAIN, LEFT: ICD-10-CM

## 2024-07-06 DIAGNOSIS — M54.50 CHRONIC RIGHT-SIDED LOW BACK PAIN WITHOUT SCIATICA: ICD-10-CM

## 2024-07-06 DIAGNOSIS — M54.50 CHRONIC LEFT-SIDED LOW BACK PAIN WITHOUT SCIATICA: ICD-10-CM

## 2024-07-06 DIAGNOSIS — M48.44XG STRESS FRACTURE OF THORACIC VERTEBRA WITH DELAYED HEALING, SUBSEQUENT ENCOUNTER: ICD-10-CM

## 2024-07-06 DIAGNOSIS — R30.0 DYSURIA: ICD-10-CM

## 2024-07-06 DIAGNOSIS — N30.01 ACUTE CYSTITIS WITH HEMATURIA: Primary | ICD-10-CM

## 2024-07-06 DIAGNOSIS — M16.12 PRIMARY OSTEOARTHRITIS OF LEFT HIP: ICD-10-CM

## 2024-07-06 LAB
ALBUMIN UR-MCNC: NEGATIVE MG/DL
APPEARANCE UR: CLEAR
BACTERIA #/AREA URNS HPF: ABNORMAL /HPF
BILIRUB UR QL STRIP: NEGATIVE
COLOR UR AUTO: YELLOW
GLUCOSE UR STRIP-MCNC: NEGATIVE MG/DL
HGB UR QL STRIP: ABNORMAL
KETONES UR STRIP-MCNC: NEGATIVE MG/DL
LEUKOCYTE ESTERASE UR QL STRIP: ABNORMAL
NITRATE UR QL: NEGATIVE
PH UR STRIP: 7 [PH] (ref 5–8)
RBC #/AREA URNS AUTO: ABNORMAL /HPF
SP GR UR STRIP: <=1.005 (ref 1–1.03)
SQUAMOUS #/AREA URNS AUTO: ABNORMAL /LPF
UROBILINOGEN UR STRIP-ACNC: 0.2 E.U./DL
WBC #/AREA URNS AUTO: >100 /HPF
WBC CLUMPS #/AREA URNS HPF: PRESENT /HPF

## 2024-07-06 PROCEDURE — 72070 X-RAY EXAM THORAC SPINE 2VWS: CPT

## 2024-07-06 PROCEDURE — 81001 URINALYSIS AUTO W/SCOPE: CPT | Performed by: NURSE PRACTITIONER

## 2024-07-06 PROCEDURE — 87086 URINE CULTURE/COLONY COUNT: CPT | Performed by: NURSE PRACTITIONER

## 2024-07-06 PROCEDURE — 73502 X-RAY EXAM HIP UNI 2-3 VIEWS: CPT

## 2024-07-06 PROCEDURE — 72100 X-RAY EXAM L-S SPINE 2/3 VWS: CPT

## 2024-07-06 PROCEDURE — 99213 OFFICE O/P EST LOW 20 MIN: CPT | Performed by: NURSE PRACTITIONER

## 2024-07-06 RX ORDER — LANCETS 30 GAUGE
EACH MISCELLANEOUS
COMMUNITY
Start: 2024-04-03

## 2024-07-06 RX ORDER — NITROFURANTOIN 25; 75 MG/1; MG/1
100 CAPSULE ORAL 2 TIMES DAILY
Qty: 10 CAPSULE | Refills: 0 | Status: SHIPPED | OUTPATIENT
Start: 2024-07-06 | End: 2024-07-11

## 2024-07-06 NOTE — PATIENT INSTRUCTIONS
Push fluids.  Take antibiotic as directed twice daily for 5 days.  X-rays completed today.  Your back x-rays look normal.  Your left hip x-ray shows some moderate degenerative joint disease.  I have referred you to orthopedic for evaluation and treatment recommendations.

## 2024-07-07 LAB — BACTERIA UR CULT: NORMAL

## 2024-07-08 DIAGNOSIS — Z53.9 DIAGNOSIS NOT YET DEFINED: Primary | ICD-10-CM

## 2024-07-08 PROCEDURE — G0179 MD RECERTIFICATION HHA PT: HCPCS | Performed by: FAMILY MEDICINE

## 2024-07-18 DIAGNOSIS — E11.9 TYPE 2 DIABETES MELLITUS TREATED WITHOUT INSULIN (H): ICD-10-CM

## 2024-07-26 ENCOUNTER — OFFICE VISIT (OUTPATIENT)
Dept: FAMILY MEDICINE | Facility: CLINIC | Age: 74
End: 2024-07-26
Payer: COMMERCIAL

## 2024-07-26 VITALS
TEMPERATURE: 98.9 F | BODY MASS INDEX: 25.28 KG/M2 | HEART RATE: 67 BPM | RESPIRATION RATE: 20 BRPM | DIASTOLIC BLOOD PRESSURE: 69 MMHG | OXYGEN SATURATION: 99 % | HEIGHT: 59 IN | WEIGHT: 125.4 LBS | SYSTOLIC BLOOD PRESSURE: 104 MMHG

## 2024-07-26 DIAGNOSIS — K59.00 CONSTIPATION, UNSPECIFIED CONSTIPATION TYPE: ICD-10-CM

## 2024-07-26 DIAGNOSIS — G89.29 CHRONIC MIDLINE LOW BACK PAIN WITHOUT SCIATICA: ICD-10-CM

## 2024-07-26 DIAGNOSIS — N18.9 CHRONIC KIDNEY DISEASE, UNSPECIFIED CKD STAGE: ICD-10-CM

## 2024-07-26 DIAGNOSIS — I10 ESSENTIAL HYPERTENSION: ICD-10-CM

## 2024-07-26 DIAGNOSIS — E78.5 HYPERLIPIDEMIA, UNSPECIFIED HYPERLIPIDEMIA TYPE: ICD-10-CM

## 2024-07-26 DIAGNOSIS — F32.1 MODERATE SINGLE CURRENT EPISODE OF MAJOR DEPRESSIVE DISORDER (H): ICD-10-CM

## 2024-07-26 DIAGNOSIS — R21 RASH OF BOTH FEET: ICD-10-CM

## 2024-07-26 DIAGNOSIS — E11.9 TYPE 2 DIABETES MELLITUS TREATED WITHOUT INSULIN (H): Primary | ICD-10-CM

## 2024-07-26 DIAGNOSIS — M54.50 CHRONIC MIDLINE LOW BACK PAIN WITHOUT SCIATICA: ICD-10-CM

## 2024-07-26 LAB
ALBUMIN SERPL BCG-MCNC: 4 G/DL (ref 3.5–5.2)
ALP SERPL-CCNC: 61 U/L (ref 40–150)
ALT SERPL W P-5'-P-CCNC: 21 U/L (ref 0–50)
ANION GAP SERPL CALCULATED.3IONS-SCNC: 7 MMOL/L (ref 7–15)
AST SERPL W P-5'-P-CCNC: 26 U/L (ref 0–45)
BILIRUB SERPL-MCNC: 0.5 MG/DL
BUN SERPL-MCNC: 10.7 MG/DL (ref 8–23)
CALCIUM SERPL-MCNC: 9.2 MG/DL (ref 8.8–10.4)
CHLORIDE SERPL-SCNC: 98 MMOL/L (ref 98–107)
CHOLEST SERPL-MCNC: 218 MG/DL
CREAT SERPL-MCNC: 1.1 MG/DL (ref 0.51–0.95)
CREAT UR-MCNC: 106 MG/DL
EGFRCR SERPLBLD CKD-EPI 2021: 53 ML/MIN/1.73M2
FASTING STATUS PATIENT QL REPORTED: NO
FASTING STATUS PATIENT QL REPORTED: NO
GLUCOSE SERPL-MCNC: 77 MG/DL (ref 70–99)
HBA1C MFR BLD: 6.6 % (ref 0–5.6)
HCO3 SERPL-SCNC: 31 MMOL/L (ref 22–29)
HDLC SERPL-MCNC: 40 MG/DL
LDLC SERPL CALC-MCNC: 145 MG/DL
MICROALBUMIN UR-MCNC: <12 MG/L
MICROALBUMIN/CREAT UR: NORMAL MG/G{CREAT}
NONHDLC SERPL-MCNC: 178 MG/DL
POTASSIUM SERPL-SCNC: 4.6 MMOL/L (ref 3.4–5.3)
PROT SERPL-MCNC: 6.9 G/DL (ref 6.4–8.3)
SODIUM SERPL-SCNC: 136 MMOL/L (ref 135–145)
TRIGL SERPL-MCNC: 164 MG/DL

## 2024-07-26 PROCEDURE — 99214 OFFICE O/P EST MOD 30 MIN: CPT | Performed by: FAMILY MEDICINE

## 2024-07-26 PROCEDURE — 80061 LIPID PANEL: CPT | Performed by: FAMILY MEDICINE

## 2024-07-26 PROCEDURE — 83036 HEMOGLOBIN GLYCOSYLATED A1C: CPT | Performed by: FAMILY MEDICINE

## 2024-07-26 PROCEDURE — 80053 COMPREHEN METABOLIC PANEL: CPT | Performed by: FAMILY MEDICINE

## 2024-07-26 PROCEDURE — 36415 COLL VENOUS BLD VENIPUNCTURE: CPT | Performed by: FAMILY MEDICINE

## 2024-07-26 PROCEDURE — 82043 UR ALBUMIN QUANTITATIVE: CPT | Performed by: FAMILY MEDICINE

## 2024-07-26 PROCEDURE — 82570 ASSAY OF URINE CREATININE: CPT | Performed by: FAMILY MEDICINE

## 2024-07-26 PROCEDURE — G2211 COMPLEX E/M VISIT ADD ON: HCPCS | Performed by: FAMILY MEDICINE

## 2024-07-26 RX ORDER — POLYETHYLENE GLYCOL 3350 17 G/17G
17 POWDER, FOR SOLUTION ORAL DAILY
Qty: 510 G | Refills: 3 | Status: SHIPPED | OUTPATIENT
Start: 2024-07-26

## 2024-07-26 RX ORDER — LANCETS 30 GAUGE
EACH MISCELLANEOUS
Status: CANCELLED | OUTPATIENT
Start: 2024-07-26

## 2024-07-26 NOTE — PROGRESS NOTES
Type 2 diabetes mellitus treated without insulin (H)  A1c 6.6 today.  - HEMOGLOBIN A1C  - Comprehensive metabolic panel (BMP + Alb, Alk Phos, ALT, AST, Total. Bili, TP)  - Lipid panel  - blood glucose (CONTOUR NEXT TEST) test strip; Use to test blood sugar one time aday    Chronic kidney disease, unspecified CKD stage  - Albumin Random Urine Quantitative with Creat Ratio    Essential hypertension  Controlled.    Hyperlipidemia, unspecified hyperlipidemia type  Recheck today.    Moderate single current episode of major depressive disorder (H)  Stable.    Constipation, unspecified constipation type  - polyethylene glycol (MIRALAX) 17 GM/Dose powder; Take 17 g by mouth daily    Chronic midline low back pain without sciatica  Stable    Rash of both feet  - Adult Dermatology  Referral; Future     Subjective   Pa is a 74 year old female with history of type 2 diabetes, chronic kidney disease, depression, chronic back pain, hyperlipidemia, hypertension, chronic rash on the sole of the feet here for follow-up.  Last A1c was 6.9, 6 months ago.  She is currently taking Glipizide , Metformin was discontinued due to kidney function.  Fasting BG in the low 100s- 140 the highest. No low BG with symptoms since last visit.   Back pain is not worsening. Takes Lyrica and Mobic.   Depression symptoms are stable. Denied SI/HI.   Rash on sole of the feet, not improving.Skin peeling, no pain but itchy. Topical steroid not helping.   Take Miralax as needed for constipation, no abdominal pain. No blood in stool.     No new concerns since last visit.         7/26/2024     9:16 AM   Additional Questions   Roomed by Veronica THURSTON   Accompanied by carmen Azar         Review of Systems  CONSTITUTIONAL: NEGATIVE for fever, chills, change in weight  RESP: NEGATIVE for significant cough or SOB  CV: NEGATIVE for chest pain, palpitations or peripheral edema      Objective    Vitals:    07/26/24 0920   BP: 104/69   Pulse: 67   Resp: 20   Temp:  "98.9  F (37.2  C)   TempSrc: Oral   SpO2: 99%   Weight: 56.9 kg (125 lb 6.4 oz)   Height: 1.499 m (4' 11\")      Physical Exam   GENERAL: alert and no distress  NECK: Supple.  RESP: lungs clear to auscultation - no rales, rhonchi or wheezes  CV: regular rate and rhythm, normal S1 S2  ABDOMEN: soft, nontender, no hepatosplenomegaly, no masses and bowel sounds normal  MS: no gross musculoskeletal defects noted, no edema  Feet: erythema, skin peeling sole of feet , bilaterally     This transcription uses voice recognition software, which may contain typographical errors.    The longitudinal plan of care for the diagnosis(es)/condition(s) as documented were addressed during this visit. Due to the added complexity in care, I will continue to support Pa in the subsequent management and with ongoing continuity of care.          Signed Electronically by: Bassam Llanos MD    Answers submitted by the patient for this visit:  Diabetes Visit (Submitted on 7/26/2024)  Chief Complaint: Chronic problems general questions HPI Form  Frequency of checking blood sugars:: one time daily  What time of day are you checking your blood sugars : before meals  Have you had any blood sugars above 200?: No  Have you had any blood sugars below 70?: No  Hypoglycemia symptoms:: dizziness  Diabetic concerns:: none  Paraesthesia present:: none of these symptoms  General Questionnaire (Submitted on 7/26/2024)  Chief Complaint: Chronic problems general questions HPI Form    "

## 2024-07-29 ENCOUNTER — TELEPHONE (OUTPATIENT)
Dept: DERMATOLOGY | Facility: CLINIC | Age: 74
End: 2024-07-29
Payer: COMMERCIAL

## 2024-07-29 ENCOUNTER — TELEPHONE (OUTPATIENT)
Dept: FAMILY MEDICINE | Facility: CLINIC | Age: 74
End: 2024-07-29
Payer: COMMERCIAL

## 2024-07-29 DIAGNOSIS — N18.9 CHRONIC KIDNEY DISEASE, UNSPECIFIED CKD STAGE: Primary | ICD-10-CM

## 2024-07-29 NOTE — TELEPHONE ENCOUNTER
----- Message from Bassam Llanos sent at 7/29/2024  4:47 PM CDT -----  Kidney function is a little worse than 3 months ago.  She does not need to see a kidney doctor yet but I recommend recheck in 4 weeks.  She should increase daily water intake.  Future lab order placed.  Please call the patient and advised to make lab only appointment in 4 weeks.    Dr. Bassam Llanos  7/29/2024 4:47 PM      Called patient # with assistance of an  to relay provider test results and recommendation above.  Clinic phone number provided.    Ben Chang RN  [facility]

## 2024-07-29 NOTE — TELEPHONE ENCOUNTER
This encounter is being sent to inform the clinic that this patient has a referral from Bassam Llanos MD in [facility] for the diagnoses of Rash of both feet; Rash and has requested that this patient be seen within Priority: 1-2 Weeks and/or with Priority: 1-2 Weeks. Based on the availability of our provider(s), we are unable to accommodate this request.    Were all sites offered this patient?  Yes  Requesting [facility] as closest to home  Does scheduling algorithm request to schedule next available?  Patient appointment has not been scheduled.  Please review the referral request for accommodation and contact the patient.  If unable to accommodate, please resubmit a referral and indicate a preferred partner or affiliate location using Provider Finder or Scheduling Instructions field.     Referral Order in Epic  Records in [facility]  Unknown outside Records    Please review and call Pt's daughter Barrie to schedule via Sejal     Thank you

## 2024-07-29 NOTE — TELEPHONE ENCOUNTER
----- Message from Bassam Llanos sent at 7/29/2024  4:47 PM CDT -----  Kidney function is a little worse than 3 months ago.  She does not need to see a kidney doctor yet but I recommend recheck in 4 weeks.  She should increase daily water intake.  Future lab order placed.  Please call the patient and advised to make lab only appointment in 4 weeks.    Dr. Bassam Llanos  7/29/2024 4:47 PM

## 2024-07-30 NOTE — TELEPHONE ENCOUNTER
Patient daughter Barrie Samuels  returns call. Writer relay RN/provider's message below. Caller verbalizes understanding and has no further questions. Patient scheduled for future lab appt on 08/26/2024 @ 9 am.    Katelynn Duron,   [facility]  July 30, 2024 8:18 AM

## 2024-07-31 ENCOUNTER — PATIENT OUTREACH (OUTPATIENT)
Dept: GERIATRIC MEDICINE | Facility: CLINIC | Age: 74
End: 2024-07-31
Payer: COMMERCIAL

## 2024-08-01 NOTE — PROGRESS NOTES
[facility] Care Coordination Contact      [facility] Six-Month Telephone Assessment    6 month telephone assessment completed on 7/31/2024.    ER visits: No  Hospitalizations: No  TCU stays: No  Significant health status changes: No Changes.  Falls/Injuries: No  ADL/IADL changes: No  Changes in services: No    Caregiver Assessment follow up:  Family continues to provide support.     Goals: See POC in chart for goal progress documentation.      Will see member in 6 months for an annual health risk assessment.   Encouraged member to call CC with any questions or concerns in the meantime.       Lowell Woods RN, BSN, PHN  [facility]  Phone: [phone]

## 2024-08-02 NOTE — TELEPHONE ENCOUNTER
DIAGNOSIS: primary osteoarthritis of L hip/Rach Burnette, NP/[facility]/ortho con  Please use iPad or phones [phone] to reach  and follow prompts     APPOINTMENT DATE: 8.5.24   NOTES STATUS DETAILS   OFFICE NOTE from referring provider Internal 7.6.24  Yomaira  FP   MEDICATION LIST Internal    XRAYS (IMAGES & REPORTS) Internal 7.6.24  XR Hip Left

## 2024-08-05 ENCOUNTER — PRE VISIT (OUTPATIENT)
Dept: ORTHOPEDICS | Facility: CLINIC | Age: 74
End: 2024-08-05

## 2024-08-05 ENCOUNTER — OFFICE VISIT (OUTPATIENT)
Dept: ORTHOPEDICS | Facility: CLINIC | Age: 74
End: 2024-08-05
Attending: NURSE PRACTITIONER
Payer: COMMERCIAL

## 2024-08-05 DIAGNOSIS — M47.816 SPONDYLOSIS OF LUMBAR REGION WITHOUT MYELOPATHY OR RADICULOPATHY: Primary | ICD-10-CM

## 2024-08-05 PROCEDURE — 99203 OFFICE O/P NEW LOW 30 MIN: CPT | Performed by: FAMILY MEDICINE

## 2024-08-05 NOTE — LETTER
"  8/5/2024      RE: Ricky Lea  [address] [place] [address]     Dear Colleague,    Thank you for referring your patient, Ricky Lea, to the [facility] [place]. Please see a copy of my visit note below.    PCP: Bassam Llanos    Ricky Lea is a 74 year old female who is seen  in consultation at the request of Dr. Burnette presenting with left hip pain    Injury: Pt notes falling in the snow in 2016 and since has noticed pain     Location of Pain: left anterior/posterior hip  Duration of Pain: 8 year(s)  Rating of Pain: 10/10  Pain is better with: Previous OG, PT  Pain is worse with: Sitting for long period  Additional Features: pain  Treatment so far consists of: Previous OG, PT    There were no vitals taken for this visit.      Hip x-ray [place] 7/6/2024 without significant degenerative change, images reviewed by me    Patient has followed with Spine physical medicine, Maureen Palafox PAC for chronic low back pain with a history of epidural injection 3/10/2023 which was not helpful at that time,, lumbar facet arthropathy.  Most recent lumbar MRI without significant central or neuroforaminal stenosis.  Most recent physical medicine note 4/6/2023 reviewed by me:  \"- Bilateral L4-5 transforaminal epidural steroid injection September 30, 2021 with significant relief of her pain for about 6 to 8 weeks  - Patient is status post bilateral L4-5 transforaminal epidural steroid injections December 27, 2021 which provided significant relief of her pain but only lasted 1 week.    -She then failed bilateral L3, L4, L5 medial branch blocks October 18, 2022.  - Patient had 50% relief with bilateral L2, L3 medial branch blocks November 15, 2022.  - Patient then failed confirmatory bilateral L2, L3 medial branch blocks.  - L4-5 interlaminar epidural steroid injection March 10, 2023 with partial relief of her pain only lasting several days.  -Patient completed 7 sessions of physical therapy in June 2021.  She " "does her home exercises.\"      Patient is maintained on Tylenol 1000 mg 3 times daily, diclofenac gel, Cymbalta, meloxicam 15 mg daily pregabalin 50 mg twice daily.  Patient did not tolerate muscle relaxants due to balance issues.    Patient past medical history of chronic kidney disease,hyperlipidemia, hypertension, depression, osteoporosis (on Fosamax), type 2 diabetes mellitus, urinary incontinence         Imaging study below reviewed by me  EXAM: XR HIP LEFT 2-3 VIEWS  LOCATION: [facility]  DATE: 7/6/2024     INDICATION:  Hip pain, left  COMPARISON: Pelvis radiographs 12/27/2016.                                                                      IMPRESSION: Normal joint spaces and alignment. No fracture.        MRI lumbar spine 10/1/2022  L2-L3: Mild loss of disc height and signal. Posterior disc bulge. Central annular fissuring of the disc. Normal facets. No significant spinal canal narrowing. No neural foraminal narrowing.      L3-L4: Mild loss of disc height and signal. Shallow posterior disc bulge with central annular fissuring of the disc. Normal facets. No spinal canal narrowing. No neural foraminal narrowing.     L4-L5: Mild loss of disc height and signal. Central disc protrusion. Annular fissuring of the discs posteriorly more pronounced towards the right subarticular region. Normal facets. No significant spinal canal narrowing. No significant neural foraminal   narrowing.     L5-S1: Normal disc height and signal. No herniation. Normal facets. No spinal canal or neural foraminal stenosis.                                                                      IMPRESSION:  1.  Degenerative disc changes with posterior disc bulges at L2-L3, L3-L4, and L4-L5 as detailed above. No significant spinal canal or neural foraminal narrowing at any level.  2.  Overall, no significant change since prior MR 4/1/2021.  3.  Chronic inferior endplate fracture of the T11 vertebral body with mild " anterior wedging.        XR PELVIS AP  12/27/2016 6:17 PM     INDICATION: Pelvic pain.      COMPARISON: None.     FINDINGS: Negative pelvis. No fracture or dislocation.          PMH:    Active problem list:  Patient Active Problem List   Diagnosis     Hyperlipidemia, unspecified hyperlipidemia type     Pulmonary nodules     Left shoulder pain     Essential hypertension     Moderate single current episode of major depressive disorder (H)     Skin cancer of face     Dry skin     Noncompliance with medication regimen     Intrinsic eczema     Type 2 diabetes mellitus treated without insulin (H)     Osteoporosis, unspecified osteoporosis type, unspecified pathological fracture presence     Urinary incontinence, unspecified type     Burning sensation of feet     Chronic midline low back pain without sciatica     Chronic kidney disease, unspecified CKD stage     Compression fracture of T11 vertebra, sequela     At high risk for falls     Insomnia, unspecified type       FH:  Family History   Problem Relation Age of Onset     Breast Cancer No family hx of      Ovarian Cancer No family hx of      Colon Cancer No family hx of        SH:  Social History     Socioeconomic History     Marital status:      Spouse name: Not on file     Number of children: Not on file     Years of education: Not on file     Highest education level: Not on file   Occupational History     Not on file   Tobacco Use     Smoking status: Never     Passive exposure: Never     Smokeless tobacco: Current     Types: Chew     Tobacco comments:     chews betal nut   Vaping Use     Vaping status: Never Used   Substance and Sexual Activity     Alcohol use: Not on file     Drug use: Not on file     Sexual activity: Not on file   Other Topics Concern     Not on file   Social History Narrative     Not on file     Social Determinants of Health     Financial Resource Strain: Low Risk  (4/26/2024)    Financial Resource Strain      Within the past 12 months,  have you or your family members you live with been unable to get utilities (heat, electricity) when it was really needed?: No   Food Insecurity: Low Risk  (4/26/2024)    Food Insecurity      Within the past 12 months, did you worry that your food would run out before you got money to buy more?: No      Within the past 12 months, did the food you bought just not last and you didn t have money to get more?: No   Transportation Needs: Low Risk  (4/26/2024)    Transportation Needs      Within the past 12 months, has lack of transportation kept you from medical appointments, getting your medicines, non-medical meetings or appointments, work, or from getting things that you need?: No   Physical Activity: Inactive (4/26/2024)    Exercise Vital Sign      Days of Exercise per Week: 0 days      Minutes of Exercise per Session: 0 min   Stress: No Stress Concern Present (4/26/2024)    [place] [employer] - Occupational Stress Questionnaire      Feeling of Stress : Not at all   Social Connections: Unknown (4/26/2024)    Social Connection and Isolation Panel [NHANES]      Frequency of Communication with Friends and Family: Not on file      Frequency of Social Gatherings with Friends and Family: Once a week      Attends Jehovah's witness Services: Not on file      Active Member of Clubs or Organizations: Not on file      Attends Club or Organization Meetings: Not on file      Marital Status: Not on file   Interpersonal Safety: Not on file   Housing Stability: Low Risk  (4/26/2024)    Housing Stability      Do you have housing? : Yes      Are you worried about losing your housing?: No       MEDS:  See EMR, reviewed  ALL:  See EMR, reviewed    REVIEW OF SYSTEMS:  CONSTITUTIONAL:NEGATIVE for fever, chills, change in weight  INTEGUMENTARY/SKIN: NEGATIVE for worrisome rashes, moles or lesions  EYES: NEGATIVE for vision changes or irritation  ENT/MOUTH: NEGATIVE for ear, mouth and throat problems  RESP:NEGATIVE for  significant cough or SOB  BREAST: NEGATIVE for masses, tenderness or discharge  CV: NEGATIVE for chest pain, palpitations or peripheral edema  GI: NEGATIVE for nausea, abdominal pain, heartburn, or change in bowel habits  :NEGATIVE for frequency, dysuria, or hematuria  :NEGATIVE for frequency, dysuria, or hematuria  NEURO: NEGATIVE for weakness, dizziness or paresthesias  ENDOCRINE: NEGATIVE for temperature intolerance, skin/hair changes  HEME/ALLERGY/IMMUNE: NEGATIVE for bleeding problems  PSYCHIATRIC: NEGATIVE for changes in mood or affect        Objective: Normal range of motion of the left hip to internal rotation, external rotation and abduction.  FADIR and AFSHAN test are negative.  Nontender over the bilateral SI joints.  Nontender over the bilateral trochanters.  Kobe test is negative bilaterally.  Straight leg raise is negative on the left.  Strength is intact in the left lower extremity at hip, knee, ankle and foot.  Sensation is normal distally.  Appropriate conversation and affect.    I personally reviewed with the patient through the  her recent favorable hips x-rays and her previous MRI results of the lumbar spine.    Assessment: Chronic low back and lumbar facet DJD    Plan: We discussed that I do not see indications for hip surgery or intra-articular hip injections.  She does not have evidence of trochanteric bursitis that would benefit from a soft tissue hip injection.  We went over her previous treatment protocols from her spine specialist including her injections, medicines and physical therapy.  Patient declines the need for additional injections for her lumbar spine and declines the need for additional physical therapy.  It sounds as if she has a heating pad at home that she finds useful, and she describes what appears to be a TENS unit that she also finds useful.  She will continue to maximize these conservative cares.  She had no further questions.                      [facility] Visit        Again, thank you for allowing me to participate in the care of your patient.      Sincerely,    Uche Linares MD

## 2024-08-05 NOTE — PROGRESS NOTES
"PCP: Bassam Llanos Venu is a 74 year old female who is seen  in consultation at the request of Dr. Burnette presenting with left hip pain    Injury: Pt notes falling in the snow in 2016 and since has noticed pain     Location of Pain: left anterior/posterior hip  Duration of Pain: 8 year(s)  Rating of Pain: 10/10  Pain is better with: Previous OG, PT  Pain is worse with: Sitting for long period  Additional Features: pain  Treatment so far consists of: Previous OG, PT    There were no vitals taken for this visit.      Hip x-ray [place] 7/6/2024 without significant degenerative change, images reviewed by me    Patient has followed with Spine physical medicine, Maureen MOCK for chronic low back pain with a history of epidural injection 3/10/2023 which was not helpful at that time,, lumbar facet arthropathy.  Most recent lumbar MRI without significant central or neuroforaminal stenosis.  Most recent physical medicine note 4/6/2023 reviewed by me:  \"- Bilateral L4-5 transforaminal epidural steroid injection September 30, 2021 with significant relief of her pain for about 6 to 8 weeks  - Patient is status post bilateral L4-5 transforaminal epidural steroid injections December 27, 2021 which provided significant relief of her pain but only lasted 1 week.    -She then failed bilateral L3, L4, L5 medial branch blocks October 18, 2022.  - Patient had 50% relief with bilateral L2, L3 medial branch blocks November 15, 2022.  - Patient then failed confirmatory bilateral L2, L3 medial branch blocks.  - L4-5 interlaminar epidural steroid injection March 10, 2023 with partial relief of her pain only lasting several days.  -Patient completed 7 sessions of physical therapy in June 2021.  She does her home exercises.\"      Patient is maintained on Tylenol 1000 mg 3 times daily, diclofenac gel, Cymbalta, meloxicam 15 mg daily pregabalin 50 mg twice daily.  Patient did not tolerate muscle relaxants due to balance issues.    Patient " past medical history of chronic kidney disease,hyperlipidemia, hypertension, depression, osteoporosis (on Fosamax), type 2 diabetes mellitus, urinary incontinence         Imaging study below reviewed by me  EXAM: XR HIP LEFT 2-3 VIEWS  LOCATION: [facility]  DATE: 7/6/2024     INDICATION:  Hip pain, left  COMPARISON: Pelvis radiographs 12/27/2016.                                                                      IMPRESSION: Normal joint spaces and alignment. No fracture.        MRI lumbar spine 10/1/2022  L2-L3: Mild loss of disc height and signal. Posterior disc bulge. Central annular fissuring of the disc. Normal facets. No significant spinal canal narrowing. No neural foraminal narrowing.      L3-L4: Mild loss of disc height and signal. Shallow posterior disc bulge with central annular fissuring of the disc. Normal facets. No spinal canal narrowing. No neural foraminal narrowing.     L4-L5: Mild loss of disc height and signal. Central disc protrusion. Annular fissuring of the discs posteriorly more pronounced towards the right subarticular region. Normal facets. No significant spinal canal narrowing. No significant neural foraminal   narrowing.     L5-S1: Normal disc height and signal. No herniation. Normal facets. No spinal canal or neural foraminal stenosis.                                                                      IMPRESSION:  1.  Degenerative disc changes with posterior disc bulges at L2-L3, L3-L4, and L4-L5 as detailed above. No significant spinal canal or neural foraminal narrowing at any level.  2.  Overall, no significant change since prior MR 4/1/2021.  3.  Chronic inferior endplate fracture of the T11 vertebral body with mild anterior wedging.        XR PELVIS AP  12/27/2016 6:17 PM     INDICATION: Pelvic pain.      COMPARISON: None.     FINDINGS: Negative pelvis. No fracture or dislocation.          PMH:    Active problem list:  Patient Active Problem List    Diagnosis    Hyperlipidemia, unspecified hyperlipidemia type    Pulmonary nodules    Left shoulder pain    Essential hypertension    Moderate single current episode of major depressive disorder (H)    Skin cancer of face    Dry skin    Noncompliance with medication regimen    Intrinsic eczema    Type 2 diabetes mellitus treated without insulin (H)    Osteoporosis, unspecified osteoporosis type, unspecified pathological fracture presence    Urinary incontinence, unspecified type    Burning sensation of feet    Chronic midline low back pain without sciatica    Chronic kidney disease, unspecified CKD stage    Compression fracture of T11 vertebra, sequela    At high risk for falls    Insomnia, unspecified type       FH:  Family History   Problem Relation Age of Onset    Breast Cancer No family hx of     Ovarian Cancer No family hx of     Colon Cancer No family hx of        SH:  Social History     Socioeconomic History    Marital status:      Spouse name: Not on file    Number of children: Not on file    Years of education: Not on file    Highest education level: Not on file   Occupational History    Not on file   Tobacco Use    Smoking status: Never     Passive exposure: Never    Smokeless tobacco: Current     Types: Chew    Tobacco comments:     chews betal nut   Vaping Use    Vaping status: Never Used   Substance and Sexual Activity    Alcohol use: Not on file    Drug use: Not on file    Sexual activity: Not on file   Other Topics Concern    Not on file   Social History Narrative    Not on file     Social Determinants of Health     Financial Resource Strain: Low Risk  (4/26/2024)    Financial Resource Strain     Within the past 12 months, have you or your family members you live with been unable to get utilities (heat, electricity) when it was really needed?: No   Food Insecurity: Low Risk  (4/26/2024)    Food Insecurity     Within the past 12 months, did you worry that your food would run out before you got  money to buy more?: No     Within the past 12 months, did the food you bought just not last and you didn t have money to get more?: No   Transportation Needs: Low Risk  (4/26/2024)    Transportation Needs     Within the past 12 months, has lack of transportation kept you from medical appointments, getting your medicines, non-medical meetings or appointments, work, or from getting things that you need?: No   Physical Activity: Inactive (4/26/2024)    Exercise Vital Sign     Days of Exercise per Week: 0 days     Minutes of Exercise per Session: 0 min   Stress: No Stress Concern Present (4/26/2024)    [place] of Occupational [employer] - Occupational Stress Questionnaire     Feeling of Stress : Not at all   Social Connections: Unknown (4/26/2024)    Social Connection and Isolation Panel [NHANES]     Frequency of Communication with Friends and Family: Not on file     Frequency of Social Gatherings with Friends and Family: Once a week     Attends Orthodox Services: Not on file     Active Member of Clubs or Organizations: Not on file     Attends Club or Organization Meetings: Not on file     Marital Status: Not on file   Interpersonal Safety: Not on file   Housing Stability: Low Risk  (4/26/2024)    Housing Stability     Do you have housing? : Yes     Are you worried about losing your housing?: No       MEDS:  See EMR, reviewed  ALL:  See EMR, reviewed    REVIEW OF SYSTEMS:  CONSTITUTIONAL:NEGATIVE for fever, chills, change in weight  INTEGUMENTARY/SKIN: NEGATIVE for worrisome rashes, moles or lesions  EYES: NEGATIVE for vision changes or irritation  ENT/MOUTH: NEGATIVE for ear, mouth and throat problems  RESP:NEGATIVE for significant cough or SOB  BREAST: NEGATIVE for masses, tenderness or discharge  CV: NEGATIVE for chest pain, palpitations or peripheral edema  GI: NEGATIVE for nausea, abdominal pain, heartburn, or change in bowel habits  :NEGATIVE for frequency, dysuria, or hematuria  :NEGATIVE for  frequency, dysuria, or hematuria  NEURO: NEGATIVE for weakness, dizziness or paresthesias  ENDOCRINE: NEGATIVE for temperature intolerance, skin/hair changes  HEME/ALLERGY/IMMUNE: NEGATIVE for bleeding problems  PSYCHIATRIC: NEGATIVE for changes in mood or affect        Objective: Normal range of motion of the left hip to internal rotation, external rotation and abduction.  FADIR and AFSHAN test are negative.  Nontender over the bilateral SI joints.  Nontender over the bilateral trochanters.  Kobe test is negative bilaterally.  Straight leg raise is negative on the left.  Strength is intact in the left lower extremity at hip, knee, ankle and foot.  Sensation is normal distally.  Appropriate conversation and affect.    I personally reviewed with the patient through the  her recent favorable hips x-rays and her previous MRI results of the lumbar spine.    Assessment: Chronic low back and lumbar facet DJD    Plan: We discussed that I do not see indications for hip surgery or intra-articular hip injections.  She does not have evidence of trochanteric bursitis that would benefit from a soft tissue hip injection.  We went over her previous treatment protocols from her spine specialist including her injections, medicines and physical therapy.  Patient declines the need for additional injections for her lumbar spine and declines the need for additional physical therapy.  It sounds as if she has a heating pad at home that she finds useful, and she describes what appears to be a TENS unit that she also finds useful.  She will continue to maximize these conservative cares.  She had no further questions.

## 2024-08-15 ENCOUNTER — MEDICAL CORRESPONDENCE (OUTPATIENT)
Dept: HEALTH INFORMATION MANAGEMENT | Facility: CLINIC | Age: 74
End: 2024-08-15
Payer: COMMERCIAL

## 2024-08-26 ENCOUNTER — LAB (OUTPATIENT)
Dept: LAB | Facility: CLINIC | Age: 74
End: 2024-08-26
Payer: COMMERCIAL

## 2024-08-26 DIAGNOSIS — N18.9 CHRONIC KIDNEY DISEASE, UNSPECIFIED CKD STAGE: ICD-10-CM

## 2024-08-26 LAB
CREAT SERPL-MCNC: 0.96 MG/DL (ref 0.51–0.95)
EGFRCR SERPLBLD CKD-EPI 2021: 63 ML/MIN/1.73M2

## 2024-08-26 PROCEDURE — 82565 ASSAY OF CREATININE: CPT

## 2024-08-26 PROCEDURE — 36415 COLL VENOUS BLD VENIPUNCTURE: CPT

## 2024-08-27 ENCOUNTER — TELEPHONE (OUTPATIENT)
Dept: FAMILY MEDICINE | Facility: CLINIC | Age: 74
End: 2024-08-27
Payer: COMMERCIAL

## 2024-08-27 NOTE — TELEPHONE ENCOUNTER
----- Message from Bassam Llanos sent at 8/26/2024  4:54 PM CDT -----  Kidney function is improving, almost back to normal.  We will recheck when she comes in for follow-up.  Please let the patient know.  Thank you,    Dr. Bassam Llanos  8/26/2024 4:54 PM

## 2024-08-29 ENCOUNTER — APPOINTMENT (OUTPATIENT)
Dept: INTERPRETER SERVICES | Facility: CLINIC | Age: 74
End: 2024-08-29
Payer: COMMERCIAL

## 2024-09-19 ENCOUNTER — MEDICAL CORRESPONDENCE (OUTPATIENT)
Dept: HEALTH INFORMATION MANAGEMENT | Facility: CLINIC | Age: 74
End: 2024-09-19
Payer: COMMERCIAL

## 2024-09-23 DIAGNOSIS — G62.9 NEUROPATHY: ICD-10-CM

## 2024-09-23 RX ORDER — PREGABALIN 100 MG/1
100 CAPSULE ORAL 2 TIMES DAILY
Qty: 60 CAPSULE | Refills: 3 | Status: SHIPPED | OUTPATIENT
Start: 2024-09-23

## 2024-10-01 DIAGNOSIS — E78.5 HYPERLIPIDEMIA, UNSPECIFIED HYPERLIPIDEMIA TYPE: ICD-10-CM

## 2024-10-01 DIAGNOSIS — Z76.0 ENCOUNTER FOR MEDICATION REFILL: ICD-10-CM

## 2024-10-01 RX ORDER — ATORVASTATIN CALCIUM 20 MG/1
TABLET, FILM COATED ORAL
Qty: 90 TABLET | Refills: 2 | Status: SHIPPED | OUTPATIENT
Start: 2024-10-01

## 2024-10-10 DIAGNOSIS — Z53.9 DIAGNOSIS NOT YET DEFINED: Primary | ICD-10-CM

## 2024-10-10 PROCEDURE — G0179 MD RECERTIFICATION HHA PT: HCPCS | Performed by: FAMILY MEDICINE

## 2024-11-12 ENCOUNTER — PATIENT OUTREACH (OUTPATIENT)
Dept: GERIATRIC MEDICINE | Facility: CLINIC | Age: 74
End: 2024-11-12
Payer: COMMERCIAL

## 2024-11-12 NOTE — PROGRESS NOTES
[facility] Care Coordination Contact    Called Adult daughter Barrie Samuels to schedule annual HRA home visit. HRA has been scheduled for 11/14/2024.  Called Yamel Saab and scheduled an  for the home visit.    Lowell Woods RN, BSN, PHN  [facility]  Phone: [phone]

## 2024-11-14 ENCOUNTER — PATIENT OUTREACH (OUTPATIENT)
Dept: GERIATRIC MEDICINE | Facility: CLINIC | Age: 74
End: 2024-11-14
Payer: COMMERCIAL

## 2024-11-14 NOTE — Clinical Note
Hang Llanos,   Just an FYI, please see note from annual home visit. Please let me know if you have any questions or concerns.   Thanks,  Lowell Woods RN, BSN, PHN "[employer] Phone: [phone]

## 2024-11-19 NOTE — PROGRESS NOTES
[facility] Care Coordination Contact    [facility] Home Visit Assessment     Home visit for Health Risk Assessment with Ricky Lea completed on November 14, 2024    Type of residence:: Private home - no stairs  Current living arrangement:: I live in a private home with family     Assessment completed with:: Care Team Member, Children, Patient    Current Care Plan  Member currently receiving the following home care services: Skilled Nursing   Member currently receiving the following community resources: Home Care, PCA      Medication Review  Medication reconciliation completed in Epic: Yes  Medication set-up & administration: RN set up every three weeks.  Family caregiver administers medications.  Medication Risk Assessment Medication (1 or more, place referral to MTM): N/A: No risk factors identified  MTM Referral Placed: No: No risk factors idenified    Mental/Behavioral Health   Depression Screening:           Mental health DX:: Yes   Mental health DX how managed:: Medication    Falls Assessment:   Fallen 2 or more times in the past year?: No   Any fall with injury in the past year?: No    ADL/IADL Dependencies:   Dependent ADLs:: Ambulation-walker, Bathing, Dressing, Grooming, Incontinence, Positioning, Transfers, Toileting  Dependent IADLs:: Cleaning, Cooking, Laundry, Shopping, Meal Preparation, Medication Management, Money Management, Transportation, Incontinence    Health Plan sponsored benefits: [employer]: Shared information regarding preventative health screening and health plan supplemental benefits/incentives. Reviewed medication disposal form.    PCA Assessment completed at visit: Yes Annual PCA assessment indicated 5.75 hours per day of PCA. This is the same as the previous assessment.     Elderly Waiver Eligibility: Yes, but member declines EW services; will not open to EW    Care Plan & Recommendations:     Pa will continue to receive support with ADLs and IADLs. Pa is eligible for CFSS  support 5.75 hrs/daily.     [place].    Follow-Up Plan: Member informed of future contact, plan to f/u with member with a 6 month telephone assessment.  Contact information shared with member and family, encouraged member to call with any questions or concerns at any time.    [place] care continuum providers: Please see Snapshot and Care Management Flowsheets for Specific details of care plan.    This CC note routed to PCP, Bassam Llanos.

## 2024-11-25 ENCOUNTER — PATIENT OUTREACH (OUTPATIENT)
Dept: GERIATRIC MEDICINE | Facility: CLINIC | Age: 74
End: 2024-11-25
Payer: COMMERCIAL

## 2024-11-25 NOTE — PROGRESS NOTES
[facility] Care Coordination Contact    Received after visit chart from care coordinator.  Completed following tasks: Mailed copy of support plan to member, Mailed [place] Cho[place] signature sheet pages 3-4, Mailed Safe Medication Disposal , Submitted referrals/auths for consultation service, and Updated services in Database    [employer]:  Emailed required PCA documents to [employer]. Mailed copy of assessment to member.     Candy Ace  [facility]  Case Management Specialist  [phone]

## 2024-11-25 NOTE — LETTER
November 25, 2024       YOSSI CEDENO  [address] [place] [place] [address]      Dear Pa,    At [facility], we re dedicated to improving your health and wellness. Enclosed is the Support Plan developed with you on 11/14/24. Please review the Support Plan carefully.    As a reminder, during your visit we talked about:   Ways to manage your physical and mental health   Using health care to maintain and improve your health    Your preventive care needs      Remember to contact your care coordinator if you:   Are hospitalized or plan to be hospitalized    Have a fall     Have a change in your physical or mental health   Need help finding support or services    If you have questions or don t agree with your Support Plan, call me at [phone]. You can also call me if your needs change. TTY users call the [place] Relay at 498 or [phone] (ebsbkd-hp-sawytv relay service).    Sincerely,         Lowell Woods RN, PHN  [phone]  [email or web address]      [employer]                [id number] accepted     (06/2024)                [address] [place] [address]  [phone]  fax [phone]  [facility]

## 2024-12-02 ENCOUNTER — OFFICE VISIT (OUTPATIENT)
Dept: FAMILY MEDICINE | Facility: CLINIC | Age: 74
End: 2024-12-02
Payer: COMMERCIAL

## 2024-12-02 VITALS
DIASTOLIC BLOOD PRESSURE: 76 MMHG | BODY MASS INDEX: 25.6 KG/M2 | TEMPERATURE: 98.5 F | WEIGHT: 127 LBS | SYSTOLIC BLOOD PRESSURE: 129 MMHG | RESPIRATION RATE: 24 BRPM | OXYGEN SATURATION: 94 % | HEART RATE: 90 BPM | HEIGHT: 59 IN

## 2024-12-02 DIAGNOSIS — M54.50 CHRONIC MIDLINE LOW BACK PAIN WITHOUT SCIATICA: ICD-10-CM

## 2024-12-02 DIAGNOSIS — R21 RASH AND NONSPECIFIC SKIN ERUPTION: ICD-10-CM

## 2024-12-02 DIAGNOSIS — G47.00 INSOMNIA, UNSPECIFIED TYPE: ICD-10-CM

## 2024-12-02 DIAGNOSIS — G89.29 CHRONIC MIDLINE LOW BACK PAIN WITHOUT SCIATICA: ICD-10-CM

## 2024-12-02 DIAGNOSIS — E78.5 HYPERLIPIDEMIA, UNSPECIFIED HYPERLIPIDEMIA TYPE: ICD-10-CM

## 2024-12-02 DIAGNOSIS — N18.9 CHRONIC KIDNEY DISEASE, UNSPECIFIED CKD STAGE: ICD-10-CM

## 2024-12-02 DIAGNOSIS — E11.9 TYPE 2 DIABETES MELLITUS TREATED WITHOUT INSULIN (H): Primary | ICD-10-CM

## 2024-12-02 DIAGNOSIS — F32.1 MODERATE SINGLE CURRENT EPISODE OF MAJOR DEPRESSIVE DISORDER (H): ICD-10-CM

## 2024-12-02 LAB
EST. AVERAGE GLUCOSE BLD GHB EST-MCNC: 143 MG/DL
HBA1C MFR BLD: 6.6 % (ref 0–5.6)

## 2024-12-02 PROCEDURE — 90471 IMMUNIZATION ADMIN: CPT | Performed by: FAMILY MEDICINE

## 2024-12-02 PROCEDURE — 36415 COLL VENOUS BLD VENIPUNCTURE: CPT | Performed by: FAMILY MEDICINE

## 2024-12-02 PROCEDURE — 82565 ASSAY OF CREATININE: CPT | Performed by: FAMILY MEDICINE

## 2024-12-02 PROCEDURE — 83036 HEMOGLOBIN GLYCOSYLATED A1C: CPT | Performed by: FAMILY MEDICINE

## 2024-12-02 PROCEDURE — 90662 IIV NO PRSV INCREASED AG IM: CPT | Performed by: FAMILY MEDICINE

## 2024-12-02 PROCEDURE — 96127 BRIEF EMOTIONAL/BEHAV ASSMT: CPT | Performed by: FAMILY MEDICINE

## 2024-12-02 PROCEDURE — 99215 OFFICE O/P EST HI 40 MIN: CPT | Mod: 25 | Performed by: FAMILY MEDICINE

## 2024-12-02 RX ORDER — HYDROXYZINE HYDROCHLORIDE 25 MG/1
25 TABLET, FILM COATED ORAL
Qty: 90 TABLET | Refills: 1 | Status: SHIPPED | OUTPATIENT
Start: 2024-12-02

## 2024-12-02 ASSESSMENT — PATIENT HEALTH QUESTIONNAIRE - PHQ9
10. IF YOU CHECKED OFF ANY PROBLEMS, HOW DIFFICULT HAVE THESE PROBLEMS MADE IT FOR YOU TO DO YOUR WORK, TAKE CARE OF THINGS AT HOME, OR GET ALONG WITH OTHER PEOPLE: SOMEWHAT DIFFICULT
SUM OF ALL RESPONSES TO PHQ QUESTIONS 1-9: 1
SUM OF ALL RESPONSES TO PHQ QUESTIONS 1-9: 1

## 2024-12-02 NOTE — PROGRESS NOTES
Type 2 diabetes mellitus treated without insulin (H)  A1c 6.6 today.  Continue glipizide 5 mg twice a day.  - Hemoglobin A1c  - Creatinine    Moderate single current episode of major depressive disorder (H)  Denied suicidal or homicidal ideations.    Insomnia, unspecified type  He will take hydroxyzine as needed.  - hydrOXYzine HCl (ATARAX) 25 MG tablet; Take 1 tablet (25 mg) by mouth nightly as needed for anxiety (insomnia).    Chronic kidney disease, unspecified CKD stage  Recheck today.  Will discontinue meloxicam if indicated.  Last creatinine was 0.96 with GFR 63 in 8/24.    Hyperlipidemia, unspecified hyperlipidemia type  Recheck at next visit.    Chronic midline low back pain without sciatica  Continue Lyrica.    Rash and nonspecific skin eruption  Bilateral soles.  Failed topical steroid.  Derm referral order placed.  - Adult Dermatology  Referral; Future     Subjective   Pa is a 74 year old female with history of type 2 diabetes, depression, chronic kidney disease, hyperlipidemia, chronic back pain (remote history of compression fracture) and rash on the sole of the feet here for follow-up.  She is here with her niece who is her PCA.  History obtained from the patient and PCA.  She has home care RN set up her pillbox every 3 weeks.  No worsening depression but not much improvement per PCA.  She is still crying at times per PCA.  She denied suicidal thoughts or intent.  Her fasting blood glucose are in the 90s to 140s.  She is complaining of trouble sleeping at night not due to pain.  She thinks too much at times.  She worries about her family who is not in the [place].  She uses Voltaren gel once a day for her knee pain, it was prescribed to use 3-4 times a day.  She is also on Lyrica 100 mg twice a day and Mobic twice a day.        12/2/2024     1:12 PM   Additional Questions   Roomed by zully sims   Accompanied by niece     History of Present Illness       Diabetes:   She presents for follow up of  "diabetes.  She is checking home blood glucose one time daily.   She checks blood glucose before meals.  Blood glucose is never over 200 and never under 70. She is aware of hypoglycemia symptoms including shakiness, dizziness and weakness.    She has no concerns regarding her diabetes at this time.  She is having numbness in feet, burning in feet and redness, sores, or blisters on feet.               Review of Systems  CONSTITUTIONAL: NEGATIVE for fever, chills, change in weight  ENT/MOUTH: NEGATIVE for ear, mouth and throat problems  RESP: NEGATIVE for significant cough or SOB  CV: NEGATIVE for chest pain/chest pressure      Objective    /76   Pulse 90   Temp 98.5  F (36.9  C) (Oral)   Resp 24   Ht 1.499 m (4' 11\")   Wt 57.6 kg (127 lb)   SpO2 94%   BMI 25.65 kg/m    Body mass index is 25.65 kg/m .    Physical Exam   GENERAL: alert and no distress  NECK: Supple  RESP: lungs clear to auscultation - no rales, rhonchi or wheezes  CV: regular rate and rhythm, normal S1 S2  ABDOMEN: soft, nontender, no hepatosplenomegaly, no masses and bowel sounds normal  MS: Knees-no effusion, no erythema, no significant tenderness today.  PSYCH: Mentation appears normal today.  Diabetic foot exam: no trophic changes or ulcerative lesions    This transcription uses voice recognition software, which may contain typographical errors.    The longitudinal plan of care for the diagnosis(es)/condition(s) as documented were addressed during this visit. Due to the added complexity in care, I will continue to support Pa in the subsequent management and with ongoing continuity of care.    I spent more than 40 minutes on the date of encounter reviewing medical records, evaluating the patient, coordinating care and documenting in the EHR, as detailed above.           Signed Electronically by: Bassam Llanos MD    "

## 2024-12-03 ENCOUNTER — TELEPHONE (OUTPATIENT)
Dept: DERMATOLOGY | Facility: CLINIC | Age: 74
End: 2024-12-03
Payer: COMMERCIAL

## 2024-12-03 LAB
CREAT SERPL-MCNC: 1.04 MG/DL (ref 0.51–0.95)
EGFRCR SERPLBLD CKD-EPI 2021: 57 ML/MIN/1.73M2

## 2024-12-03 NOTE — TELEPHONE ENCOUNTER
This encounter is being sent to inform the clinic that this patient has a [facility] the [facility] of Rash; Rash and nonspecific skin eruption and has requested that this patient be seen within Priority: 1-2 Weeks  and/or with Priority: 1-2 Weeks. Based on the availability of our provider(s), we are unable to accommodate this request.    Were all sites offered this patient?  Yes  Requesting [facility] location please  Does scheduling algorithm request to schedule next available?  Patient has been scheduled for the first available opening with Lakia Lou PA-C in [facility] on 04/14/25.  We have informed the patient that the clinic will review their referral and reach out if a sooner appointment is medically necessary.        Please call Pt's daughter Barrie Samuels via [employer]  to move up Appt - Thank you! ([facility] # is [phone])

## 2024-12-05 ENCOUNTER — OFFICE VISIT (OUTPATIENT)
Dept: DERMATOLOGY | Facility: CLINIC | Age: 74
End: 2024-12-05
Attending: FAMILY MEDICINE
Payer: COMMERCIAL

## 2024-12-05 ENCOUNTER — TELEPHONE (OUTPATIENT)
Dept: FAMILY MEDICINE | Facility: CLINIC | Age: 74
End: 2024-12-05

## 2024-12-05 DIAGNOSIS — L84 CALLUS OF FOOT: ICD-10-CM

## 2024-12-05 DIAGNOSIS — B35.3 TINEA PEDIS OF BOTH FEET: Primary | ICD-10-CM

## 2024-12-05 DIAGNOSIS — B35.1 ONYCHOMYCOSIS: ICD-10-CM

## 2024-12-05 RX ORDER — UREA 200 MG/G
CREAM TOPICAL 2 TIMES DAILY PRN
Qty: 85 G | Refills: 2 | Status: SHIPPED | OUTPATIENT
Start: 2024-12-05

## 2024-12-05 RX ORDER — CLOTRIMAZOLE 1 %
CREAM (GRAM) TOPICAL 2 TIMES DAILY
Qty: 45 G | Refills: 2 | Status: SHIPPED | OUTPATIENT
Start: 2024-12-05

## 2024-12-05 RX ORDER — CICLOPIROX 80 MG/ML
SOLUTION TOPICAL
Qty: 6.6 ML | Refills: 11 | Status: SHIPPED | OUTPATIENT
Start: 2024-12-05

## 2024-12-05 NOTE — Clinical Note
12/5/2024      Ricky Lea  [address] [place] [address]      Dear Colleague,    Thank you for referring your patient, Ricky Lea, to the [facility]. Please see a copy of my visit note below.    [facility] Dermatology Note  Encounter Date: Dec 5, 2024  Office Visit     Reviewed patients past medical history and pertinent chart review prior to patients visit today.     Dermatology Problem List:  # ***Dermatitis    ____________________________________________    Assessment & Plan:     # Dermatitis  - Start *** to the affected area twice daily for up to 2-3 weeks at a time or until resolution. Thereafter, may use as needed for flares. Potential side effects from prolonged topical steroid use such as skin atrophy were reviewed. Do not apply topical steroid to face or skin folds.    -When bathing, use gentle soap such as Dove for Sensitive Skin and lukewarm water on amrpits, groin, and other visibly dirty areas, all other areas let the water run over but no soap is necessary. Pat skin dry instead of vigorous rubbing. Encouraged regular use of an emollient such as Vanicream, Cera Ve Cream or Cetaphil Cream to the entire body.   -Start a humidifier in bedroom when sleeping if possible.    - *** Initiate duplimab 600 mg SQ once (given as two 300 mg injections), then 300 mg every other week. Medication hand out given along with counseling provided on self-administering medication, importance of rotating sites of injection, along with adverse effects including but not limited to injection site reaction, flu-like symptoms, conjunctivitis, blepharitis, keratitis, and possible reactivation of oral HSV. The patient was counseled to hold dose if they feel ill. Patient educated to avoid live vaccination. Flu vaccination recommended. ***Patient is not currently pregnant or breastfeeding.   - Before injection, remove dupilumab pre-filled syringe from the refrigerator and allow  dupilumab to reach room temperature (45 minutes) without removing the needle cap.     - ***Continue dupilumab 300 mg once every other week. Refilled today.    Patient to follow-up in 3 months.    Return to clinic in 1 month if not fully resolved or if rash worsens, may return sooner PRN for new or worsening conditions.    All risks, benefits and alternatives were discussed with patient.  Patient is in agreement and understands the assessment and plan.  All questions were answered.  Rhina Lou PA-C  [facility] Dermatology  _______________________________________    CC: Rash (Bilateral feet)     HPI:  Ms. Ricky Lea is a(n) 74 year old female who presents today as a new patient for evaluation of a rash. An  was utilized during today's visit. The rash is located on the ***. The patient reports the rash first started ***.  It is pruritic***. Patient has tried *** with *** improvement.     {kceczema:355864}      {kceczema:421961}    Patient {kcdoes:521470} have seasonal allergies. Patient {kcdoes:452630} have asthma.    Patient is otherwise feeling well, without additional skin concerns.    Physical Exam:  Vitals: There were no vitals taken for this visit.  SKIN: {kkSkinExam:129764}  - pink annular scaly patches on the ***    - No other lesions of concern on areas examined.     Medications:  Current Outpatient Medications   Medication Sig Dispense Refill    acetaminophen (TYLENOL) 500 MG tablet Take 1 tablet (500 mg) by mouth every 8 hours as needed for mild pain 100 tablet 2    alendronate (FOSAMAX) 70 MG tablet Take 1 tablet (70 mg) by mouth every 7 days 12 tablet 3    atorvastatin (LIPITOR) 20 MG tablet TAKE 1 TABLET (20 MG) BY MOUTH DAILY FOR CHOLESTEROL 90 tablet 2    blood glucose (CONTOUR NEXT TEST) test strip Use to test blood sugar one time aday 200 strip 11    blood glucose test strips [BLOOD GLUCOSE TEST STRIPS] Use 1 each As Directed as needed. Dispense brand per patient's insurance at pharmacy  discretion. 200 strip 11    blood-glucose meter Misc [BLOOD-GLUCOSE METER MISC] Test blood sugar twice a day 1 each 0    diaper,brief,adult,disposable Misc [DIAPER,BRIEF,ADULT,DISPOSABLE MISC] Use 2-3 a day as needed 120 each 3    diclofenac (VOLTAREN) 1 % topical gel Apply 4 g topically 4 times daily 350 g 3    DULoxetine (CYMBALTA) 60 MG capsule TAKE 1 CAPSULE (60 MG TOTAL) BY MOUTH DAILY FOR DEPRESSION 90 capsule 1    generic lancets (FINGERSTIX LANCETS) [GENERIC LANCETS (FINGERSTIX LANCETS)] Dispense brand per patient's insurance at pharmacy discretion. 200 each 11    glipiZIDE (GLUCOTROL) 5 MG tablet Take 1 tablet (5 mg) by mouth 2 times daily (before meals) 180 tablet 3    Global Inject Ease Lancets 30G MISC USE AS DIRECTED *100 DAYS*      hydrOXYzine HCl (ATARAX) 25 MG tablet Take 1 tablet (25 mg) by mouth nightly as needed for anxiety (insomnia). 90 tablet 1    meclizine (ANTIVERT) 25 MG tablet Take 1 tablet (25 mg) by mouth 3 times daily as needed for dizziness 90 tablet 1    meloxicam (MOBIC) 15 MG tablet TAKE 1 PILL (15 MG) BY MOUTH EVERYDAY FOR PAIN 30 tablet 9    polyethylene glycol (MIRALAX) 17 GM/Dose powder Take 17 g by mouth daily 510 g 3    pregabalin (LYRICA) 100 MG capsule TAKE 1 CAPSULE (100 MG) BY MOUTH 2 TIMES DAILY 60 capsule 3     No current facility-administered medications for this visit.      Past Medical History:   Patient Active Problem List   Diagnosis    Hyperlipidemia, unspecified hyperlipidemia type    Pulmonary nodules    Left shoulder pain    Essential hypertension    Moderate single current episode of major depressive disorder (H)    Skin cancer of face    Dry skin    Noncompliance with medication regimen    Intrinsic eczema    Type 2 diabetes mellitus treated without insulin (H)    Osteoporosis, unspecified osteoporosis type, unspecified pathological fracture presence    Urinary incontinence, unspecified type    Burning sensation of feet    Chronic midline low back pain without  sciatica    Chronic kidney disease, unspecified CKD stage    Compression fracture of T11 vertebra, sequela    At high risk for falls    Insomnia, unspecified type    Rash and nonspecific skin eruption     History reviewed. No pertinent past medical history.    CC Bassam Llanos MD  [address] [place] [address] on close of this encounter.     [facility] Dermatology Note  Encounter Date: Dec 5, 2024  Office Visit     Reviewed patients past medical history and pertinent chart review prior to patients visit today.     Dermatology Problem List:  # Tinea pedis  -Lotrimin  # Onychomycosis  -Penlac  # Callus  -Urea cream    ____________________________________________    Assessment & Plan:     # Tinea Pedis  -Clotrimazole (Lotrimin) 1% Cream twice daily for 4-6 weeks until cleared.    # Onychomycosis  -Onychomycosis present on the bilateral toenails. We discussed the difficulty associated with treating fungal infections of the nails.   -Ciclopirox (Penlac) was prescribed and should be applied to the infected nail and surrounding skin once daily. After 7 days the nail lacquer should be removed with alcohol and a fresh coat applied. Patient was advised used of the nail lacquer should continue until nail clearance is achieved or up to 48 weeks.    # Callus, bilateral feet   - Patient to begin applying urea 20% cream to feet twice daily as needed.   - Can begin filing down with emery board at home after soaking callus for for 10-15 minutes.    Return to clinic in 1 month if not fully resolved or if rash worsens, may return sooner PRN for new or worsening conditions.    All risks, benefits and alternatives were discussed with patient.  Patient is in agreement and understands the assessment and plan.  All questions were answered.  Rhina Lou PA-C  [facility] Dermatology  _______________________________________    CC: Rash (Bilateral feet)     HPI:  Ms. Ricky Lea is a(n) 74 year old female who  presents today as a new patient for evaluation of a rash. An  via phone was utilized during today's visit. She is accompanied by daughter. The rash is located on the bilateral feet. The patient reports the rash first started over 10 years ago.  It is pruritic and occasionally burns. Patient has tried a cream with *** improvement.     {kceczema:673992}      {kceczema:187264}    Patient {kcdoes:991200} have seasonal allergies. Patient {kcdoes:458990} have asthma.    Patient is otherwise feeling well, without additional skin concerns.    Physical Exam:  Vitals: There were no vitals taken for this visit.  SKIN: {kkSkinExam:652350}  - pink annular scaly patches on the ***    - No other lesions of concern on areas examined.     Medications:  Current Outpatient Medications   Medication Sig Dispense Refill     acetaminophen (TYLENOL) 500 MG tablet Take 1 tablet (500 mg) by mouth every 8 hours as needed for mild pain 100 tablet 2     alendronate (FOSAMAX) 70 MG tablet Take 1 tablet (70 mg) by mouth every 7 days 12 tablet 3     atorvastatin (LIPITOR) 20 MG tablet TAKE 1 TABLET (20 MG) BY MOUTH DAILY FOR CHOLESTEROL 90 tablet 2     blood glucose (CONTOUR NEXT TEST) test strip Use to test blood sugar one time aday 200 strip 11     blood glucose test strips [BLOOD GLUCOSE TEST STRIPS] Use 1 each As Directed as needed. Dispense brand per patient's insurance at pharmacy discretion. 200 strip 11     blood-glucose meter Misc [BLOOD-GLUCOSE METER MISC] Test blood sugar twice a day 1 each 0     diaper,brief,adult,disposable Misc [DIAPER,BRIEF,ADULT,DISPOSABLE MISC] Use 2-3 a day as needed 120 each 3     diclofenac (VOLTAREN) 1 % topical gel Apply 4 g topically 4 times daily 350 g 3     DULoxetine (CYMBALTA) 60 MG capsule TAKE 1 CAPSULE (60 MG TOTAL) BY MOUTH DAILY FOR DEPRESSION 90 capsule 1     generic lancets (FINGERSTIX LANCETS) [GENERIC LANCETS (FINGERSTIX LANCETS)] Dispense brand per patient's insurance at pharmacy  discretion. 200 each 11     glipiZIDE (GLUCOTROL) 5 MG tablet Take 1 tablet (5 mg) by mouth 2 times daily (before meals) 180 tablet 3     Global Inject Ease Lancets 30G MISC USE AS DIRECTED *100 DAYS*       hydrOXYzine HCl (ATARAX) 25 MG tablet Take 1 tablet (25 mg) by mouth nightly as needed for anxiety (insomnia). 90 tablet 1     meclizine (ANTIVERT) 25 MG tablet Take 1 tablet (25 mg) by mouth 3 times daily as needed for dizziness 90 tablet 1     meloxicam (MOBIC) 15 MG tablet TAKE 1 PILL (15 MG) BY MOUTH EVERYDAY FOR PAIN 30 tablet 9     polyethylene glycol (MIRALAX) 17 GM/Dose powder Take 17 g by mouth daily 510 g 3     pregabalin (LYRICA) 100 MG capsule TAKE 1 CAPSULE (100 MG) BY MOUTH 2 TIMES DAILY 60 capsule 3     No current facility-administered medications for this visit.      Past Medical History:   Patient Active Problem List   Diagnosis     Hyperlipidemia, unspecified hyperlipidemia type     Pulmonary nodules     Left shoulder pain     Essential hypertension     Moderate single current episode of major depressive disorder (H)     Skin cancer of face     Dry skin     Noncompliance with medication regimen     Intrinsic eczema     Type 2 diabetes mellitus treated without insulin (H)     Osteoporosis, unspecified osteoporosis type, unspecified pathological fracture presence     Urinary incontinence, unspecified type     Burning sensation of feet     Chronic midline low back pain without sciatica     Chronic kidney disease, unspecified CKD stage     Compression fracture of T11 vertebra, sequela     At high risk for falls     Insomnia, unspecified type     Rash and nonspecific skin eruption     History reviewed. No pertinent past medical history.    CC Bassam Llanos MD  [address] [place] [address] on close of this encounter.       Again, thank you for allowing me to participate in the care of your patient.        Sincerely,        Lakia Lou PA-C

## 2024-12-05 NOTE — TELEPHONE ENCOUNTER
----- Message from Bassam Llanos sent at 12/4/2024  2:07 PM CST -----  Kidney function is a little worse than 3 months ago. She should stop taking Meloxiam. She can take tylenol instead.   Please call the patient.     Thank you,    Dr. Bassam Llanos  12/4/2024 2:07 PM

## 2024-12-05 NOTE — PATIENT INSTRUCTIONS
Proper skin care from [facility]:    -Eliminate harsh soaps as they strip the natural oils from the skin, often resulting in dry itchy skin ( i.e. Dial, Zest, Samoan Spring)  -Use mild soaps such as Cetaphil or Dove Sensitive Skin in the shower. You do not need to use soap on arms, legs, and trunk every time you shower unless visibly soiled.   -Avoid hot or cold showers.  -After showering, lightly dry off and apply moisturizing within 2-3 minutes. This will help trap moisture in the skin.   -Aggressive use of a moisturizer at least 1-2 times a day to the entire body (including -Vanicream, Cetaphil, Aquaphor or Cerave) and moisturize hands after every washing.  -We recommend using moisturizers that come in a tub that needs to be scooped out, not a pump. This has more of an oil base. It will hold moisture in your skin much better than a water base moisturizer. The above recommended are non-pore clogging.      Wear a sunscreen with at least SPF 30 on your face, ears, neck and V of the chest daily. Wear sunscreen on other areas of the body if those areas are exposed to the sun throughout the day. Sunscreens can contain physical and/or chemical blockers. Physical blockers are less likely to clog pores, these include zinc oxide and titanium dioxide. Reapply every two hour and after swimming.     Sunscreen examples: https://www.ewg.org/sunscreen/    UV radiation  UVA radiation remains constant throughout the day and throughout the year. It is a longer wavelength than UVB and therefore penetrates deeper into the skin leading to immediate and delayed tanning, photoaging, and skin cancer. 70-80% of UVA and UVB radiation occurs between the hours of 10am-2pm.  UVB radiation  UVB radiation causes the most harmful effects and is more significant during the summer months. However, snow and ice can reflect UVB radiation leading to skin damage during the winter months as well. UVB radiation is responsible for tanning,  burning, inflammation, delayed erythema (pinkness), pigmentation (brown spots), and skin cancer.     I recommend self monthly full body exams and yearly full body exams with a dermatology provider. If you develop a new or changing lesion please follow up for examination. Most skin cancers are pink and scaly or pink and pearly. However, we do see blue/brown/black skin cancers.  Consider the ABCDEs of melanoma when giving yourself your monthly full body exam ( don't forget the groin, buttocks, feet, toes, etc). A-asymmetry, B-borders, C-color, D-diameter, E-elevation or evolving. If you see any of these changes please follow up in clinic. If you cannot see your back I recommend purchasing a hand held mirror to use with a larger wall mirror.       Checking for Skin Cancer  You can find cancer early by checking your skin each month. There are 3 kinds of skin cancer. They are melanoma, basal cell carcinoma, and squamous cell carcinoma. Doing monthly skin checks is the best way to find new marks or skin changes. Follow the instructions below for checking your skin.   The ABCDEs of checking moles for melanoma   Check your moles or growths for signs of melanoma using ABCDE:   Asymmetry: the sides of the mole or growth don t match  Border: the edges are ragged, notched, or blurred  Color: the color within the mole or growth varies  Diameter: the mole or growth is larger than 6 mm (size of a pencil eraser)  Evolving: the size, shape, or color of the mole or growth is changing (evolving is not shown in the images below)    Checking for other types of skin cancer  Basal cell carcinoma or squamous cell carcinoma have symptoms such as:     A spot or mole that looks different from all other marks on your skin  Changes in how an area feels, such as itching, tenderness, or pain  Changes in the skin's surface, such as oozing, bleeding, or scaliness  A sore that does not heal  New swelling or redness beyond the border of a  mole    Who s at risk?  Anyone can get skin cancer. But you are at greater risk if you have:   Fair skin, light-colored hair, or light-colored eyes  Many moles or abnormal moles on your skin  A history of sunburns from sunlight or tanning beds  A family history of skin cancer  A history of exposure to radiation or chemicals  A weakened immune system  If you have had skin cancer in the past, you are at risk for recurring skin cancer.   How to check your skin  Do your monthly skin checkups in front of a full-length mirror. Check all parts of your body, including your:   Head (ears, face, neck, and scalp)  Torso (front, back, and sides)  Arms (tops, undersides, upper, and lower armpits)  Hands (palms, backs, and fingers, including under the nails)  Buttocks and genitals  Legs (front, back, and sides)  Feet (tops, soles, toes, including under the nails, and between toes)  If you have a lot of moles, take digital photos of them each month. Make sure to take photos both up close and from a distance. These can help you see if any moles change over time.   Most skin changes are not cancer. But if you see any changes in your skin, call your doctor right away. Only he or she can diagnose a problem. If you have skin cancer, seeing your doctor can be the first step toward getting the treatment that could save your life.   [employer] last reviewed this educational content on 4/1/2019 2000-2020 [employer]. [address] [place] [address], [place] [address]. All rights reserved. This information is not intended as a substitute for professional medical care. Always follow your healthcare professional's instructions.       When should I call my doctor?  If you are worsening or not improving, please, contact us or seek urgent care as noted below.     Who should I call with questions (adults)?    [facility] [phone]  For urgent needs outside of business hours call the [facility] at  [phone] and ask for the dermatology resident on call to be paged  If this is a medical emergency and you are unable to reach an ER, Call 911      If you need a prescription refill, please contact your pharmacy. Refills are approved or denied by our Physicians during normal business hours, Monday through Fridays  Per office policy, refills will not be granted if you have not been seen within the past year (or sooner depending on your child's condition)

## 2024-12-05 NOTE — TELEPHONE ENCOUNTER
Called patient, spoke with daughter Barrie Raymondelsy ([facility] on file). Relayed provider message to patient's daughter, patient's daughter verbalizes understanding of provider message.         Jorje Baugh, MSN, RN   Triage Nurse   [facility]

## 2024-12-05 NOTE — PROGRESS NOTES
[facility] Dermatology Note  Encounter Date: Dec 5, 2024  Office Visit     Reviewed patients past medical history and pertinent chart review prior to patients visit today.     Dermatology Problem List:  # Tinea pedis  -Lotrimin  # Onychomycosis  -Penlac  # Callus  -Urea cream    ____________________________________________    Assessment & Plan:     # Tinea Pedis  -Clotrimazole (Lotrimin) 1% Cream twice daily for 4-6 weeks until cleared.    # Onychomycosis  -Onychomycosis present on the bilateral toenails. We discussed the difficulty associated with treating fungal infections of the nails.   -Ciclopirox (Penlac) was prescribed and should be applied to the infected nail and surrounding skin once daily. After 7 days the nail lacquer should be removed with alcohol and a fresh coat applied. Patient was advised used of the nail lacquer should continue until nail clearance is achieved or up to 48 weeks.    # Callus, bilateral feet   - Patient to begin applying urea 20% cream to feet twice daily as needed.   - Can begin filing down with emery board at home after soaking callus for for 10-15 minutes.    Return to clinic in 1 month if not fully resolved or if rash worsens, may return sooner PRN for new or worsening conditions.    All risks, benefits and alternatives were discussed with patient.  Patient is in agreement and understands the assessment and plan.  All questions were answered.  Rhina Lou PA-C  [facility]  _______________________________________    CC: Rash (Bilateral feet)     HPI:  Ms. Ricky Lea is a(n) 74 year old female who presents today as a new patient for evaluation of a rash. An  via phone was utilized during today's visit. She is accompanied by her daughter. The rash is located on the bilateral feet. The patient reports the rash first started over 10 years ago prior to moving to the [place].  It is pruritic and occasionally burns. Patient has tried a cream  (unknown name) without improvement. She does note having a history of eczema.  Her sister also may have a history or eczema.    Patient is otherwise feeling well, without additional skin concerns.    Physical Exam:  Vitals: There were no vitals taken for this visit.  SKIN: Focused examination of bilateral feet was performed.  - bilateral feet, scaly erythematous plaques in a moccasin distribution  - toenails, nail thickening, yellow discoloration, and dystrophy   - bilateral medial great toes and lateral plantar foot, yellow, hyperkeratotic papule consistent with a callus    - No other lesions of concern on areas examined.     Medications:  Current Outpatient Medications   Medication Sig Dispense Refill    acetaminophen (TYLENOL) 500 MG tablet Take 1 tablet (500 mg) by mouth every 8 hours as needed for mild pain 100 tablet 2    alendronate (FOSAMAX) 70 MG tablet Take 1 tablet (70 mg) by mouth every 7 days 12 tablet 3    atorvastatin (LIPITOR) 20 MG tablet TAKE 1 TABLET (20 MG) BY MOUTH DAILY FOR CHOLESTEROL 90 tablet 2    blood glucose (CONTOUR NEXT TEST) test strip Use to test blood sugar one time aday 200 strip 11    blood glucose test strips [BLOOD GLUCOSE TEST STRIPS] Use 1 each As Directed as needed. Dispense brand per patient's insurance at pharmacy discretion. 200 strip 11    blood-glucose meter Misc [BLOOD-GLUCOSE METER MISC] Test blood sugar twice a day 1 each 0    diaper,brief,adult,disposable Misc [DIAPER,BRIEF,ADULT,DISPOSABLE MISC] Use 2-3 a day as needed 120 each 3    diclofenac (VOLTAREN) 1 % topical gel Apply 4 g topically 4 times daily 350 g 3    DULoxetine (CYMBALTA) 60 MG capsule TAKE 1 CAPSULE (60 MG TOTAL) BY MOUTH DAILY FOR DEPRESSION 90 capsule 1    generic lancets (FINGERSTIX LANCETS) [GENERIC LANCETS (FINGERSTIX LANCETS)] Dispense brand per patient's insurance at pharmacy discretion. 200 each 11    glipiZIDE (GLUCOTROL) 5 MG tablet Take 1 tablet (5 mg) by mouth 2 times daily (before meals)  180 tablet 3    Global Inject Ease Lancets 30G MISC USE AS DIRECTED *100 DAYS*      hydrOXYzine HCl (ATARAX) 25 MG tablet Take 1 tablet (25 mg) by mouth nightly as needed for anxiety (insomnia). 90 tablet 1    meclizine (ANTIVERT) 25 MG tablet Take 1 tablet (25 mg) by mouth 3 times daily as needed for dizziness 90 tablet 1    meloxicam (MOBIC) 15 MG tablet TAKE 1 PILL (15 MG) BY MOUTH EVERYDAY FOR PAIN 30 tablet 9    polyethylene glycol (MIRALAX) 17 GM/Dose powder Take 17 g by mouth daily 510 g 3    pregabalin (LYRICA) 100 MG capsule TAKE 1 CAPSULE (100 MG) BY MOUTH 2 TIMES DAILY 60 capsule 3     No current facility-administered medications for this visit.      Past Medical History:   Patient Active Problem List   Diagnosis    Hyperlipidemia, unspecified hyperlipidemia type    Pulmonary nodules    Left shoulder pain    Essential hypertension    Moderate single current episode of major depressive disorder (H)    Skin cancer of face    Dry skin    Noncompliance with medication regimen    Intrinsic eczema    Type 2 diabetes mellitus treated without insulin (H)    Osteoporosis, unspecified osteoporosis type, unspecified pathological fracture presence    Urinary incontinence, unspecified type    Burning sensation of feet    Chronic midline low back pain without sciatica    Chronic kidney disease, unspecified CKD stage    Compression fracture of T11 vertebra, sequela    At high risk for falls    Insomnia, unspecified type    Rash and nonspecific skin eruption     History reviewed. No pertinent past medical history.    CC Bassam Llanos MD  [address] [place] [address] on close of this encounter.

## 2024-12-12 ENCOUNTER — MEDICAL CORRESPONDENCE (OUTPATIENT)
Dept: HEALTH INFORMATION MANAGEMENT | Facility: CLINIC | Age: 74
End: 2024-12-12
Payer: COMMERCIAL

## 2024-12-12 DIAGNOSIS — Z53.9 DIAGNOSIS NOT YET DEFINED: Primary | ICD-10-CM

## 2024-12-12 PROCEDURE — G0179 MD RECERTIFICATION HHA PT: HCPCS | Performed by: FAMILY MEDICINE

## 2024-12-30 DIAGNOSIS — F32.1 MODERATE SINGLE CURRENT EPISODE OF MAJOR DEPRESSIVE DISORDER (H): ICD-10-CM

## 2024-12-30 RX ORDER — DULOXETIN HYDROCHLORIDE 60 MG/1
CAPSULE, DELAYED RELEASE ORAL
Qty: 90 CAPSULE | Refills: 0 | Status: SHIPPED | OUTPATIENT
Start: 2024-12-30

## 2025-01-16 DIAGNOSIS — G62.9 NEUROPATHY: ICD-10-CM

## 2025-01-16 DIAGNOSIS — Z76.0 ENCOUNTER FOR MEDICATION REFILL: ICD-10-CM

## 2025-01-16 DIAGNOSIS — M54.50 CHRONIC MIDLINE LOW BACK PAIN WITHOUT SCIATICA: ICD-10-CM

## 2025-01-16 DIAGNOSIS — G89.29 CHRONIC MIDLINE LOW BACK PAIN WITHOUT SCIATICA: ICD-10-CM

## 2025-01-16 RX ORDER — MELOXICAM 15 MG/1
TABLET ORAL
Qty: 30 TABLET | Refills: 9 | Status: SHIPPED | OUTPATIENT
Start: 2025-01-16

## 2025-01-16 RX ORDER — PREGABALIN 100 MG/1
100 CAPSULE ORAL 2 TIMES DAILY
Qty: 60 CAPSULE | Refills: 3 | Status: SHIPPED | OUTPATIENT
Start: 2025-01-16

## 2025-01-30 DIAGNOSIS — Z53.9 DIAGNOSIS NOT YET DEFINED: Primary | ICD-10-CM

## 2025-01-30 PROCEDURE — G0179 MD RECERTIFICATION HHA PT: HCPCS | Performed by: FAMILY MEDICINE

## 2025-02-24 ENCOUNTER — PATIENT OUTREACH (OUTPATIENT)
Dept: GERIATRIC MEDICINE | Facility: CLINIC | Age: 75
End: 2025-02-24
Payer: COMMERCIAL

## 2025-02-24 NOTE — LETTER
February 24, 2025    YOSSI CEDENO  [address] [place] [address]      Dear Pa:    As a member of [facility] ([facility] you are provided a care coordinator. I will be your new care coordinator as of 3/1/2025. I will be calling you soon to see how you are doing and determine your needs.    If you have any questions, please feel free to call me at [phone]. If you reach my voice mail, please leave a message and your phone number. If you are hearing impaired, please call the [place] [employer] at [phone] or [phone] ([id number] relay service).    I look forward to speaking with you soon.    Sincerely,        SAPNA Joseph   [phone]  [email or web address]          [facility]  [id number] DHS Approved ([id number])  [id number] (11/18)                Patient

## 2025-02-24 NOTE — PROGRESS NOTES
[facility] Care Coordination Contact    Internal CC change effective 3/1/2025.  Mailed member CC Change letter.  Additional tasks to be completed by CMS include: update database & EPIC, enter CC Change in MMIS, and move member file.    Sejal Ford  Case Management Specialist   [facility]  [phone]

## 2025-03-27 ENCOUNTER — OFFICE VISIT (OUTPATIENT)
Dept: DERMATOLOGY | Facility: CLINIC | Age: 75
End: 2025-03-27
Payer: COMMERCIAL

## 2025-03-27 ENCOUNTER — PATIENT OUTREACH (OUTPATIENT)
Dept: CARE COORDINATION | Facility: CLINIC | Age: 75
End: 2025-03-27

## 2025-03-27 DIAGNOSIS — B35.1 ONYCHOMYCOSIS: ICD-10-CM

## 2025-03-27 DIAGNOSIS — B35.3 TINEA PEDIS OF BOTH FEET: ICD-10-CM

## 2025-03-27 DIAGNOSIS — Z51.81 MEDICATION MONITORING ENCOUNTER: Primary | ICD-10-CM

## 2025-03-27 LAB
ALBUMIN SERPL BCG-MCNC: 3.9 G/DL (ref 3.5–5.2)
ALP SERPL-CCNC: 58 U/L (ref 40–150)
ALT SERPL W P-5'-P-CCNC: 17 U/L (ref 0–50)
ANION GAP SERPL CALCULATED.3IONS-SCNC: 10 MMOL/L (ref 7–15)
AST SERPL W P-5'-P-CCNC: 25 U/L (ref 0–45)
BILIRUB SERPL-MCNC: 0.6 MG/DL
BUN SERPL-MCNC: 9.4 MG/DL (ref 8–23)
CALCIUM SERPL-MCNC: 9.2 MG/DL (ref 8.8–10.4)
CHLORIDE SERPL-SCNC: 100 MMOL/L (ref 98–107)
CREAT SERPL-MCNC: 1.06 MG/DL (ref 0.51–0.95)
EGFRCR SERPLBLD CKD-EPI 2021: 55 ML/MIN/1.73M2
GLUCOSE SERPL-MCNC: 215 MG/DL (ref 70–99)
HCO3 SERPL-SCNC: 30 MMOL/L (ref 22–29)
POTASSIUM SERPL-SCNC: 4.2 MMOL/L (ref 3.4–5.3)
PROT SERPL-MCNC: 6.6 G/DL (ref 6.4–8.3)
SODIUM SERPL-SCNC: 140 MMOL/L (ref 135–145)

## 2025-03-27 RX ORDER — CLOTRIMAZOLE 1 %
CREAM (GRAM) TOPICAL 2 TIMES DAILY
Qty: 45 G | Refills: 2 | Status: SHIPPED | OUTPATIENT
Start: 2025-03-27

## 2025-03-27 RX ORDER — CICLOPIROX 80 MG/ML
SOLUTION TOPICAL
Qty: 6.6 ML | Refills: 11 | Status: SHIPPED | OUTPATIENT
Start: 2025-03-27

## 2025-03-27 RX ORDER — TERBINAFINE HYDROCHLORIDE 250 MG/1
125 TABLET ORAL DAILY
Qty: 7 TABLET | Refills: 0 | Status: SHIPPED | OUTPATIENT
Start: 2025-03-27

## 2025-03-27 ASSESSMENT — PAIN SCALES - GENERAL: PAINLEVEL_OUTOF10: NO PAIN (0)

## 2025-03-27 NOTE — PROGRESS NOTES
[facility] Dermatology Note  Encounter Date: Mar 27, 2025  Office Visit     Reviewed patients past medical history and pertinent chart review prior to patients visit today.     Dermatology Problem List:  # Tinea pedis  -Lotrimin, terbinafine 250 mg daily x 2 weeks  # Onychomycosis  -Penlac  # Callus  -Urea cream  ____________________________________________    Assessment & Plan:     # Tinea Pedis  -Restart Clotrimazole (Lotrimin) 1% Cream twice daily for until cleared then may use once weekly for prevention/maintenance thereafter. We also will initiate oral Lamisil, patient to take 1/2 tablet (125 mg) daily for the next 2 weeks. Dosage adjusted due to creatinine clearance of 43 mL/min. Side effects of oral terbinafine include headache, dermatitis, gastrointestinal distress, taste disturbances, and hepatic toxicity/liver enzyme abnormalities. We will check baseline CMP today prior to starting medication.    # Onychomycosis  -Continue Ciclopirox (Penlac). Refills provided. Discussed we could do a longer course of oral terbinafine given concurrent onychomycosis, but she is more bothered by the rash of the feet and not so much by the nails. The topical should be applied to the infected nail and surrounding skin once daily. After 7 days the nail lacquer should be removed with alcohol and a fresh coat applied. Patient was advised used of the nail lacquer should continue until nail clearance is achieved or up to 48 weeks.    Return to clinic in 1-2 months if not fully resolved or if rash worsens, may return sooner PRN for new or worsening conditions.    All risks, benefits and alternatives were discussed with patient.  Patient is in agreement and understands the assessment and plan.  All questions were answered.  Rhina Lou PA-C  [facility]  _______________________________________    CC: Rash, feet     HPI:  Ms. Ricky Lea is a(n) 75 year old female who presents today as a return patient  for evaluation of a rash. An  via phone was utilized during today's visit. She is accompanied by her daughter.  She was last seen in dermatology on 12/5/2024 for evaluation of a rash on her feet.  She was given a prescription for clotrimazole 1% cream to use twice daily for 4 to 6 weeks.  She also was prescribed ciclopirox for onychomycosis as well as urea 20% cream for callus.    She presents today for follow-up and reports no improvement with her rash on her feet.  She still continues to experience burning and itching.  She reports she applied the clotrimazole cream to her feet, but did not see improvement. She is not sure how long she applied this for.    Patient is otherwise feeling well, without additional skin concerns.    Physical Exam:  Vitals: There were no vitals taken for this visit.  SKIN: Focused examination of bilateral feet was performed.  - bilateral feet, scaly erythematous plaques in a moccasin distribution  - toenails, nail thickening, yellow discoloration, and dystrophy     - No other lesions of concern on areas examined.     Medications:  Current Outpatient Medications   Medication Sig Dispense Refill    acetaminophen (TYLENOL) 500 MG tablet Take 1 tablet (500 mg) by mouth every 8 hours as needed for mild pain 100 tablet 2    alendronate (FOSAMAX) 70 MG tablet Take 1 tablet (70 mg) by mouth every 7 days 12 tablet 3    atorvastatin (LIPITOR) 20 MG tablet TAKE 1 TABLET (20 MG) BY MOUTH DAILY FOR CHOLESTEROL 90 tablet 2    blood glucose (CONTOUR NEXT TEST) test strip Use to test blood sugar one time aday 200 strip 11    blood glucose test strips [BLOOD GLUCOSE TEST STRIPS] Use 1 each As Directed as needed. Dispense brand per patient's insurance at pharmacy discretion. 200 strip 11    blood-glucose meter Misc [BLOOD-GLUCOSE METER MISC] Test blood sugar twice a day 1 each 0    ciclopirox (PENLAC) 8 % external solution Apply to adjacent skin and affected nails nightly.  Remove with alcohol  every 7 days, then repeat. 6.6 mL 11    clotrimazole (LOTRIMIN) 1 % external cream Apply topically 2 times daily. To the feet. 45 g 2    diaper,brief,adult,disposable Misc [DIAPER,BRIEF,ADULT,DISPOSABLE MISC] Use 2-3 a day as needed 120 each 3    diclofenac (VOLTAREN) 1 % topical gel Apply 4 g topically 4 times daily 350 g 3    DULoxetine (CYMBALTA) 60 MG capsule TAKE 1 CAPSULE (60 MG TOTAL) BY MOUTH DAILY FOR DEPRESSION 90 capsule 0    generic lancets (FINGERSTIX LANCETS) [GENERIC LANCETS (FINGERSTIX LANCETS)] Dispense brand per patient's insurance at pharmacy discretion. 200 each 11    glipiZIDE (GLUCOTROL) 5 MG tablet Take 1 tablet (5 mg) by mouth 2 times daily (before meals) 180 tablet 3    Global Inject Ease Lancets 30G MISC USE AS DIRECTED *100 DAYS*      hydrOXYzine HCl (ATARAX) 25 MG tablet Take 1 tablet (25 mg) by mouth nightly as needed for anxiety (insomnia). 90 tablet 1    meclizine (ANTIVERT) 25 MG tablet Take 1 tablet (25 mg) by mouth 3 times daily as needed for dizziness 90 tablet 1    meloxicam (MOBIC) 15 MG tablet TAKE 1 PILL (15 MG) BY MOUTH EVERYDAY FOR PAIN 30 tablet 9    polyethylene glycol (MIRALAX) 17 GM/Dose powder Take 17 g by mouth daily 510 g 3    pregabalin (LYRICA) 100 MG capsule TAKE 1 CAPSULE (100 MG) BY MOUTH 2 TIMES DAILY 60 capsule 3    urea (GORMEL) 20 % external cream Apply topically 2 times daily as needed (callus on feet). 85 g 2     No current facility-administered medications for this visit.      Past Medical History:   Patient Active Problem List   Diagnosis    Hyperlipidemia, unspecified hyperlipidemia type    Pulmonary nodules    Left shoulder pain    Essential hypertension    Moderate single current episode of major depressive disorder (H)    Skin cancer of face    Dry skin    Noncompliance with medication regimen    Intrinsic eczema    Type 2 diabetes mellitus treated without insulin (H)    Osteoporosis, unspecified osteoporosis type, unspecified pathological fracture  presence    Urinary incontinence, unspecified type    Burning sensation of feet    Chronic midline low back pain without sciatica    Chronic kidney disease, unspecified CKD stage    Compression fracture of T11 vertebra, sequela    At high risk for falls    Insomnia, unspecified type    Rash and nonspecific skin eruption     History reviewed. No pertinent past medical history.    CC Bassam Llanos MD  [address] [place] [address] on close of this encounter.

## 2025-03-27 NOTE — LETTER
3/27/2025      Ricky Lea  [address] [place] [address]      Dear Colleague,    Thank you for referring your patient, Ricky Lea, to the [facility]. Please see a copy of my visit note below.    [facility] Dermatology Note  Encounter Date: Mar 27, 2025  Office Visit     Reviewed patients past medical history and pertinent chart review prior to patients visit today.     Dermatology Problem List:  # Tinea pedis  -Lotrimin, terbinafine 250 mg daily x 2 weeks  # Onychomycosis  -Penlac  # Callus  -Urea cream  ____________________________________________    Assessment & Plan:     # Tinea Pedis  -Restart Clotrimazole (Lotrimin) 1% Cream twice daily for until cleared then may use once weekly for prevention/maintenance thereafter. We also will initiate oral Lamisil, patient to take 1/2 tablet (125 mg) daily for the next 2 weeks. Dosage adjusted due to creatinine clearance of 43 mL/min. Side effects of oral terbinafine include headache, dermatitis, gastrointestinal distress, taste disturbances, and hepatic toxicity/liver enzyme abnormalities. We will check baseline CMP today prior to starting medication.    # Onychomycosis  -Continue Ciclopirox (Penlac). Refills provided. Discussed we could do a longer course of oral terbinafine given concurrent onychomycosis, but she is more bothered by the rash of the feet and not so much by the nails. The topical should be applied to the infected nail and surrounding skin once daily. After 7 days the nail lacquer should be removed with alcohol and a fresh coat applied. Patient was advised used of the nail lacquer should continue until nail clearance is achieved or up to 48 weeks.    Return to clinic in 1-2 months if not fully resolved or if rash worsens, may return sooner PRN for new or worsening conditions.    All risks, benefits and alternatives were discussed with patient.  Patient is in agreement and understands the assessment and  plan.  All questions were answered.  Rhina Lou PA-C  [facility] Dermatology  _______________________________________    CC: Rash, feet     HPI:  Ms. Ricky Lea is a(n) 75 year old female who presents today as a return patient for evaluation of a rash. An  via phone was utilized during today's visit. She is accompanied by her daughter.  She was last seen in dermatology on 12/5/2024 for evaluation of a rash on her feet.  She was given a prescription for clotrimazole 1% cream to use twice daily for 4 to 6 weeks.  She also was prescribed ciclopirox for onychomycosis as well as urea 20% cream for callus.    She presents today for follow-up and reports no improvement with her rash on her feet.  She still continues to experience burning and itching.  She reports she applied the clotrimazole cream to her feet, but did not see improvement. She is not sure how long she applied this for.    Patient is otherwise feeling well, without additional skin concerns.    Physical Exam:  Vitals: There were no vitals taken for this visit.  SKIN: Focused examination of bilateral feet was performed.  - bilateral feet, scaly erythematous plaques in a moccasin distribution  - toenails, nail thickening, yellow discoloration, and dystrophy     - No other lesions of concern on areas examined.     Medications:  Current Outpatient Medications   Medication Sig Dispense Refill     acetaminophen (TYLENOL) 500 MG tablet Take 1 tablet (500 mg) by mouth every 8 hours as needed for mild pain 100 tablet 2     alendronate (FOSAMAX) 70 MG tablet Take 1 tablet (70 mg) by mouth every 7 days 12 tablet 3     atorvastatin (LIPITOR) 20 MG tablet TAKE 1 TABLET (20 MG) BY MOUTH DAILY FOR CHOLESTEROL 90 tablet 2     blood glucose (CONTOUR NEXT TEST) test strip Use to test blood sugar one time aday 200 strip 11     blood glucose test strips [BLOOD GLUCOSE TEST STRIPS] Use 1 each As Directed as needed. Dispense brand per patient's insurance at pharmacy  discretion. 200 strip 11     blood-glucose meter Misc [BLOOD-GLUCOSE METER MISC] Test blood sugar twice a day 1 each 0     ciclopirox (PENLAC) 8 % external solution Apply to adjacent skin and affected nails nightly.  Remove with alcohol every 7 days, then repeat. 6.6 mL 11     clotrimazole (LOTRIMIN) 1 % external cream Apply topically 2 times daily. To the feet. 45 g 2     diaper,brief,adult,disposable Misc [DIAPER,BRIEF,ADULT,DISPOSABLE MISC] Use 2-3 a day as needed 120 each 3     diclofenac (VOLTAREN) 1 % topical gel Apply 4 g topically 4 times daily 350 g 3     DULoxetine (CYMBALTA) 60 MG capsule TAKE 1 CAPSULE (60 MG TOTAL) BY MOUTH DAILY FOR DEPRESSION 90 capsule 0     generic lancets (FINGERSTIX LANCETS) [GENERIC LANCETS (FINGERSTIX LANCETS)] Dispense brand per patient's insurance at pharmacy discretion. 200 each 11     glipiZIDE (GLUCOTROL) 5 MG tablet Take 1 tablet (5 mg) by mouth 2 times daily (before meals) 180 tablet 3     Global Inject Ease Lancets 30G MISC USE AS DIRECTED *100 DAYS*       hydrOXYzine HCl (ATARAX) 25 MG tablet Take 1 tablet (25 mg) by mouth nightly as needed for anxiety (insomnia). 90 tablet 1     meclizine (ANTIVERT) 25 MG tablet Take 1 tablet (25 mg) by mouth 3 times daily as needed for dizziness 90 tablet 1     meloxicam (MOBIC) 15 MG tablet TAKE 1 PILL (15 MG) BY MOUTH EVERYDAY FOR PAIN 30 tablet 9     polyethylene glycol (MIRALAX) 17 GM/Dose powder Take 17 g by mouth daily 510 g 3     pregabalin (LYRICA) 100 MG capsule TAKE 1 CAPSULE (100 MG) BY MOUTH 2 TIMES DAILY 60 capsule 3     urea (GORMEL) 20 % external cream Apply topically 2 times daily as needed (callus on feet). 85 g 2     No current facility-administered medications for this visit.      Past Medical History:   Patient Active Problem List   Diagnosis     Hyperlipidemia, unspecified hyperlipidemia type     Pulmonary nodules     Left shoulder pain     Essential hypertension     Moderate single current episode of major  depressive disorder (H)     Skin cancer of face     Dry skin     Noncompliance with medication regimen     Intrinsic eczema     Type 2 diabetes mellitus treated without insulin (H)     Osteoporosis, unspecified osteoporosis type, unspecified pathological fracture presence     Urinary incontinence, unspecified type     Burning sensation of feet     Chronic midline low back pain without sciatica     Chronic kidney disease, unspecified CKD stage     Compression fracture of T11 vertebra, sequela     At high risk for falls     Insomnia, unspecified type     Rash and nonspecific skin eruption     History reviewed. No pertinent past medical history.    CC Bassam Llanos MD  [address] [place] [address] on close of this encounter.       Again, thank you for allowing me to participate in the care of your patient.        Sincerely,        Lakia Ravi PA-C    Electronically signed

## 2025-03-30 DIAGNOSIS — F32.1 MODERATE SINGLE CURRENT EPISODE OF MAJOR DEPRESSIVE DISORDER (H): ICD-10-CM

## 2025-03-30 RX ORDER — DULOXETIN HYDROCHLORIDE 60 MG/1
CAPSULE, DELAYED RELEASE ORAL
Qty: 90 CAPSULE | Refills: 0 | Status: SHIPPED | OUTPATIENT
Start: 2025-03-30

## 2025-04-04 PROBLEM — N18.31 CHRONIC KIDNEY DISEASE, STAGE 3A (H): Status: ACTIVE | Noted: 2025-04-04

## 2025-04-04 PROBLEM — B35.3 TINEA PEDIS OF BOTH FEET: Status: ACTIVE | Noted: 2025-04-04

## 2025-04-05 DIAGNOSIS — M81.0 AGE-RELATED OSTEOPOROSIS WITHOUT CURRENT PATHOLOGICAL FRACTURE: ICD-10-CM

## 2025-04-07 ENCOUNTER — TELEPHONE (OUTPATIENT)
Dept: FAMILY MEDICINE | Facility: CLINIC | Age: 75
End: 2025-04-07
Payer: COMMERCIAL

## 2025-04-07 RX ORDER — ALENDRONATE SODIUM 70 MG/1
70 TABLET ORAL
Qty: 12 TABLET | Refills: 3 | Status: SHIPPED | OUTPATIENT
Start: 2025-04-07

## 2025-04-07 NOTE — TELEPHONE ENCOUNTER
Patient Quality Outreach    Patient is due for the following:   There are no preventive care reminders to display for this patient.    Action(s) Taken:   Patient has upcoming appointment, these items will be addressed at that time.    Type of outreach:    Chart review performed, no outreach needed.    Questions for provider review:    None         Sukh Hazel MA  Chart routed to None.

## 2025-04-08 ENCOUNTER — TRANSFERRED RECORDS (OUTPATIENT)
Dept: HEALTH INFORMATION MANAGEMENT | Facility: CLINIC | Age: 75
End: 2025-04-08

## 2025-04-08 LAB — RETINOPATHY: NEGATIVE

## 2025-04-10 DIAGNOSIS — Z53.9 DIAGNOSIS NOT YET DEFINED: Primary | ICD-10-CM

## 2025-04-10 PROCEDURE — G0179 MD RECERTIFICATION HHA PT: HCPCS | Performed by: FAMILY MEDICINE

## 2025-04-11 ENCOUNTER — TELEPHONE (OUTPATIENT)
Dept: FAMILY MEDICINE | Facility: CLINIC | Age: 75
End: 2025-04-11
Payer: COMMERCIAL

## 2025-04-11 NOTE — TELEPHONE ENCOUNTER
Called patient and left message to call clinic back. Please relay message if patient calls back.      Sharif Aragon Cem Say, BSN RN  [facility]      ----- Message from Bassam Lalnos sent at 4/11/2025 10:34 AM CDT -----  Hemoglobin A1c is later worse than before.  She should take her diabetes medication regularly as prescribed.  I did send a refill for her.  Please call the patient.    Thank you,    Dr. Bassam Llanos  4/11/2025 10:34 AM

## 2025-04-16 NOTE — TELEPHONE ENCOUNTER
"Writer attempt #2 to call patient with the help of a \"Sejal\"   (ID # [id number]) regarding clinician's message below. Clinician's message relayed to patient's daughter, Barrie Samuels (CTC on file). Daughter already picked up the Glipizide 5 mg tablet. Reviewed Glipizide directions again with daughter.    Daughter verbalizes understanding, agrees with plan and has no further questions.    ROBBIE HeardN, RN, PHN   [facility]        "

## 2025-05-13 ENCOUNTER — TELEPHONE (OUTPATIENT)
Dept: FAMILY MEDICINE | Facility: CLINIC | Age: 75
End: 2025-05-13
Payer: COMMERCIAL

## 2025-05-13 NOTE — TELEPHONE ENCOUNTER
"  Forms/Letter Request    Type of form/letter: DME     Type of DME requested: PULL UP PREVAIL MED 28-40\" FEMALE 20/PK    Do we have the form/letter: Yes: in Dr. Llanos's blue folder    Who is the form from? [facility]     Where did/will the form come from? form was faxed in    When is form/letter needed by: 5-7 Business days    How would you like the form/letter returned: Fax : [phone]    Patient Notified form requests are processed in 5-7 business days:Yes    Okay to leave a detailed message?: Yes at Other phone number:  [phone]    "

## 2025-05-15 ENCOUNTER — MEDICAL CORRESPONDENCE (OUTPATIENT)
Dept: HEALTH INFORMATION MANAGEMENT | Facility: CLINIC | Age: 75
End: 2025-05-15
Payer: COMMERCIAL

## 2025-05-15 NOTE — TELEPHONE ENCOUNTER
Form reviewed, completed, and signed by physician. Returned to sender as requested. Copied to EHR scanning.

## 2025-05-19 DIAGNOSIS — G62.9 NEUROPATHY: ICD-10-CM

## 2025-05-20 RX ORDER — PREGABALIN 100 MG/1
100 CAPSULE ORAL 2 TIMES DAILY
Qty: 60 CAPSULE | Refills: 3 | Status: SHIPPED | OUTPATIENT
Start: 2025-05-20

## 2025-05-27 ENCOUNTER — PATIENT OUTREACH (OUTPATIENT)
Dept: GERIATRIC MEDICINE | Facility: CLINIC | Age: 75
End: 2025-05-27
Payer: COMMERCIAL

## 2025-05-27 NOTE — PROGRESS NOTES
[facility] Care Coordination Contact    CFSS auth    Sent copy of service delivery plan and addendum to member and [employer] (include [employer] agency, [employer] agency as applicable). Sent copy of revised support plan to member. Submitted auth to health plan. Updated database.    Candy Ace  [facility]  Senior Care Management Specialist  [phone]

## 2025-06-12 ENCOUNTER — MEDICAL CORRESPONDENCE (OUTPATIENT)
Dept: HEALTH INFORMATION MANAGEMENT | Facility: CLINIC | Age: 75
End: 2025-06-12

## 2025-06-19 DIAGNOSIS — E78.5 HYPERLIPIDEMIA, UNSPECIFIED HYPERLIPIDEMIA TYPE: ICD-10-CM

## 2025-06-19 DIAGNOSIS — Z76.0 ENCOUNTER FOR MEDICATION REFILL: ICD-10-CM

## 2025-06-19 RX ORDER — ATORVASTATIN CALCIUM 20 MG/1
TABLET, FILM COATED ORAL
Qty: 90 TABLET | Refills: 2 | Status: SHIPPED | OUTPATIENT
Start: 2025-06-19

## 2025-06-28 DIAGNOSIS — F32.1 MODERATE SINGLE CURRENT EPISODE OF MAJOR DEPRESSIVE DISORDER (H): ICD-10-CM

## 2025-06-30 RX ORDER — DULOXETIN HYDROCHLORIDE 60 MG/1
CAPSULE, DELAYED RELEASE ORAL
Qty: 90 CAPSULE | Refills: 3 | Status: SHIPPED | OUTPATIENT
Start: 2025-06-30

## 2025-07-07 ENCOUNTER — OFFICE VISIT (OUTPATIENT)
Dept: FAMILY MEDICINE | Facility: CLINIC | Age: 75
End: 2025-07-07
Payer: COMMERCIAL

## 2025-07-07 VITALS
WEIGHT: 130.56 LBS | SYSTOLIC BLOOD PRESSURE: 110 MMHG | TEMPERATURE: 98.2 F | RESPIRATION RATE: 16 BRPM | HEART RATE: 84 BPM | HEIGHT: 59 IN | DIASTOLIC BLOOD PRESSURE: 67 MMHG | BODY MASS INDEX: 26.32 KG/M2 | OXYGEN SATURATION: 97 %

## 2025-07-07 DIAGNOSIS — M54.50 CHRONIC MIDLINE LOW BACK PAIN WITHOUT SCIATICA: ICD-10-CM

## 2025-07-07 DIAGNOSIS — Z00.00 ROUTINE GENERAL MEDICAL EXAMINATION AT A HEALTH CARE FACILITY: Primary | ICD-10-CM

## 2025-07-07 DIAGNOSIS — G89.29 CHRONIC MIDLINE LOW BACK PAIN WITHOUT SCIATICA: ICD-10-CM

## 2025-07-07 DIAGNOSIS — I10 ESSENTIAL HYPERTENSION: ICD-10-CM

## 2025-07-07 DIAGNOSIS — F32.1 MODERATE SINGLE CURRENT EPISODE OF MAJOR DEPRESSIVE DISORDER (H): ICD-10-CM

## 2025-07-07 DIAGNOSIS — E11.9 TYPE 2 DIABETES MELLITUS TREATED WITHOUT INSULIN (H): ICD-10-CM

## 2025-07-07 DIAGNOSIS — N18.31 CHRONIC KIDNEY DISEASE, STAGE 3A (H): ICD-10-CM

## 2025-07-07 PROCEDURE — 96127 BRIEF EMOTIONAL/BEHAV ASSMT: CPT | Performed by: FAMILY MEDICINE

## 2025-07-07 PROCEDURE — 99214 OFFICE O/P EST MOD 30 MIN: CPT | Mod: 25 | Performed by: FAMILY MEDICINE

## 2025-07-07 PROCEDURE — 3078F DIAST BP <80 MM HG: CPT | Performed by: FAMILY MEDICINE

## 2025-07-07 PROCEDURE — 99397 PER PM REEVAL EST PAT 65+ YR: CPT | Performed by: FAMILY MEDICINE

## 2025-07-07 PROCEDURE — T1013 SIGN LANG/ORAL INTERPRETER: HCPCS | Mod: U4

## 2025-07-07 PROCEDURE — 3074F SYST BP LT 130 MM HG: CPT | Performed by: FAMILY MEDICINE

## 2025-07-07 PROCEDURE — G2211 COMPLEX E/M VISIT ADD ON: HCPCS | Performed by: FAMILY MEDICINE

## 2025-07-07 ASSESSMENT — PATIENT HEALTH QUESTIONNAIRE - PHQ9
SUM OF ALL RESPONSES TO PHQ QUESTIONS 1-9: 0
SUM OF ALL RESPONSES TO PHQ QUESTIONS 1-9: 0

## 2025-07-07 ASSESSMENT — SOCIAL DETERMINANTS OF HEALTH (SDOH): HOW OFTEN DO YOU GET TOGETHER WITH FRIENDS OR RELATIVES?: THREE TIMES A WEEK

## 2025-07-07 NOTE — PATIENT INSTRUCTIONS
Take Lyrical 100 mg and 50 mg ( Total 150 mg ) twice a day until you run out of Lyrica  50 mg capsules then resume 100 mg twice a day  Start taking Januvia 25 mg once a day

## 2025-07-07 NOTE — PROGRESS NOTES
Preventive Care Visit  [facility] ARIEL Llanos MD, Family Medicine  Jul 7, 2025      Routine general medical examination at a health care facility      Chronic kidney disease, stage 3a (H)  - Albumin Random Urine Quantitative with Creat Ratio; Future    Type 2 diabetes mellitus treated without insulin (H)  Added Januvia  - sitagliptin (JANUVIA) 25 MG tablet; Take 1 tablet (25 mg) by mouth daily.    Moderate single current episode of major depressive disorder (H)  Stable    Essential hypertension  Controlled.     Chronic midline low back pain without sciatica  Take Lyrical 100 mg and 50 mg ( Total 150 mg ) twice a day until you run out of Lyrica  50 mg capsules then resume 100 mg twice a day    Subjective   Pa is a 75 year old, presenting for the following:  Wellness Visit  DM- Last A1c 7.0. Home BG in 130s- 150s. No low BG with hypoglycemic symptoms. Only on glipizide 5 mg BID.   Back pain- On Lyrica 100 mg BID ( was on 50 mg in the past ), still c/o pain with this med .   Depression- No worsening depression. Denied SI/HI.  HTN- Taking med as Rxed, not monitoring BP at home.        7/7/2025     2:03 PM   Additional Questions   Roomed by Veronica THURSTON   Accompanied by nathan      Advance Care Planning    Discussed advance care planning with patient; however, patient declined at this time.        7/7/2025   General Health   How would you rate your overall physical health? Good   Feel stress (tense, anxious, or unable to sleep) Not at all         7/7/2025   Nutrition   Diet: Regular (no restrictions)         4/26/2024   Exercise   Days per week of moderate/strenous exercise 0 days   Average minutes spent exercising at this level 0 min         7/7/2025   Social Factors   Frequency of gathering with friends or relatives Three times a week   Worry food won't last until get money to buy more No   Food not last or not have enough money for food? No   Do you have housing? (Housing is defined as stable  permanent housing and does not include staying outside in a car, in a tent, in an abandoned building, in an overnight shelter, or couch-surfing.) Yes   Are you worried about losing your housing? No   Lack of transportation? No   Unable to get utilities (heat,electricity)? No         7/7/2025   Fall Risk   Fallen 2 or more times in the past year? No   Trouble with walking or balance? No          7/7/2025   Activities of Daily Living- Home Safety   Needs help with the following daily activites Telephone use    Transportation    Shopping    Preparing meals    Housework    Bathing    Laundry    Medication administration    Money management    Toileting    Feeding    Dressing   Do you have the help that you need? Yes for Some   Safety concerns in the home None of the above       Multiple values from one day are sorted in reverse-chronological order         7/7/2025   Dental   Dentist two times every year? Yes         7/7/2025   Hearing Screening   Hearing concerns? None of the above         7/7/2025   Driving Risk Screening   Patient/family members have concerns about driving No         7/7/2025   General Alertness/Fatigue Screening   Have you been more tired than usual lately? No         7/7/2025   Urinary Incontinence Screening   Bothered by leaking urine in past 6 months Yes       Today's PHQ-9 Score:       7/7/2025     1:54 PM   PHQ-9 SCORE   PHQ-9 Total Score MyChart 0   PHQ-9 Total Score 0        Patient-reported         7/7/2025   Substance Use   Alcohol more than 3/day or more than 7/wk No   Do you have a current opioid prescription? No   How severe/bad is pain from 1 to 10? 0/10 (No Pain)   Do you use any other substances recreationally? No     Social History     Tobacco Use    Smoking status: Never     Passive exposure: Never    Smokeless tobacco: Current     Types: Chew    Tobacco comments:     chews betal nut   Vaping Use    Vaping status: Never Used           8/28/2023   LAST FHS-7 RESULTS   1st degree  relative breast or ovarian cancer No   Any relative bilateral breast cancer No   Any male have breast cancer No   Any ONE woman have BOTH breast AND ovarian cancer No   Any woman with breast cancer before 50yrs No   2 or more relatives with breast AND/OR ovarian cancer No   2 or more relatives with breast AND/OR bowel cancer No        Mammogram Screening - Mammogram every 1-2 years updated in Health Maintenance based on mutual decision making    ASCVD Risk   The 10-year ASCVD risk score (Marcus GARCIA, et al., 2019) is: 30.5%    Values used to calculate the score:      Age: 75 years      Sex: Female      Is Non- : No      Diabetic: Yes      Tobacco smoker: No      Systolic Blood Pressure: 150 mmHg      Is BP treated: No      HDL Cholesterol: 42 mg/dL      Total Cholesterol: 166 mg/dL            Reviewed and updated as needed this visit by Provider                  Current providers sharing in care for this patient include:  Patient Care Team:  Bassam Llanos MD as PCP - General (Family Medicine)  Hattie Hummel, PharmD as Pharmacist (Pharmacist)  Bassam Llanos MD as Assigned PCP  Uche Linares MD as Assigned Musculoskeletal Provider  Pancho Baugh LSW as Lead Care Coordinator  Lakia Ravi PA-C as Assigned Dermatology Provider    The following health maintenance items are reviewed in Epic and correct as of today:  Health Maintenance   Topic Date Due    RSV VACCINE (1 - Risk 60-74 years 1-dose series) Never done    MEDICARE ANNUAL WELLNESS VISIT  04/26/2025    ZOSTER VACCINE (2 of 2) 05/30/2025    MICROALBUMIN  07/26/2025    DEPRESSION ACTION PLAN  08/20/2025 (Originally 1950)    ADVANCE CARE PLANNING  04/04/2026 (Originally 10/8/2024)    MAMMO SCREENING  08/28/2025    INFLUENZA VACCINE (1) 09/01/2025    A1C  10/04/2025    PHQ-9  01/07/2026    BMP  04/04/2026    LIPID  04/04/2026    DIABETIC FOOT EXAM  04/04/2026    ANNUAL REVIEW OF HM ORDERS  04/04/2026    EYE EXAM   "04/08/2026    FALL RISK ASSESSMENT  07/07/2026    DTAP/TDAP/TD VACCINE (2 - Td or Tdap) 07/26/2027    COLORECTAL CANCER SCREENING  03/10/2030    DEXA  08/20/2034    HEPATITIS C SCREENING  Completed    PNEUMOCOCCAL VACCINE 50+ YEARS  Completed    URINALYSIS  Completed    HPV VACCINE  Aged Out    MENINGITIS VACCINE  Aged Out    HEMOGLOBIN  Discontinued    COVID-19 VACCINE  Discontinued         Review of Systems  CONSTITUTIONAL: NEGATIVE for fever, chills, change in weight  ENT/MOUTH: NEGATIVE for ear, mouth and throat problems  RESP: NEGATIVE for significant cough or SOB  CV: NEGATIVE for chest pain, palpitations or peripheral edema     Objective    Exam  Vitals:    07/07/25 1411   BP: 110/67   Pulse: 84   Resp: 16   Temp: 98.2  F (36.8  C)   TempSrc: Oral   SpO2: 97%   Weight: 59.2 kg (130 lb 9 oz)   Height: 1.499 m (4' 11\")        Physical Exam  Gen - alert, orientated, NAD  Eyes - fundascopic exam limited by the undialated pupil but looks symmetric  ENT - oropharynx clear, TMs clear  Neck - supple, no palpable mass or lymphadenopathy  CV - RRR, no murmur  Breast - Declined  Resp - lungs CTA  Ab - soft, nontender, no palpable mass or organomegaly   - Declined, deferred.  Extrem - warm, no edema  Neuro - CN II-XII intact  Skin - no rash.  No atypical appearing lesions seen.          7/7/2025   Mini Cog   Clock Draw Score 0 Abnormal   3 Item Recall 1 object recalled   Mini Cog Total Score 1             Signed Electronically by: Bassam Llanos MD    Answers submitted by the patient for this visit:  Patient Health Questionnaire (Submitted on 7/7/2025)  PHQ9 TOTAL SCORE: 0    "

## 2025-07-29 ENCOUNTER — PATIENT OUTREACH (OUTPATIENT)
Dept: CARE COORDINATION | Facility: CLINIC | Age: 75
End: 2025-07-29
Payer: COMMERCIAL

## 2025-08-18 ENCOUNTER — MEDICAL CORRESPONDENCE (OUTPATIENT)
Dept: HEALTH INFORMATION MANAGEMENT | Facility: CLINIC | Age: 75
End: 2025-08-18
Payer: COMMERCIAL

## 2025-08-26 ENCOUNTER — PATIENT OUTREACH (OUTPATIENT)
Dept: CARE COORDINATION | Facility: CLINIC | Age: 75
End: 2025-08-26
Payer: COMMERCIAL